# Patient Record
Sex: MALE | Race: WHITE
[De-identification: names, ages, dates, MRNs, and addresses within clinical notes are randomized per-mention and may not be internally consistent; named-entity substitution may affect disease eponyms.]

---

## 2017-03-11 ENCOUNTER — HOSPITAL ENCOUNTER (INPATIENT)
Dept: HOSPITAL 17 - PHEFT | Age: 27
LOS: 3 days | Discharge: LEFT BEFORE DISCHARGE | DRG: 433 | End: 2017-03-14
Attending: HOSPITALIST | Admitting: HOSPITALIST
Payer: COMMERCIAL

## 2017-03-11 VITALS
HEART RATE: 107 BPM | OXYGEN SATURATION: 99 % | SYSTOLIC BLOOD PRESSURE: 122 MMHG | TEMPERATURE: 97.8 F | DIASTOLIC BLOOD PRESSURE: 72 MMHG | RESPIRATION RATE: 20 BRPM

## 2017-03-11 VITALS
DIASTOLIC BLOOD PRESSURE: 83 MMHG | OXYGEN SATURATION: 94 % | RESPIRATION RATE: 20 BRPM | SYSTOLIC BLOOD PRESSURE: 133 MMHG | HEART RATE: 103 BPM | TEMPERATURE: 99.2 F

## 2017-03-11 VITALS
RESPIRATION RATE: 20 BRPM | SYSTOLIC BLOOD PRESSURE: 142 MMHG | OXYGEN SATURATION: 95 % | TEMPERATURE: 99 F | HEART RATE: 97 BPM | DIASTOLIC BLOOD PRESSURE: 79 MMHG

## 2017-03-11 VITALS
RESPIRATION RATE: 18 BRPM | OXYGEN SATURATION: 98 % | HEART RATE: 71 BPM | SYSTOLIC BLOOD PRESSURE: 123 MMHG | DIASTOLIC BLOOD PRESSURE: 62 MMHG

## 2017-03-11 VITALS
OXYGEN SATURATION: 98 % | DIASTOLIC BLOOD PRESSURE: 76 MMHG | HEART RATE: 100 BPM | RESPIRATION RATE: 18 BRPM | SYSTOLIC BLOOD PRESSURE: 140 MMHG

## 2017-03-11 VITALS
DIASTOLIC BLOOD PRESSURE: 65 MMHG | HEART RATE: 90 BPM | SYSTOLIC BLOOD PRESSURE: 135 MMHG | OXYGEN SATURATION: 97 % | RESPIRATION RATE: 16 BRPM

## 2017-03-11 VITALS — BODY MASS INDEX: 23.53 KG/M2 | WEIGHT: 173.72 LBS | HEIGHT: 72 IN

## 2017-03-11 DIAGNOSIS — F10.188: ICD-10-CM
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
## 2017-03-11 DIAGNOSIS — L29.9: ICD-10-CM

## 2017-03-11 DIAGNOSIS — K70.30: ICD-10-CM

## 2017-03-11 DIAGNOSIS — K72.90: ICD-10-CM

## 2017-03-11 DIAGNOSIS — Z87.820: ICD-10-CM

## 2017-03-11 DIAGNOSIS — Z72.0: ICD-10-CM

## 2017-03-11 DIAGNOSIS — Z78.1: ICD-10-CM

## 2017-03-11 DIAGNOSIS — E72.20: ICD-10-CM

## 2017-03-11 DIAGNOSIS — R74.0: ICD-10-CM

## 2017-03-11 DIAGNOSIS — K70.10: Primary | ICD-10-CM

## 2017-03-11 DIAGNOSIS — K21.9: ICD-10-CM

## 2017-03-11 LAB
ALP SERPL-CCNC: 223 U/L (ref 45–117)
ALT SERPL-CCNC: 66 U/L (ref 12–78)
AMPHETAMINE, URINE: (no result)
ANION GAP SERPL CALC-SCNC: 14 MEQ/L (ref 5–15)
APAP SERPL-MCNC: (no result) MCG/ML (ref 10–30)
APTT BLD: 35.1 SEC (ref 24.3–30.1)
AST SERPL-CCNC: 166 U/L (ref 15–37)
BARBITURATES, URINE: (no result)
BASOPHILS # BLD AUTO: 0.1 TH/MM3 (ref 0–0.2)
BASOPHILS NFR BLD: 0.8 % (ref 0–2)
BILIRUB SERPL-MCNC: 22.5 MG/DL (ref 0.2–1)
BUN SERPL-MCNC: 4 MG/DL (ref 7–18)
CHLORIDE SERPL-SCNC: 92 MEQ/L (ref 98–107)
COCAINE UR-MCNC: (no result) NG/ML
COLOR UR: (no result)
COMMENT (UR): (no result)
CULTURE IF INDICATED: (no result)
EOSINOPHIL # BLD: 0 TH/MM3 (ref 0–0.4)
EOSINOPHIL NFR BLD: 0.5 % (ref 0–4)
ERYTHROCYTE [DISTWIDTH] IN BLOOD BY AUTOMATED COUNT: 15.6 % (ref 11.6–17.2)
GFR SERPLBLD BASED ON 1.73 SQ M-ARVRAT: 163 ML/MIN (ref 89–?)
GLUCOSE UR STRIP-MCNC: 100 MG/DL
HCO3 BLD-SCNC: 27 MEQ/L (ref 21–32)
HCT VFR BLD CALC: 34.7 % (ref 39–51)
HEMO FLAGS: (no result)
HGB UR QL STRIP: (no result)
INR PPP: 1.5 RATIO
KETONES UR STRIP-MCNC: (no result) MG/DL
LEUKOCYTE ESTERASE UR QL STRIP: (no result) /HPF (ref 0–5)
LYMPHOCYTES # BLD AUTO: 0.9 TH/MM3 (ref 1–4.8)
LYMPHOCYTES NFR BLD AUTO: 12 % (ref 9–44)
MCH RBC QN AUTO: 36 PG (ref 27–34)
MCHC RBC AUTO-ENTMCNC: 34.6 % (ref 32–36)
MCV RBC AUTO: 104.1 FL (ref 80–100)
METHOD OF COLLECTION: (no result)
MONOCYTES NFR BLD: 10.2 % (ref 0–8)
NEUTROPHILS # BLD AUTO: 6 TH/MM3 (ref 1.8–7.7)
NEUTROPHILS NFR BLD AUTO: 76.5 % (ref 16–70)
NITRITE UR QL STRIP: (no result)
PLATELET # BLD: 123 TH/MM3 (ref 150–450)
POTASSIUM SERPL-SCNC: 3 MEQ/L (ref 3.5–5.1)
PROTHROMBIN TIME: 16.8 SEC (ref 9.8–11.6)
RBC # BLD AUTO: 3.33 MIL/MM3 (ref 4.5–5.9)
SODIUM SERPL-SCNC: 133 MEQ/L (ref 136–145)
SP GR UR STRIP: (no result) (ref 1–1.03)
SQUAMOUS #/AREA URNS HPF: (no result) /HPF (ref 0–5)
WBC # BLD AUTO: 7.8 TH/MM3 (ref 4–11)

## 2017-03-11 PROCEDURE — 80053 COMPREHEN METABOLIC PANEL: CPT

## 2017-03-11 PROCEDURE — 83690 ASSAY OF LIPASE: CPT

## 2017-03-11 PROCEDURE — 85007 BL SMEAR W/DIFF WBC COUNT: CPT

## 2017-03-11 PROCEDURE — 86308 HETEROPHILE ANTIBODY SCREEN: CPT

## 2017-03-11 PROCEDURE — 96360 HYDRATION IV INFUSION INIT: CPT

## 2017-03-11 PROCEDURE — 84443 ASSAY THYROID STIM HORMONE: CPT

## 2017-03-11 PROCEDURE — 85730 THROMBOPLASTIN TIME PARTIAL: CPT

## 2017-03-11 PROCEDURE — 74177 CT ABD & PELVIS W/CONTRAST: CPT

## 2017-03-11 PROCEDURE — 76705 ECHO EXAM OF ABDOMEN: CPT

## 2017-03-11 PROCEDURE — 81001 URINALYSIS AUTO W/SCOPE: CPT

## 2017-03-11 PROCEDURE — 82248 BILIRUBIN DIRECT: CPT

## 2017-03-11 PROCEDURE — 80074 ACUTE HEPATITIS PANEL: CPT

## 2017-03-11 PROCEDURE — 85610 PROTHROMBIN TIME: CPT

## 2017-03-11 PROCEDURE — 85027 COMPLETE CBC AUTOMATED: CPT

## 2017-03-11 PROCEDURE — 80307 DRUG TEST PRSMV CHEM ANLYZR: CPT

## 2017-03-11 PROCEDURE — 83735 ASSAY OF MAGNESIUM: CPT

## 2017-03-11 PROCEDURE — 82140 ASSAY OF AMMONIA: CPT

## 2017-03-11 PROCEDURE — 85025 COMPLETE CBC W/AUTO DIFF WBC: CPT

## 2017-03-11 RX ADMIN — ONDANSETRON PRN MG: 2 INJECTION, SOLUTION INTRAMUSCULAR; INTRAVENOUS at 20:39

## 2017-03-11 RX ADMIN — SODIUM CHLORIDE, PRESERVATIVE FREE SCH ML: 5 INJECTION INTRAVENOUS at 20:44

## 2017-03-11 RX ADMIN — WATER SCH ML: 1 IRRIGANT IRRIGATION at 16:31

## 2017-03-11 RX ADMIN — POTASSIUM CHLORIDE SCH MEQ: 750 TABLET, FILM COATED, EXTENDED RELEASE ORAL at 16:44

## 2017-03-11 RX ADMIN — PANTOPRAZOLE SCH MG: 40 TABLET, DELAYED RELEASE ORAL at 17:24

## 2017-03-11 RX ADMIN — SODIUM CHLORIDE, PRESERVATIVE FREE PRN ML: 5 INJECTION INTRAVENOUS at 16:31

## 2017-03-11 NOTE — RADHPO
EXAM DATE/TIME:  03/11/2017 13:56 

 

[facility] COMPARISON:     

No previous studies available for comparison.

        

 

 

INDICATIONS :     

Right upper quadrant pain.

                     

 

MEDICAL HISTORY :     

Gastroesophageal reflux disease.     Skull fracture. Right foot/ankle fracture. Jaundice. Abdominal p
ain. ETOH abuse.

 

SURGICAL HISTORY :          

Right foot/ankle fracture repair.

 

ENCOUNTER:     

Initial

 

ACUITY:     

4-6 days

 

PAIN SCORE:     

4/10

 

LOCATION:     

Right upper quadrant 

                     

MEASUREMENTS:     

 

LIVER:     

21.0 cm length 

 

COMMON DUCT:     

6 mm

 

RIGHT KIDNEY:     

11.8 x 5.8 x 5.4 cm

 

FINDINGS:     

 

LIVER:     

The liver is enlarged and echogenic. No focal hepatic masses seen.

 

COMMON DUCT:     

No intraluminal mass or stone visualized.  

 

GALLBLADDER:          

There is sludge within the gallbladder. The gallbladder wall is thickened. Gallstones are not seen.

 

PANCREAS:          

The visualized portions are within normal limits.  

 

RIGHT KIDNEY:          

No evidence of hydronephrosis, stone, or mass.  

 

CONCLUSION:     

1. Hepatomegaly and suspected fatty infiltration.

2. Thickened gallbladder wall which can be seen with hepatic disease. Gallstones are not seen. Cholec
ystitis cannot be ruled out in the correct clinical situation.

 

 

 

 Jose Arnett MD on March 11, 2017 at 14:44           

Board Certified Radiologist.

 This report was verified electronically.

## 2017-03-11 NOTE — PD
HPI


Chief Complaint:  Abdominal Pain


Time Seen by Provider:  13:08


Travel History


International Travel<30 days:  No


Contact w/Intl Traveler<30days:  No





History of Present Illness


HPI


Patient is a 26-year-old male who presents to emergency room for evaluation of 

right upper quadrant abdominal pain, fatigue, decreased appetite as well as 

jaundice.  Patient reports that for the past 4-5 days, he has not been feeling 

well.  Patient reports that he has been feeling nauseous, reports overall 

decreased oral intake.  Reports that he has been having pains to his right 

upper abdomen.  Patient also reports that he has noticed that his eyes as well 

as his skin appears yellow in color.  Reports that he has noticed mild pruritus 

to his skin.  Patient does admit to heavy alcohol drinking and does drink about 

6-7 alcoholic beverages per day.  Reports that he has been drinking very 

heavily for the past few years.  Patient reports that he has been also having 

some low-grade fevers with a MAXIMUM TEMPERATURE of 101.  Patient here for 

evaluation of the symptoms.


Patient denies any use of drugs, reports no past medical history or past 

surgical history.





PFSH


Past Medical History


Asthma:  No


Autoimmune Disease:  No


Blood Disorders:  No


Anxiety:  No


Depression:  No


Cardiovascular Problems:  No


Cystic Fibrosis:  No


Diminished Hearing:  No


Gastrointestinal Disorders:  Yes (REFLUX, IS TAKING PRILOSEC OTC FOR THIS SINCE 

2001)


GERD:  Yes


Genitourinary:  No


Musculoskeletal:  Yes (FX RT FT /ANKLE)


Neurologic:  No


Psychiatric:  No


Respiratory:  No


Sleep Apnea:  No





Past Surgical History


Abdominal Surgery:  No


Cardiac Surgery:  No


Ear Surgery:  No


Endocrine Surgery:  No


Eye Surgery:  No


Genitourinary Surgery:  No


Gynecologic Surgery:  No


Oral Surgery:  No


Thoracic Surgery:  No





Social History


Alcohol Use:  Yes (3 DRINKS PER MONTH)


Tobacco Use:  Yes (1 PK PER WEEK)


Substance Use:  No





Allergies-Medications


(Allergen,Severity, Reaction):  


Coded Allergies:  


     No Known Allergies (Verified , 3/11/17)


Reported Meds & Prescriptions





Reported Meds & Active Scripts


Active


No Active Prescriptions or Reported Medications    








Review of Systems


General / Constitutional:  Positive: Fever, Chills


Eyes:  No: Visual changes


HENT:  No: Headaches


Cardiovascular:  No: Chest Pain or Discomfort


Respiratory:  No: Shortness of Breath


Gastrointestinal:  Positive: Nausea, Abdominal Pain, Loss of Appetite,  No: 

Vomiting


Genitourinary:  No: Dysuria


Musculoskeletal:  No: Pain


Skin:  No Rash


Neurologic:  Positive: Weakness


Psychiatric:  No: Depression


Endocrine:  No: Polydipsia


Hematologic/Lymphatic:  No: Easy Bruising





Physical Exam


Narrative


GENERAL: no acute distress, nontoxic 


SKIN: Warm and dry.  Patient is jaundiced appearing


HEAD: Atraumatic. Normocephalic. 


EYES: Pupils equal and round.  Patient with jaundice to sclera.  Patient with 

left-sided conjunctival injected. 


ENT: No nasal bleeding or discharge.  Mucous membranes pink and moist.


NECK: Trachea midline. No JVD. 


CARDIOVASCULAR: Regular rate and rhythm.  No murmur appreciated.


RESPIRATORY: No accessory muscle use. Clear to auscultation. Breath sounds 

equal bilaterally. 


GASTROINTESTINAL: Abdomen soft, patient with mild tenderness to the right upper 

quadrant with no rebound or guarding on exam, nondistended. 


MUSCULOSKELETAL: No obvious deformities. No clubbing.  No cyanosis.  No edema. 


NEUROLOGICAL: Awake and alert. No obvious cranial nerve deficits.  Motor 

grossly within normal limits. Normal speech.


PSYCHIATRIC: Appropriate mood and affect; insight and judgment normal.





Data


Data


Last Documented VS








Vital Signs








  Date Time  Temp Pulse Resp B/P Pulse Ox O2 Delivery O2 Flow Rate FiO2


 


3/11/17 14:37  71 18 123/62 98 Room Air  


 


3/11/17 11:20 99.2       











Orders








 Us Abdomen Gallbladder (3/11/17 )


Lipase (3/11/17 13:42)


Urinalysis - C+S If Indicated (3/11/17 13:42)


Comprehensive Metabolic Panel (3/11/17 13:42)


Complete Blood Count With Diff (3/11/17 13:42)


Hepatitis Profile (3/11/17 13:42)


Act Partial Throm Time (Ptt) (3/11/17 13:42)


Prothrombin Time / Inr (Pt) (3/11/17 13:42)


Ammonia (3/11/17 13:42)


Tylenol (Acetaminophen) (3/11/17 13:42)


Iv Access Insert/Monitor (3/11/17 13:42)


Alcohol (Ethanol) (3/11/17 13:42)


Ct Abd/Pel W Iv Contrast(Rout) (3/11/17 14:06)


Sodium Chlor 0.9% 1000 Ml Inj (Ns 1000 M (3/11/17 14:15)








Labs








 Laboratory Tests








Test 3/11/17 3/11/17 3/11/17





 12:00 13:50 14:55


 


White Blood Count 7.8 TH/MM3  


 


Red Blood Count 3.33 MIL/MM3  


 


Hemoglobin 12.0 GM/DL  


 


Hematocrit 34.7 %  


 


Mean Corpuscular Volume 104.1 FL  


 


Mean Corpuscular Hemoglobin 36.0 PG  


 


Mean Corpuscular Hemoglobin 34.6 %  





Concent   


 


Red Cell Distribution Width 15.6 %  


 


Platelet Count 123 TH/MM3  


 


Mean Platelet Volume 9.5 FL  


 


Neutrophils (%) (Auto) 76.5 %  


 


Lymphocytes (%) (Auto) 12.0 %  


 


Monocytes (%) (Auto) 10.2 %  


 


Eosinophils (%) (Auto) 0.5 %  


 


Basophils (%) (Auto) 0.8 %  


 


Neutrophils # (Auto) 6.0 TH/MM3  


 


Lymphocytes # (Auto) 0.9 TH/MM3  


 


Monocytes # (Auto) 0.8 TH/MM3  


 


Eosinophils # (Auto) 0.0 TH/MM3  


 


Basophils # (Auto) 0.1 TH/MM3  


 


CBC Comment DIFF FINAL   


 


Differential Comment    


 


Prothrombin Time 16.8 SEC  


 


Prothromb Time International 1.5 RATIO  





Ratio   


 


Activated Partial 35.1 SEC  





Thromboplast Time   


 


Sodium Level 133 MEQ/L  


 


Potassium Level 3.0 MEQ/L  


 


Chloride Level 92 MEQ/L  


 


Carbon Dioxide Level 27.0 MEQ/L  


 


Anion Gap 14 MEQ/L  


 


Blood Urea Nitrogen 4 MG/DL  


 


Creatinine 0.60 MG/DL  


 


Estimat Glomerular Filtration 163 ML/MIN  





Rate   


 


Random Glucose 83 MG/DL  


 


Calcium Level 8.2 MG/DL  


 


Total Bilirubin 22.5 MG/DL  


 


Aspartate Amino Transf 166 U/L  





(AST/SGOT)   


 


Alanine Aminotransferase 66 U/L  





(ALT/SGPT)   


 


Alkaline Phosphatase 223 U/L  


 


Total Protein 6.2 GM/DL  


 


Albumin 2.9 GM/DL  


 


Lipase 572 U/L  


 


Acetaminophen Level LESS THAN 2.0  





 MCG/ML  


 


Ethyl Alcohol Level 21 MG/DL  


 


Ammonia  39 MCMOL/L 


 


Urine Collection Type   CLEAN CATCH 


 


Urine Color   AREN 


 


Urine Turbidity   CLEAR 


 


Urine pH   7.0 


 


Urine Specific Gravity   GREATER THAN





   1.035


 


Urine Protein   30 mg/dL


 


Urine Glucose (UA)   100 mg/dL


 


Urine Ketones   TRACE mg/dL


 


Urine Occult Blood   TRACE 


 


Urine Nitrite   NEG 


 


Urine Bilirubin   LARGE 


 


Urine Leukocyte Esterase   TRACE 


 


Urine WBC   0-2 /hpf


 


Urine Squamous Epithelial   0-5 /hpf





Cells   


 


Microscopic Urinalysis Comment   CULT NOT





   INDICATED














MDM


Medical Decision Making


Medical Screen Exam Complete:  Yes


Emergency Medical Condition:  Yes


Differential Diagnosis


Biliary duct obstruction, hepatitis, alcoholic hepatitis, pancreatitis, 

pancreatic cancer, cholangiocarcinoma, biliary atresia


Narrative Course


Patient is a 26-year-old male who presents to emergency room with complaints of 

jaundice, right upper quadrant abdominal pain, pruritus, fevers, fatigue and 

generalized weakness per the past 4-5 days.  Patient does have significant 

jaundice to his skin as well as to his sclera, patient admits heavy alcohol 

drinking.


Labs as well as hepatitis panel, lipase, liver function tests, right upper 

quadrant also been ordered.


Plan to monitor patient this time.





Physician Communication


Physician Communication


case reviewed with dr sequeira who accepts pt to service





Diagnosis





 Primary Impression:  


 Acute hepatitis


 Additional Impressions:  


 Alcohol abuse


 Transaminitis


 Jaundice


Scripts


No Active Prescriptions or Reported Meds








Cristal Phillips DO Mar 11, 2017 13:15

## 2017-03-11 NOTE — RADHPO
EXAM DATE/TIME:  03/11/2017 14:41 

 

[facility] COMPARISON:     

No previous studies available for comparison.

 

 

INDICATIONS :     

Abdomen pain, jaundice

                      

 

IV CONTRAST:     

70 cc Omnipaque 350 (iohexol) IV 

 

 

ORAL CONTRAST:      

No oral contrast ingested.

                      

 

RADIATION DOSE:     

10.62 CTDIvol (mGy) 

 

 

MEDICAL HISTORY :     

Gastroesophageal reflux disease.  

 

SURGICAL HISTORY :      

None. 

 

ENCOUNTER:      

Initial

 

ACUITY:      

1 day

 

PAIN SCALE:      

4/10

 

LOCATION:         

Abdomen 

 

TECHNIQUE:     

Volumetric scanning of the abdomen and pelvis was performed.  Using automated exposure control and ad
justment of the mA and/or kV according to patient size, radiation dose was kept as low as reasonably 
achievable to obtain optimal diagnostic quality images. 

 

FINDINGS:     

Liver is very enlarged and with heterogeneous, primarily fatty attenuation. No focal hepatic lesions 
seen. No ductal dilatation. There is sludge and/or small stones in the gallbladder.

 

Spleen enlarged at approximately 15.7 cm craniocaudal.

 

Pancreas, adrenal glands and kidneys within normal limits.

 

No obstruction or perceptible acute inflammatory changes of the gastrointestinal tract. There is smal
l ascites.

 

Visualized lung bases are clear. Visualized osseous structures are within normal limits.

 

CONCLUSION:     

1. Suspected hepatocellular disease and potentially with an acute component. Liver is very enlarged a
nd fatty infiltrated. There is small ascites and splenomegaly.

2. Sludge and/or small stones in the gallbladder without associated inflammatory changes. No duct sto
ne or ductal dilatation.

 

 

 

 Jose Irvin MD on March 11, 2017 at 15:07           

Board Certified Radiologist.

 This report was verified electronically.

## 2017-03-11 NOTE — HHI.HP
__________________________________________________





HPI


Service


[facility]


Primary Care Physician


No Primary Care Physician


Admission Diagnosis


Acute Liver Failure, Alcoholism


Diagnoses:  


Chief Complaint:  


yellow


Travel History


International Travel<30 Days:  No


Contact w/Intl Traveler <30 Da:  No


Traveled to Known Affected Are:  No


History of Present Illness


Patient is a 26-year-old gentleman with a known history of chronic alcoholism 

for at least the last 6 years.  He did come to the emergency room with 4 days 

of increasing "yellowness".  He arrives quite jaundiced with some left eye 

hemorrhage which is asymptomatic.  He has had increased nausea and decreased 

oral intake over the last several weeks.  He is accompanied by his mother.  He 

does have history of anxiety and had been taking alprazolam in the past but had 

stopped that about a year ago he is employed however has significant bruises 

and has reported increased weight loss of about 30 pounds in the last month 

with associated nausea and poor oral intake patient reports his abdomen is been 

aching.  He notes no history of a viral hepatitis or needle sticks or blood 

transfusions in the past.  Patient has been admitted Into the hospital due to 

increased jaundiced with evidence of liver failure and hyperbilirubinemia.  

Plan of care discussed with patient, IGOR MCGINNIS and mom at bedside





Review of Systems


Constitutional:  COMPLAINS OF: Fatigue, Weight loss, Change in appetite,  DENIES

: Diaphoretic episodes, Fever, Weight gain, Chills, Dizziness, Night Sweats


Endocrine:  DENIES: Heat/cold intolerance, Polydipsia, Polyuria, Polyphagia


Eyes:  DENIES: Blurred vision, Diplopia, Eye inflammation, Eye pain, Vision loss

, Photosensitivity, Double Vision


Ears, nose, mouth, throat:  DENIES: Tinnitus, Hearing loss, Vertigo, Nasal 

discharge, Oral lesions, Throat pain, Hoarseness, Ear Pain, Running Nose, 

Epistaxis, Sinus Pain, Toothache, Odynophagia


Respiratory:  DENIES: Apneas, Cough, Snoring, Wheezing, Hemoptysis, Sputum 

production, Shortness of breath


Cardiovascular:  DENIES: Chest pain, Palpitations, Syncope, Dyspnea on Exertion

, PND, Lower Extremity Edema, Orthopnea, Claudication


Gastrointestinal:  DENIES: Abdominal pain, Black stools, Bloody stools, 

Constipation, Diarrhea, Nausea, Vomiting, Difficulty Swallowing, Anorexia


Genitourinary:  DENIES: Sexual dysfunction, Urinary frequency, Urinary 

incontinence, Urgency, Hematuria, Dysuria, Nocturia, Penile Discharge, 

Testicular Pain, Testicular Swelling


Musculoskeletal:  DENIES: Joint pain, Muscle aches, Stiffness, Joint Swelling, 

Back pain, Neck pain


Integumentary:  DENIES: Abnormal pigmentation, Nail changes, Pruritus, Rash


Hematologic/lymphatic:  COMPLAINS OF: Bruising,  DENIES: Lymphadenopathy


Immunologic/allergic:  DENIES: Eczema, Urticaria


Neurologic:  DENIES: Abnormal gait, Headache, Localized weakness, Paresthesias, 

Seizures, Speech Problems, Tremor, Poor Balance


Psychiatric:  COMPLAINS OF: Anxiety,  DENIES: Confusion, Mood changes, 

Depression, Hallucinations, Agitation, Suicidal Ideation, Homicidal Ideation, 

Delusions





Past Family Social History


Past Medical History


Anxiety


Past Surgical History


None


Reported Medications


None


Allergies:  


Coded Allergies:  


     No Known Allergies (Verified , 3/11/17)


Active Ordered Medications


Reviewed and the medical record


Family History


No history of alcoholism


Social History


However take daily, drinks at least 612 ounce beers or hard liquor daily for 

the last 6 years, lives with her roommate, employed as a 





Physical Exam


Vital Signs





 Vital Signs








  Date Time  Temp Pulse Resp B/P Pulse Ox O2 Delivery O2 Flow Rate FiO2


 


3/11/17 14:37  71 18 123/62 98 Room Air  


 


3/11/17 13:16  90 16 135/65 97 Room Air  


 


3/11/17 11:20 99.2 103 20 133/83 94   








Physical Exam


GENERAL: This is a well-nourished, well-developed patient, tremorous.


SKIN: No rashes, scattered petechia,jaundice. Cool and dry.


HEAD: Atraumatic. Normocephalic. No temporal or scalp tenderness.


EYES: left eye asymptomatic hemorrhage, icteric


ENT: Nose without bleeding, purulent drainage or septal hematoma. Throat 

without erythema, tonsillar hypertrophy or exudate. Uvula midline. Airway 

patent.


NECK: Trachea midline. No JVD or lymphadenopathy. Supple, nontender, no 

meningeal signs.


CARDIOVASCULAR: Regular rate and rhythm without murmurs, gallops, or rubs. 


RESPIRATORY: Clear to auscultation. Breath sounds equal bilaterally. No wheezes

, rales, or rhonchi.  


GASTROINTESTINAL: Abdomen soft, non-tender, nondistended. there is hepato-

splenomegaly, no palpable masses. No guarding.


MUSCULOSKELETAL: Extremities without clubbing, cyanosis, or edema. No joint 

tenderness, effusion, or edema noted. No calf tenderness. Negative Homans sign 

bilaterally.


NEUROLOGICAL: Awake and alert. Cranial nerves II through XII intact.  Motor and 

sensory grossly within normal limits. Five out of 5 muscle strength in all 

muscle groups.  Normal speech.


Laboratory





Laboratory Tests








Test 3/11/17 3/11/17 3/11/17





 12:00 13:50 14:55


 


White Blood Count 7.8   


 


Red Blood Count 3.33   


 


Hemoglobin 12.0   


 


Hematocrit 34.7   


 


Mean Corpuscular Volume 104.1   


 


Mean Corpuscular Hemoglobin 36.0   


 


Mean Corpuscular Hemoglobin 34.6   





Concent   


 


Red Cell Distribution Width 15.6   


 


Platelet Count 123   


 


Mean Platelet Volume 9.5   


 


Neutrophils (%) (Auto) 76.5   


 


Lymphocytes (%) (Auto) 12.0   


 


Monocytes (%) (Auto) 10.2   


 


Eosinophils (%) (Auto) 0.5   


 


Basophils (%) (Auto) 0.8   


 


Neutrophils # (Auto) 6.0   


 


Lymphocytes # (Auto) 0.9   


 


Monocytes # (Auto) 0.8   


 


Eosinophils # (Auto) 0.0   


 


Basophils # (Auto) 0.1   


 


CBC Comment DIFF FINAL   


 


Differential Comment    


 


Prothrombin Time 16.8   


 


Prothromb Time International 1.5   





Ratio   


 


Activated Partial 35.1   





Thromboplast Time   


 


Sodium Level 133   


 


Potassium Level 3.0   


 


Chloride Level 92   


 


Carbon Dioxide Level 27.0   


 


Anion Gap 14   


 


Blood Urea Nitrogen 4   


 


Creatinine 0.60   


 


Estimat Glomerular Filtration 163   





Rate   


 


Random Glucose 83   


 


Calcium Level 8.2   


 


Total Bilirubin 22.5   


 


Aspartate Amino Transf 166   





(AST/SGOT)   


 


Alanine Aminotransferase 66   





(ALT/SGPT)   


 


Alkaline Phosphatase 223   


 


Total Protein 6.2   


 


Albumin 2.9   


 


Lipase 572   


 


Acetaminophen Level LESS THAN 2.0   


 


Ethyl Alcohol Level 21   


 


Ammonia  39  


 


Urine Collection Type   CLEAN CATCH 


 


Urine Color   AREN 


 


Urine Turbidity   CLEAR 


 


Urine pH   7.0 


 


Urine Specific Gravity   GREATER THAN





   1.035


 


Urine Protein   30 


 


Urine Glucose (UA)   100 


 


Urine Ketones   TRACE 


 


Urine Occult Blood   TRACE 


 


Urine Nitrite   NEG 


 


Urine Bilirubin   LARGE 


 


Urine Leukocyte Esterase   TRACE 


 


Urine WBC   0-2 


 


Urine Squamous Epithelial   0-5 





Cells   


 


Microscopic Urinalysis Comment   CULT NOT





   INDICATED








Result Diagram:  


3/11/17 1200                                                                   

             3/11/17 1200





Imaging


Gallbladder ultrasound, some enlargement without evidence of stones 


CT abdomen pelvis shows fatty liver, splenomegaly and minimal ascites





Assessment and Plan


Problem List:  


(1) Acute hepatitis


ICD Code:  B17.9


Status:  Acute


Plan:  likely  due to etoh (thrombocytopenia likley related)


r/o other causes? Viral, tylenol, GB disease


GI eval pending


aldactone, nadolol, lactulose


Reg diet, folic acid, thiamine, multivitamin, Protonix


Replace electrolytes





Code Status


full code


Discussed Condition With


patient, mom, ER MD





Physician Certification


2 Midnight Certification Type:  Admission for Inpatient Services


Order for Inpatient Services


The services are ordered in accordance with Medicare regulations or non-

Medicare payer requirements, as applicable.  In the case of services not 

specified as inpatient-only, they are appropriately provided as inpatient 

services in accordance with the 2-midnight benchmark.


Estimated LOS (days):  4


4 days is the estimated time the patient will need to remain in the hospital, 

assuming treatment plan goals are met and no additional complications.


Post-Hospital Plan:  Not yet determined








Brianna Jimenez MD Mar 11, 2017 16:32

## 2017-03-12 VITALS
DIASTOLIC BLOOD PRESSURE: 77 MMHG | HEART RATE: 103 BPM | SYSTOLIC BLOOD PRESSURE: 117 MMHG | OXYGEN SATURATION: 94 % | RESPIRATION RATE: 16 BRPM | TEMPERATURE: 100 F

## 2017-03-12 VITALS
DIASTOLIC BLOOD PRESSURE: 78 MMHG | OXYGEN SATURATION: 94 % | SYSTOLIC BLOOD PRESSURE: 118 MMHG | TEMPERATURE: 99.2 F | HEART RATE: 104 BPM | RESPIRATION RATE: 20 BRPM

## 2017-03-12 VITALS
HEART RATE: 109 BPM | RESPIRATION RATE: 20 BRPM | SYSTOLIC BLOOD PRESSURE: 109 MMHG | DIASTOLIC BLOOD PRESSURE: 73 MMHG | OXYGEN SATURATION: 95 % | TEMPERATURE: 100.6 F

## 2017-03-12 VITALS
TEMPERATURE: 97.1 F | RESPIRATION RATE: 20 BRPM | SYSTOLIC BLOOD PRESSURE: 119 MMHG | HEART RATE: 101 BPM | OXYGEN SATURATION: 97 % | DIASTOLIC BLOOD PRESSURE: 76 MMHG

## 2017-03-12 VITALS
HEART RATE: 113 BPM | TEMPERATURE: 98 F | RESPIRATION RATE: 20 BRPM | OXYGEN SATURATION: 100 % | SYSTOLIC BLOOD PRESSURE: 126 MMHG | DIASTOLIC BLOOD PRESSURE: 84 MMHG

## 2017-03-12 VITALS
TEMPERATURE: 98.9 F | OXYGEN SATURATION: 97 % | DIASTOLIC BLOOD PRESSURE: 88 MMHG | RESPIRATION RATE: 20 BRPM | SYSTOLIC BLOOD PRESSURE: 114 MMHG | HEART RATE: 107 BPM

## 2017-03-12 LAB
ALP SERPL-CCNC: 208 U/L (ref 45–117)
ALT SERPL-CCNC: 65 U/L (ref 12–78)
ANION GAP SERPL CALC-SCNC: 10 MEQ/L (ref 5–15)
AST SERPL-CCNC: 146 U/L (ref 15–37)
BASOPHILS # BLD AUTO: 0.3 TH/MM3 (ref 0–0.2)
BASOPHILS NFR BLD: 3.7 % (ref 0–2)
BILIRUB SERPL-MCNC: 24.6 MG/DL (ref 0.2–1)
BUN SERPL-MCNC: 6 MG/DL (ref 7–18)
CHLORIDE SERPL-SCNC: 95 MEQ/L (ref 98–107)
EOSINOPHIL # BLD: 0 TH/MM3 (ref 0–0.4)
EOSINOPHIL NFR BLD: 0.6 % (ref 0–4)
EOSINOPHIL NFR BLD: 2 % (ref 0–4)
ERYTHROCYTE [DISTWIDTH] IN BLOOD BY AUTOMATED COUNT: 16.2 % (ref 11.6–17.2)
GFR SERPLBLD BASED ON 1.73 SQ M-ARVRAT: 120 ML/MIN (ref 89–?)
HCO3 BLD-SCNC: 27.6 MEQ/L (ref 21–32)
HCT VFR BLD CALC: 33.2 % (ref 39–51)
HEMO FLAGS: (no result)
INR PPP: 1.6 RATIO
LYMPHOCYTES # BLD AUTO: 2 TH/MM3 (ref 1–4.8)
LYMPHOCYTES NFR BLD AUTO: 25.7 % (ref 9–44)
MCH RBC QN AUTO: 36.1 PG (ref 27–34)
MCHC RBC AUTO-ENTMCNC: 34.4 % (ref 32–36)
MCV RBC AUTO: 104.9 FL (ref 80–100)
MONOCYTES NFR BLD: 8.6 % (ref 0–8)
NEUTROPHILS # BLD AUTO: 4.6 TH/MM3 (ref 1.8–7.7)
NEUTROPHILS NFR BLD AUTO: 61.4 % (ref 16–70)
NEUTS BAND # BLD MANUAL: 6 TH/MM3 (ref 1.8–7.7)
NEUTS BAND NFR BLD: 2 % (ref 0–6)
NEUTS SEG NFR BLD MANUAL: 77 % (ref 16–70)
PLATELET # BLD: 128 TH/MM3 (ref 150–450)
POTASSIUM SERPL-SCNC: 3.2 MEQ/L (ref 3.5–5.1)
PROTHROMBIN TIME: 17.8 SEC (ref 9.8–11.6)
RBC # BLD AUTO: 3.17 MIL/MM3 (ref 4.5–5.9)
SCAN/DIFF: (no result)
SODIUM SERPL-SCNC: 133 MEQ/L (ref 136–145)
WBC # BLD AUTO: 7.6 TH/MM3 (ref 4–11)
WBC DIFF SAMPLE: 100

## 2017-03-12 RX ADMIN — PANTOPRAZOLE SCH MG: 40 TABLET, DELAYED RELEASE ORAL at 08:51

## 2017-03-12 RX ADMIN — SODIUM CHLORIDE, PRESERVATIVE FREE SCH ML: 5 INJECTION INTRAVENOUS at 08:50

## 2017-03-12 RX ADMIN — NADOLOL SCH MG: 20 TABLET ORAL at 08:51

## 2017-03-12 RX ADMIN — POTASSIUM CHLORIDE SCH MEQ: 750 TABLET, FILM COATED, EXTENDED RELEASE ORAL at 08:52

## 2017-03-12 RX ADMIN — SODIUM CHLORIDE, PRESERVATIVE FREE PRN ML: 5 INJECTION INTRAVENOUS at 22:21

## 2017-03-12 RX ADMIN — ACYCLOVIR SCH TAB: 800 TABLET ORAL at 08:51

## 2017-03-12 RX ADMIN — PENTOXIFYLLINE SCH MG: 400 TABLET, FILM COATED, EXTENDED RELEASE ORAL at 22:26

## 2017-03-12 RX ADMIN — SODIUM CHLORIDE, PRESERVATIVE FREE SCH ML: 5 INJECTION INTRAVENOUS at 20:06

## 2017-03-12 RX ADMIN — ONDANSETRON PRN MG: 2 INJECTION, SOLUTION INTRAMUSCULAR; INTRAVENOUS at 06:08

## 2017-03-12 RX ADMIN — WATER SCH ML: 1 IRRIGANT IRRIGATION at 08:53

## 2017-03-12 RX ADMIN — SPIRONOLACTONE SCH MG: 25 TABLET, FILM COATED ORAL at 08:50

## 2017-03-12 RX ADMIN — Medication SCH MG: at 08:51

## 2017-03-12 RX ADMIN — FOLIC ACID SCH MG: 1 TABLET ORAL at 08:52

## 2017-03-12 NOTE — HHI.PR
Subjective


Remarks


Patient seen on floor ambulating, would like to leave


Some concern for hallucinations


Mom at bedside and patient unable to clarify his mental status





Objective


Vitals





 Vital Signs








  Date Time  Temp Pulse Resp B/P Pulse Ox O2 Delivery O2 Flow Rate FiO2


 


3/12/17 12:00 98.0 113 20 126/84 100   


 


3/12/17 08:00 97.1 101 20 119/76 97   


 


3/12/17 04:29 100.0 103 16 117/77 94   


 


3/12/17 01:06 99.2 104 20 118/78 94   


 


3/11/17 20:28 97.8 107 20 122/72 99   


 


3/11/17 17:38 99.0 97 20 142/79 95   


 


3/11/17 16:43  100 18 140/76 98 Room Air  








 I/O








 3/11/17 3/11/17 3/11/17 3/12/17 3/12/17 3/12/17





 07:00 15:00 23:00 07:00 15:00 23:00


 


Intake Total   1000 ml  420 ml 


 


Balance   1000 ml  420 ml 


 


      


 


Intake Oral     420 ml 


 


IV Total   1000 ml   


 


# Voids  1 2 2 2 








Result Diagram:  


3/12/17 0822                                                                   

             3/12/17 0822





Imaging





Last Impressions








Abdomen/Pelvis CT 3/11/17 1406 Signed





Impressions: 





 Service Date/Time:  Saturday, March 11, 2017 14:41 - CONCLUSION:  1. Suspected 





 hepatocellular disease and potentially with an acute component. Liver is very 





 enlarged and fatty infiltrated. There is small ascites and splenomegaly. 2. 





 Sludge and/or small stones in the gallbladder without associated inflammatory 





 changes. No duct stone or ductal dilatation.     Jose Irvin MD 


 


Gall Bladder Ultrasound 3/11/17 0000 Signed





Impressions: 





 Service Date/Time:  Saturday, March 11, 2017 13:56 - CONCLUSION:  1. 





 Hepatomegaly and suspected fatty infiltration. 2. Thickened gallbladder wall 





 which can be seen with hepatic disease. Gallstones are not seen. Cholecystitis 





 cannot be ruled out in the correct clinical situation.     Jose Arnett MD 








Objective Remarks


GENERAL: This is a well-nourished, well-developed patient, in no apparent 

distress. Jaundice, petechia


CARDIOVASCULAR: sinus tachy without murmurs, gallops, or rubs. 


RESPIRATORY: Clear to auscultation. Breath sounds equal bilaterally. No wheezes

, rales, or rhonchi.  


GASTROINTESTINAL: Abdomen soft, non-tender, nondistended. Normal active bowel 

sounds


MUSCULOSKELETAL: Extremities without clubbing, cyanosis, or edema.


NEURO:  Alert & Oriented x4 to person, place, time, situation.  Moves all ext x4





A/P


Problem List:  


(1) Acute hepatitis


ICD Code:  B17.9


Status:  Acute


Plan:  likely  due to EtOH (Thrombocytopenia, coagulopathy likely related)


r/o other causes? Viral mono neg, hep viral studies pending , Tylenol neg, GB 

disease


GI eval pending


Aldactone, nadolol, lactulose


Reg diet, folic acid, thiamine, multivitamin, Protonix


Ammonia level still high


Replace electrolytes





Assessment and Plan


Santizo acted, Psych to see








Brianna Jimenez MD Mar 12, 2017 15:19

## 2017-03-13 VITALS
HEART RATE: 116 BPM | SYSTOLIC BLOOD PRESSURE: 132 MMHG | RESPIRATION RATE: 42 BRPM | DIASTOLIC BLOOD PRESSURE: 82 MMHG | TEMPERATURE: 101.6 F | OXYGEN SATURATION: 80 %

## 2017-03-13 VITALS
OXYGEN SATURATION: 92 % | DIASTOLIC BLOOD PRESSURE: 58 MMHG | SYSTOLIC BLOOD PRESSURE: 102 MMHG | RESPIRATION RATE: 18 BRPM | TEMPERATURE: 97.1 F | HEART RATE: 93 BPM

## 2017-03-13 VITALS
RESPIRATION RATE: 22 BRPM | OXYGEN SATURATION: 97 % | TEMPERATURE: 98.7 F | DIASTOLIC BLOOD PRESSURE: 78 MMHG | SYSTOLIC BLOOD PRESSURE: 114 MMHG | HEART RATE: 110 BPM

## 2017-03-13 VITALS
SYSTOLIC BLOOD PRESSURE: 106 MMHG | HEART RATE: 101 BPM | TEMPERATURE: 98.6 F | RESPIRATION RATE: 18 BRPM | OXYGEN SATURATION: 97 % | DIASTOLIC BLOOD PRESSURE: 68 MMHG

## 2017-03-13 VITALS
HEART RATE: 193 BPM | DIASTOLIC BLOOD PRESSURE: 46 MMHG | RESPIRATION RATE: 18 BRPM | OXYGEN SATURATION: 94 % | TEMPERATURE: 98 F | SYSTOLIC BLOOD PRESSURE: 101 MMHG

## 2017-03-13 VITALS
OXYGEN SATURATION: 95 % | TEMPERATURE: 99.5 F | HEART RATE: 115 BPM | DIASTOLIC BLOOD PRESSURE: 65 MMHG | SYSTOLIC BLOOD PRESSURE: 101 MMHG | RESPIRATION RATE: 22 BRPM

## 2017-03-13 VITALS
RESPIRATION RATE: 18 BRPM | TEMPERATURE: 98.2 F | OXYGEN SATURATION: 93 % | SYSTOLIC BLOOD PRESSURE: 95 MMHG | HEART RATE: 86 BPM | DIASTOLIC BLOOD PRESSURE: 60 MMHG

## 2017-03-13 VITALS
DIASTOLIC BLOOD PRESSURE: 61 MMHG | SYSTOLIC BLOOD PRESSURE: 100 MMHG | RESPIRATION RATE: 18 BRPM | OXYGEN SATURATION: 96 % | HEART RATE: 92 BPM | TEMPERATURE: 98.8 F

## 2017-03-13 VITALS — OXYGEN SATURATION: 90 %

## 2017-03-13 RX ADMIN — PANTOPRAZOLE SCH MG: 40 TABLET, DELAYED RELEASE ORAL at 07:27

## 2017-03-13 RX ADMIN — PENTOXIFYLLINE SCH MG: 400 TABLET, FILM COATED, EXTENDED RELEASE ORAL at 16:11

## 2017-03-13 RX ADMIN — PHENYTOIN SODIUM SCH MLS/HR: 50 INJECTION INTRAMUSCULAR; INTRAVENOUS at 16:58

## 2017-03-13 RX ADMIN — SODIUM CHLORIDE, PRESERVATIVE FREE PRN ML: 5 INJECTION INTRAVENOUS at 02:34

## 2017-03-13 RX ADMIN — FOLIC ACID SCH MG: 1 TABLET ORAL at 07:28

## 2017-03-13 RX ADMIN — PENTOXIFYLLINE SCH MG: 400 TABLET, FILM COATED, EXTENDED RELEASE ORAL at 05:19

## 2017-03-13 RX ADMIN — SODIUM CHLORIDE, PRESERVATIVE FREE PRN ML: 5 INJECTION INTRAVENOUS at 00:28

## 2017-03-13 RX ADMIN — SODIUM CHLORIDE, PRESERVATIVE FREE SCH ML: 5 INJECTION INTRAVENOUS at 21:30

## 2017-03-13 RX ADMIN — WATER SCH ML: 1 IRRIGANT IRRIGATION at 07:28

## 2017-03-13 RX ADMIN — PENTOXIFYLLINE SCH MG: 400 TABLET, FILM COATED, EXTENDED RELEASE ORAL at 21:30

## 2017-03-13 RX ADMIN — RIFAXIMIN SCH MG: 550 TABLET ORAL at 09:36

## 2017-03-13 RX ADMIN — SODIUM CHLORIDE, PRESERVATIVE FREE SCH ML: 5 INJECTION INTRAVENOUS at 07:29

## 2017-03-13 RX ADMIN — POTASSIUM CHLORIDE SCH MEQ: 750 TABLET, FILM COATED, EXTENDED RELEASE ORAL at 07:28

## 2017-03-13 RX ADMIN — NADOLOL SCH MG: 20 TABLET ORAL at 07:28

## 2017-03-13 RX ADMIN — Medication SCH MG: at 07:28

## 2017-03-13 RX ADMIN — SPIRONOLACTONE SCH MG: 25 TABLET, FILM COATED ORAL at 07:28

## 2017-03-13 RX ADMIN — ACYCLOVIR SCH TAB: 800 TABLET ORAL at 07:27

## 2017-03-13 RX ADMIN — RIFAXIMIN SCH MG: 550 TABLET ORAL at 21:29

## 2017-03-13 RX ADMIN — DEXTROSE MONOHYDRATE SCH MG: 5 INJECTION, SOLUTION INTRAVENOUS at 09:36

## 2017-03-13 NOTE — HHI.GIFU
GI Follow-up Note


Consult Follow-up


Subjective:  Patient laying in bed comfortably, sedated , restrained . No nausea

, vomiting, diarrhea . 





Objective:


PHYSICAL EXAMINATION:


Vitals signs stable


No fever





 Vital Signs








  Date Time  Temp Pulse Resp B/P Pulse Ox O2 Delivery O2 Flow Rate FiO2


 


3/13/17 04:00 98.7 110 22 114/78 97   








HEENT: Pupils round and reactive to light; normocephalic; atraumatic;  

jaundice.  Throat is clear.


NECK: Neck is supple, no JVD, no lymphadenopathy.


CHEST:  Chest is clear to auscultation and percussion.


CARDIAC:  Regular rate and rhythm with no murmur gallop or rubs.


ABDOMEN:  Soft, nondistended, nontender; no hepatosplenomegaly; bowel sounds 

are present in all four quadrants.


EXTREMITIES: No clubbing, cyanosis, or edema.


SKIN:  Normal; no rash; jaundice.


CNS:  lethargic, arousable 


Available Data (labs, X- Rays, Procedues) : 





 Laboratory Tests








Test 3/11/17 3/11/17 3/11/17 3/11/17





 12:00 13:50 14:55 16:30


 


White Blood Count 7.8 TH/MM3   


 


Red Blood Count 3.33 MIL/MM3   


 


Hemoglobin 12.0 GM/DL   


 


Hematocrit 34.7 %   


 


Mean Corpuscular Volume 104.1 FL   


 


Mean Corpuscular Hemoglobin 36.0 PG   


 


Mean Corpuscular Hemoglobin 34.6 %   





Concent    


 


Red Cell Distribution Width 15.6 %   


 


Platelet Count 123 TH/MM3   


 


Mean Platelet Volume 9.5 FL   


 


Neutrophils (%) (Auto) 76.5 %   


 


Lymphocytes (%) (Auto) 12.0 %   


 


Monocytes (%) (Auto) 10.2 %   


 


Eosinophils (%) (Auto) 0.5 %   


 


Basophils (%) (Auto) 0.8 %   


 


Neutrophils # (Auto) 6.0 TH/MM3   


 


Lymphocytes # (Auto) 0.9 TH/MM3   


 


Monocytes # (Auto) 0.8 TH/MM3   


 


Eosinophils # (Auto) 0.0 TH/MM3   


 


Basophils # (Auto) 0.1 TH/MM3   


 


CBC Comment DIFF FINAL    


 


Differential Comment     


 


Prothrombin Time 16.8 SEC   


 


Prothromb Time International 1.5 RATIO   





Ratio    


 


Activated Partial 35.1 SEC   





Thromboplast Time    


 


Sodium Level 133 MEQ/L   


 


Potassium Level 3.0 MEQ/L   


 


Chloride Level 92 MEQ/L   


 


Carbon Dioxide Level 27.0 MEQ/L   


 


Anion Gap 14 MEQ/L   


 


Blood Urea Nitrogen 4 MG/DL   


 


Creatinine 0.60 MG/DL   


 


Estimat Glomerular Filtration 163 ML/MIN   





Rate    


 


Random Glucose 83 MG/DL   


 


Calcium Level 8.2 MG/DL   


 


Total Bilirubin 22.5 MG/DL   


 


Direct Bilirubin 17.9 MG/DL   


 


Aspartate Amino Transf 166 U/L   





(AST/SGOT)    


 


Alanine Aminotransferase 66 U/L   





(ALT/SGPT)    


 


Alkaline Phosphatase 223 U/L   


 


Total Protein 6.2 GM/DL   


 


Albumin 2.9 GM/DL   


 


Lipase 572 U/L   


 


Acetaminophen Level LESS THAN 2.0   LESS THAN 2.0





 MCG/ML   MCG/ML


 


Ethyl Alcohol Level 21 MG/DL   


 


Ammonia  39 MCMOL/L  


 


Urine Collection Type   CLEAN CATCH  


 


Urine Color   AREN  


 


Urine Turbidity   CLEAR  


 


Urine pH   7.0  


 


Urine Specific Gravity   GREATER THAN 





   1.035 


 


Urine Protein   30 mg/dL 


 


Urine Glucose (UA)   100 mg/dL 


 


Urine Ketones   TRACE mg/dL 


 


Urine Occult Blood   TRACE  


 


Urine Nitrite   NEG  


 


Urine Bilirubin   LARGE  


 


Urine Leukocyte Esterase   TRACE  


 


Urine WBC   0-2 /hpf 


 


Urine Squamous Epithelial   0-5 /hpf 





Cells    


 


Microscopic Urinalysis Comment   CULT NOT 





   INDICATED 


 


Urine Opiates Screen   NEG  


 


Urine Barbiturates Screen   NEG  


 


Urine Amphetamines Screen   NEG  


 


Urine Benzodiazepines Screen   NEG  


 


Urine Cocaine Screen   NEG  


 


Urine Cannabinoids Screen   NEG  


 


Thyroid Stimulating Hormone    1.030 uIU/ML





3rd Gen    


 


Monoscreen    NEG 


 


    





Test 3/12/17 3/12/17 3/13/17 





 08:22 13:45 05:43 


 


White Blood Count 7.6 TH/MM3   


 


Red Blood Count 3.17 MIL/MM3   


 


Hemoglobin 11.4 GM/DL   


 


Hematocrit 33.2 %   


 


Mean Corpuscular Volume 104.9 FL   


 


Mean Corpuscular Hemoglobin 36.1 PG   


 


Mean Corpuscular Hemoglobin 34.4 %   





Concent    


 


Red Cell Distribution Width 16.2 %   


 


Platelet Count 128 TH/MM3   


 


Mean Platelet Volume 9.4 FL   


 


Neutrophils (%) (Auto) 61.4 %   


 


Lymphocytes (%) (Auto) 25.7 %   


 


Monocytes (%) (Auto) 8.6 %   


 


Eosinophils (%) (Auto) 0.6 %   


 


Basophils (%) (Auto) 3.7 %   


 


Neutrophils # (Auto) 4.6 TH/MM3   


 


Lymphocytes # (Auto) 2.0 TH/MM3   


 


Monocytes # (Auto) 0.7 TH/MM3   


 


Eosinophils # (Auto) 0.0 TH/MM3   


 


Basophils # (Auto) 0.3 TH/MM3   


 


CBC Comment AUTO DIFF    


 


Differential Total Cells 100    





Counted    


 


Neutrophils % (Manual) 77 %   


 


Band Neutrophils % 2 %   


 


Lymphocytes % 6 %   


 


Monocytes % 13 %   


 


Eosinophils % 2 %   


 


Neutrophils # (Manual) 6.0 TH/MM3   


 


Differential Comment FINAL DIFF   





 MANUAL   


 


Prothrombin Time 17.8 SEC   


 


Prothromb Time International 1.6 RATIO   





Ratio    


 


Sodium Level 133 MEQ/L   


 


Potassium Level 3.2 MEQ/L   


 


Chloride Level 95 MEQ/L   


 


Carbon Dioxide Level 27.6 MEQ/L   


 


Anion Gap 10 MEQ/L   


 


Blood Urea Nitrogen 6 MG/DL   


 


Creatinine 0.78 MG/DL   


 


Estimat Glomerular Filtration 120 ML/MIN   





Rate    


 


Random Glucose 97 MG/DL   


 


Calcium Level 8.3 MG/DL   


 


Magnesium Level 2.0 MG/DL   


 


Total Bilirubin 24.6 MG/DL   


 


Aspartate Amino Transf 146 U/L   





(AST/SGOT)    


 


Alanine Aminotransferase 65 U/L   





(ALT/SGPT)    


 


Alkaline Phosphatase 208 U/L   


 


Total Protein 6.3 GM/DL   


 


Albumin 2.8 GM/DL   


 


Lipase 748 U/L   


 


Ammonia  47 MCMOL/L 56 MCMOL/L 














ASSESSMENT/PLAN:


etoh hepatitis 


lethargy we will check ammonia





Recommendations 


supportive care


continue current management 


vit k


close monitoring of ammonia, pt/inr, lfts 


will need to stop etoh use upon dc 


fu hepatitis profile 


It was a pleasure seeing Scar Mathur. Thank you for this consult.


Entered by: Stephanie Morse MD Mar 13, 2017 08:05

## 2017-03-13 NOTE — HHI.PR
Subjective


Remarks


Follow-up EtOH hepatitis/hyperammonemia


03/13/17-patient seen and examined, alert and oriented 3, father by the 

bedside.  Case discussed with psychiatry.  MAXIMUM TEMPERATURE 100.6 at 8 PM 

however currently afebrile.  Denies any chest pain or shortness of breath.





Objective


Vitals





 Vital Signs








  Date Time  Temp Pulse Resp B/P Pulse Ox O2 Delivery O2 Flow Rate FiO2


 


3/13/17 08:00 98.6 101 18 106/68 97   


 


3/13/17 04:00 98.7 110 22 114/78 97   


 


3/13/17 00:00 99.5 115 22 101/65 95   


 


3/12/17 20:00 100.6 109 20 109/73 95   


 


3/12/17 16:00 98.9 107 20 114/88 97   








 I/O








 3/12/17 3/12/17 3/12/17 3/13/17 3/13/17 3/13/17





 07:00 15:00 23:00 07:00 15:00 23:00


 


Intake Total  420 ml 0 ml 0 ml  


 


Balance  420 ml 0 ml 0 ml  


 


      


 


Intake Oral  420 ml    


 


IV Total   0 ml 0 ml  


 


# Voids 2 2  3  


 


# Bowel Movements    0  








Result Diagram:  


3/12/17 0822                                                                   

             3/12/17 0822





Imaging





Last Impressions








Abdomen/Pelvis CT 3/11/17 1406 Signed





Impressions: 





 Service Date/Time:  Saturday, March 11, 2017 14:41 - CONCLUSION:  1. Suspected 





 hepatocellular disease and potentially with an acute component. Liver is very 





 enlarged and fatty infiltrated. There is small ascites and splenomegaly. 2. 





 Sludge and/or small stones in the gallbladder without associated inflammatory 





 changes. No duct stone or ductal dilatation.     Jose Irvin MD 


 


Gall Bladder Ultrasound 3/11/17 0000 Signed





Impressions: 





 Service Date/Time:  Saturday, March 11, 2017 13:56 - CONCLUSION:  1. 





 Hepatomegaly and suspected fatty infiltration. 2. Thickened gallbladder wall 





 which can be seen with hepatic disease. Gallstones are not seen. Cholecystitis 





 cannot be ruled out in the correct clinical situation.     Jose Arnett MD 








Objective Remarks


GENERAL: NAD in 4 point restraints


SKIN: Jaundice


HEAD: Normocephalic.


EYES: No scleral icterus. No injection or drainage. 


NECK: Supple, trachea midline. No JVD or lymphadenopathy.


CARDIOVASCULAR: Tachycardia, Regular rate and rhythm without murmurs, gallops, 

or rubs. 


RESPIRATORY: Breath sounds equal bilaterally. No accessory muscle use.


GASTROINTESTINAL: Abdomen soft, non-tender, nondistended. +HSM


MUSCULOSKELETAL: No cyanosis, or edema. 


BACK: Nontender without obvious deformity. No CVA tenderness.








A/P


Problem List:  


(1) Acute hepatitis


ICD Code:  B17.9


Status:  Acute


(2) Hepatic encephalopathy


ICD Code:  K72.90


Status:  Acute


(3) Hyperammonemia


ICD Code:  E72.20


Status:  Acute


(4) Transaminitis


ICD Code:  R74.0


Status:  Acute


(5) Alcohol abuse


ICD Code:  F10.10


Status:  Acute


(6) Alcoholic hepatitis


ICD Code:  K70.10


Status:  Acute


(7) Cirrhosis of liver


ICD Code:  K74.60


Status:  Acute


(8) Cirrhosis with alcoholism


ICD Code:  K70.30


Status:  Acute


Assessment and Plan


26-year-old man with





1-EtOH hepatitis: However Hepatitis profile pending.  Currently on vitamin K 2/

2 Liver disease


2-Alcohol abuse: Counseled to quit, continue with rally pack, CIWA protocol, 

Librium when necessary


3-Cirrhosis with alcoholism and cirrhosis of the liver: Continue with Aldactone

, nadolol, Xifaxan.  Appreciate input from GI


4-Hepatic encephalopathy: Currently on lactulose, continue Baker act as well as 

sitter one-to-one.  Monitor NH 3


5-Hyperammonemia: Continue with lactulose and monitor NH 3


6-Adjustment disorder with acute mood: Appreciate input from psychiatry








Manish Azevedo MD Mar 13, 2017 12:07

## 2017-03-13 NOTE — PD.CONS
Provisional Diagnosis


Admission Date


Mar 11, 2017 at 15:54


Axis I.


Alcohol use disorder


Axis II.


Deferred


Axis III.


Alcoholic hepatitis


Axis IV.


History of heavy alcohol use


Axis V.


35





History of Present Illness


Service


Psychiatry


Consult Requested By





Reason for Consult


The patient is a 26 year old  man, history of heavy alcohol use 

disorder, unknown for this service, with unknown psychiatric history, he 

reports psychiatric history of anxiety, but he is unreliable at this moment, 

who presented to the ER due to increased "yellowness" of skin over the past 4 

days, with increased nausea, and decreased oral intake. Parents presents at 

bedside. Patient reports unintentional weight loss of 30 pounds over the past 

month. Reports diffuse abdominal pain that is always present, no alleviating or 

aggravating factors. Having regular BMs, no obvious blood in stool. Patient has 

a history of chronic alcoholism for the past 6 years. Reports he drinks daily, 

but the amount each day varies, on average he is drinking 6 12 oz beers per day 

or hard liquor daily. He works as a . He has been to [facility] rehab twice. 

Apparently,per attendings note, patient has a history of DUI, and a second DUI 

with charges still pending. 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 

school with frontal lobe injury.  Patient was Santizo acted yesterday because he 

was refusing to come to the hospital for help while he was hallucinating and 

delirious at home.  At the moment of the psychiatric evaluation today patient 

is found restrained in 4 point, very confused, lethargic, disoriented in time 

and place, unable to provide any reliable or significant information for the 

psychiatric assessment most probably due to the alcohol withdrawal/hepatic 

encephalopathy.  I spoke with Dr. Lora, psychiatrist, who his former step 

father and related that he was the one that suggested yesterday the patient 

should be Baker act due to his reticence to look for medical help while he was 

hallucinating and psychotic and at that moment he did not have capacity to take 

any medical/rational decision.  Also spoke with Dr. Kinney, with the hospital is 

in charge of the patient today and he was informed of the condition of the 

mental status of the patient and my inability to perform a psychiatric 

assessment.  Patient should be stabilized medically first then re-consult to 

psychiatry once is able to participate in the interview.  I also called his 

sister for additional collateral information, Jeannette Garcia, [phone], she 

was unable to give me more information about the patient at this moment and 

refereed me to his mother, who unfortunately was not available at this time.


Primary Care Physician


No Primary Care Physician





Review of Systems


ROS Limitations:  Altered Mental Status, Uncooperative





Past Family Social History


Coded Allergies:  


     No Known Allergies (Verified , 3/11/17)


No Active Prescriptions or Reported Meds





 Current Medications








 Medications


  (Trade)  Dose


 Ordered  Sig/Brittany


 Route  Start Time


 Stop Time Status Last Admin


 


  (NS Flush)  2 ml  UNSCH  PRN


 FLUSH  3/11/17 16:15


    3/13/17 02:34


 


 


  (NS Flush)  2 ml  BID


 FLUSH  3/11/17 21:00


    3/13/17 07:29


 


 


  (Zofran Inj)  4 mg  Q6H  PRN


 IVP  3/11/17 16:15


    3/12/17 06:08


 


 


  (Romazicon Inj)  0.2 mg  Q1M  PRN


 IV PUSH  3/11/17 16:15


     


 


 


  (Ativan)  1 mg  Q4H  PRN


 PO  3/11/17 16:15


     


 


 


  (Ativan Inj)  1 mg  Q4H  PRN


 IV PUSH  3/11/17 16:15


    3/11/17 20:39


 


 


  (Ativan)  2 mg  Q2H  PRN


 PO  3/11/17 16:15


     


 


 


  (Ativan Inj)  2 mg  Q2H  PRN


 IV PUSH  3/11/17 16:15


    3/13/17 09:36


 


 


  (Ativan Inj)  2 mg  Q1H  PRN


 IV PUSH  3/11/17 16:15


     


 


 


  (Ativan Inj)  2 mg  Q15M  PRN


 IV PUSH  3/11/17 16:15


    3/12/17 22:21


 


 


  (Lactulose Liq)  30 ml  DAILY


 PO  3/11/17 16:30


    3/13/17 07:28


 


 


  (KCl)  30 meq  DAILY


 PO  3/11/17 16:15


    3/13/17 07:28


 


 


  (Librium)  25 mg  TID  PRN


 PO  3/11/17 16:15


    3/13/17 09:36


 


 


  (Vitamin B1)  100 mg  DAILY


 PO  3/12/17 09:00


    3/13/17 07:28


 


 


  (Folate)  1 mg  DAILY


 PO  3/12/17 09:00


    3/13/17 07:28


 


 


  (Theragran)  1 tab  DAILY


 PO  3/12/17 09:00


    3/13/17 07:27


 


 


  (Aldactone)  25 mg  DAILY


 PO  3/12/17 09:00


    3/13/17 07:28


 


 


  (Corgard)  20 mg  DAILY


 PO  3/12/17 09:00


    3/13/17 07:28


 


 


  (Protonix)  40 mg  DAILY


 PO  3/11/17 16:23


    3/13/17 07:27


 


 


  (Deltasone)  20 mg  DAILY


 PO  3/13/17 09:00


    3/13/17 07:28


 


 


  (TRENtal SR)  400 mg  Q8HR


 PO  3/12/17 22:00


    3/13/17 05:19


 


 


  (Vitamin K Inj)  10 mg  DAILY


 SQ  3/13/17 09:00


    3/13/17 09:36


 


 


  (Xifaxan)  550 mg  BID


 PO  3/13/17 09:00


    3/13/17 09:36


 








Social History


Patient lives with his mother, he works as a 





Physical Exam


Vital Signs





 Vital Signs








  Date Time  Temp Pulse Resp B/P Pulse Ox O2 Delivery O2 Flow Rate FiO2


 


3/13/17 08:00 98.6 101 18 106/68 97   


 


3/11/17 16:43      Room Air  








 I/O








 3/12/17 3/12/17 3/13/17





 08:00 16:00 00:00


 


Intake Total  420 ml 0 ml


 


Balance  420 ml 0 ml











Mental Status Examination


Speech:  Slow, Incoherent


Orientation:  Person


Memory:  Impaired (describe)


Thought Process:  Loose Association


Thought Content:  Bizarre thinking, Other (disorganized ideas)


Hallucination Type:  Visual


Attention and Concentration:  Abnormal


Judgement:  Impulsive


Motor Activity:  Normal gait





Assessment & Plan


Problem List:  


(1) Delirium due to another medical condition


Assessment & Plan:  26 year old  man, with psychiatric history of 

anxiety, history of TBI, hospitalized due to alcoholic hepatitis, who was Baker 

acted due to poor judgment, incapacity to take rational and medical decisions 

in the context of increased visual hallucination/psychotic behavior secondary 

to alcohol withdrawal and potential hepatic encephalitis.  On psychiatric 

evaluation today the patient is restrained in 4 points, confused, disorganized, 

unable to provide any meaningful information for the psychiatric assessment at 

this moment.  Patient should continue aggressive medical treatment and once is 

more stable he should be reconsult to psychiatry to evaluate the need to 

continue under Baker act.  Agree with CIWA protocol.  Could use Haldol 5 mg 

when necessary every 12 hours for aggressive behavior.


ICD Code:  F05


Assessment & Plan


Estimated LOS:  days








Chandu Turner MD Mar 13, 2017 10:03

## 2017-03-14 VITALS
OXYGEN SATURATION: 97 % | RESPIRATION RATE: 18 BRPM | HEART RATE: 86 BPM | SYSTOLIC BLOOD PRESSURE: 93 MMHG | DIASTOLIC BLOOD PRESSURE: 46 MMHG | TEMPERATURE: 98.5 F

## 2017-03-14 VITALS
OXYGEN SATURATION: 97 % | TEMPERATURE: 98.5 F | HEART RATE: 86 BPM | DIASTOLIC BLOOD PRESSURE: 46 MMHG | RESPIRATION RATE: 18 BRPM | SYSTOLIC BLOOD PRESSURE: 93 MMHG

## 2017-03-14 VITALS
OXYGEN SATURATION: 94 % | DIASTOLIC BLOOD PRESSURE: 58 MMHG | TEMPERATURE: 96.7 F | HEART RATE: 92 BPM | RESPIRATION RATE: 18 BRPM | SYSTOLIC BLOOD PRESSURE: 101 MMHG

## 2017-03-14 VITALS
DIASTOLIC BLOOD PRESSURE: 61 MMHG | OXYGEN SATURATION: 96 % | SYSTOLIC BLOOD PRESSURE: 95 MMHG | TEMPERATURE: 97 F | RESPIRATION RATE: 18 BRPM | HEART RATE: 83 BPM

## 2017-03-14 VITALS
OXYGEN SATURATION: 96 % | SYSTOLIC BLOOD PRESSURE: 102 MMHG | DIASTOLIC BLOOD PRESSURE: 54 MMHG | RESPIRATION RATE: 20 BRPM | TEMPERATURE: 97.9 F | HEART RATE: 86 BPM

## 2017-03-14 VITALS
HEART RATE: 59 BPM | SYSTOLIC BLOOD PRESSURE: 95 MMHG | TEMPERATURE: 97.9 F | RESPIRATION RATE: 18 BRPM | OXYGEN SATURATION: 95 % | DIASTOLIC BLOOD PRESSURE: 55 MMHG

## 2017-03-14 LAB
ALP SERPL-CCNC: 162 U/L (ref 45–117)
ALT SERPL-CCNC: 61 U/L (ref 12–78)
ANION GAP SERPL CALC-SCNC: 11 MEQ/L (ref 5–15)
AST SERPL-CCNC: 115 U/L (ref 15–37)
BILIRUB SERPL-MCNC: 23.7 MG/DL (ref 0.2–1)
BUN SERPL-MCNC: 12 MG/DL (ref 7–18)
CHLORIDE SERPL-SCNC: 104 MEQ/L (ref 98–107)
GFR SERPLBLD BASED ON 1.73 SQ M-ARVRAT: 109 ML/MIN (ref 89–?)
HCO3 BLD-SCNC: 25.3 MEQ/L (ref 21–32)
POTASSIUM SERPL-SCNC: 3.1 MEQ/L (ref 3.5–5.1)
SODIUM SERPL-SCNC: 140 MEQ/L (ref 136–145)

## 2017-03-14 RX ADMIN — FOLIC ACID SCH MG: 1 TABLET ORAL at 07:59

## 2017-03-14 RX ADMIN — POTASSIUM CHLORIDE SCH MEQ: 750 TABLET, FILM COATED, EXTENDED RELEASE ORAL at 08:00

## 2017-03-14 RX ADMIN — Medication SCH MG: at 08:00

## 2017-03-14 RX ADMIN — NADOLOL SCH MG: 20 TABLET ORAL at 08:00

## 2017-03-14 RX ADMIN — SPIRONOLACTONE SCH MG: 25 TABLET, FILM COATED ORAL at 08:02

## 2017-03-14 RX ADMIN — ACYCLOVIR SCH TAB: 800 TABLET ORAL at 08:00

## 2017-03-14 RX ADMIN — SODIUM CHLORIDE, PRESERVATIVE FREE SCH ML: 5 INJECTION INTRAVENOUS at 08:02

## 2017-03-14 RX ADMIN — PENTOXIFYLLINE SCH MG: 400 TABLET, FILM COATED, EXTENDED RELEASE ORAL at 12:49

## 2017-03-14 RX ADMIN — PHENYTOIN SODIUM SCH MLS/HR: 50 INJECTION INTRAMUSCULAR; INTRAVENOUS at 05:22

## 2017-03-14 RX ADMIN — WATER SCH ML: 1 IRRIGANT IRRIGATION at 08:01

## 2017-03-14 RX ADMIN — PANTOPRAZOLE SCH MG: 40 TABLET, DELAYED RELEASE ORAL at 08:00

## 2017-03-14 RX ADMIN — PENTOXIFYLLINE SCH MG: 400 TABLET, FILM COATED, EXTENDED RELEASE ORAL at 04:18

## 2017-03-14 RX ADMIN — DEXTROSE MONOHYDRATE SCH MG: 5 INJECTION, SOLUTION INTRAVENOUS at 08:02

## 2017-03-14 RX ADMIN — RIFAXIMIN SCH MG: 550 TABLET ORAL at 08:00

## 2017-03-14 NOTE — HHI.PR
Subjective


Remarks


Follow-up EtOH hepatitis/hyperammonemia


03/13/17-patient seen and examined, alert and oriented 3, father by the 

bedside.  Case discussed with psychiatry.  MAXIMUM TEMPERATURE 100.6 at 8 PM 

however currently afebrile.  Denies any chest pain or shortness of breath.


03/14/17-patient seen and examined by me no acute event overnight.  Alert and 

oriented 3.  Case discussed with patient's dad.  Currently afebrile.





Objective


Vitals





 Vital Signs








  Date Time  Temp Pulse Resp B/P Pulse Ox O2 Delivery O2 Flow Rate FiO2


 


3/14/17 08:15 97.0 83 18 95/61 96   


 


3/14/17 04:10 97.9 86 20 102/54 96   


 


3/14/17 00:00 97.9 59 18 95/55 95   


 


3/13/17 23:12     90   


 


3/13/17 23:07 101.6 116 42 132/82 80   


 


3/13/17 21:24 97.1 93 18 102/58 92   


 


3/13/17 20:00 98.0 193 18 101/46 94   


 


3/13/17 16:00 98.2 86 18 95/60 93   


 


3/13/17 12:00 98.8 92 18 100/61 96   








 I/O








 3/13/17 3/13/17 3/13/17 3/14/17 3/14/17 3/14/17





 07:00 15:00 23:00 07:00 15:00 23:00


 


Intake Total 0 ml 420 ml 640 ml 1040 ml  


 


Balance 0 ml 420 ml 640 ml 1040 ml  


 


      


 


Intake Oral  420 ml 640 ml 240 ml  


 


IV Total 0 ml   800 ml  


 


# Voids 3 2 2   


 


# Bowel Movements 0 1 1   








Result Diagram:  


3/12/17 0822                                                                   

             3/14/17 0515





Objective Remarks


GENERAL: NAD 


SKIN: Jaundice


HEAD: Normocephalic.


EYES: No scleral icterus. No injection or drainage. 


NECK: Supple, trachea midline. No JVD or lymphadenopathy.


CARDIOVASCULAR: Tachycardia, Regular rate and rhythm without murmurs, gallops, 

or rubs. 


RESPIRATORY: Breath sounds equal bilaterally. No accessory muscle use.


GASTROINTESTINAL: Abdomen soft, non-tender, nondistended. +HSM


MUSCULOSKELETAL: No cyanosis, or edema. 


BACK: Nontender without obvious deformity. No CVA tenderness.








A/P


Problem List:  


(1) Acute hepatitis


ICD Code:  B17.9


Status:  Acute


(2) Hepatic encephalopathy


ICD Code:  K72.90


Status:  Acute


(3) Hyperammonemia


ICD Code:  E72.20


Status:  Acute


(4) Transaminitis


ICD Code:  R74.0


Status:  Acute


(5) Alcohol abuse


ICD Code:  F10.10


Status:  Acute


(6) Alcoholic hepatitis


ICD Code:  K70.10


Status:  Acute


(7) Cirrhosis of liver


ICD Code:  K74.60


Status:  Acute


(8) Cirrhosis with alcoholism


ICD Code:  K70.30


Status:  Acute


Assessment and Plan


26-year-old man with





1-EtOH hepatitis: Hepatitis profile negative.  Currently on vitamin K 2/2 Liver 

disease


2-Alcohol abuse: Counseled to quit, continue with rally pack, CIWA protocol, 

Librium when necessary


3-Cirrhosis with alcoholism and cirrhosis of the liver: Continue with Aldactone

, nadolol, Xifaxan.  Appreciate input from GI


4-Hepatic encephalopathy: Resolved.  Currently on lactulose, continue Baker act 

as well as sitter one-to-one.  Monitor NH 3


5-Hyperammonemia: Continue with lactulose and monitor NH 3


6-Adjustment disorder with acute mood: Appreciate input from psychiatry


Discharge Planning


Likely discharge within the next 24 hours








Manish Azevedo MD Mar 14, 2017 09:15

## 2017-03-14 NOTE — HHI.PYPN
Subjective


Remarks


Patient seen for reevaluation today, he was found in his room calm, cooperative 

and pleasant, reports good mood, he says that he has learned his lesson with 

this hospitalization and says "my liver is warning me and calling my attention 

and I am planning to decrease my alcohol intake and also organize my life". He 

denies depressive symptoms, denies anxiety, anhedonia, hopelessness, 

helplessness, worthlessness, he denies SI, HI, VH, AH. No paranoia, delusions, 

agitation, aggressive behavior have been observed or reported. Patient is 

oriented x3, no confusion, fluctuation of consciousness is present at this time.





Review of Systems


Other


No somatic complains





Objective


Alert:  Yes


[place]:  Person, Place, Date, Situation


Mood:  Calm


Affect:  Euthymic


Memory Intact:  Immediate, Recent, Remote


Hallucinations:  Other (denies)


Delusions:  No


Delusion Type:  Other (no elicited )


Suicidal:  Ideation (he denies)


Homicidal:  Ideation (denies)


Insight/Judgement


Fair


Labs











Test 3/14/17 3/14/17





 05:15 08:53


 


Sodium Level 140 MEQ/L 


 


Potassium Level 3.1 MEQ/L 


 


Chloride Level 104 MEQ/L 


 


Carbon Dioxide Level 25.3 MEQ/L 


 


Anion Gap 11 MEQ/L 


 


Blood Urea Nitrogen 12 MG/DL 


 


Creatinine 0.85 MG/DL 


 


Estimat Glomerular Filtration 109 ML/MIN 





Rate  


 


Random Glucose 93 MG/DL 


 


Calcium Level 8.1 MG/DL 


 


Total Bilirubin 23.7 MG/DL 


 


Direct Bilirubin 19.9 MG/DL 


 


Aspartate Amino Transf 115 U/L 





(AST/SGOT)  


 


Alanine Aminotransferase 61 U/L 





(ALT/SGPT)  


 


Alkaline Phosphatase 162 U/L 


 


Total Protein 5.0 GM/DL 


 


Albumin 2.3 GM/DL 


 


Ammonia  44 MCMOL/L








Vitals/IOs





 Vital Signs








  Date Time  Temp Pulse Resp B/P Pulse Ox O2 Delivery O2 Flow Rate FiO2


 


3/14/17 16:32 96.7 92 18 101/58 94   


 


3/11/17 16:43      Room Air  








 Intake and Output








 3/13/17 3/13/17 3/14/17





 08:00 16:00 00:00


 


Intake Total 0 ml 420 ml 640 ml


 


Balance 0 ml 420 ml 640 ml











Assessment & Plan


Problem List:  


(1) Delirium due to another medical condition


ICD Code:  F05


(2) Alcohol dependence


Assessment & Plan:  At the moment of this evaluation patient does show any 

evidence of subjective or objective depression, anxiety, dulce or psychosis. He 

denies SI/HI/VH/AH. He is logical, coherent and relevant. OrientedX3. He does 

not meet criteria for psychiatric admission, baker act will be lifted. 

Extensive psychoeducation and motivation provided.


ICD Code:  F10.20


Assessment & Plan


Estimated LOS:  days


Justification for Cont. Inpt.


Patient does not meet criteria for psychiatric admission at this moment and can 

be lifted








Chandu uTrner MD Mar 14, 2017 16:58

## 2017-03-27 ENCOUNTER — HOSPITAL ENCOUNTER (INPATIENT)
Dept: HOSPITAL 17 - NEPC | Age: 27
LOS: 19 days | Discharge: HOME | DRG: 432 | End: 2017-04-15
Attending: HOSPITALIST | Admitting: HOSPITALIST
Payer: COMMERCIAL

## 2017-03-27 VITALS
HEART RATE: 86 BPM | OXYGEN SATURATION: 97 % | SYSTOLIC BLOOD PRESSURE: 121 MMHG | DIASTOLIC BLOOD PRESSURE: 65 MMHG | RESPIRATION RATE: 18 BRPM

## 2017-03-27 VITALS
DIASTOLIC BLOOD PRESSURE: 59 MMHG | HEART RATE: 90 BPM | OXYGEN SATURATION: 97 % | TEMPERATURE: 98.2 F | SYSTOLIC BLOOD PRESSURE: 119 MMHG | RESPIRATION RATE: 18 BRPM

## 2017-03-27 VITALS
OXYGEN SATURATION: 98 % | SYSTOLIC BLOOD PRESSURE: 120 MMHG | DIASTOLIC BLOOD PRESSURE: 56 MMHG | RESPIRATION RATE: 18 BRPM | TEMPERATURE: 97.8 F | HEART RATE: 92 BPM

## 2017-03-27 VITALS
DIASTOLIC BLOOD PRESSURE: 62 MMHG | TEMPERATURE: 98.2 F | OXYGEN SATURATION: 96 % | SYSTOLIC BLOOD PRESSURE: 133 MMHG | HEART RATE: 105 BPM | RESPIRATION RATE: 16 BRPM

## 2017-03-27 VITALS — WEIGHT: 193.57 LBS | HEIGHT: 72 IN | BODY MASS INDEX: 26.22 KG/M2

## 2017-03-27 VITALS
RESPIRATION RATE: 18 BRPM | OXYGEN SATURATION: 96 % | DIASTOLIC BLOOD PRESSURE: 57 MMHG | HEART RATE: 90 BPM | SYSTOLIC BLOOD PRESSURE: 115 MMHG

## 2017-03-27 DIAGNOSIS — E86.0: ICD-10-CM

## 2017-03-27 DIAGNOSIS — K76.7: ICD-10-CM

## 2017-03-27 DIAGNOSIS — D73.1: ICD-10-CM

## 2017-03-27 DIAGNOSIS — E87.2: ICD-10-CM

## 2017-03-27 DIAGNOSIS — Z87.891: ICD-10-CM

## 2017-03-27 DIAGNOSIS — R19.7: ICD-10-CM

## 2017-03-27 DIAGNOSIS — K70.31: ICD-10-CM

## 2017-03-27 DIAGNOSIS — L50.9: ICD-10-CM

## 2017-03-27 DIAGNOSIS — K72.90: ICD-10-CM

## 2017-03-27 DIAGNOSIS — J18.9: ICD-10-CM

## 2017-03-27 DIAGNOSIS — R04.0: ICD-10-CM

## 2017-03-27 DIAGNOSIS — D64.9: ICD-10-CM

## 2017-03-27 DIAGNOSIS — F41.9: ICD-10-CM

## 2017-03-27 DIAGNOSIS — D69.59: ICD-10-CM

## 2017-03-27 DIAGNOSIS — E88.09: ICD-10-CM

## 2017-03-27 DIAGNOSIS — K64.8: ICD-10-CM

## 2017-03-27 DIAGNOSIS — F10.21: ICD-10-CM

## 2017-03-27 DIAGNOSIS — N17.0: ICD-10-CM

## 2017-03-27 DIAGNOSIS — N39.0: ICD-10-CM

## 2017-03-27 DIAGNOSIS — K70.11: Primary | ICD-10-CM

## 2017-03-27 DIAGNOSIS — D68.4: ICD-10-CM

## 2017-03-27 DIAGNOSIS — E87.6: ICD-10-CM

## 2017-03-27 DIAGNOSIS — I86.8: ICD-10-CM

## 2017-03-27 DIAGNOSIS — D72.828: ICD-10-CM

## 2017-03-27 LAB
ALP SERPL-CCNC: 216 U/L (ref 45–117)
ALT SERPL-CCNC: 78 U/L (ref 12–78)
AMPHETAMINE, URINE: (no result)
ANION GAP SERPL CALC-SCNC: 14 MEQ/L (ref 5–15)
APTT BLD: 35.9 SEC (ref 24.3–30.1)
AST SERPL-CCNC: 143 U/L (ref 15–37)
BACTERIA #/AREA URNS HPF: (no result) /HPF
BARBITURATES, URINE: (no result)
BASOPHILS # BLD AUTO: 0.1 TH/MM3 (ref 0–0.2)
BASOPHILS NFR BLD: 0.5 % (ref 0–2)
BILIRUB SERPL-MCNC: 36.7 MG/DL (ref 0.2–1)
BUN SERPL-MCNC: 20 MG/DL (ref 7–18)
CHLORIDE SERPL-SCNC: 98 MEQ/L (ref 98–107)
COCAINE UR-MCNC: (no result) NG/ML
COLOR UR: (no result)
COMMENT (UR): (no result)
CULTURE IF INDICATED: (no result)
EOSINOPHIL # BLD: 0.1 TH/MM3 (ref 0–0.4)
EOSINOPHIL NFR BLD: 0.6 % (ref 0–4)
ERYTHROCYTE [DISTWIDTH] IN BLOOD BY AUTOMATED COUNT: 16.5 % (ref 11.6–17.2)
GFR SERPLBLD BASED ON 1.73 SQ M-ARVRAT: 20 ML/MIN (ref 89–?)
GGT SERPL-CCNC: 279 U/L (ref 15–85)
GLUCOSE UR STRIP-MCNC: (no result) MG/DL
HCO3 BLD-SCNC: 19.9 MEQ/L (ref 21–32)
HCT VFR BLD CALC: 35 % (ref 39–51)
HEMO FLAGS: (no result)
HGB UR QL STRIP: (no result)
INR PPP: 1.5 RATIO
KETONES UR STRIP-MCNC: (no result) MG/DL
LYMPHOCYTES # BLD AUTO: 0.6 TH/MM3 (ref 1–4.8)
LYMPHOCYTES NFR BLD AUTO: 3.8 % (ref 9–44)
MCH RBC QN AUTO: 36.6 PG (ref 27–34)
MCHC RBC AUTO-ENTMCNC: 33.8 % (ref 32–36)
MCV RBC AUTO: 108.4 FL (ref 80–100)
MONOCYTES NFR BLD: 10.6 % (ref 0–8)
MUCOUS THREADS #/AREA URNS LPF: (no result) /LPF
NEUTROPHILS # BLD AUTO: 12.8 TH/MM3 (ref 1.8–7.7)
NEUTROPHILS NFR BLD AUTO: 84.5 % (ref 16–70)
NITRITE UR QL STRIP: (no result)
PLATELET # BLD: 181 TH/MM3 (ref 150–450)
POTASSIUM SERPL-SCNC: 3.3 MEQ/L (ref 3.5–5.1)
PROTHROMBIN TIME: 16.9 SEC (ref 9.8–11.6)
RBC # BLD AUTO: 3.23 MIL/MM3 (ref 4.5–5.9)
SODIUM SERPL-SCNC: 132 MEQ/L (ref 136–145)
SP GR UR STRIP: 1.01 (ref 1–1.03)
WBC # BLD AUTO: 15.2 TH/MM3 (ref 4–11)

## 2017-03-27 PROCEDURE — 85027 COMPLETE CBC AUTOMATED: CPT

## 2017-03-27 PROCEDURE — 83540 ASSAY OF IRON: CPT

## 2017-03-27 PROCEDURE — 82105 ALPHA-FETOPROTEIN SERUM: CPT

## 2017-03-27 PROCEDURE — 82746 ASSAY OF FOLIC ACID SERUM: CPT

## 2017-03-27 PROCEDURE — 88305 TISSUE EXAM BY PATHOLOGIST: CPT

## 2017-03-27 PROCEDURE — 83520 IMMUNOASSAY QUANT NOS NONAB: CPT

## 2017-03-27 PROCEDURE — 82103 ALPHA-1-ANTITRYPSIN TOTAL: CPT

## 2017-03-27 PROCEDURE — 83605 ASSAY OF LACTIC ACID: CPT

## 2017-03-27 PROCEDURE — A9537 TC99M MEBROFENIN: HCPCS

## 2017-03-27 PROCEDURE — 85610 PROTHROMBIN TIME: CPT

## 2017-03-27 PROCEDURE — 84300 ASSAY OF URINE SODIUM: CPT

## 2017-03-27 PROCEDURE — 80069 RENAL FUNCTION PANEL: CPT

## 2017-03-27 PROCEDURE — 93970 EXTREMITY STUDY: CPT

## 2017-03-27 PROCEDURE — 82977 ASSAY OF GGT: CPT

## 2017-03-27 PROCEDURE — 94664 DEMO&/EVAL PT USE INHALER: CPT

## 2017-03-27 PROCEDURE — 81270 JAK2 GENE: CPT

## 2017-03-27 PROCEDURE — 74176 CT ABD & PELVIS W/O CONTRAST: CPT

## 2017-03-27 PROCEDURE — 87040 BLOOD CULTURE FOR BACTERIA: CPT

## 2017-03-27 PROCEDURE — 85652 RBC SED RATE AUTOMATED: CPT

## 2017-03-27 PROCEDURE — 80048 BASIC METABOLIC PNL TOTAL CA: CPT

## 2017-03-27 PROCEDURE — 83970 ASSAY OF PARATHORMONE: CPT

## 2017-03-27 PROCEDURE — 87086 URINE CULTURE/COLONY COUNT: CPT

## 2017-03-27 PROCEDURE — 96360 HYDRATION IV INFUSION INIT: CPT

## 2017-03-27 PROCEDURE — 83690 ASSAY OF LIPASE: CPT

## 2017-03-27 PROCEDURE — 82140 ASSAY OF AMMONIA: CPT

## 2017-03-27 PROCEDURE — 82306 VITAMIN D 25 HYDROXY: CPT

## 2017-03-27 PROCEDURE — 83735 ASSAY OF MAGNESIUM: CPT

## 2017-03-27 PROCEDURE — 76937 US GUIDE VASCULAR ACCESS: CPT

## 2017-03-27 PROCEDURE — 80076 HEPATIC FUNCTION PANEL: CPT

## 2017-03-27 PROCEDURE — 94003 VENT MGMT INPAT SUBQ DAY: CPT

## 2017-03-27 PROCEDURE — 87493 C DIFF AMPLIFIED PROBE: CPT

## 2017-03-27 PROCEDURE — 86140 C-REACTIVE PROTEIN: CPT

## 2017-03-27 PROCEDURE — 87070 CULTURE OTHR SPECIMN AEROBIC: CPT

## 2017-03-27 PROCEDURE — 94640 AIRWAY INHALATION TREATMENT: CPT

## 2017-03-27 PROCEDURE — 86256 FLUORESCENT ANTIBODY TITER: CPT

## 2017-03-27 PROCEDURE — 87449 NOS EACH ORGANISM AG IA: CPT

## 2017-03-27 PROCEDURE — 82607 VITAMIN B-12: CPT

## 2017-03-27 PROCEDURE — 85025 COMPLETE CBC W/AUTO DIFF WBC: CPT

## 2017-03-27 PROCEDURE — 86038 ANTINUCLEAR ANTIBODIES: CPT

## 2017-03-27 PROCEDURE — 80053 COMPREHEN METABOLIC PANEL: CPT

## 2017-03-27 PROCEDURE — 80074 ACUTE HEPATITIS PANEL: CPT

## 2017-03-27 PROCEDURE — 81256 HFE GENE: CPT

## 2017-03-27 PROCEDURE — 83550 IRON BINDING TEST: CPT

## 2017-03-27 PROCEDURE — 87205 SMEAR GRAM STAIN: CPT

## 2017-03-27 PROCEDURE — 85007 BL SMEAR W/DIFF WBC COUNT: CPT

## 2017-03-27 PROCEDURE — 76700 US EXAM ABDOM COMPLETE: CPT

## 2017-03-27 PROCEDURE — 82728 ASSAY OF FERRITIN: CPT

## 2017-03-27 PROCEDURE — 71010: CPT

## 2017-03-27 PROCEDURE — 84100 ASSAY OF PHOSPHORUS: CPT

## 2017-03-27 PROCEDURE — 85730 THROMBOPLASTIN TIME PARTIAL: CPT

## 2017-03-27 PROCEDURE — 76705 ECHO EXAM OF ABDOMEN: CPT

## 2017-03-27 PROCEDURE — 80307 DRUG TEST PRSMV CHEM ANLYZR: CPT

## 2017-03-27 PROCEDURE — P9047 ALBUMIN (HUMAN), 25%, 50ML: HCPCS

## 2017-03-27 PROCEDURE — 82390 ASSAY OF CERULOPLASMIN: CPT

## 2017-03-27 PROCEDURE — 78226 HEPATOBILIARY SYSTEM IMAGING: CPT

## 2017-03-27 PROCEDURE — 81001 URINALYSIS AUTO W/SCOPE: CPT

## 2017-03-27 RX ADMIN — PHENYTOIN SODIUM SCH MLS/HR: 50 INJECTION INTRAMUSCULAR; INTRAVENOUS at 18:46

## 2017-03-27 RX ADMIN — DOCUSATE SODIUM SCH MG: 100 CAPSULE, LIQUID FILLED ORAL at 21:45

## 2017-03-27 RX ADMIN — Medication SCH ML: at 21:46

## 2017-03-27 NOTE — RADRPT
EXAM DATE/TIME:  03/27/2017 18:08 

 

[facility] COMPARISON:     

No previous studies available for comparison.

 

                     

INDICATIONS :     

Short of breath. Severe jaundice.

                     

 

MEDICAL HISTORY :     

Cirrhosis.  Gastroesophageal reflux disease.        

 

SURGICAL HISTORY :     

None.   

 

ENCOUNTER:     

Initial                                        

 

ACUITY:     

1 week      

 

PAIN SCORE:     

0/10

 

LOCATION:     

Bilateral chest 

 

FINDINGS:     

Streakiness is noted within the left lung base consistent with probable atelectasis.  The right lung 
is clear.  The heart is normal. 

 

CONCLUSION:     

Left basilar streakiness consistent with probable atelectasis.       

 

 

 

 Lebron Marin MD on March 27, 2017 at 18:24                

Board Certified Radiologist.

 This report was verified electronically.

## 2017-03-27 NOTE — PD
HPI


Chief Complaint:  Abnormal Results


Time Seen by Provider:  15:10


Travel History


International Travel<30 days:  No


Contact w/Intl Traveler<30days:  No


Traveled to known affect area:  No





History of Present Illness


HPI


Patient is a 26-year-old male presenting to the emergency department with his 

sister for evaluation of jaundice.  Patient states he was in the [facility] 2 weeks ago, he left AMA secondary to withdrawing from alcohol likely 

and anxiety prior to workup being completed.  He states since that time he has 

felt fatigued, has increased abdominal bloating, muscle cramping, itching, 

irregular bowel movements, discolored stools.  He states that his stools are 

pale.  He denies any alcohol use since that time.  His sister is with him and 

states that she does believe he has been abstinent since his discharge.  His 

sister is a nurse in same day surgery.  Patient has been utilizing Benadryl, 4-

5 tablets a day for the itching.  He also has been utilizing over-the-counter 

Robitussin for dry cough, potassium supplementation that he attained over-the-

counter because he states they were giving it to him in the hospital so he 

decided to take it at home.  Additionally patient has been utilizing an over-the

-counter diuretic.  Patient states despite the over-the-counter therapies he 

continues to feel worse.





PFSH


Past Medical History


Asthma:  No


Autoimmune Disease:  No


Blood Disorders:  No


Anxiety:  Yes


Depression:  No


Cancer:  No


Cardiovascular Problems:  No


COPD:  No


Cystic Fibrosis:  No


Diabetes:  No


Diminished Hearing:  No


Endocrine:  No


Gastrointestinal Disorders:  Yes (liver failure)


GERD:  Yes


Genitourinary:  No


Hiatal Hernia:  No


Immune Disorder:  No


Medical other:  Yes (alcoholism)


Musculoskeletal:  Yes (FX RT FT /ANKLE)


Neurologic:  No


Psychiatric:  No


Reproductive:  No


Respiratory:  Yes


Sickle Cell Disease:  No


Sleep Apnea:  No


Thyroid Disease:  No


Ulcer:  No





Past Surgical History


Surgical History:  No Previous Surgery


Abdominal Surgery:  No


AICD:  No


Arteriovenous Shunt:  No


Cardiac Surgery:  No


Ear Surgery:  No


Endocrine Surgery:  No


Eye Surgery:  No


Genitourinary Surgery:  No


Gynecologic Surgery:  No


Insulin Pump:  No


Joint Replacement:  No


Oral Surgery:  No


Pacemaker:  No


Thoracic Surgery:  No





Social History


Alcohol Use:  Yes (6-7 alcoholic beverages daily, none since prior admission  3/

11)


Tobacco Use:  Yes (1 PK PER WEEK)


Substance Use:  No





Allergies-Medications


(Allergen,Severity, Reaction):  


Coded Allergies:  


     No Known Allergies (Verified , 3/11/17)


Reported Meds & Prescriptions





Reported Meds & Active Scripts


Active


No Active Prescriptions or Reported Medications    








Review of Systems


Except as stated in HPI:  all other systems reviewed are Neg


General / Constitutional:  No: Fever, Chills


Eyes:  No: Blurred Vision


HENT:  No: Headaches, Lightheadedness


Cardiovascular:  Positive: Dyspnea on exertion, Edema,  No: Chest Pain or 

Discomfort


Respiratory:  Positive: Cough, Shortness of Breath, Wheezing


Gastrointestinal:  Positive: Changes in Bowel Habits,  No: Nausea, Vomiting, 

Abdominal Pain


Genitourinary:  No: Dysuria


Musculoskeletal:  Positive: Myalgias


Skin:  Positive Itching


Neurologic:  Positive: Weakness


Psychiatric:  Positive: Anxiety





Physical Exam


Narrative


GENERAL: Thin, well-developed, alert  male.  Resting comfortably in no 

acute distress.


SKIN: Focused skin assessment warm/dry.  Visibly jaundiced


HEAD: Atraumatic. Normocephalic. 


EYES: Pupils equal and round.  Positive for scleral icterus. No injection or 

drainage. 


ENT: No nasal bleeding or discharge.  Mucous membranes pink and moist.


NECK: Trachea midline. No JVD. 


CARDIOVASCULAR: Regular rate and rhythm.  2/6 systolic murmur appreciated.


RESPIRATORY: No accessory muscle use.  Diminished breath sounds with scattered 

wheezing.


GASTROINTESTINAL: Abdomen firm, mildly tender diffusely, moderately distended. 

Hepatic and splenic margins not palpable.  Positive bowel sounds


MUSCULOSKELETAL: No obvious deformities. No clubbing.  No cyanosis.  No edema. 


NEUROLOGICAL: Awake and alert. No obvious cranial nerve deficits.  Motor 

grossly within normal limits. Normal speech.


PSYCHIATRIC: Appropriate mood and affect; insight and judgment normal.





Data


Data


Last Documented VS





Vital Signs








  Date Time  Temp Pulse Resp B/P Pulse Ox O2 Delivery O2 Flow Rate FiO2


 


3/27/17 16:00  90 18 115/57 96 Room Air  


 


3/27/17 15:30 98.2       








Orders





 Complete Blood Count With Diff (3/27/17 14:52)


Comprehensive Metabolic Panel (3/27/17 14:52)


Lipase (3/27/17 14:52)


Prothrombin Time / Inr (Pt) (3/27/17 14:52)


Act Partial Throm Time (Ptt) (3/27/17 14:52)


Urinalysis - C+S If Indicated (3/27/17 14:52)


Iv Access Insert/Monitor (3/27/17 14:52)


Ecg Monitoring (3/27/17 14:52)


Oximetry (3/27/17 14:52)


NPO (3/27/17 14:52)


Sodium Chloride 0.9% Flush (Ns Flush) (3/27/17 15:00)


Gamma Gt (Ggt) (3/27/17 15:25)


Sodium Chlor 0.9% 1000 Ml Inj (Ns 1000 M (3/27/17 16:15)


Ammonia (3/27/17 16:14)


Ct Abd/Pel W/O Iv Contrast (3/27/17 )


Admit Order (Ed Use Only) (3/27/17 17:44)





Labs








 Laboratory Tests








Test 3/27/17





 15:25


 


White Blood Count 15.2 TH/MM3


 


Red Blood Count 3.23 MIL/MM3


 


Hemoglobin 11.8 GM/DL


 


Hematocrit 35.0 %


 


Mean Corpuscular Volume 108.4 FL


 


Mean Corpuscular Hemoglobin 36.6 PG


 


Mean Corpuscular Hemoglobin 33.8 %





Concent 


 


Red Cell Distribution Width 16.5 %


 


Platelet Count 181 TH/MM3


 


Mean Platelet Volume 10.4 FL


 


Neutrophils (%) (Auto) 84.5 %


 


Lymphocytes (%) (Auto) 3.8 %


 


Monocytes (%) (Auto) 10.6 %


 


Eosinophils (%) (Auto) 0.6 %


 


Basophils (%) (Auto) 0.5 %


 


Neutrophils # (Auto) 12.8 TH/MM3


 


Lymphocytes # (Auto) 0.6 TH/MM3


 


Monocytes # (Auto) 1.6 TH/MM3


 


Eosinophils # (Auto) 0.1 TH/MM3


 


Basophils # (Auto) 0.1 TH/MM3


 


CBC Comment DIFF FINAL 


 


Differential Comment  


 


Prothrombin Time 16.9 SEC


 


Prothromb Time International 1.5 RATIO





Ratio 


 


Activated Partial 35.9 SEC





Thromboplast Time 


 


Sodium Level 132 MEQ/L


 


Potassium Level 3.3 MEQ/L


 


Chloride Level 98 MEQ/L


 


Carbon Dioxide Level 19.9 MEQ/L


 


Anion Gap 14 MEQ/L


 


Blood Urea Nitrogen 20 MG/DL


 


Creatinine 3.63 MG/DL


 


Estimat Glomerular Filtration 20 ML/MIN





Rate 


 


Random Glucose 87 MG/DL


 


Calcium Level 8.3 MG/DL


 


Total Bilirubin 36.7 MG/DL


 


Gamma Glutamyl Transpeptidase 279 U/L


 


Aspartate Amino Transf 143 U/L





(AST/SGOT) 


 


Alanine Aminotransferase 78 U/L





(ALT/SGPT) 


 


Alkaline Phosphatase 216 U/L


 


Total Protein 5.6 GM/DL


 


Albumin 2.1 GM/DL


 


Lipase 332 U/L














MDM


Medical Decision Making


Medical Screen Exam Complete:  Yes


Emergency Medical Condition:  Yes


Interpretation(s)





Last Impressions








Abdomen/Pelvis CT 3/27/17 0000 Signed





Impressions: 





 Service Date/Time:  Monday, March 27, 2017 16:50 - CONCLUSION: Findings 

suggest 





 advanced hepatocellular disease with varicosities, prominent spleen and 





 interestingly only a small amount of ascites.  Most of the ascites is in the 





 pelvis.     Jona Del Angel MD  FACR








 Laboratory Tests








Test 3/27/17





 15:25


 


White Blood Count 15.2 TH/MM3


 


Red Blood Count 3.23 MIL/MM3


 


Hemoglobin 11.8 GM/DL


 


Hematocrit 35.0 %


 


Mean Corpuscular Volume 108.4 FL


 


Mean Corpuscular Hemoglobin 36.6 PG


 


Mean Corpuscular Hemoglobin 33.8 %





Concent 


 


Red Cell Distribution Width 16.5 %


 


Platelet Count 181 TH/MM3


 


Mean Platelet Volume 10.4 FL


 


Neutrophils (%) (Auto) 84.5 %


 


Lymphocytes (%) (Auto) 3.8 %


 


Monocytes (%) (Auto) 10.6 %


 


Eosinophils (%) (Auto) 0.6 %


 


Basophils (%) (Auto) 0.5 %


 


Neutrophils # (Auto) 12.8 TH/MM3


 


Lymphocytes # (Auto) 0.6 TH/MM3


 


Monocytes # (Auto) 1.6 TH/MM3


 


Eosinophils # (Auto) 0.1 TH/MM3


 


Basophils # (Auto) 0.1 TH/MM3


 


CBC Comment DIFF FINAL 


 


Differential Comment  


 


Prothrombin Time 16.9 SEC


 


Prothromb Time International 1.5 RATIO





Ratio 


 


Activated Partial 35.9 SEC





Thromboplast Time 


 


Sodium Level 132 MEQ/L


 


Potassium Level 3.3 MEQ/L


 


Chloride Level 98 MEQ/L


 


Carbon Dioxide Level 19.9 MEQ/L


 


Anion Gap 14 MEQ/L


 


Blood Urea Nitrogen 20 MG/DL


 


Creatinine 3.63 MG/DL


 


Estimat Glomerular Filtration 20 ML/MIN





Rate 


 


Random Glucose 87 MG/DL


 


Calcium Level 8.3 MG/DL


 


Total Bilirubin 36.7 MG/DL


 


Gamma Glutamyl Transpeptidase 279 U/L


 


Aspartate Amino Transf 143 U/L





(AST/SGOT) 


 


Alanine Aminotransferase 78 U/L





(ALT/SGPT) 


 


Alkaline Phosphatase 216 U/L


 


Total Protein 5.6 GM/DL


 


Albumin 2.1 GM/DL


 


Lipase 332 U/L








Vital Signs








  Date Time  Temp Pulse Resp B/P Pulse Ox O2 Delivery O2 Flow Rate FiO2


 


3/27/17 14:29 98.2 105 16 133/62 96   








Differential Diagnosis


Liver failure versus congestive heart failure versus peritonitis versus ascites 

versus electrolyte abnormality versus cardiac arrhythmia versus other


Narrative Course


Patient is a 26-year-old male presenting to the emergency department for 

evaluation of worsening jaundice.  Patient has a 6 year history of alcoholism.  

He denies any alcohol use since his previous admission.  Patient left that 

admission AMA and has been utilizing over-the-counter therapies to help 

alleviate his symptoms with no relief.  Labs ordered and pending, patient 

placed on telemetry monitoring, continuous pulse oximetry.  IV access 

initiated.  Sister at bedside.


CBC with elevated WBCs at 15.2 with left shift, this is increased from prior  2 

weeks ago.


Chemistry shows elevated BUN and creatinine at 20/3.63, bilirubin at 36.7, GGT 

279, .  Potassium 3.3, sodium 132.


Elevated PT at 16.9, PTT at 35.9


Urinalysis and ammonia level pending.


CT of the abdomen and pelvis shows advanced hepatocellular disease with 

varicosities, prominent spleen and only a small amount of ascites, most of the 

ascites is in the pelvis.


Discussed with patient and his sister, patient stated we could freely discuss 

results in front of her.


Discussed with Dr. Mims who accepted admission.





Diagnosis





 Primary Impression:  


 Acute hepatitis


 Additional Impressions:  


 Jaundice


 Transaminitis


 Cirrhosis of liver


 Qualified Code:  K74.60 - Cirrhosis of liver with ascites, unspecified hepatic 

cirrhosis type


Scripts


No Active Prescriptions or Reported Meds


Condition:  Hortencia Masters Mar 27, 2017 15:19

## 2017-03-27 NOTE — HHI.HP
__________________________________________________





HPI


Service


[facility]


Primary Care Physician


No Primary Care Physician


Admission Diagnosis


cirrhosis


Diagnoses:  


Chief Complaint:  


abdominal pain, nausea, worsening of liver


Travel History


International Travel<30 Days:  No


Contact w/Intl Traveler <30 Da:  No


Traveled to Known Affected Are:  No


History of Present Illness


This is a 27 y/o M with hx of alcoholism and tobacco dependence who was 

admitted in [place] a few weeks ago for alcoholic hepatitis.  Patient left 

[place] due to the care that was given there and try to manage himself.  

Patient stated upon discharge he was taking his Lasix and potassium as 

directed.  Patient stated that abdominal pain did not worsen but he did felt 

nauseated.  Denies any vomiting.  Patient stated his urine was a [employer] 

color.  Patient has family history of autoimmune disease.  Denies any family 

history of any type of liver disease or cancer.  Patient states his main 

complaint is just feeling fatigued.





Patient stated that he has not drank any alcohol since he was admitted to [place].  Per patient's sister she stated that she can confirm this and that he 

was not drinking any alcohol.











Patient sisters at the bedside and she stated that patient tends to downplay 

his symptoms.  Per sister patient looks worse and he is more jaundiced.  He 

stated that he is still making good amount of urine.





Review of Systems


Constitutional:  COMPLAINS OF: Fatigue, Change in appetite,  DENIES: 

Diaphoretic episodes, Fever, Weight gain, Weight loss, Chills, Dizziness, Night 

Sweats


Endocrine:  DENIES: Heat/cold intolerance, Polydipsia, Polyuria, Polyphagia


Eyes:  DENIES: Blurred vision, Diplopia, Eye inflammation, Eye pain, Vision loss

, Photosensitivity, Double Vision


Ears, nose, mouth, throat:  DENIES: Tinnitus, Hearing loss, Vertigo, Nasal 

discharge, Oral lesions, Throat pain, Hoarseness, Ear Pain, Running Nose, 

Epistaxis, Sinus Pain, Toothache, Odynophagia


Respiratory:  DENIES: Apneas, Cough, Snoring, Wheezing, Hemoptysis, Sputum 

production, Shortness of breath


Cardiovascular:  DENIES: Chest pain, Palpitations, Syncope, Dyspnea on Exertion

, PND, Lower Extremity Edema, Orthopnea, Claudication


Gastrointestinal:  COMPLAINS OF: Abdominal pain, Nausea,  DENIES: Black stools, 

Bloody stools, Constipation, Diarrhea, Vomiting, Difficulty Swallowing, Anorexia


Genitourinary:  DENIES: Sexual dysfunction, Urinary frequency, Urinary 

incontinence, Urgency, Hematuria, Dysuria, Nocturia, Penile Discharge, 

Testicular Pain, Testicular Swelling


Musculoskeletal:  DENIES: Joint pain, Muscle aches, Stiffness, Joint Swelling, 

Back pain, Neck pain


Integumentary:  DENIES: Abnormal pigmentation, Nail changes, Pruritus, Rash


Hematologic/lymphatic:  DENIES: Bruising, Lymphadenopathy


Immunologic/allergic:  DENIES: Eczema, Urticaria


Neurologic:  DENIES: Abnormal gait, Headache, Localized weakness, Paresthesias, 

Seizures, Speech Problems, Tremor, Poor Balance


Psychiatric:  DENIES: Anxiety, Confusion, Mood changes, Depression, 

Hallucinations, Agitation, Suicidal Ideation, Homicidal Ideation, Delusions





Past Family Social History


Past Medical History


History of questionable alcohol hepatitis


Alcoholism


Tobacco dependence


Past Surgical History


Denied surgical history.


Reported Medications





Lasix and potassium.


Allergies:  


Coded Allergies:  


     No Known Allergies (Verified , 3/11/17)


Active Ordered Medications





 Current Medications


Sodium Chloride 2 ml 2 ml UNSCH  PRN IV FLUSH FLUSH AFTER USING IV ACCESS Last 

administered on 3/27/17at 16:08;  Start 3/27/17 at 15:00


Sodium Chloride (NS 1000 ml Inj) 1,000 ml @  999 mls/hr BOLUS  ONCE IV  Last 

administered on 3/27/17at 16:30;  Start 3/27/17 at 16:15;  Stop 3/27/17 at 17:15

;  Status DC


Family History


Multiple family members with autoimmune diseases.


Social History


Patient has a positive history for tobacco dependence but stop smoking when he 

was admitted to [facility].


Also had history of alcoholism in which patient stopped drinking since he was 

admitted at [facility].


Denied illicit drug use.





Physical Exam


Vital Signs





 Vital Signs








  Date Time  Temp Pulse Resp B/P Pulse Ox O2 Delivery O2 Flow Rate FiO2


 


3/27/17 16:00  90 18 115/57 96 Room Air  


 


3/27/17 15:30 98.2 90 18 119/59 97 Room Air  


 


3/27/17 15:15     96 Room Air  


 


3/27/17 14:29 98.2 105 16 133/62 96   








Physical Exam


GENERAL: This is a well-nourished, well-developed patient, in no apparent 

distress but looks weak.


SKIN: Positive for petechiae and jaundice.


HEAD: Atraumatic. Normocephalic. No temporal or scalp tenderness.


EYES: Pupils equal round and reactive. Extraocular motions intact. scleral 

icterus. No injection or drainage. 


ENT: Nose without bleeding, purulent drainage or septal hematoma. Throat 

without erythema, tonsillar hypertrophy or exudate. Uvula midline. Airway 

patent.


NECK: Trachea midline. No JVD or lymphadenopathy. Supple, nontender, no 

meningeal signs.


CARDIOVASCULAR: Regular rate and rhythm without murmurs, gallops, or rubs. 


RESPIRATORY: Clear to auscultation. Breath sounds equal bilaterally. No wheezes

, rales, or rhonchi.  


GASTROINTESTINAL: Abdomen soft, non-tender.  Positive for distention.  No hepato

-splenomegaly, or palpable masses. No guarding.


MUSCULOSKELETAL: Extremities without clubbing, cyanosis, or edema. No joint 

tenderness, effusion, or edema noted. No calf tenderness. Negative Homans sign 

bilaterally.


NEUROLOGICAL: Lethargic.  Cranial nerves II through XII intact.  Motor and 

sensory grossly within normal limits. Five out of 5 muscle strength in all 

muscle groups.  Normal speech.


Laboratory





Laboratory Tests








Test 3/27/17 3/27/17





 15:25 17:30


 


White Blood Count 15.2  


 


Red Blood Count 3.23  


 


Hemoglobin 11.8  


 


Hematocrit 35.0  


 


Mean Corpuscular Volume 108.4  


 


Mean Corpuscular Hemoglobin 36.6  


 


Mean Corpuscular Hemoglobin 33.8  





Concent  


 


Red Cell Distribution Width 16.5  


 


Platelet Count 181  


 


Mean Platelet Volume 10.4  


 


Neutrophils (%) (Auto) 84.5  


 


Lymphocytes (%) (Auto) 3.8  


 


Monocytes (%) (Auto) 10.6  


 


Eosinophils (%) (Auto) 0.6  


 


Basophils (%) (Auto) 0.5  


 


Neutrophils # (Auto) 12.8  


 


Lymphocytes # (Auto) 0.6  


 


Monocytes # (Auto) 1.6  


 


Eosinophils # (Auto) 0.1  


 


Basophils # (Auto) 0.1  


 


CBC Comment DIFF FINAL  


 


Differential Comment   


 


Prothrombin Time 16.9  


 


Prothromb Time International 1.5  





Ratio  


 


Activated Partial 35.9  





Thromboplast Time  


 


Sodium Level 132  


 


Potassium Level 3.3  


 


Chloride Level 98  


 


Carbon Dioxide Level 19.9  


 


Anion Gap 14  


 


Blood Urea Nitrogen 20  


 


Creatinine 3.63  


 


Estimat Glomerular Filtration 20  





Rate  


 


Random Glucose 87  


 


Calcium Level 8.3  


 


Total Bilirubin 36.7  


 


Gamma Glutamyl Transpeptidase 279  


 


Aspartate Amino Transf 143  





(AST/SGOT)  


 


Alanine Aminotransferase 78  





(ALT/SGPT)  


 


Alkaline Phosphatase 216  


 


Total Protein 5.6  


 


Albumin 2.1  


 


Lipase 332  


 


Urine Color  DARK-YELLOW 


 


Urine Turbidity  HAZY 


 


Urine pH  6.0 


 


Urine Specific Gravity  1.010 


 


Urine Protein  TRACE 


 


Urine Glucose (UA)  NEG 


 


Urine Ketones  NEG 


 


Urine Occult Blood  NEG 


 


Urine Nitrite  NEG 


 


Urine Bilirubin  LARGE 


 


Urine Urobilinogen  LESS THAN 2.0 


 


Urine Leukocyte Esterase  TRACE 


 


Urine RBC  LESS THAN 1 


 


Urine WBC  2 


 


Urine Bacteria  FEW 


 


Urine Mucus  FEW 


 


Microscopic Urinalysis Comment  CULT NOT





  INDICATED








Result Diagram:  


3/27/17 1525                                                                   

             3/27/17 1525





Imaging





Last Impressions








Abdomen/Pelvis CT 3/27/17 0000 Signed





Impressions: 





 Service Date/Time:  Monday, March 27, 2017 16:50 - CONCLUSION: Findings 

suggest 





 advanced hepatocellular disease with varicosities, prominent spleen and 





 interestingly only a small amount of ascites.  Most of the ascites is in the 





 pelvis.     Jona Del Angel MD  FACR











Assessment and Plan


Assessment and Plan


26-year-old male with history of hepatitis





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


-CT scan show hepatocellular disease with varices.


-Patient has not been drinking alcohol since last admission in [place] but 

symptoms are not improving.


-Hepatitis panel reviewed from last admission which was all negative.


-We will get an alcohol level and liver ultrasound.  continue to monitor LFTs.


-Consult GI.





Coagulopathy


-Secondary to liver disease.


-See treatment as above.





Acute renal failure


-Most likely secondary to dehydration since patient has not been eating for the 

past 2 weeks and was on Lasix.


-Will give patient IV fluids and monitor creatinine/GFR.  Will get a renal 

ultrasound.  Strict ins and outs.  Avoid nephrotoxins.


-Consult nephrologist.








DVT prophylaxis


-INR is elevated.  Chemoprophylaxis contraindicated.


Code Status


full


Discussed Condition With


Patient his sister.


DAVID Parsons.





Physician Certification


2 Midnight Certification Type:  Admission for Inpatient Services


Order for Inpatient Services


The services are ordered in accordance with Medicare regulations or non-

Medicare payer requirements, as applicable.  In the case of services not 

specified as inpatient-only, they are appropriately provided as inpatient 

services in accordance with the 2-midnight benchmark.


Estimated LOS (days):  5


5 days is the estimated time the patient will need to remain in the hospital, 

assuming treatment plan goals are met and no additional complications.


Post-Hospital Plan:  Home








Van,Catie Nola MD Mar 27, 2017 18:28

## 2017-03-27 NOTE — RADRPT
EXAM DATE/TIME:  03/27/2017 16:50 

 

[facility] COMPARISON:     CT ABDOMEN & PELVIS W CONTRAST, March 11, 2017, 14:41.

 

INDICATIONS :     Cirrhosis. Evaluate for ascites.

                      

ORAL CONTRAST:      No oral contrast ingested.

                      

RADIATION DOSE:     12.32 CTDIvol (mGy) 

 

MEDICAL HISTORY :     Cirrhosis. Gastroesophageal reflux disease. 

SURGICAL HISTORY :      None. 

ENCOUNTER:      Initial

ACUITY:      1 day

PAIN SCALE:      0/10

LOCATION:       Abdomen

 

TECHNIQUE:     Volumetric scanning of the abdomen and pelvis was performed.  Using automated exposure
 control and adjustment of the mA and/or kV according to patient size, radiation dose was kept as low
 as reasonably achievable to obtain optimal diagnostic quality images. 

 

FINDINGS:     Lung bases are clear.  The liver is free of focal defects.  Spleen is  unremarkable.  S
mall amount of ascites is present.  

 

There is generalized mesenteric edema noted. 

 

Minimal ascites is present in the pelvis.  

 

Kidneys are unremarkable.  

 

Mesenteric varicosities are noted.  

 

 

 

 

CONCLUSION:     Findings suggest advanced hepatocellular disease with varicosities, prominent spleen 
and interestingly only a small amount of ascites.  Most of the ascites is in the pelvis.  Jona winslow MD FACR on March 27, 2017 at 17:02                

Board Certified Radiologist.

 This report was verified electronically.

## 2017-03-27 NOTE — RADRPT
EXAM DATE/TIME:  03/27/2017 18:27 

 

[facility] COMPARISON:     

CT ABDOMEN & PELVIS W/O CONTRAST, March 27, 2017, 16:50.

        

 

 

INDICATIONS :     

Abdominal pain. Increased lab values. 

                     

 

MEDICAL HISTORY :     

Gastroesophageal reflux disease. Cirrhosis. Skull fracture from trauma. Jaundice. Abdominal pain.

 

SURGICAL HISTORY :     

Right foot/ankle fracture surgery. 

 

ENCOUNTER:     

Initial

 

ACUITY:     

3 weeks

 

PAIN SCORE:     

5/10

 

LOCATION:     

Abdomen. 

                     

MEASUREMENTS:     

 

LIVER:     

18.0 cm length 

 

COMMON DUCT:     

5 mm

 

RIGHT KIDNEY:     

13.4 x 6.2 x 5.6 cm

 

LEFT KIDNEY:     

12.3 x 5.4 x 5.7 cm

 

SPLEEN:     

17.9 cm length

 

AORTA:     

1.9 cm maximal      

 

FINDINGS:     

Hepatosplenomegaly is noted.  The wall of the gallbladder is thickened.  Some ascites is noted.  Slud
ge is noted within the gallbladder lumen.  Minimal pericholecystic fluid is also noted. If there is c
linical concern for acute cholecystitis a hepatobiliary scan may be helpful to confirm cystic duct ob
struction.  There is hepatopetal flow within the portal vein. No biliary ductal dilatation is noted. 
The common bile duct is normal in caliber for the patient's age.  The kidneys are prominent but demon
strate no hydronephrosis or solid mass. Evaluation of the pancreas is limited due to shadowing bowel 
gas.  

 

CONCLUSION:

1. Thick-walled gallbladder containing some sludge and demonstrating pericholecystic fluid.  If there
 is clinical concern for acute cholecystitis a hepatobiliary scan may be helpful to confirm cystic du
ct obstruction. 

2. Hepatosplenomegaly. 

3. Some ascites within the abdomen.       

 

 

 Lebron Marin MD on March 27, 2017 at 20:27                

Board Certified Radiologist.

 This report was verified electronically.

## 2017-03-28 VITALS
TEMPERATURE: 97.6 F | RESPIRATION RATE: 16 BRPM | SYSTOLIC BLOOD PRESSURE: 99 MMHG | DIASTOLIC BLOOD PRESSURE: 52 MMHG | HEART RATE: 81 BPM | OXYGEN SATURATION: 96 %

## 2017-03-28 VITALS
SYSTOLIC BLOOD PRESSURE: 94 MMHG | RESPIRATION RATE: 16 BRPM | OXYGEN SATURATION: 96 % | HEART RATE: 86 BPM | TEMPERATURE: 97.3 F | DIASTOLIC BLOOD PRESSURE: 54 MMHG

## 2017-03-28 VITALS
HEART RATE: 88 BPM | RESPIRATION RATE: 16 BRPM | TEMPERATURE: 97 F | DIASTOLIC BLOOD PRESSURE: 58 MMHG | SYSTOLIC BLOOD PRESSURE: 108 MMHG | OXYGEN SATURATION: 99 %

## 2017-03-28 VITALS
OXYGEN SATURATION: 97 % | TEMPERATURE: 97.1 F | SYSTOLIC BLOOD PRESSURE: 102 MMHG | RESPIRATION RATE: 20 BRPM | HEART RATE: 92 BPM | DIASTOLIC BLOOD PRESSURE: 53 MMHG

## 2017-03-28 VITALS
DIASTOLIC BLOOD PRESSURE: 59 MMHG | TEMPERATURE: 97 F | SYSTOLIC BLOOD PRESSURE: 107 MMHG | HEART RATE: 86 BPM | OXYGEN SATURATION: 95 % | RESPIRATION RATE: 18 BRPM

## 2017-03-28 VITALS — OXYGEN SATURATION: 96 %

## 2017-03-28 VITALS — OXYGEN SATURATION: 93 %

## 2017-03-28 VITALS — OXYGEN SATURATION: 95 %

## 2017-03-28 LAB
ALP SERPL-CCNC: 199 U/L (ref 45–117)
ALT SERPL-CCNC: 65 U/L (ref 12–78)
ANION GAP SERPL CALC-SCNC: 15 MEQ/L (ref 5–15)
AST SERPL-CCNC: 125 U/L (ref 15–37)
BASOPHILS # BLD AUTO: 0.2 TH/MM3 (ref 0–0.2)
BASOPHILS NFR BLD: 1.1 % (ref 0–2)
BILIRUB SERPL-MCNC: 30.2 MG/DL (ref 0.2–1)
BUN SERPL-MCNC: 26 MG/DL (ref 7–18)
CHLORIDE SERPL-SCNC: 106 MEQ/L (ref 98–107)
EOSINOPHIL # BLD: 0.2 TH/MM3 (ref 0–0.4)
EOSINOPHIL NFR BLD: 1.1 % (ref 0–4)
ERYTHROCYTE [DISTWIDTH] IN BLOOD BY AUTOMATED COUNT: 16.1 % (ref 11.6–17.2)
FERRITIN SERPL-MCNC: 1666 NG/ML (ref 26–388)
GFR SERPLBLD BASED ON 1.73 SQ M-ARVRAT: 17 ML/MIN (ref 89–?)
HCO3 BLD-SCNC: 16.2 MEQ/L (ref 21–32)
HCT VFR BLD CALC: 32 % (ref 39–51)
HEMO FLAGS: (no result)
LYMPHOCYTES # BLD AUTO: 1.3 TH/MM3 (ref 1–4.8)
LYMPHOCYTES NFR BLD AUTO: 9.3 % (ref 9–44)
MAGNESIUM SERPL-MCNC: 2.7 MG/DL (ref 1.5–2.5)
MCH RBC QN AUTO: 36.3 PG (ref 27–34)
MCHC RBC AUTO-ENTMCNC: 33.8 % (ref 32–36)
MCV RBC AUTO: 107.4 FL (ref 80–100)
MONOCYTES NFR BLD: 10.7 % (ref 0–8)
NEUTROPHILS # BLD AUTO: 11.2 TH/MM3 (ref 1.8–7.7)
NEUTROPHILS NFR BLD AUTO: 77.8 % (ref 16–70)
PLAT MORPH BLD: (no result)
PLATELET # BLD: 167 TH/MM3 (ref 150–450)
PLATELET BLD QL SMEAR: NORMAL
POTASSIUM SERPL-SCNC: 3.1 MEQ/L (ref 3.5–5.1)
RBC # BLD AUTO: 2.98 MIL/MM3 (ref 4.5–5.9)
SCAN/DIFF: (no result)
SODIUM SERPL-SCNC: 137 MEQ/L (ref 136–145)
TARGETS BLD QL SMEAR: (no result)
TRANSFERRIN IRON PROFILE: 99 MG/DL (ref 200–360)
WBC # BLD AUTO: 14.4 TH/MM3 (ref 4–11)

## 2017-03-28 RX ADMIN — DOCUSATE SODIUM SCH MG: 100 CAPSULE, LIQUID FILLED ORAL at 21:00

## 2017-03-28 RX ADMIN — PENTOXIFYLLINE SCH MG: 400 TABLET, FILM COATED, EXTENDED RELEASE ORAL at 17:08

## 2017-03-28 RX ADMIN — PHENYTOIN SODIUM SCH MLS/HR: 50 INJECTION INTRAMUSCULAR; INTRAVENOUS at 19:48

## 2017-03-28 RX ADMIN — SODIUM CHLORIDE SCH MLS/HR: 900 INJECTION INTRAVENOUS at 17:39

## 2017-03-28 RX ADMIN — WATER SCH ML: 1 IRRIGANT IRRIGATION at 08:48

## 2017-03-28 RX ADMIN — PENTOXIFYLLINE SCH MG: 400 TABLET, FILM COATED, EXTENDED RELEASE ORAL at 21:01

## 2017-03-28 RX ADMIN — DOCUSATE SODIUM SCH MG: 100 CAPSULE, LIQUID FILLED ORAL at 08:48

## 2017-03-28 RX ADMIN — POTASSIUM CHLORIDE SCH MLS/HR: 200 INJECTION, SOLUTION INTRAVENOUS at 19:48

## 2017-03-28 RX ADMIN — Medication SCH ML: at 21:00

## 2017-03-28 RX ADMIN — WATER SCH ML: 1 IRRIGANT IRRIGATION at 13:00

## 2017-03-28 RX ADMIN — Medication SCH ML: at 08:48

## 2017-03-28 RX ADMIN — ALBUMIN HUMAN SCH GM: 0.25 SOLUTION INTRAVENOUS at 21:05

## 2017-03-28 RX ADMIN — WATER SCH ML: 1 IRRIGANT IRRIGATION at 17:17

## 2017-03-28 RX ADMIN — PHENYTOIN SODIUM SCH MLS/HR: 50 INJECTION INTRAMUSCULAR; INTRAVENOUS at 01:54

## 2017-03-28 RX ADMIN — PHENYTOIN SODIUM SCH MLS/HR: 50 INJECTION INTRAMUSCULAR; INTRAVENOUS at 21:00

## 2017-03-28 RX ADMIN — POTASSIUM CHLORIDE SCH MLS/HR: 200 INJECTION, SOLUTION INTRAVENOUS at 13:18

## 2017-03-28 NOTE — RADRPT
EXAM DATE/TIME:  03/28/2017 14:36 

 

CORRECTION

Corrected on: April 03, 2017; Corrected surgical history

 

This report includes an Addendum and supersedes previous reports for this exam.

 

 

 

[facility] COMPARISON:     

No previous studies available for comparison.

 

 

INDICATIONS :     

*** Jaundice with abdomen pain and nausea.

                   

 

DOSE:      

3.9 mCi Tc99m Mebrofenin IV 

                   

                   

 

MEDICAL HISTORY :     

Cirrhosis.   Alcoholic hepatitis. ETOH abuse.

 

SURGICAL HISTORY :      

None    

 

ENCOUNTER:     

Initial

 

ACUITY:     

2 days

 

PAIN SCALE:     

2/10

 

LOCATION:       

upper quadrant 

 

TECHNIQUE:     

Following the intravenous administration of radiotracer, dynamic sequential images were performed wit
h continuous acquisition.

 

FINDINGS:     

 

HEPATIC KINETICS:     

There is diffusely decreased uptake in the liver. No activity in the biliary ducts is seen at 2 hours
. Gallbladder is not visualized.

 

CONCLUSION:     

Markedly delayed uptake in the liver. Findings suggest diffuse hepatocellular disease versus biliary 
obstruction. 24 hour delayed scan could be performed.

 

 

 

 Estevan Longoria MD on March 28, 2017 at 17:03           

Board Certified Radiologist.

 This report was verified electronically. 

 

ADDENDUM: 

24 hour scans still shows no activity in bile ducts or small bowel suggesting diffuse hepatocellular 
disease.

 

 

 Jona Del Angel MD FACR on March 29, 2017 at 9:38           

Board Certified Radiologist.

 This report was verified electronically.      on April 03, 2017 at 8:46   

Board Certified Radiologist.

 This report was verified electronically.

## 2017-03-28 NOTE — HHI.PR
Subjective


Remarks


Follow-up for hepatitis and acute renal failure


Patient complaining that the stroke did not help with his anxiety and pruritus.


Patient said the Ativan helped him.


Patient is making urine.


He has no other complaints.  He stated abdominal pain is stable.  Denies any 

nausea or vomiting.





Objective


Vitals





 Vital Signs








  Date Time  Temp Pulse Resp B/P Pulse Ox O2 Delivery O2 Flow Rate FiO2


 


3/28/17 12:00 97.0 86 18 107/59 95   


 


3/28/17 10:08     96   21


 


3/28/17 08:00 97.1 92 20 102/53 97   


 


3/28/17 04:00 97.6 81 16 99/52 96   


 


3/28/17 02:18   18     


 


3/28/17 01:01     93   


 


3/28/17 00:45 97.3 86 16 94/54 96   


 


3/27/17 20:40 97.8 92 18 120/56 98   


 


3/27/17 19:00  86 18 121/65 97 Room Air  


 


3/27/17 16:00  90 18 115/57 96 Room Air  








 I/O








 3/27/17 3/27/17 3/27/17 3/28/17 3/28/17 3/28/17





 07:00 15:00 23:00 07:00 15:00 23:00


 


Intake Total   240 ml 240 ml 1803 ml 


 


Output Total     400 ml 


 


Balance   240 ml 240 ml 1403 ml 


 


      


 


Intake Oral   240 ml 240 ml 720 ml 


 


IV Total     1083 ml 


 


Output Urine Total     400 ml 


 


# Voids   1 1 4 








Result Diagram:  


3/28/17 1031                                                                   

             3/28/17 1031





Imaging





Last Impressions








Chest X-Ray 3/27/17 0000 Signed





Impressions: 





 Service Date/Time:  Monday, March 27, 2017 18:08 - CONCLUSION:  Left basilar 





 streakiness consistent with probable atelectasis.            Lebron Marin MD 


 


Abdomen/Pelvis CT 3/27/17 0000 Signed





Impressions: 





 Service Date/Time:  Monday, March 27, 2017 16:50 - CONCLUSION: Findings 

suggest 





 advanced hepatocellular disease with varicosities, prominent spleen and 





 interestingly only a small amount of ascites.  Most of the ascites is in the 





 pelvis.     Jona Del Angel MD  FACR


 


Abdomen Ultrasound 3/27/17 0000 Signed





Impressions: 





 Service Date/Time:  Monday, March 27, 2017 18:27 - CONCLUSION:  1. Thick-

walled 





 gallbladder containing some sludge and demonstrating pericholecystic fluid.  

If 





 there is clinical concern for acute cholecystitis a hepatobiliary scan may be 





 helpful to confirm cystic duct obstruction.  2. Hepatosplenomegaly.  3. Some 





 ascites within the abdomen.           Lebron Marin MD 








Objective Remarks


GENERAL: Chronically ill male in no acute distress.


SKIN: Jaundiced


HEAD: Normocephalic.


EYES: scleral icterus. . 


RESPIRATORY: Breath sounds equal bilaterally. No accessory muscle use.


GASTROINTESTINAL: Abdomen soft, nontender positive for distention.


MUSCULOSKELETAL: No cyanosis, or edema. 


BACK: Nontender without obvious deformity. No CVA tenderness.





Medications and IVs





 Current Medications


Sodium Chloride 2 ml 2 ml UNSCH  PRN IV FLUSH FLUSH AFTER USING IV ACCESS Last 

administered on 3/27/17at 16:08;  Start 3/27/17 at 15:00;  Stop 3/27/17 at 18:25

;  Status DC


Sodium Chloride 1,000 ml @  999 mls/hr BOLUS  ONCE IV  Last administered on 3/27

/17at 16:30;  Start 3/27/17 at 16:15;  Stop 3/27/17 at 17:15;  Status DC


Sodium Chloride (NS 1000 ml Inj) 1,000 ml @  125 mls/hr Q8H IV  Last 

administered on 3/28/17at 01:54;  Start 3/27/17 at 18:14


Sodium Chloride (NS Flush) 2 ml UNSCH  PRN IV FLUSH FLUSH AFTER USING IV ACCESS

;  Start 3/27/17 at 18:15


Sodium Chloride (NS Flush) 2 ml BID IV FLUSH  Last administered on 3/27/17at 21:

46;  Start 3/27/17 at 21:00


Ondansetron HCl (Zofran Inj) 4 mg Q6H  PRN IVP NAUSEA OR VOMITING;  Start 3/27/

17 at 18:15


Docusate Sodium (Colace) 100 mg Q12H PO  Last administered on 3/27/17at 21:45;  

Start 3/27/17 at 21:00


Naloxone HCl (Narcan Inj) 0.4 mg UNSCH  PRN IV SEE LABEL COMMENTS;  Start 3/27/

17 at 18:15


Hydroxyzine Pamoate (Vistaril) 25 mg Q6H  PRN PO ITCHING Last administered on 3/

28/17at 08:48;  Start 3/27/17 at 18:15


Lactulose (Lactulose Liq) 30 ml TID PO  Last administered on 3/28/17at 08:48;  

Start 3/28/17 at 09:00


Alprazolam (Xanax) 0.25 mg ONCE  ONCE PO  Last administered on 3/27/17at 21:45;

  Start 3/27/17 at 21:30;  Stop 3/27/17 at 21:31;  Status DC


Morphine Sulfate 2 mg 2 mg ONCE  ONCE IV PUSH  Last administered on 3/28/17at 02

:10;  Start 3/28/17 at 02:00;  Stop 3/28/17 at 02:01;  Status DC


Potassium Chloride (KCl 20 Meq Premix Inj) 100 ml @  50 mls/hr Q2H IV  Last 

administered on 3/28/17at 13:18;  Start 3/28/17 at 13:00;  Stop 3/28/17 at 16:59


Pentoxifylline 400 mg 400 mg Q8HR PO ;  Start 3/28/17 at 14:00


Ceftriaxone Sodium/Sodium Chloride (Rocephin Inj/NS Inj) 100 ml @  200 mls/hr 

Q24H IV ;  Start 3/28/17 at 14:00





A/P


Assessment and Plan


26-year-old male with history of hepatitis





Hepatitis complicated by varices, ascites and coagulopathy.


-CT scan show hepatocellular disease with varices.


-Patient has not been drinking alcohol since last admission in [facility] but 

symptoms are not improving.


-Hepatitis panel reviewed from last admission which was all negative.


-Alcohol level during this admission is negative.


-Liver ultrasound shows possible acute cholecystitis so HIDA scan was ordered.


-GI consulted and recommended to add Ceftriaxone, add Pentoxifylline, Cont. PPI

, Cont. Lactulose, obtain DARRYL, AMA, ASM, Ceruloplasmin, Alpha 1 Antitrypsin, 

AFP level, US guided paracentesis, diagnostic (? SBP) if enough fluid to safely 

drain


-Continue with supportive care.  Patient is not a candidate for liver 

transplant due to recent abdomen which patient was drinking alcohol.





Coagulopathy


-Secondary to liver disease.


-See treatment as above.





Acute renal failure


-Most likely secondary to dehydration since patient has not been eating for the 

past 2 weeks and was on Lasix.


-Nephrologist consulted and following.  Recommend to continue with IV fluids.


-Abdominal images are reviewed.





Anxiety


-Give Ativan when necessary.





Urticaria


-Increase Vistaril.








DVT prophylaxis


-INR is elevated so chemoprophylaxis contraindicated.








Catie Mims MD Mar 28, 2017 15:48

## 2017-03-28 NOTE — PD.CONS
HPI


History of Present Illness


This is a 26 year old male patient with a long hx of ETOH abuse, who was 

hospitalized from 3/11-3/14 for acute alcoholic hepatitis.  During that 

admission, he was treated with Pentoxifylline, Prednisone, Nadolol, 

Spironolactone, Lactulose, and Protonix.  Unfortunately, he left AMA on 3/14.  

His LFTs on 3/14 were T. Bili 23.7, , ALT 61, Alkaline Phosph 162. He 

reports that he did take an OTC diuretic at home, but was not sent home with 

medications because he left AMA.  He reports that he has been drinking heavily 

for the past 6 years, drinking about 6-7 drinks per day.  He has not had any 

alcohol since his last hospitalization on 3/11.  He is not taking Tylenol 

products. He is not aware of any other liver disease, although several people 

on his mother's side have autoimmune disorders.  He did have a hepatitis panel 

and celiac disease panel, which were both negative, but did not have the full 

liver workup.  He reports that he has not felt well since his last 

hospitalization, having nausea without vomiting, increasing abdominal swelling 

with discomfort related to the swelling.  His jaundice has been worsening and 

reports that his urine has been darker in color.  He also reports intermittent 

fevers.  Abdomen Ultrasound (3/27/17)-----> 1. Thick-walled gallbladder 

containing some sludge and demonstrating pericholecystic fluid.  If there is 

clinical concern for acute cholecystitis a hepatobiliary scan may be helpful to 

confirm cystic duct obstruction. 2. Hepatosplenomegaly.  3. Some ascites within 

the abdomen.  Abdomen/Pelvis CT (3/27/17)---->  Findings suggest advanced 

hepatocellular disease with varicosities, prominent spleen and interestingly 

only a small amount of ascites.  Most of the ascites is in the pelvis.    (

Lizette Amin)





PFSH


Past Medical History


Alcoholic hepatitis


Alcoholism


Tobacco dependence


Liver cirrhosis.


Past Surgical History


Denied surgical history. (Lizette Amin)


Coded Allergies:  


     No Known Allergies (Verified , 3/11/17)


Medications





 Allergies








Coded Allergies Type Severity Reaction Last Updated Verified


 


  No Known Allergies    3/11/17 Yes








 Active Scripts








 Medications  Dose


 Route/Sig Days Date Category


 


 No Active


 Prescriptions or


 Reported


 Medications  


     Rx





He was taking a diuretic at home (OTC) unknown name.


Family History


Multiple family members on mother's side with autoimmune diseases.


Social History


Patient has a positive history for tobacco dependence but stop smoking when he 

was admitted to [facility].


Also had history of alcoholism in which patient stopped drinking since he was 

admitted at [facility]. (has been drinking 6 alcoholic drinks per day x 6 years

)


Denied illicit drug use. (EloisaLizette)





Review of Systems


Constitutional:  COMPLAINS OF: Fatigue, Fever, Chills


Cardiovascular:  DENIES: Chest pain


Gastrointestinal:  COMPLAINS OF: Abdominal pain, Nausea, Swelling of Abdomen,  

DENIES: Black stools, Bloody stools, Constipation, Diarrhea, Vomiting


Integumentary:  COMPLAINS OF: Jaundice


Psychiatric:  DENIES: Confusion (Lizette Amin)





GI Exam


Vitals I&O





 Vital Signs








  Date Time  Temp Pulse Resp B/P Pulse Ox O2 Delivery O2 Flow Rate FiO2


 


3/28/17 10:08     96   21


 


3/28/17 08:00 97.1 92 20 102/53 97   


 


3/28/17 04:00 97.6 81 16 99/52 96   


 


3/28/17 02:18   18     


 


3/28/17 01:01     93   


 


3/28/17 00:45 97.3 86 16 94/54 96   


 


3/27/17 20:40 97.8 92 18 120/56 98   


 


3/27/17 19:00  86 18 121/65 97 Room Air  


 


3/27/17 16:00  90 18 115/57 96 Room Air  


 


3/27/17 15:30 98.2 90 18 119/59 97 Room Air  


 


3/27/17 15:15     96 Room Air  


 


3/27/17 14:29 98.2 105 16 133/62 96   








 I/O








 3/27/17 3/27/17 3/27/17 3/28/17 3/28/17 3/28/17





 07:00 15:00 23:00 07:00 15:00 23:00


 


Intake Total   240 ml 240 ml  


 


Balance   240 ml 240 ml  


 


      


 


Intake Oral   240 ml 240 ml  


 


# Voids   1 1  








Imaging





Last Impressions








Chest X-Ray 3/27/17 0000 Signed





Impressions: 





 Service Date/Time:  Monday, March 27, 2017 18:08 - CONCLUSION:  Left basilar 





 streakiness consistent with probable atelectasis.            Lebron Marin MD 


 


Abdomen/Pelvis CT 3/27/17 0000 Signed





Impressions: 





 Service Date/Time:  Monday, March 27, 2017 16:50 - CONCLUSION: Findings 

suggest 





 advanced hepatocellular disease with varicosities, prominent spleen and 





 interestingly only a small amount of ascites.  Most of the ascites is in the 





 pelvis.     Jona Del Angel MD  FACR


 


Abdomen Ultrasound 3/27/17 0000 Signed





Impressions: 





 Service Date/Time:  Monday, March 27, 2017 18:27 - CONCLUSION:  1. Thick-

walled 





 gallbladder containing some sludge and demonstrating pericholecystic fluid.  

If 





 there is clinical concern for acute cholecystitis a hepatobiliary scan may be 





 helpful to confirm cystic duct obstruction.  2. Hepatosplenomegaly.  3. Some 





 ascites within the abdomen.           Lebron Marin MD 








Laboratory











Test 3/27/17 3/27/17 3/27/17 3/28/17





 15:25 17:30 17:42 10:31


 


White Blood Count 15.2 TH/MM3   14.4 TH/MM3


 


Red Blood Count 3.23 MIL/MM3   2.98 MIL/MM3


 


Hemoglobin 11.8 GM/DL   10.8 GM/DL


 


Hematocrit 35.0 %   32.0 %


 


Mean Corpuscular Volume 108.4 FL   107.4 FL


 


Mean Corpuscular Hemoglobin 36.6 PG   36.3 PG


 


Mean Corpuscular Hemoglobin 33.8 %   33.8 %





Concent    


 


Red Cell Distribution Width 16.5 %   16.1 %


 


Platelet Count 181 TH/MM3   167 TH/MM3


 


Mean Platelet Volume 10.4 FL   10.4 FL


 


Neutrophils (%) (Auto) 84.5 %   77.8 %


 


Lymphocytes (%) (Auto) 3.8 %   9.3 %


 


Monocytes (%) (Auto) 10.6 %   10.7 %


 


Eosinophils (%) (Auto) 0.6 %   1.1 %


 


Basophils (%) (Auto) 0.5 %   1.1 %


 


Neutrophils # (Auto) 12.8 TH/MM3   11.2 TH/MM3


 


Lymphocytes # (Auto) 0.6 TH/MM3   1.3 TH/MM3


 


Monocytes # (Auto) 1.6 TH/MM3   1.5 TH/MM3


 


Eosinophils # (Auto) 0.1 TH/MM3   0.2 TH/MM3


 


Basophils # (Auto) 0.1 TH/MM3   0.2 TH/MM3


 


CBC Comment DIFF FINAL    AUTO DIFF 


 


Differential Comment     AUTO DIFF





    CONFIRMED


 


Prothrombin Time 16.9 SEC   


 


Prothromb Time International 1.5 RATIO   





Ratio    


 


Activated Partial 35.9 SEC   





Thromboplast Time    


 


Sodium Level 132 MEQ/L   137 MEQ/L


 


Potassium Level 3.3 MEQ/L   3.1 MEQ/L


 


Chloride Level 98 MEQ/L   106 MEQ/L


 


Carbon Dioxide Level 19.9 MEQ/L   16.2 MEQ/L


 


Anion Gap 14 MEQ/L   15 MEQ/L


 


Blood Urea Nitrogen 20 MG/DL   26 MG/DL


 


Creatinine 3.63 MG/DL   4.21 MG/DL


 


Estimat Glomerular Filtration 20 ML/MIN   17 ML/MIN





Rate    


 


Random Glucose 87 MG/DL   108 MG/DL


 


Calcium Level 8.3 MG/DL   7.6 MG/DL


 


Total Bilirubin 36.7 MG/DL   30.2 MG/DL


 


Gamma Glutamyl Transpeptidase 279 U/L   


 


Aspartate Amino Transf 143 U/L   125 U/L





(AST/SGOT)    


 


Alanine Aminotransferase 78 U/L   65 U/L





(ALT/SGPT)    


 


Alkaline Phosphatase 216 U/L   199 U/L


 


Total Protein 5.6 GM/DL   4.9 GM/DL


 


Albumin 2.1 GM/DL   1.8 GM/DL


 


Lipase 332 U/L   


 


Urine Color  DARK-YELLOW   


 


Urine Turbidity  HAZY   


 


Urine pH  6.0   


 


Urine Specific Gravity  1.010   


 


Urine Protein  TRACE mg/dL  


 


Urine Glucose (UA)  NEG mg/dL  


 


Urine Ketones  NEG mg/dL  


 


Urine Occult Blood  NEG   


 


Urine Nitrite  NEG   


 


Urine Bilirubin  LARGE   


 


Urine Urobilinogen  LESS THAN 2.0  





  MG/DL  


 


Urine Leukocyte Esterase  TRACE   


 


Urine RBC  LESS THAN 1  





  /hpf  


 


Urine WBC  2 /hpf  


 


Urine Bacteria  FEW /hpf  


 


Urine Mucus  FEW /lpf  


 


Microscopic Urinalysis Comment  CULT NOT  





  INDICATED  


 


Urine Opiates Screen  NEG   


 


Urine Barbiturates Screen  NEG   


 


Urine Amphetamines Screen  NEG   


 


Urine Benzodiazepines Screen  POS   


 


Urine Cocaine Screen  NEG   


 


Urine Cannabinoids Screen  NEG   


 


Ammonia   44 MCMOL/L 


 


Ethyl Alcohol Level   LESS THAN 3 





   MG/DL 


 


Platelet Estimate    NORMAL 


 


Platelet Morphology Comment    ENLARGED 


 


Target Cells    1+ 








Physical Examination


HEENT: Normocephalic; atraumatic; + significant jaundice. 


CHEST:  CTA


CARDIAC:  RRR


ABDOMEN:  Soft, distended with ascites, nontender; hepatosplenomegaly; bowel 

sounds are present in all four quadrants.


EXTREMITIES: No clubbing, cyanosis, or edema.


SKIN:  + jaundice.


CNS:  No focal deficits; lethargic, but oriented times three. (Lizette Amin)





Assessment and Plan


Plan


ASSESSMENT:


- Severe Alcoholic hepatitis on what appears to be underlying liver cirrhosis (

based on imaging).  Has hx of ETOH abuse, reportedly drinking 6-7 drinks per


   day x 6 years.  He was hospitalized from 3/11-3/14 for acute alcoholic 

hepatitis.  During that admission, he was treated with Pentoxifylline, 

Prednisone,


   Nadolol, Spironolactone, Lactulose, and Protonix.  Unfortunately, he left 

[facility] on 3/14 with LFTs on 3/14 were T. Bili 23.7, , ALT 61, Alkaline 


   Phosph 162.  Hepatitis Panel and Celiac panel was negative at that time.  He 

was taking an OTC diuretic at home.  He is not taking Tylenol products. 


   He is not aware of any other liver disease, although several people on his 

mother's side have autoimmune disorders.  He returned to ER for worsening


   jaundice, nausea without vomiting, worsening abdominal distention, and 

intermittent fever and chills.  Abdomen Ultrasound (3/27/17)-----> 1. Thick-

walled 


   gallbladder containing some sludge and demonstrating pericholecystic fluid.  

If there is clinical concern for acute cholecystitis a hepatobiliary scan may 


   be helpful to confirm cystic duct obstruction. 2. Hepatosplenomegaly.  3. 

Some ascites within the abdomen.  Abdomen/Pelvis CT (3/27/17)---->  Findings


   suggest advanced hepatocellular disease with varicosities, prominent spleen 

and interestingly only a small amount of ascites.  Most of the ascites is in the


   pelvis.   T. Bili 30.2, , ALT 65, Alk Phosph 199.  PT 16.9, INR 1.5.  

The fact that his PT/INR is stable is actually reassuring.  Creat. 4.21. It is


   unclear at this time, if he has an CARLEE related to his self medicating with 

OTC diuretics or possible hepatorenal because of liver disease.  DF 52.70.


   Not a candidate for steroids with his intermittent fevers/chills.  Will 

start Pentoxifylline.  He does need complete liver workup to rule out other 

underlying


   liver disease such as autoimmune hepatitis.


- Ascites with intermittent fevers and chills.  Will add Ceftriaxone for SBP 

proph.  Will ask for diagnostic paracentesis if enough fluid to safely drain.


- Hepatic encephalopathy.  44 lactulose.


- Coagulopathy.  STABLE.  


- ARF.  Creat 4.21.  At this point, feel more related to OTC diuretics/

dehydration.  


- Leukocytosis/Fever.  Add Rocephin.





PLAN:


- Clear liquids


- Add Ceftriaxone 


- Add Pentoxifylline


- Cont. PPI


- Cont. Lactulose


- DARRYL, AMA, ASMA


- Ceruloplasmin, Alpha 1 Antitrypsin


- AFP level


- Defer diuretics to renal


- US guided paracentesis, diagnostic (? SBP) if enough fluid to safely drain


- Pt was drinking up until his last hospitalization on 3/11 and therefore is 

not a candidate for liver transplant if his condition worsens.


- Supportive care


- Further recommendations to follow based on results of above


- Pt seen and examined by Dr. Parker and myself and this note is written on 

his behalf (Lizette Amin)


Physician Comments


Patient seen and examined


Agree with above


Continue with current supportive care


Monitor labs


Patient needs to stop all alcohol intake


Prognosis at this point is poor (Zac Parker MD)








Lizette Amin Mar 28, 2017 13:14


Zac Parker MD Mar 28, 2017 22:28

## 2017-03-28 NOTE — PD.CONS
[facility]


Service


Nephrology


Consult Requested By


Dr. Mims


Reason for Consult


CARLEE


Primary Care Physician


No Primary Care Physician


History of Present Illness


The patient is a 27 yo [place] male who presented to this facility 3/27 with 

complaints of abd pain, nausea, and poor oral intake for the past 2 weeks.  He 

was recently admitted to [facility] (3/11-3/14) for similar complaints, but signed 

himself out AMA.  He has a hx of heavy EtOH intake for the past several years, 

but says he has been trying to cut back since he was admitted to [facility].  As per 

records, he was using Lasix since his discharge from the hospital despite 

little po intake, but he says he was only using "over the counter diuretics" 

and Benadryl.  Denies any NSAID use at home.  No prior hx of renal decline.  

Mentions multiple family member with RA.


Admitting SCr 3.63


Labs from beginning of month from [facility] show preserved renal function at 0.6-0.8 

baseline SCr. (Prerna Ball)





Review of Systems


Gastrointestinal:  COMPLAINS OF: Abdominal pain, Nausea, Vomiting


Integumentary:  COMPLAINS OF: Abnormal pigmentation, Pruritus (Prerna Ball)





Past Family Social History


Allergies:  


Coded Allergies:  


     No Known Allergies (Verified , 3/11/17)


Past Medical History


EtOH abuse


Alcoholic hepatitis?


TBI from high school


GERD


Past Surgical History


Denies


Reported Medications


Benadryl


"over the counter diuretics"





Reported Meds & Active Scripts


Active


No Active Prescriptions or Reported Medications


Active Ordered Medications





 Current Medications








 Medications


  (Trade)  Dose


 Ordered  Sig/Brittany


 Route  Start Time


 Stop Time Status Last Admin


 


  (NS 1000 ml Inj)  1,000 ml @ 


 125 mls/hr  Q8H


 IV  3/27/17 18:14


    3/28/17 01:54


 


 


  (NS Flush)  2 ml  UNSCH  PRN


 IV FLUSH  3/27/17 18:15


     


 


 


  (NS Flush)  2 ml  BID


 IV FLUSH  3/27/17 21:00


    3/27/17 21:46


 


 


  (Zofran Inj)  4 mg  Q6H  PRN


 IVP  3/27/17 18:15


     


 


 


  (Colace)  100 mg  Q12H


 PO  3/27/17 21:00


    3/27/17 21:45


 


 


  (Narcan Inj)  0.4 mg  UNSCH  PRN


 IV  3/27/17 18:15


     


 


 


  (Vistaril)  25 mg  Q6H  PRN


 PO  3/27/17 18:15


    3/28/17 08:48


 


 


  (Lactulose Liq)  30 ml  TID


 PO  3/28/17 09:00


    3/28/17 08:48


 








Family History


Reports multiple family members with RA.


Social History


Single


Works as a  as per previous records


Sister active in his life.  Works in hospital as per RN


Previous smoker of 1 pack per week.  Quit last hospitalization


EtOH abuse---6-8 drinks daily. (Prerna Ball)





Physical Exam


Vital Signs





 Vital Signs








  Date Time  Temp Pulse Resp B/P Pulse Ox O2 Delivery O2 Flow Rate FiO2


 


3/28/17 10:08     96   21


 


3/28/17 08:00 97.1 92 20 102/53 97   


 


3/28/17 04:00 97.6 81 16 99/52 96   


 


3/28/17 02:18   18     


 


3/28/17 01:01     93   


 


3/28/17 00:45 97.3 86 16 94/54 96   


 


3/27/17 20:40 97.8 92 18 120/56 98   


 


3/27/17 19:00  86 18 121/65 97 Room Air  


 


3/27/17 16:00  90 18 115/57 96 Room Air  


 


3/27/17 15:30 98.2 90 18 119/59 97 Room Air  


 


3/27/17 15:15     96 Room Air  


 


3/27/17 14:29 98.2 105 16 133/62 96   








Physical Exam


GENERAL: Pt drowsy, but talks when asked questions.  NAD


SKIN: Warm and dry. Jaundiced


HEAD: Atraumatic. Normocephalic. 


EYES: Pupils equal and round. Scleral icterus. No injection or drainage. 


ENT: No nasal bleeding or discharge.  Mucous membranes pink and moist.


NECK: Trachea midline. No JVD. 


CARDIOVASCULAR: Regular rate and rhythm.  


RESPIRATORY: No accessory muscle use. Clear to auscultation. Breath sounds 

equal bilaterally. 


GASTROINTESTINAL: Abdomen soft, non-tender, distended.


MUSCULOSKELETAL: Extremities without clubbing, cyanosis, or edema. No obvious 

deformities. 


NEUROLOGICAL: Awake and alert.  Normal speech.


PSYCHIATRIC: Appropriate mood and affect; insight and judgment normal (appears, 

but is quite drowsy)


Laboratory





Laboratory Tests








Test 3/27/17 3/27/17 3/27/17 3/28/17





 15:25 17:30 17:42 10:31


 


White Blood Count 15.2    14.4 


 


Red Blood Count 3.23    2.98 


 


Hemoglobin 11.8    10.8 


 


Hematocrit 35.0    32.0 


 


Mean Corpuscular Volume 108.4    107.4 


 


Mean Corpuscular Hemoglobin 36.6    36.3 


 


Mean Corpuscular Hemoglobin 33.8    33.8 





Concent    


 


Red Cell Distribution Width 16.5    16.1 


 


Platelet Count 181    167 


 


Mean Platelet Volume 10.4    10.4 


 


Neutrophils (%) (Auto) 84.5    77.8 


 


Lymphocytes (%) (Auto) 3.8    9.3 


 


Monocytes (%) (Auto) 10.6    10.7 


 


Eosinophils (%) (Auto) 0.6    1.1 


 


Basophils (%) (Auto) 0.5    1.1 


 


Neutrophils # (Auto) 12.8    11.2 


 


Lymphocytes # (Auto) 0.6    1.3 


 


Monocytes # (Auto) 1.6    1.5 


 


Eosinophils # (Auto) 0.1    0.2 


 


Basophils # (Auto) 0.1    0.2 


 


CBC Comment DIFF FINAL    AUTO DIFF 


 


Differential Comment     AUTO DIFF





    CONFIRMED


 


Prothrombin Time 16.9    


 


Prothromb Time International 1.5    





Ratio    


 


Activated Partial 35.9    





Thromboplast Time    


 


Sodium Level 132    137 


 


Potassium Level 3.3    3.1 


 


Chloride Level 98    106 


 


Carbon Dioxide Level 19.9    16.2 


 


Anion Gap 14    15 


 


Blood Urea Nitrogen 20    26 


 


Creatinine 3.63    4.21 


 


Estimat Glomerular Filtration 20    17 





Rate    


 


Random Glucose 87    108 


 


Calcium Level 8.3    7.6 


 


Total Bilirubin 36.7    30.2 


 


Gamma Glutamyl Transpeptidase 279    


 


Aspartate Amino Transf 143    125 





(AST/SGOT)    


 


Alanine Aminotransferase 78    65 





(ALT/SGPT)    


 


Alkaline Phosphatase 216    199 


 


Total Protein 5.6    4.9 


 


Albumin 2.1    1.8 


 


Lipase 332    


 


Urine Color  DARK-YELLOW   


 


Urine Turbidity  HAZY   


 


Urine pH  6.0   


 


Urine Specific Gravity  1.010   


 


Urine Protein  TRACE   


 


Urine Glucose (UA)  NEG   


 


Urine Ketones  NEG   


 


Urine Occult Blood  NEG   


 


Urine Nitrite  NEG   


 


Urine Bilirubin  LARGE   


 


Urine Urobilinogen  LESS THAN 2.0   


 


Urine Leukocyte Esterase  TRACE   


 


Urine RBC  LESS THAN 1   


 


Urine WBC  2   


 


Urine Bacteria  FEW   


 


Urine Mucus  FEW   


 


Microscopic Urinalysis Comment  CULT NOT  





  INDICATED  


 


Urine Opiates Screen  NEG   


 


Urine Barbiturates Screen  NEG   


 


Urine Amphetamines Screen  NEG   


 


Urine Benzodiazepines Screen  POS   


 


Urine Cocaine Screen  NEG   


 


Urine Cannabinoids Screen  NEG   


 


Ammonia   44  


 


Ethyl Alcohol Level   LESS THAN 3  


 


Platelet Estimate    NORMAL 


 


Platelet Morphology Comment    ENLARGED 


 


Target Cells    1+ 





 (Ball,Prerna Shefali PA)


Result Diagram:  


3/28/17 1031                                                                   

             3/28/17 1031





Imaging





Last Impressions








Chest X-Ray 3/27/17 0000 Signed





Impressions: 





 Service Date/Time:  Monday, March 27, 2017 18:08 - CONCLUSION:  Left basilar 





 streakiness consistent with probable atelectasis.            Lebron Marin MD 


 


Abdomen/Pelvis CT 3/27/17 0000 Signed





Impressions: 





 Service Date/Time:  Monday, March 27, 2017 16:50 - CONCLUSION: Findings 

suggest 





 advanced hepatocellular disease with varicosities, prominent spleen and 





 interestingly only a small amount of ascites.  Most of the ascites is in the 





 pelvis.     Jona Del Angel MD  FACR


 


Abdomen Ultrasound 3/27/17 0000 Signed





Impressions: 





 Service Date/Time:  Monday, March 27, 2017 18:27 - CONCLUSION:  1. Thick-

walled 





 gallbladder containing some sludge and demonstrating pericholecystic fluid.  

If 





 there is clinical concern for acute cholecystitis a hepatobiliary scan may be 





 helpful to confirm cystic duct obstruction.  2. Hepatosplenomegaly.  3. Some 





 ascites within the abdomen.           Lebron Marin MD 





 (Prerna Ball)





Assessment and Plan


Problem List:  


(1) Acute renal failure


Plan:  Likely prerenal from dehydration.  Reported hx of poor po intake with 

apparent diuretic usage.


Agree with IVF.  


Abd US reviewed and showed no signs of obstructive uropathy





Medications should be adjusted for the patient's renal decline.  Avoid 

nephrotoxic medications such as iodinated contrast dyes and NSAIDs.  Avoid 

gadolinium when eGFR <30





(2) Hypokalemia


Plan:  Repletion as ordered





(3) Acidosis


Plan:  Likely related to ARF.  Will replete K+ and monitor.  Consider adding 

bicarb to IVF if worsens.





(4) Cirrhosis with alcoholism


Plan:  Pending GI eval today.  


 (Prerna Ball)


Assessment and Plan


At this point in time the primary consideration is acute renal insufficiency 

secondary to dehydration.  Patient does not progress to an acute tubular 

necrosis.





Unfortunately given severity of his hepatocellular disease he is also at risk 

for development of hepatorenal syndrome which would be a poor prognostic 

indicator.





Continue IV fluids and add IV albumin to try and improve intravascular volume.





The exam, history, and the medical decision-making described in the above note 

were completed with the assistance of the KAIDEN. I reviewed and agree with the 

findings presented.  I attest that I had a face-to-face encounter with the 

patient on the same day, and personally performed and documented my assessment 

and findings in the medical record.   (RADHA Alan MD)








Prerna Ball Mar 28, 2017 12:06


RADHA Alan MD Mar 28, 2017 20:04

## 2017-03-29 VITALS
TEMPERATURE: 98.1 F | DIASTOLIC BLOOD PRESSURE: 56 MMHG | RESPIRATION RATE: 18 BRPM | HEART RATE: 96 BPM | OXYGEN SATURATION: 95 % | SYSTOLIC BLOOD PRESSURE: 113 MMHG

## 2017-03-29 VITALS
OXYGEN SATURATION: 95 % | TEMPERATURE: 98.3 F | DIASTOLIC BLOOD PRESSURE: 61 MMHG | HEART RATE: 114 BPM | SYSTOLIC BLOOD PRESSURE: 112 MMHG | RESPIRATION RATE: 16 BRPM

## 2017-03-29 VITALS
RESPIRATION RATE: 16 BRPM | TEMPERATURE: 97 F | HEART RATE: 92 BPM | OXYGEN SATURATION: 97 % | DIASTOLIC BLOOD PRESSURE: 66 MMHG | SYSTOLIC BLOOD PRESSURE: 118 MMHG

## 2017-03-29 VITALS — OXYGEN SATURATION: 94 %

## 2017-03-29 VITALS
SYSTOLIC BLOOD PRESSURE: 115 MMHG | DIASTOLIC BLOOD PRESSURE: 68 MMHG | TEMPERATURE: 96.7 F | OXYGEN SATURATION: 96 % | HEART RATE: 94 BPM | RESPIRATION RATE: 16 BRPM

## 2017-03-29 VITALS
TEMPERATURE: 98 F | HEART RATE: 92 BPM | SYSTOLIC BLOOD PRESSURE: 123 MMHG | DIASTOLIC BLOOD PRESSURE: 63 MMHG | OXYGEN SATURATION: 97 % | RESPIRATION RATE: 16 BRPM

## 2017-03-29 VITALS
DIASTOLIC BLOOD PRESSURE: 60 MMHG | RESPIRATION RATE: 16 BRPM | OXYGEN SATURATION: 98 % | SYSTOLIC BLOOD PRESSURE: 119 MMHG | TEMPERATURE: 98 F | HEART RATE: 90 BPM

## 2017-03-29 LAB
ALP SERPL-CCNC: 201 U/L (ref 45–117)
ALT SERPL-CCNC: 65 U/L (ref 12–78)
ANION GAP SERPL CALC-SCNC: 14 MEQ/L (ref 5–15)
AST SERPL-CCNC: 129 U/L (ref 15–37)
BASOPHILS # BLD AUTO: 0 TH/MM3 (ref 0–0.2)
BASOPHILS NFR BLD: 0.3 % (ref 0–2)
BILIRUB INDIRECT SERPL-MCNC: 5.7 MG/DL (ref 0–0.8)
BILIRUB SERPL-MCNC: 31.1 MG/DL (ref 0.2–1)
BUN SERPL-MCNC: 30 MG/DL (ref 7–18)
CHLORIDE SERPL-SCNC: 106 MEQ/L (ref 98–107)
EOSINOPHIL # BLD: 0.1 TH/MM3 (ref 0–0.4)
EOSINOPHIL NFR BLD: 0.6 % (ref 0–4)
EOSINOPHIL NFR BLD: 1 % (ref 0–4)
ERYTHROCYTE [DISTWIDTH] IN BLOOD BY AUTOMATED COUNT: 16.3 % (ref 11.6–17.2)
GFR SERPLBLD BASED ON 1.73 SQ M-ARVRAT: 15 ML/MIN (ref 89–?)
HCO3 BLD-SCNC: 15.5 MEQ/L (ref 21–32)
HCT VFR BLD CALC: 32.3 % (ref 39–51)
HEMO FLAGS: (no result)
INR PPP: 1.6 RATIO
LYMPHOCYTES # BLD AUTO: 0.8 TH/MM3 (ref 1–4.8)
LYMPHOCYTES NFR BLD AUTO: 5.2 % (ref 9–44)
MCH RBC QN AUTO: 37.5 PG (ref 27–34)
MCHC RBC AUTO-ENTMCNC: 34.9 % (ref 32–36)
MCV RBC AUTO: 107.5 FL (ref 80–100)
METAMYELOCYTES NFR BLD: 1 % (ref 0–1)
MONOCYTES NFR BLD: 8.6 % (ref 0–8)
NEUTROPHILS # BLD AUTO: 13 TH/MM3 (ref 1.8–7.7)
NEUTROPHILS NFR BLD AUTO: 85.3 % (ref 16–70)
NEUTS BAND # BLD MANUAL: 12.6 TH/MM3 (ref 1.8–7.7)
NEUTS BAND NFR BLD: 9 % (ref 0–6)
NEUTS SEG NFR BLD MANUAL: 73 % (ref 16–70)
PLAT MORPH BLD: (no result)
PLATELET # BLD: 169 TH/MM3 (ref 150–450)
PLATELET BLD QL SMEAR: NORMAL
POTASSIUM SERPL-SCNC: 3 MEQ/L (ref 3.5–5.1)
PROTHROMBIN TIME: 18.1 SEC (ref 9.8–11.6)
RBC # BLD AUTO: 3 MIL/MM3 (ref 4.5–5.9)
SCAN/DIFF: (no result)
SODIUM SERPL-SCNC: 135 MEQ/L (ref 136–145)
TARGETS BLD QL SMEAR: (no result)
WBC # BLD AUTO: 15.2 TH/MM3 (ref 4–11)
WBC DIFF SAMPLE: 100

## 2017-03-29 RX ADMIN — WATER SCH ML: 1 IRRIGANT IRRIGATION at 13:32

## 2017-03-29 RX ADMIN — DOCUSATE SODIUM SCH MG: 100 CAPSULE, LIQUID FILLED ORAL at 08:37

## 2017-03-29 RX ADMIN — Medication SCH ML: at 21:19

## 2017-03-29 RX ADMIN — SODIUM CHLORIDE SCH MLS/HR: 900 INJECTION INTRAVENOUS at 13:33

## 2017-03-29 RX ADMIN — ONDANSETRON PRN MG: 2 INJECTION, SOLUTION INTRAMUSCULAR; INTRAVENOUS at 02:57

## 2017-03-29 RX ADMIN — PHENYTOIN SODIUM SCH MLS/HR: 50 INJECTION INTRAMUSCULAR; INTRAVENOUS at 02:14

## 2017-03-29 RX ADMIN — Medication SCH ML: at 08:37

## 2017-03-29 RX ADMIN — PHENYTOIN SODIUM SCH MLS/HR: 50 INJECTION INTRAMUSCULAR; INTRAVENOUS at 08:38

## 2017-03-29 RX ADMIN — ONDANSETRON PRN MG: 2 INJECTION, SOLUTION INTRAMUSCULAR; INTRAVENOUS at 11:57

## 2017-03-29 RX ADMIN — ALBUMIN HUMAN SCH GM: 0.25 SOLUTION INTRAVENOUS at 02:15

## 2017-03-29 RX ADMIN — ALBUMIN HUMAN SCH GM: 0.25 SOLUTION INTRAVENOUS at 15:42

## 2017-03-29 RX ADMIN — ALBUMIN HUMAN SCH GM: 0.25 SOLUTION INTRAVENOUS at 08:36

## 2017-03-29 RX ADMIN — PENTOXIFYLLINE SCH MG: 400 TABLET, FILM COATED, EXTENDED RELEASE ORAL at 21:21

## 2017-03-29 RX ADMIN — STANDARDIZED SENNA CONCENTRATE AND DOCUSATE SODIUM SCH TAB: 8.6; 5 TABLET, FILM COATED ORAL at 21:00

## 2017-03-29 RX ADMIN — WATER SCH ML: 1 IRRIGANT IRRIGATION at 18:31

## 2017-03-29 RX ADMIN — PENTOXIFYLLINE SCH MG: 400 TABLET, FILM COATED, EXTENDED RELEASE ORAL at 13:32

## 2017-03-29 RX ADMIN — PENTOXIFYLLINE SCH MG: 400 TABLET, FILM COATED, EXTENDED RELEASE ORAL at 05:47

## 2017-03-29 RX ADMIN — THIAMINE HYDROCHLORIDE SCH MLS/HR: 100 INJECTION, SOLUTION INTRAMUSCULAR; INTRAVENOUS at 18:31

## 2017-03-29 RX ADMIN — WATER SCH ML: 1 IRRIGANT IRRIGATION at 08:35

## 2017-03-29 NOTE — HHI.GIFU
Subjective


Remarks


Resting in bed.  No change.  States he "aches all over."  Having mild epistaxis 

after blowing nose. (Lizette Amin)





Objective


Vitals I&O





 Vital Signs








  Date Time  Temp Pulse Resp B/P Pulse Ox O2 Delivery O2 Flow Rate FiO2


 


3/29/17 04:00 98.3 114 16 112/61 95   


 


3/29/17 00:00 98.0 90 16 119/60 98   


 


3/28/17 20:00 97.0 88 16 108/58 99   


 


3/28/17 17:59     95   21


 


3/28/17 12:00 97.0 86 18 107/59 95   


 


3/28/17 10:08     96   21








 I/O








 3/28/17 3/28/17 3/28/17 3/29/17 3/29/17 3/29/17





 07:00 15:00 23:00 07:00 15:00 23:00


 


Intake Total 240 ml 1803 ml 240 ml 300 ml  


 


Output Total  400 ml    


 


Balance 240 ml 1403 ml 240 ml 300 ml  


 


      


 


Intake Oral 240 ml 720 ml 240 ml 300 ml  


 


IV Total  1083 ml    


 


Output Urine Total  400 ml    


 


# Voids 1 4 3 4  


 


# Bowel Movements   1 3  








Laboratory





Laboratory Tests








Test 3/28/17 3/28/17 3/28/17 3/29/17





 10:31 13:25 14:10 08:37


 


White Blood Count 14.4    15.2 


 


Red Blood Count 2.98    3.00 


 


Hemoglobin 10.8    11.3 


 


Hematocrit 32.0    32.3 


 


Mean Corpuscular Volume 107.4    107.5 


 


Mean Corpuscular Hemoglobin 36.3    37.5 


 


Mean Corpuscular Hemoglobin 33.8    34.9 





Concent    


 


Red Cell Distribution Width 16.1    16.3 


 


Platelet Count 167    169 


 


Mean Platelet Volume 10.4    10.4 


 


Neutrophils (%) (Auto) 77.8    85.3 


 


Lymphocytes (%) (Auto) 9.3    5.2 


 


Monocytes (%) (Auto) 10.7    8.6 


 


Eosinophils (%) (Auto) 1.1    0.6 


 


Basophils (%) (Auto) 1.1    0.3 


 


Neutrophils # (Auto) 11.2    13.0 


 


Lymphocytes # (Auto) 1.3    0.8 


 


Monocytes # (Auto) 1.5    1.3 


 


Eosinophils # (Auto) 0.2    0.1 


 


Basophils # (Auto) 0.2    0.0 


 


CBC Comment AUTO DIFF    AUTO DIFF 


 


Differential Comment AUTO DIFF   





 CONFIRMED   


 


Platelet Estimate NORMAL    


 


Platelet Morphology Comment ENLARGED    


 


Target Cells 1+    


 


Sodium Level 137    


 


Potassium Level 3.1    


 


Chloride Level 106    


 


Carbon Dioxide Level 16.2    


 


Anion Gap 15    


 


Blood Urea Nitrogen 26    


 


Creatinine 4.21    


 


Estimat Glomerular Filtration 17    





Rate    


 


Random Glucose 108    


 


Calcium Level 7.6    


 


Total Bilirubin 30.2    


 


Aspartate Amino Transf 125    





(AST/SGOT)    


 


Alanine Aminotransferase 65    





(ALT/SGPT)    


 


Alkaline Phosphatase 199    


 


Total Protein 4.9    


 


Albumin 1.8    


 


Urine Random Sodium  29   


 


Lactic Acid Level   1.7  


 


Magnesium Level   2.7  


 


Iron Level   129  


 


Total Iron Binding Capacity   139  


 


Percent Iron Saturation   93.1  


 


Ferritin   1666  


 


Tumor Marker Alpha Fetoprotein   3.3  


 


25-Hydroxy Vitamin D Total   10.2  


 


Parathyroid Hormone (Intact)   59.9  


 


Prothrombin Time    18.1 


 


Prothromb Time International    1.6 





Ratio    








Imaging





Last Impressions








Hepatobiliary Scan Nuclear Medicine 3/28/17 0000 Signed





Impressions: 





 Service Date/Time:  Tuesday, March 28, 2017 14:36 - CONCLUSION:  Markedly 





 delayed uptake in the liver. Findings suggest diffuse hepatocellular disease 





 versus biliary obstruction. 24 hour delayed scan could be performed.     

Estevan Longoria MD 





 


 


Chest X-Ray 3/27/17 0000 Signed





Impressions: 





 Service Date/Time:  Monday, March 27, 2017 18:08 - CONCLUSION:  Left basilar 





 streakiness consistent with probable atelectasis.            Lebron Marin MD 


 


Abdomen/Pelvis CT 3/27/17 0000 Signed





Impressions: 





 Service Date/Time:  Monday, March 27, 2017 16:50 - CONCLUSION: Findings 

suggest 





 advanced hepatocellular disease with varicosities, prominent spleen and 





 interestingly only a small amount of ascites.  Most of the ascites is in the 





 pelvis.     Jona Del Angel MD  FACR


 


Abdomen Ultrasound 3/27/17 0000 Signed





Impressions: 





 Service Date/Time:  Monday, March 27, 2017 18:27 - CONCLUSION:  1. Thick-

walled 





 gallbladder containing some sludge and demonstrating pericholecystic fluid.  

If 





 there is clinical concern for acute cholecystitis a hepatobiliary scan may be 





 helpful to confirm cystic duct obstruction.  2. Hepatosplenomegaly.  3. Some 





 ascites within the abdomen.           Lebron Marin MD 








Physical Exam


HEENT: Normocephalic; atraumatic; + jaundice.


CHEST:  CTA


CARDIAC:  RRR


ABDOMEN:  Soft, nondistended, nontender;  hepatosplenomegaly; bowel sounds are 

present in all four quadrants. Ascites


EXTREMITIES: No clubbing, cyanosis, or edema.


SKIN:  Significant jaundice, Spider angiomas


CNS:  No focal deficits; alert and oriented times three. (Lizette Amin)





Assessment and Plan


Plan


ASSESSMENT:


- Severe Alcoholic hepatitis on what appears to be underlying liver cirrhosis (

based on imaging).  Has hx of ETOH abuse, reportedly drinking 6-7 drinks per


   day x 6 years.  He was hospitalized from 3/11-3/14 for acute alcoholic 

hepatitis.  During that admission, he was treated with Pentoxifylline, 

Prednisone,


   Nadolol, Spironolactone, Lactulose, and Protonix.  Unfortunately, he left 

[facility] on 3/14 with LFTs on 3/14 were T. Bili 23.7, , ALT 61, Alkaline 


   Phosph 162.  Hepatitis Panel and Celiac panel was negative at that time.  He 

was taking an OTC diuretic at home.  He is not taking Tylenol products. 


   He is not aware of any other liver disease, although several people on his 

mother's side have autoimmune disorders.  He returned to ER for worsening


   jaundice, nausea without vomiting, worsening abdominal distention, and 

intermittent fever and chills.  Abdomen Ultrasound (3/27/17)-----> 1. Thick-

walled 


   gallbladder containing some sludge and demonstrating pericholecystic fluid.  

If there is clinical concern for acute cholecystitis a hepatobiliary scan may 


   be helpful to confirm cystic duct obstruction. 2. Hepatosplenomegaly.  3. 

Some ascites within the abdomen.  Abdomen/Pelvis CT (3/27/17)---->  Findings


   suggest advanced hepatocellular disease with varicosities, prominent spleen 

and interestingly only a small amount of ascites.  Most of the ascites is in the


   pelvis.  DF 52.70. Pentoxifylline.  He does need complete liver workup to 

rule out other underlying liver disease such as autoimmune hepatitis-DARRYL pending

,


   AMA pending, ASMA pending, Iron Saturation 93.1, Ferritin 1666, AFP 3.3, 

Alpha 1 antitrypsin pending, Ceruloplasmin pending.  Today's LFTs are 


   pending. 


- Ascites with intermittent fevers and chills.  Ceftriaxone.  Afebrile.


- Hepatic encephalopathy.  44 lactulose.


- Coagulopathy.  STABLE.  


- ARF.  At this point, renal feels more related to OTC diuretics/dehydration.  


- Leukocytosis/Fever.  Rocephin.  Afebrile.





PLAN:


- 2 gram sodium diet


- Cont. Ceftriaxone 


- Cont. Pentoxifylline


- Cont. PPI


- Cont. Lactulose


- Await DARRYL, AMA, ASMA


- Await Ceruloplasmin, Alpha 1 Antitrypsin


- Hfe Level


- Defer diuretics to renal


- Pt was drinking up until his last hospitalization on 3/11 and therefore is 

not a candidate for liver transplant if his condition worsens.


- Supportive care


- Further recommendations to follow based on results of above


- Pt seen and examined by Dr. Parker and myself and this note is written on 

his behalf (Lizette Amin)


Physician Comments


Patient seen and examined


Agree with above


Continue with current supportive care


Monitor labs


Case discussed in brief with Dr. Alan we will defer to nephrology regarding 

diuretic use


If ascites becomes tense we will pursue paracentesis (Zac Parker MD)








Lizette Amin Mar 29, 2017 09:49


Zac Parker MD Mar 29, 2017 20:29

## 2017-03-29 NOTE — HHI.PR
Subjective


Remarks


f/u for liver and renal failure.


patient is very fatigue today.


he lost his appetite.


feels like he needs to urinate but unable to. 


sister at bedside.


+abdominal pain that is stable. 


+ nauseated.


he had an episode of epistaxis this morning.





Objective


Vitals





 Vital Signs








  Date Time  Temp Pulse Resp B/P Pulse Ox O2 Delivery O2 Flow Rate FiO2


 


3/29/17 12:00 97.0 92 16 118/66 97   


 


3/29/17 11:17     94   21


 


3/29/17 08:00 96.7 94 16 115/68 96   


 


3/29/17 04:00 98.3 114 16 112/61 95   


 


3/29/17 00:00 98.0 90 16 119/60 98   


 


3/28/17 20:00 97.0 88 16 108/58 99   


 


3/28/17 17:59     95   21








 I/O








 3/28/17 3/28/17 3/28/17 3/29/17 3/29/17 3/29/17





 07:00 15:00 23:00 07:00 15:00 23:00


 


Intake Total 240 ml 1803 ml 240 ml 300 ml  


 


Output Total  400 ml    


 


Balance 240 ml 1403 ml 240 ml 300 ml  


 


      


 


Intake Oral 240 ml 720 ml 240 ml 300 ml  


 


IV Total  1083 ml    


 


Output Urine Total  400 ml    


 


# Voids 1 4 3 4  


 


# Bowel Movements   1 3  








Result Diagram:  


3/29/17 0837                                                                   

             3/29/17 0837





Objective Remarks


GENERAL: Chronically ill male in no acute distress.


SKIN: Jaundiced


HEAD: Normocephalic.


EYES: scleral icterus. . 


RESPIRATORY: Breath sounds equal bilaterally. No accessory muscle use.


GASTROINTESTINAL: Abdomen soft, + diffused mild TT,  positive for distention.


MUSCULOSKELETAL: No cyanosis, or edema. 


BACK: Nontender without obvious deformity. No CVA tenderness.





Medications and IVs





 Current Medications


Sodium Chloride 2 ml 2 ml UNSCH  PRN IV FLUSH FLUSH AFTER USING IV ACCESS Last 

administered on 3/27/17at 16:08;  Start 3/27/17 at 15:00;  Stop 3/27/17 at 18:25

;  Status DC


Sodium Chloride 1,000 ml @  999 mls/hr BOLUS  ONCE IV  Last administered on 3/27

/17at 16:30;  Start 3/27/17 at 16:15;  Stop 3/27/17 at 17:15;  Status DC


Sodium Chloride (NS 1000 ml Inj) 1,000 ml @  125 mls/hr Q8H IV  Last 

administered on 3/29/17at 08:38;  Start 3/27/17 at 18:14


Sodium Chloride (NS Flush) 2 ml UNSCH  PRN IV FLUSH FLUSH AFTER USING IV ACCESS

;  Start 3/27/17 at 18:15


Sodium Chloride (NS Flush) 2 ml BID IV FLUSH  Last administered on 3/29/17at 08:

37;  Start 3/27/17 at 21:00


Ondansetron HCl (Zofran Inj) 4 mg Q6H  PRN IVP NAUSEA OR VOMITING Last 

administered on 3/29/17at 11:57;  Start 3/27/17 at 18:15


Docusate Sodium (Colace) 100 mg Q12H PO  Last administered on 3/27/17at 21:45;  

Start 3/27/17 at 21:00


Naloxone HCl (Narcan Inj) 0.4 mg UNSCH  PRN IV SEE LABEL COMMENTS;  Start 3/27/

17 at 18:15


Hydroxyzine Pamoate (Vistaril) 25 mg Q6H  PRN PO ITCHING Last administered on 3/

28/17at 08:48;  Start 3/27/17 at 18:15;  Stop 3/28/17 at 15:47;  Status DC


Lactulose (Lactulose Liq) 30 ml TID PO  Last administered on 3/29/17at 13:32;  

Start 3/28/17 at 09:00


Alprazolam (Xanax) 0.25 mg ONCE  ONCE PO  Last administered on 3/27/17at 21:45;

  Start 3/27/17 at 21:30;  Stop 3/27/17 at 21:31;  Status DC


Morphine Sulfate 2 mg 2 mg ONCE  ONCE IV PUSH  Last administered on 3/28/17at 02

:10;  Start 3/28/17 at 02:00;  Stop 3/28/17 at 02:01;  Status DC


Potassium Chloride (KCl 20 Meq Premix Inj) 100 ml @  50 mls/hr Q2H IV  Last 

administered on 3/28/17at 19:48;  Start 3/28/17 at 13:00;  Stop 3/28/17 at 16:59

;  Status DC


Pentoxifylline 400 mg 400 mg Q8HR PO  Last administered on 3/29/17at 13:32;  

Start 3/28/17 at 14:00


Ceftriaxone Sodium/Sodium Chloride (Rocephin Inj/NS Inj) 100 ml @  200 mls/hr 

Q24H IV  Last administered on 3/29/17at 13:33;  Start 3/28/17 at 14:00


Hydroxyzine Pamoate (Vistaril) 50 mg Q6H  PRN PO ITCHING Last administered on 3/

29/17at 11:57;  Start 3/28/17 at 18:00


Lorazepam (Ativan) 0.5 mg Q12H  PRN PO anxiety Last administered on 3/29/17at 03

:00;  Start 3/28/17 at 16:00


Albumin Human (Albumin 25% Inj) 12.5 gm Q6H IV  Last administered on 3/29/17at 

08:36;  Start 3/28/17 at 20:15;  Stop 3/29/17 at 15:00;  Status DC





A/P


Assessment and Plan


26-year-old male with history of hepatitis





Hepatitis complicated by varices, ascites and coagulopathy.


-CT scan show hepatocellular disease with varices.


-Patient has not been drinking alcohol since last admission in [place] but 

symptoms are not improving.


-Hepatitis panel reviewed from last admission which was all negative.


-Alcohol level during this admission is negative.


-Liver ultrasound shows possible acute cholecystitis. HIDA scan negative. 


-GI consulted and ff. continue with Ceftriaxone, add Pentoxifylline, Cont. PPI, 

Cont. Lactulose, obtain DARRYL, AMA, ASM, Ceruloplasmin, Alpha 1 Antitrypsin, AFP 

level, US guided paracentesis, diagnostic (? SBP) if enough fluid to safely 

drain


-Continue with supportive care.  Patient is not a candidate for liver 

transplant due to recent abdomen which patient was drinking alcohol.


-poor prognosis.





Coagulopathy


-Secondary to liver disease.


-See treatment as above.





Acute renal failure


-Most likely secondary to dehydration since patient has not been eating for the 

past 2 weeks and was on Lasix.


-Nephrologist consulted and following.  Recommend to continue with IV fluids.


-Abdominal images are reviewed.


-worsening. 


-will place [facility] for accurate I/Os. 





Anxiety


-Give Ativan when necessary.





Urticaria


-on Vistaril.








DVT prophylaxis


-INR is elevated so chemoprophylaxis contraindicated.


Discharge Planning


poor prognosis. continue with current management.








Catie Mims MD Mar 29, 2017 15:14

## 2017-03-29 NOTE — HHI.NPPN
Subjective


History of Present Illness


The patient is a 27 yo [place] male who presented to this facility 3/27 with 

complaints of abd pain, nausea, and poor oral intake for the past 2 weeks.  He 

was recently admitted to [facility] (3/11-3/14) for similar complaints, but signed 

himself out AMA.  He has a hx of heavy EtOH intake for the past several years, 

but says he has been trying to cut back since he was admitted to [facility].  As per 

records, he was using Lasix since his discharge from the hospital despite 

little po intake, but he says he was only using "over the counter diuretics" 

and Benadryl.  Denies any NSAID use at home.  No prior hx of renal decline.  

Mentions multiple family member with RA.


Admitting SCr 3.63


Labs from beginning of month from [facility] show preserved renal function at 0.6-0.8 

baseline SCr.


Interval History


Patient had no new plaints today.  His mother was by the bedside and patient 

gave permission for discussion of his medical situation with her.





Review of Systems


General


Constitutional:  Fatigue





Objective Data


Data











 3/28/17 3/29/17





 19:00 07:00


 


Intake Total 1803 ml 540 ml


 


Output Total 400 ml 


 


Balance 1403 ml 540 ml


 


  


 


Intake Oral 720 ml 540 ml


 


IV Total 1083 ml 


 


Output Urine Total 400 ml 


 


# Voids 4 7


 


# Bowel Movements  4











 Vital Signs








  Date Time  Temp Pulse Resp B/P Pulse Ox O2 Delivery O2 Flow Rate FiO2


 


3/29/17 12:00 97.0 92 16 118/66 97   


 


3/29/17 11:17     94   21


 


3/29/17 08:00 96.7 94 16 115/68 96   


 


3/29/17 04:00 98.3 114 16 112/61 95   


 


3/29/17 00:00 98.0 90 16 119/60 98   


 


3/28/17 20:00 97.0 88 16 108/58 99   


 


3/28/17 17:59     95   21








-:  


3/29/17 0837                                                                   

             3/29/17 0837





Medication Review





 Current Medications


Sodium Chloride 2 ml 2 ml UNSCH  PRN IV FLUSH FLUSH AFTER USING IV ACCESS Last 

administered on 3/27/17at 16:08;  Start 3/27/17 at 15:00;  Stop 3/27/17 at 18:25

;  Status DC


Sodium Chloride 1,000 ml @  999 mls/hr BOLUS  ONCE IV  Last administered on 3/27

/17at 16:30;  Start 3/27/17 at 16:15;  Stop 3/27/17 at 17:15;  Status DC


Sodium Chloride (NS 1000 ml Inj) 1,000 ml @  125 mls/hr Q8H IV  Last 

administered on 3/29/17at 08:38;  Start 3/27/17 at 18:14;  Stop 3/29/17 at 17:48

;  Status DC


Sodium Chloride (NS Flush) 2 ml UNSCH  PRN IV FLUSH FLUSH AFTER USING IV ACCESS

;  Start 3/27/17 at 18:15


Sodium Chloride (NS Flush) 2 ml BID IV FLUSH  Last administered on 3/29/17at 08:

37;  Start 3/27/17 at 21:00


Ondansetron HCl (Zofran Inj) 4 mg Q6H  PRN IVP NAUSEA OR VOMITING Last 

administered on 3/29/17at 11:57;  Start 3/27/17 at 18:15


Docusate Sodium (Colace) 100 mg Q12H PO  Last administered on 3/27/17at 21:45;  

Start 3/27/17 at 21:00;  Stop 3/29/17 at 15:16;  Status DC


Naloxone HCl (Narcan Inj) 0.4 mg UNSCH  PRN IV SEE LABEL COMMENTS;  Start 3/27/

17 at 18:15


Hydroxyzine Pamoate (Vistaril) 25 mg Q6H  PRN PO ITCHING Last administered on 3/

28/17at 08:48;  Start 3/27/17 at 18:15;  Stop 3/28/17 at 15:47;  Status DC


Lactulose (Lactulose Liq) 30 ml TID PO  Last administered on 3/29/17at 13:32;  

Start 3/28/17 at 09:00


Alprazolam (Xanax) 0.25 mg ONCE  ONCE PO  Last administered on 3/27/17at 21:45;

  Start 3/27/17 at 21:30;  Stop 3/27/17 at 21:31;  Status DC


Morphine Sulfate 2 mg 2 mg ONCE  ONCE IV PUSH  Last administered on 3/28/17at 02

:10;  Start 3/28/17 at 02:00;  Stop 3/28/17 at 02:01;  Status DC


Potassium Chloride (KCl 20 Meq Premix Inj) 100 ml @  50 mls/hr Q2H IV  Last 

administered on 3/28/17at 19:48;  Start 3/28/17 at 13:00;  Stop 3/28/17 at 16:59

;  Status DC


Pentoxifylline 400 mg 400 mg Q8HR PO  Last administered on 3/29/17at 13:32;  

Start 3/28/17 at 14:00


Ceftriaxone Sodium/Sodium Chloride (Rocephin Inj/NS Inj) 100 ml @  200 mls/hr 

Q24H IV  Last administered on 3/29/17at 13:33;  Start 3/28/17 at 14:00


Hydroxyzine Pamoate (Vistaril) 50 mg Q6H  PRN PO ITCHING Last administered on 3/

29/17at 11:57;  Start 3/28/17 at 18:00


Lorazepam (Ativan) 0.5 mg Q12H  PRN PO anxiety Last administered on 3/29/17at 15

:42;  Start 3/28/17 at 16:00


Albumin Human (Albumin 25% Inj) 12.5 gm Q6H IV  Last administered on 3/29/17at 

15:42;  Start 3/28/17 at 20:15;  Stop 3/29/17 at 15:00;  Status DC


Benzonatate (Tessalon) 200 mg TID  PRN PO cough;  Start 3/29/17 at 15:15


Senna/Docusate Sodium 2 tab 2 tab BID PO ;  Start 3/29/17 at 21:00


Sodium Bicarbonate/ Sodium Chloride (Sodium Bicarb 8.4% (Ped) Inj/1/ 2 NS 1000 

ml Inj) 1,075 ml @  100 mls/hr [id number] IV ;  Start 3/29/17 at 18:00;  Status UNV


Potassium Chloride (KCl) 20 meq ONCE  ONCE PO ;  Start 3/29/17 at 18:00;  Stop 3

/29/17 at 18:01;  Status UNV





Physical Exam


General


Appearance:  No Acute Distress, Comfortable





Eyes


Eye Exam:  Jaundice





Pulmonary


Resp Exam:  Clear Bilaterally, Breath Sounds Equal, No Distress





Cardiology


CV Exam:  Regular, Normal Sinus Rhythm





Gastrointestinal/Abdomen


GI Exam:  Soft, Non-Tender





Integumentary


Skin Exam:  Clear, Warm, Jaundice





Neurologic


Neuro Exam:  Alert, Awake





Psychiatric


Psych Exam:  Appropriate Responses





Assessment/Plan


Discussed Condition With:  Patient


Problem List:  


(1) Acute renal failure


Plan:  


Unfortunately the patient's renal indices are continuing to deteriorate despite 

IV hydration.


I suspect the patient did have some degree of hepatorenal syndrome prior to 

admission with subsequent development of dehydration and development of an ATN.


Unfortunately the patient's azotemia may continue to worsen.  I discussed the 

situation with the patient and his mother in detail.


If azotemia continues to worsen we could proceed with dialysis in the hope that 

the patient has a recoverable ATN.


His overall prognosis I believe is primarily dependent upon his hepatic disease 

however.  It is noted that the patient is not a liver transplant candidate at 

the present time given recent usage of alcohol per GI.


I discussed with the patient and his mother indications for, alternatives to 

and risks associated with dialysis and hemodialysis Vas-Cath placement 

including risk of hemorrhage, arrhythmia, hypotension and death.


It is noted a Todd catheter has been placed but there was no evidence of 

obstructive uropathy and ultrasound.








Medications should be adjusted for the patient's renal decline.  Avoid 

nephrotoxic medications such as iodinated contrast dyes and NSAIDs.  Avoid 

gadolinium when eGFR <30





(2) Hypokalemia


Plan:  Repletion as ordered





(3) Acidosis


Plan:  Sodium bicarbonate added to the IV fluids to try and improve acid-base 

status.





(4) Cirrhosis with alcoholism


Plan:  Pending GI eval today.  











RADHA Alan MD Mar 29, 2017 17:58

## 2017-03-30 VITALS
DIASTOLIC BLOOD PRESSURE: 58 MMHG | SYSTOLIC BLOOD PRESSURE: 110 MMHG | TEMPERATURE: 97.4 F | HEART RATE: 90 BPM | OXYGEN SATURATION: 95 % | RESPIRATION RATE: 16 BRPM

## 2017-03-30 VITALS
HEART RATE: 85 BPM | TEMPERATURE: 97.8 F | SYSTOLIC BLOOD PRESSURE: 108 MMHG | OXYGEN SATURATION: 97 % | RESPIRATION RATE: 16 BRPM | DIASTOLIC BLOOD PRESSURE: 55 MMHG

## 2017-03-30 VITALS
DIASTOLIC BLOOD PRESSURE: 67 MMHG | RESPIRATION RATE: 16 BRPM | TEMPERATURE: 98.6 F | OXYGEN SATURATION: 97 % | SYSTOLIC BLOOD PRESSURE: 109 MMHG | HEART RATE: 87 BPM

## 2017-03-30 VITALS
RESPIRATION RATE: 17 BRPM | TEMPERATURE: 98.2 F | OXYGEN SATURATION: 97 % | DIASTOLIC BLOOD PRESSURE: 59 MMHG | SYSTOLIC BLOOD PRESSURE: 110 MMHG | HEART RATE: 87 BPM

## 2017-03-30 VITALS
SYSTOLIC BLOOD PRESSURE: 108 MMHG | OXYGEN SATURATION: 97 % | HEART RATE: 86 BPM | RESPIRATION RATE: 18 BRPM | TEMPERATURE: 96.2 F | DIASTOLIC BLOOD PRESSURE: 58 MMHG

## 2017-03-30 VITALS
DIASTOLIC BLOOD PRESSURE: 55 MMHG | RESPIRATION RATE: 18 BRPM | OXYGEN SATURATION: 97 % | HEART RATE: 86 BPM | SYSTOLIC BLOOD PRESSURE: 105 MMHG | TEMPERATURE: 97.3 F

## 2017-03-30 LAB
ALP SERPL-CCNC: 195 U/L (ref 45–117)
ALT SERPL-CCNC: 60 U/L (ref 12–78)
ANION GAP SERPL CALC-SCNC: 17 MEQ/L (ref 5–15)
AST SERPL-CCNC: 105 U/L (ref 15–37)
BASOPHILS # BLD AUTO: 0.1 TH/MM3 (ref 0–0.2)
BASOPHILS NFR BLD: 0.9 % (ref 0–2)
BILIRUB SERPL-MCNC: 33.7 MG/DL (ref 0.2–1)
BUN SERPL-MCNC: 33 MG/DL (ref 7–18)
CHLORIDE SERPL-SCNC: 105 MEQ/L (ref 98–107)
EOSINOPHIL # BLD: 0.1 TH/MM3 (ref 0–0.4)
EOSINOPHIL NFR BLD: 0.5 % (ref 0–4)
ERYTHROCYTE [DISTWIDTH] IN BLOOD BY AUTOMATED COUNT: 15.9 % (ref 11.6–17.2)
GFR SERPLBLD BASED ON 1.73 SQ M-ARVRAT: 13 ML/MIN (ref 89–?)
HCO3 BLD-SCNC: 14.4 MEQ/L (ref 21–32)
HCT VFR BLD CALC: 31.6 % (ref 39–51)
HEMO FLAGS: (no result)
INR PPP: 1.6 RATIO
LYMPHOCYTES # BLD AUTO: 0.7 TH/MM3 (ref 1–4.8)
LYMPHOCYTES NFR BLD AUTO: 4.7 % (ref 9–44)
MCH RBC QN AUTO: 37.1 PG (ref 27–34)
MCHC RBC AUTO-ENTMCNC: 34.6 % (ref 32–36)
MCV RBC AUTO: 107.2 FL (ref 80–100)
MONOCYTES NFR BLD: 7.7 % (ref 0–8)
NEUTROPHILS # BLD AUTO: 13.6 TH/MM3 (ref 1.8–7.7)
NEUTROPHILS NFR BLD AUTO: 86.2 % (ref 16–70)
PLATELET # BLD: 165 TH/MM3 (ref 150–450)
POTASSIUM SERPL-SCNC: 3 MEQ/L (ref 3.5–5.1)
PROTHROMBIN TIME: 18.1 SEC (ref 9.8–11.6)
RBC # BLD AUTO: 2.95 MIL/MM3 (ref 4.5–5.9)
SODIUM SERPL-SCNC: 136 MEQ/L (ref 136–145)
WBC # BLD AUTO: 15.8 TH/MM3 (ref 4–11)

## 2017-03-30 RX ADMIN — PENTOXIFYLLINE SCH MG: 400 TABLET, FILM COATED, EXTENDED RELEASE ORAL at 13:46

## 2017-03-30 RX ADMIN — WATER SCH ML: 1 IRRIGANT IRRIGATION at 08:39

## 2017-03-30 RX ADMIN — STANDARDIZED SENNA CONCENTRATE AND DOCUSATE SODIUM SCH TAB: 8.6; 5 TABLET, FILM COATED ORAL at 20:55

## 2017-03-30 RX ADMIN — MIDODRINE HYDROCHLORIDE SCH MG: 5 TABLET ORAL at 17:15

## 2017-03-30 RX ADMIN — OCTREOTIDE ACETATE SCH MCG: 100 INJECTION, SOLUTION INTRAVENOUS; SUBCUTANEOUS at 17:13

## 2017-03-30 RX ADMIN — Medication SCH ML: at 08:39

## 2017-03-30 RX ADMIN — SODIUM CHLORIDE SCH MLS/HR: 900 INJECTION INTRAVENOUS at 13:46

## 2017-03-30 RX ADMIN — ONDANSETRON PRN MG: 2 INJECTION, SOLUTION INTRAMUSCULAR; INTRAVENOUS at 17:19

## 2017-03-30 RX ADMIN — PENTOXIFYLLINE SCH MG: 400 TABLET, FILM COATED, EXTENDED RELEASE ORAL at 20:55

## 2017-03-30 RX ADMIN — PENTOXIFYLLINE SCH MG: 400 TABLET, FILM COATED, EXTENDED RELEASE ORAL at 05:44

## 2017-03-30 RX ADMIN — Medication SCH ML: at 20:55

## 2017-03-30 RX ADMIN — OCTREOTIDE ACETATE SCH MCG: 100 INJECTION, SOLUTION INTRAVENOUS; SUBCUTANEOUS at 20:55

## 2017-03-30 RX ADMIN — THIAMINE HYDROCHLORIDE SCH MLS/HR: 100 INJECTION, SOLUTION INTRAMUSCULAR; INTRAVENOUS at 08:41

## 2017-03-30 RX ADMIN — WATER SCH ML: 1 IRRIGANT IRRIGATION at 15:00

## 2017-03-30 RX ADMIN — WATER SCH ML: 1 IRRIGANT IRRIGATION at 13:45

## 2017-03-30 RX ADMIN — STANDARDIZED SENNA CONCENTRATE AND DOCUSATE SODIUM SCH TAB: 8.6; 5 TABLET, FILM COATED ORAL at 08:39

## 2017-03-30 RX ADMIN — RIFAXIMIN SCH MG: 550 TABLET ORAL at 20:55

## 2017-03-30 NOTE — HHI.PR
Subjective


Remarks


Follow-up for liver failure and renal failure.


Patient continues to be fatigue.


Continues to complain of pruritus.


Has decreased urine output.


Urine output is 100 cc.  Urine is a concentrated yellow.





Objective


Vitals





 Vital Signs








  Date Time  Temp Pulse Resp B/P Pulse Ox O2 Delivery O2 Flow Rate FiO2


 


3/30/17 12:01 97.3 86 18 105/55 97   


 


3/30/17 08:00 96.2 86 18 108/58 97   


 


3/30/17 05:30 97.8 85 16 108/55 97   


 


3/30/17 00:00 97.4 90 16 110/58 95   


 


3/29/17 20:00 98.1 96 18 113/56 95   


 


3/29/17 16:00 98.0 92 16 123/63 97   








 I/O








 3/29/17 3/29/17 3/29/17 3/30/17 3/30/17 3/30/17





 07:00 15:00 23:00 07:00 15:00 23:00


 


Intake Total 300 ml 1150 ml 480 ml 240 ml  


 


Output Total  200 ml 350 ml 350 ml  


 


Balance 300 ml 950 ml 130 ml -110 ml  


 


      


 


Intake Oral 300 ml 480 ml 480 ml 240 ml  


 


IV Total  670 ml    


 


Output Urine Total  200 ml 350 ml 350 ml  


 


# Voids 4 1    


 


# Bowel Movements 3 1 2 3  








Result Diagram:  


3/30/17 0825                                                                   

             3/30/17 0825





Objective Remarks


GENERAL: Chronically ill male in no acute distress.


SKIN: Jaundiced


HEAD: Normocephalic.


EYES: scleral icterus. . 


RESPIRATORY: Breath sounds equal bilaterally. No accessory muscle use.


GASTROINTESTINAL: Abdomen soft, + diffused mild TT,  positive for distention.


MUSCULOSKELETAL: No cyanosis, or edema. 


BACK: Nontender without obvious deformity. No CVA tenderness.





Medications and IVs





 Current Medications


Sodium Chloride 2 ml 2 ml UNSCH  PRN IV FLUSH FLUSH AFTER USING IV ACCESS Last 

administered on 3/27/17at 16:08;  Start 3/27/17 at 15:00;  Stop 3/27/17 at 18:25

;  Status DC


Sodium Chloride 1,000 ml @  999 mls/hr BOLUS  ONCE IV  Last administered on 3/27

/17at 16:30;  Start 3/27/17 at 16:15;  Stop 3/27/17 at 17:15;  Status DC


Sodium Chloride (NS 1000 ml Inj) 1,000 ml @  125 mls/hr Q8H IV  Last 

administered on 3/29/17at 08:38;  Start 3/27/17 at 18:14;  Stop 3/29/17 at 17:48

;  Status DC


Sodium Chloride (NS Flush) 2 ml UNSCH  PRN IV FLUSH FLUSH AFTER USING IV ACCESS

;  Start 3/27/17 at 18:15


Sodium Chloride (NS Flush) 2 ml BID IV FLUSH  Last administered on 3/29/17at 21:

19;  Start 3/27/17 at 21:00


Ondansetron HCl (Zofran Inj) 4 mg Q6H  PRN IVP NAUSEA OR VOMITING Last 

administered on 3/29/17at 11:57;  Start 3/27/17 at 18:15


Docusate Sodium (Colace) 100 mg Q12H PO  Last administered on 3/27/17at 21:45;  

Start 3/27/17 at 21:00;  Stop 3/29/17 at 15:16;  Status DC


Naloxone HCl (Narcan Inj) 0.4 mg UNSCH  PRN IV SEE LABEL COMMENTS;  Start 3/27/

17 at 18:15


Hydroxyzine Pamoate (Vistaril) 25 mg Q6H  PRN PO ITCHING Last administered on 3/

28/17at 08:48;  Start 3/27/17 at 18:15;  Stop 3/28/17 at 15:47;  Status DC


Lactulose (Lactulose Liq) 30 ml TID PO  Last administered on 3/30/17at 08:39;  

Start 3/28/17 at 09:00


Alprazolam (Xanax) 0.25 mg ONCE  ONCE PO  Last administered on 3/27/17at 21:45;

  Start 3/27/17 at 21:30;  Stop 3/27/17 at 21:31;  Status DC


Morphine Sulfate 2 mg 2 mg ONCE  ONCE IV PUSH  Last administered on 3/28/17at 02

:10;  Start 3/28/17 at 02:00;  Stop 3/28/17 at 02:01;  Status DC


Potassium Chloride (KCl 20 Meq Premix Inj) 100 ml @  50 mls/hr Q2H IV  Last 

administered on 3/28/17at 19:48;  Start 3/28/17 at 13:00;  Stop 3/28/17 at 16:59

;  Status DC


Pentoxifylline 400 mg 400 mg Q8HR PO  Last administered on 3/30/17at 05:44;  

Start 3/28/17 at 14:00


Ceftriaxone Sodium/Sodium Chloride (Rocephin Inj/NS Inj) 100 ml @  200 mls/hr 

Q24H IV  Last administered on 3/29/17at 13:33;  Start 3/28/17 at 14:00


Hydroxyzine Pamoate (Vistaril) 50 mg Q6H  PRN PO ITCHING Last administered on 3/

30/17at 08:39;  Start 3/28/17 at 18:00;  Stop 3/30/17 at 09:15;  Status DC


Lorazepam (Ativan) 0.5 mg Q12H  PRN PO anxiety Last administered on 3/30/17at 01

:30;  Start 3/28/17 at 16:00


Albumin Human (Albumin 25% Inj) 12.5 gm Q6H IV  Last administered on 3/29/17at 

15:42;  Start 3/28/17 at 20:15;  Stop 3/29/17 at 15:00;  Status DC


Benzonatate (Tessalon) 200 mg TID  PRN PO cough;  Start 3/29/17 at 15:15


Senna/Docusate Sodium 2 tab 2 tab BID PO ;  Start 3/29/17 at 21:00


Sodium Bicarbonate/ Sodium Chloride (Sodium Bicarbonate 8.4% Inj/1/2 NS 1000 ml 

Inj) 1,075 ml @  100 mls/hr [id number] IV  Last administered on 3/30/17at 08:41;  

Start 3/29/17 at 18:00


Potassium Chloride (KCl) 20 meq ONCE  ONCE PO  Last administered on 3/29/17at 18

:31;  Start 3/29/17 at 18:00;  Stop 3/29/17 at 18:01;  Status DC


Hydroxyzine Pamoate (Vistaril) 50 mg Q6H  PRN PO ITCHING;  Start 3/30/17 at 09:

15





A/P


Assessment and Plan


26-year-old male with history of hepatitis





Hepatitis complicated by varices, ascites and coagulopathy.


-CT scan show hepatocellular disease with varices.


-Patient has not been drinking alcohol since last admission in [place] but 

symptoms are not improving.


-Hepatitis panel reviewed from last admission which was all negative.


-Alcohol level during this admission is negative.


-Liver ultrasound shows possible acute cholecystitis. HIDA scan negative. 


-GI consulted and ff. continue with Ceftriaxone, add Pentoxifylline, Cont. PPI, 

Cont. Lactulose, obtain DARRYL, AMA, ASM, Ceruloplasmin, Alpha 1 Antitrypsin, AFP 

level, US guided paracentesis, diagnostic (? SBP) if enough fluid to safely 

drain


-Continue with supportive care.  Patient is not a candidate for liver 

transplant due to recent alcohol use.


-poor prognosis.





Coagulopathy


-Secondary to liver disease.


-See treatment as above.





Acute renal failure


-Per nephrologist this may be partly due to hepatorenal syndrome versus 

dehydration with ATN.


-Nephrologist consulted and following.  Recommend to continue with IV fluids.


-Abdominal images are reviewed.


-worsening. 


-Todd placed due to for accurate I's and O's.


-Patient may require dialysis.  Renal function most likely will not improve 

unless liver improves.





Anxiety


-on Ativan when necessary.





Urticaria


-on Vistaril.








DVT prophylaxis


-INR is elevated so chemoprophylaxis contraindicated.


Discharge Planning


Very poor prognosis.


Continue with supportive care.








Catie Mims MD Mar 30, 2017 12:32

## 2017-03-30 NOTE — HHI.GIFU
Subjective


Remarks


Resting in bed.  More lethargic today.  No fevers.  No abdominal pain.  (Lizette Amin)





Objective


Vitals I&O





 Vital Signs








  Date Time  Temp Pulse Resp B/P Pulse Ox O2 Delivery O2 Flow Rate FiO2


 


3/30/17 12:01 97.3 86 18 105/55 97   


 


3/30/17 08:00 96.2 86 18 108/58 97   


 


3/30/17 05:30 97.8 85 16 108/55 97   


 


3/30/17 00:00 97.4 90 16 110/58 95   


 


3/29/17 20:00 98.1 96 18 113/56 95   


 


3/29/17 16:00 98.0 92 16 123/63 97   








 I/O








 3/29/17 3/29/17 3/29/17 3/30/17 3/30/17 3/30/17





 07:00 15:00 23:00 07:00 15:00 23:00


 


Intake Total 300 ml 1150 ml 480 ml 240 ml  


 


Output Total  200 ml 350 ml 350 ml  


 


Balance 300 ml 950 ml 130 ml -110 ml  


 


      


 


Intake Oral 300 ml 480 ml 480 ml 240 ml  


 


IV Total  670 ml    


 


Output Urine Total  200 ml 350 ml 350 ml  


 


# Voids 4 1    


 


# Bowel Movements 3 1 2 3  








Laboratory





Laboratory Tests








Test 3/30/17





 08:25


 


White Blood Count 15.8 


 


Red Blood Count 2.95 


 


Hemoglobin 10.9 


 


Hematocrit 31.6 


 


Mean Corpuscular Volume 107.2 


 


Mean Corpuscular Hemoglobin 37.1 


 


Mean Corpuscular Hemoglobin 34.6 





Concent 


 


Red Cell Distribution Width 15.9 


 


Platelet Count 165 


 


Mean Platelet Volume 10.6 


 


Neutrophils (%) (Auto) 86.2 


 


Lymphocytes (%) (Auto) 4.7 


 


Monocytes (%) (Auto) 7.7 


 


Eosinophils (%) (Auto) 0.5 


 


Basophils (%) (Auto) 0.9 


 


Neutrophils # (Auto) 13.6 


 


Lymphocytes # (Auto) 0.7 


 


Monocytes # (Auto) 1.2 


 


Eosinophils # (Auto) 0.1 


 


Basophils # (Auto) 0.1 


 


CBC Comment DIFF FINAL 


 


Differential Comment  


 


Prothrombin Time 18.1 


 


Prothromb Time International 1.6 





Ratio 


 


Sodium Level 136 


 


Potassium Level 3.0 


 


Chloride Level 105 


 


Carbon Dioxide Level 14.4 


 


Anion Gap 17 


 


Blood Urea Nitrogen 33 


 


Creatinine 5.33 


 


Estimat Glomerular Filtration 13 





Rate 


 


Random Glucose 108 


 


Calcium Level 7.8 


 


Phosphorus Level 3.9 


 


Total Bilirubin 33.7 


 


Aspartate Amino Transf 105 





(AST/SGOT) 


 


Alanine Aminotransferase 60 





(ALT/SGPT) 


 


Alkaline Phosphatase 195 


 


Total Protein 4.9 


 


Albumin 2.0 








Imaging





Last Impressions








Hepatobiliary Scan Nuclear Medicine 3/28/17 0000 Signed





Impressions: 





 Service Date/Time:  Tuesday, March 28, 2017 14:36 - CONCLUSION:  Markedly 





 delayed uptake in the liver. Findings suggest diffuse hepatocellular disease 





 versus biliary obstruction. 24 hour delayed scan could be performed.     

Estevan Longoria MD ADDENDUM:  24 hour scans still shows no activity in bile ducts or 





 small bowel suggesting diffuse hepatocellular disease.    Jona Del Angel MD  





 FACR


 


Chest X-Ray 3/27/17 0000 Signed





Impressions: 





 Service Date/Time:  Monday, March 27, 2017 18:08 - CONCLUSION:  Left basilar 





 streakiness consistent with probable atelectasis.            Lebron Marin MD 


 


Abdomen/Pelvis CT 3/27/17 0000 Signed





Impressions: 





 Service Date/Time:  Monday, March 27, 2017 16:50 - CONCLUSION: Findings 

suggest 





 advanced hepatocellular disease with varicosities, prominent spleen and 





 interestingly only a small amount of ascites.  Most of the ascites is in the 





 pelvis.     Jona Del Angel MD  FACR


 


Abdomen Ultrasound 3/27/17 0000 Signed





Impressions: 





 Service Date/Time:  Monday, March 27, 2017 18:27 - CONCLUSION:  1. Thick-

walled 





 gallbladder containing some sludge and demonstrating pericholecystic fluid.  

If 





 there is clinical concern for acute cholecystitis a hepatobiliary scan may be 





 helpful to confirm cystic duct obstruction.  2. Hepatosplenomegaly.  3. Some 





 ascites within the abdomen.           Lebron Marin MD 








Physical Exam


HEENT: Normocephalic; atraumatic; + jaundice.


CHEST:  CTA


CARDIAC:  RRR


ABDOMEN:  Soft, nondistended, nontender;  hepatosplenomegaly; bowel sounds are 

present in all four quadrants. Ascites


EXTREMITIES: No clubbing, cyanosis, or edema.


SKIN:  Significant jaundice, Spider angiomas


CNS:  No focal deficits; alert and oriented times three. (Lizette Amin)





Assessment and Plan


Plan


ASSESSMENT:


- Severe Alcoholic hepatitis on what appears to be underlying liver cirrhosis (

based on imaging).  Has hx of ETOH abuse, reportedly drinking 6-7 drinks per


   day x 6 years.  He was hospitalized from 3/11-3/14 for acute alcoholic 

hepatitis.  During that admission, he was treated with Pentoxifylline, 

Prednisone,


   Nadolol, Spironolactone, Lactulose, and Protonix.  Unfortunately, he left 

A on 3/14 with LFTs on 3/14 were T. Bili 23.7, , ALT 61, Alkaline 


   Phosph 162.  Hepatitis Panel and Celiac panel was negative at that time.  He 

was taking an OTC diuretic at home.  He is not taking Tylenol products. 


   He is not aware of any other liver disease, although several people on his 

mother's side have autoimmune disorders.  He returned to ER for worsening


   jaundice, nausea without vomiting, worsening abdominal distention, and 

intermittent fever and chills.  Abdomen Ultrasound (3/27/17)-----> 1. Thick-

walled 


   gallbladder containing some sludge and demonstrating pericholecystic fluid.  

If there is clinical concern for acute cholecystitis a hepatobiliary scan may 


   be helpful to confirm cystic duct obstruction. 2. Hepatosplenomegaly.  3. 

Some ascites within the abdomen.  Abdomen/Pelvis CT (3/27/17)---->  Findings


   suggest advanced hepatocellular disease with varicosities, prominent spleen 

and interestingly only a small amount of ascites.  Most of the ascites is in the


   pelvis.  DF 52.70. Pentoxifylline.  He does need complete liver workup to 

rule out other underlying liver disease such as autoimmune hepatitis-DARRYL neg,


   AMA pending, ASMA pending, Iron Saturation 93.1, Ferritin 1666, AFP 3.3, 

Alpha 1 antitrypsin 286, Ceruloplasmin pending.  LFTs T. Bili 33.7, , 


   ALT 60, Alk Phosph 195.  Pt with worsening lethargy today.  Will add xifaxan


- Ascites with intermittent fevers and chills.  Ceftriaxone.  Afebrile.


- Hepatic encephalopathy.  44 lactulose.


- Coagulopathy.  STABLE.  


- ARF.  Per renal.  


- Leukocytosis/Fever.  Rocephin.  Afebrile.





PLAN:


- 2 gram sodium diet


- Cont. Ceftriaxone 


- Cont. Pentoxifylline


- Cont. PPI


- Cont. Lactulose


- Add Xifaxan 


- Await Hfe Gene, AMA, Ceruloplasmin 


- Defer diuretics to renal


- Supportive care


- Pt was drinking up until his last hospitalization on 3/11 and therefore is 

not a candidate for liver transplant if his condition worsens.


- Further recommendations to follow based on results of above


- Pt seen and examined by Dr. Parker and myself and this note is written on 

his behalf (Lizette Amin)


Physician Comments


Patient seen and examined


Agree with above


Continue with current supportive care


Monitor labs


We will cut down on lactulose as it seems that diarrhea is one of the reasons 

the patient avoids to eat


Patient is encouraged to increase his intake and nutritional supplements have 

been added


Overall prognosis appears to be poor with worsening renal function (Zac Parker MD)








Lizette Amin Mar 30, 2017 14:00


Zac Parker MD Mar 30, 2017 18:13

## 2017-03-30 NOTE — HHI.NPPN
Subjective


History of Present Illness


The patient is a 27 yo [place] male who presented to this facility 3/27 with 

complaints of abd pain, nausea, and poor oral intake for the past 2 weeks.  He 

was recently admitted to [facility] (3/11-3/14) for similar complaints, but signed 

himself out AMA.  He has a hx of heavy EtOH intake for the past several years, 

but says he has been trying to cut back since he was admitted to [facility].  As per 

records, he was using Lasix since his discharge from the hospital despite 

little po intake, but he says he was only using "over the counter diuretics" 

and Benadryl.  Denies any NSAID use at home.  No prior hx of renal decline.  

Mentions multiple family member with RA.


Admitting SCr 3.63


Labs from beginning of month from [facility] show preserved renal function at 0.6-0.8 

baseline SCr.


Interval History


Patient had no verbal complaints.  Appeared to be fairly alert.





Review of Systems


General


Constitutional:  Fatigue





Objective Data


Data











 3/29/17 3/30/17





 19:00 07:00


 


Intake Total 1150 ml 720 ml


 


Output Total 400 ml 500 ml


 


Balance 750 ml 220 ml


 


  


 


Intake Oral 480 ml 720 ml


 


IV Total 670 ml 


 


Output Urine Total 400 ml 500 ml


 


# Voids 1 


 


# Bowel Movements 3 3











 Vital Signs








  Date Time  Temp Pulse Resp B/P Pulse Ox O2 Delivery O2 Flow Rate FiO2


 


3/30/17 12:01 97.3 86 18 105/55 97   


 


3/30/17 08:00 96.2 86 18 108/58 97   


 


3/30/17 05:30 97.8 85 16 108/55 97   


 


3/30/17 00:00 97.4 90 16 110/58 95   


 


3/29/17 20:00 98.1 96 18 113/56 95   








-:  


3/30/17 0825                                                                   

             3/30/17 0825





Medication Review





 Current Medications


Sodium Chloride 2 ml 2 ml UNSCH  PRN IV FLUSH FLUSH AFTER USING IV ACCESS Last 

administered on 3/27/17at 16:08;  Start 3/27/17 at 15:00;  Stop 3/27/17 at 18:25

;  Status DC


Sodium Chloride 1,000 ml @  999 mls/hr BOLUS  ONCE IV  Last administered on 3/27

/17at 16:30;  Start 3/27/17 at 16:15;  Stop 3/27/17 at 17:15;  Status DC


Sodium Chloride (NS 1000 ml Inj) 1,000 ml @  125 mls/hr Q8H IV  Last 

administered on 3/29/17at 08:38;  Start 3/27/17 at 18:14;  Stop 3/29/17 at 17:48

;  Status DC


Sodium Chloride (NS Flush) 2 ml UNSCH  PRN IV FLUSH FLUSH AFTER USING IV ACCESS

;  Start 3/27/17 at 18:15


Sodium Chloride (NS Flush) 2 ml BID IV FLUSH  Last administered on 3/29/17at 21:

19;  Start 3/27/17 at 21:00


Ondansetron HCl (Zofran Inj) 4 mg Q6H  PRN IVP NAUSEA OR VOMITING Last 

administered on 3/29/17at 11:57;  Start 3/27/17 at 18:15


Docusate Sodium (Colace) 100 mg Q12H PO  Last administered on 3/27/17at 21:45;  

Start 3/27/17 at 21:00;  Stop 3/29/17 at 15:16;  Status DC


Naloxone HCl (Narcan Inj) 0.4 mg UNSCH  PRN IV SEE LABEL COMMENTS;  Start 3/27/

17 at 18:15


Hydroxyzine Pamoate (Vistaril) 25 mg Q6H  PRN PO ITCHING Last administered on 3/

28/17at 08:48;  Start 3/27/17 at 18:15;  Stop 3/28/17 at 15:47;  Status DC


Lactulose (Lactulose Liq) 30 ml TID PO  Last administered on 3/30/17at 13:45;  

Start 3/28/17 at 09:00;  Stop 3/30/17 at 14:22;  Status DC


Alprazolam (Xanax) 0.25 mg ONCE  ONCE PO  Last administered on 3/27/17at 21:45;

  Start 3/27/17 at 21:30;  Stop 3/27/17 at 21:31;  Status DC


Morphine Sulfate 2 mg 2 mg ONCE  ONCE IV PUSH  Last administered on 3/28/17at 02

:10;  Start 3/28/17 at 02:00;  Stop 3/28/17 at 02:01;  Status DC


Potassium Chloride (KCl 20 Meq Premix Inj) 100 ml @  50 mls/hr Q2H IV  Last 

administered on 3/28/17at 19:48;  Start 3/28/17 at 13:00;  Stop 3/28/17 at 16:59

;  Status DC


Pentoxifylline 400 mg 400 mg Q8HR PO  Last administered on 3/30/17at 13:46;  

Start 3/28/17 at 14:00


Ceftriaxone Sodium/Sodium Chloride (Rocephin Inj/NS Inj) 100 ml @  200 mls/hr 

Q24H IV  Last administered on 3/30/17at 13:46;  Start 3/28/17 at 14:00


Hydroxyzine Pamoate (Vistaril) 50 mg Q6H  PRN PO ITCHING Last administered on 3/

30/17at 08:39;  Start 3/28/17 at 18:00;  Stop 3/30/17 at 09:15;  Status DC


Lorazepam (Ativan) 0.5 mg Q12H  PRN PO anxiety Last administered on 3/30/17at 13

:46;  Start 3/28/17 at 16:00


Albumin Human (Albumin 25% Inj) 12.5 gm Q6H IV  Last administered on 3/29/17at 

15:42;  Start 3/28/17 at 20:15;  Stop 3/29/17 at 15:00;  Status DC


Benzonatate (Tessalon) 200 mg TID  PRN PO cough;  Start 3/29/17 at 15:15


Senna/Docusate Sodium 2 tab 2 tab BID PO ;  Start 3/29/17 at 21:00


Sodium Bicarbonate/ Sodium Chloride (Sodium Bicarbonate 8.4% Inj/1/2 NS 1000 ml 

Inj) 1,075 ml @  100 mls/hr [id number] IV  Last administered on 3/30/17at 08:41;  

Start 3/29/17 at 18:00;  Stop 3/30/17 at 15:55;  Status DC


Potassium Chloride (KCl) 20 meq ONCE  ONCE PO  Last administered on 3/29/17at 18

:31;  Start 3/29/17 at 18:00;  Stop 3/29/17 at 18:01;  Status DC


Hydroxyzine Pamoate (Vistaril) 50 mg Q6H  PRN PO ITCHING Last administered on 3/

30/17at 13:46;  Start 3/30/17 at 09:15


Rifaximin (Xifaxan) 550 mg BID PO ;  Start 3/30/17 at 21:00


Lactulose 30 ml 30 ml DAILY PO ;  Start 3/30/17 at 15:00


Sodium Bicarbonate/ Dextrose (Sodium Bicarbonate 8.4% Inj/D5W 1000 ml Inj) 1,

150 ml @  50 mls/hr Q23H IV ;  Start 3/30/17 at 18:00


Midodrine (Proamatine) 5 mg TID@07,12,17 PO ;  Start 3/30/17 at 17:00


Octreotide Acetate (SandoSTATIN INJ) 100 mcg Q8HR SQ ;  Start 3/30/17 at 16:00





Physical Exam


General


Appearance:  No Acute Distress, Comfortable





Eyes


Eye Exam:  Jaundice





Pulmonary


Resp Exam:  Clear Bilaterally, Breath Sounds Equal, No Distress





Cardiology


CV Exam:  Regular, Normal Sinus Rhythm





Gastrointestinal/Abdomen


GI Exam:  Soft, Non-Tender, Distended (moderately but not tense.  Relatively 

soft)





Integumentary


Skin Exam:  Clear, Warm, Jaundice





Extremeties


Extremeties Remarks


One plus pitting edema of lower legs but no edema of upper legs or thighs.





Neurologic


Neuro Exam:  Alert, Awake





Psychiatric


Psych Exam:  Appropriate Responses





Assessment/Plan


Discussed Condition With:  Patient, Parent, Sibling


Problem List:  


(1) Acute renal failure


Plan:  


Unfortunately the patient's renal indices are continuing to deteriorate despite 

IV hydration.


I suspect the patient did have some degree of hepatorenal syndrome prior to 

admission with subsequent development of dehydration and development of an ATN.


Unfortunately the patient's azotemia may continue to worsen. 





In the event that there may be a component of hepatorenal syndrome will try 

midodrine by mouth as well as octreotide subcutaneously.  Family concerned the 

patient is developing edema but there does not appear to be an excessive amount 

of fluid retention present at this time peripherally.  Patient will have some 

third spacing secondary to hypoalbuminemia and liver disease.  Continue IV 

fluids with added bicarbonate.





We'll hold on dialysis at this time.  Supplement potassium as indicated.


If the patient continues with progressive azotemia may be related to 

hepatorenal syndrome and without significant liver recovery prognosis is grim 

as discussed with family.  If the patient has an ATN component and liver 

improvement doesn't occur there is potential for renal recovery given the 

patient's relatively young age.  This also discussed with the family.








Medications should be adjusted for the patient's renal decline.  Avoid 

nephrotoxic medications such as iodinated contrast dyes and NSAIDs.  Avoid 

gadolinium when eGFR <30





(2) Hypokalemia


Plan:  Repletion as ordered





(3) Acidosis


Plan:  Continue sodium bicarbonate in IV fluids.





(4) Cirrhosis with alcoholism


Plan:  Repeat LFTs tomorrow.





Plan


Prognosis guarded.


Time 30 minutes in direct patient care.








RADHA Alan MD Mar 30, 2017 16:13

## 2017-03-31 VITALS
SYSTOLIC BLOOD PRESSURE: 108 MMHG | TEMPERATURE: 98.3 F | OXYGEN SATURATION: 99 % | RESPIRATION RATE: 17 BRPM | DIASTOLIC BLOOD PRESSURE: 54 MMHG | HEART RATE: 83 BPM

## 2017-03-31 VITALS
OXYGEN SATURATION: 99 % | TEMPERATURE: 98.2 F | DIASTOLIC BLOOD PRESSURE: 60 MMHG | HEART RATE: 84 BPM | RESPIRATION RATE: 17 BRPM | SYSTOLIC BLOOD PRESSURE: 108 MMHG

## 2017-03-31 VITALS
HEART RATE: 83 BPM | SYSTOLIC BLOOD PRESSURE: 111 MMHG | DIASTOLIC BLOOD PRESSURE: 55 MMHG | TEMPERATURE: 99 F | RESPIRATION RATE: 17 BRPM | OXYGEN SATURATION: 97 %

## 2017-03-31 VITALS
SYSTOLIC BLOOD PRESSURE: 115 MMHG | RESPIRATION RATE: 20 BRPM | TEMPERATURE: 97.4 F | DIASTOLIC BLOOD PRESSURE: 57 MMHG | OXYGEN SATURATION: 97 % | HEART RATE: 85 BPM

## 2017-03-31 VITALS
SYSTOLIC BLOOD PRESSURE: 111 MMHG | OXYGEN SATURATION: 95 % | HEART RATE: 80 BPM | DIASTOLIC BLOOD PRESSURE: 60 MMHG | TEMPERATURE: 96.8 F | RESPIRATION RATE: 12 BRPM

## 2017-03-31 VITALS
DIASTOLIC BLOOD PRESSURE: 59 MMHG | TEMPERATURE: 97.7 F | OXYGEN SATURATION: 96 % | SYSTOLIC BLOOD PRESSURE: 108 MMHG | RESPIRATION RATE: 20 BRPM | HEART RATE: 95 BPM

## 2017-03-31 LAB
ALP SERPL-CCNC: 181 U/L (ref 45–117)
ALP SERPL-CCNC: 187 U/L (ref 45–117)
ALT SERPL-CCNC: 58 U/L (ref 12–78)
ALT SERPL-CCNC: 61 U/L (ref 12–78)
ANION GAP SERPL CALC-SCNC: 14 MEQ/L (ref 5–15)
ANION GAP SERPL CALC-SCNC: 15 MEQ/L (ref 5–15)
AST SERPL-CCNC: 100 U/L (ref 15–37)
AST SERPL-CCNC: 103 U/L (ref 15–37)
BASOPHILS # BLD AUTO: 0.1 TH/MM3 (ref 0–0.2)
BASOPHILS NFR BLD: 0.5 % (ref 0–2)
BILIRUB SERPL-MCNC: 32.5 MG/DL (ref 0.2–1)
BILIRUB SERPL-MCNC: 33.7 MG/DL (ref 0.2–1)
BUN SERPL-MCNC: 32 MG/DL (ref 7–18)
BUN SERPL-MCNC: 33 MG/DL (ref 7–18)
CHLORIDE SERPL-SCNC: 106 MEQ/L (ref 98–107)
CHLORIDE SERPL-SCNC: 106 MEQ/L (ref 98–107)
EOSINOPHIL # BLD: 0.2 TH/MM3 (ref 0–0.4)
EOSINOPHIL NFR BLD: 1.1 % (ref 0–4)
ERYTHROCYTE [DISTWIDTH] IN BLOOD BY AUTOMATED COUNT: 15.7 % (ref 11.6–17.2)
GFR SERPLBLD BASED ON 1.73 SQ M-ARVRAT: 12 ML/MIN (ref 89–?)
GFR SERPLBLD BASED ON 1.73 SQ M-ARVRAT: 12 ML/MIN (ref 89–?)
HCO3 BLD-SCNC: 17.4 MEQ/L (ref 21–32)
HCO3 BLD-SCNC: 18.3 MEQ/L (ref 21–32)
HCT VFR BLD CALC: 29.6 % (ref 39–51)
HEMO FLAGS: (no result)
INR PPP: 1.6 RATIO
LYMPHOCYTES # BLD AUTO: 0.8 TH/MM3 (ref 1–4.8)
LYMPHOCYTES NFR BLD AUTO: 5.5 % (ref 9–44)
MCH RBC QN AUTO: 37.3 PG (ref 27–34)
MCHC RBC AUTO-ENTMCNC: 35.2 % (ref 32–36)
MCV RBC AUTO: 105.8 FL (ref 80–100)
MITOCHONDRIA AB SER QL: (no result) U
MONOCYTES NFR BLD: 9.5 % (ref 0–8)
NEUTROPHILS # BLD AUTO: 12.4 TH/MM3 (ref 1.8–7.7)
NEUTROPHILS NFR BLD AUTO: 83.4 % (ref 16–70)
PLATELET # BLD: 165 TH/MM3 (ref 150–450)
POTASSIUM SERPL-SCNC: 3 MEQ/L (ref 3.5–5.1)
POTASSIUM SERPL-SCNC: 3.1 MEQ/L (ref 3.5–5.1)
PROTHROMBIN TIME: 17.8 SEC (ref 9.8–11.6)
RBC # BLD AUTO: 2.8 MIL/MM3 (ref 4.5–5.9)
SODIUM SERPL-SCNC: 138 MEQ/L (ref 136–145)
SODIUM SERPL-SCNC: 138 MEQ/L (ref 136–145)
WBC # BLD AUTO: 14.9 TH/MM3 (ref 4–11)

## 2017-03-31 RX ADMIN — RIFAXIMIN SCH MG: 550 TABLET ORAL at 08:02

## 2017-03-31 RX ADMIN — OCTREOTIDE ACETATE SCH MCG: 100 INJECTION, SOLUTION INTRAVENOUS; SUBCUTANEOUS at 22:40

## 2017-03-31 RX ADMIN — STANDARDIZED SENNA CONCENTRATE AND DOCUSATE SODIUM SCH TAB: 8.6; 5 TABLET, FILM COATED ORAL at 21:00

## 2017-03-31 RX ADMIN — MIDODRINE HYDROCHLORIDE SCH MG: 5 TABLET ORAL at 05:57

## 2017-03-31 RX ADMIN — Medication SCH ML: at 08:03

## 2017-03-31 RX ADMIN — Medication SCH ML: at 22:41

## 2017-03-31 RX ADMIN — OCTREOTIDE ACETATE SCH MCG: 100 INJECTION, SOLUTION INTRAVENOUS; SUBCUTANEOUS at 05:56

## 2017-03-31 RX ADMIN — Medication SCH MG: at 14:49

## 2017-03-31 RX ADMIN — ONDANSETRON PRN MG: 2 INJECTION, SOLUTION INTRAMUSCULAR; INTRAVENOUS at 08:03

## 2017-03-31 RX ADMIN — SODIUM CHLORIDE SCH MLS/HR: 900 INJECTION INTRAVENOUS at 14:49

## 2017-03-31 RX ADMIN — OCTREOTIDE ACETATE SCH MCG: 100 INJECTION, SOLUTION INTRAVENOUS; SUBCUTANEOUS at 14:49

## 2017-03-31 RX ADMIN — Medication SCH MG: at 22:40

## 2017-03-31 RX ADMIN — PENTOXIFYLLINE SCH MG: 400 TABLET, FILM COATED, EXTENDED RELEASE ORAL at 14:50

## 2017-03-31 RX ADMIN — STANDARDIZED SENNA CONCENTRATE AND DOCUSATE SODIUM SCH TAB: 8.6; 5 TABLET, FILM COATED ORAL at 08:03

## 2017-03-31 RX ADMIN — WATER SCH ML: 1 IRRIGANT IRRIGATION at 08:01

## 2017-03-31 RX ADMIN — PENTOXIFYLLINE SCH MG: 400 TABLET, FILM COATED, EXTENDED RELEASE ORAL at 05:56

## 2017-03-31 RX ADMIN — PENTOXIFYLLINE SCH MG: 400 TABLET, FILM COATED, EXTENDED RELEASE ORAL at 22:40

## 2017-03-31 RX ADMIN — RIFAXIMIN SCH MG: 550 TABLET ORAL at 22:46

## 2017-03-31 RX ADMIN — MIDODRINE HYDROCHLORIDE SCH MG: 5 TABLET ORAL at 11:37

## 2017-03-31 RX ADMIN — MIDODRINE HYDROCHLORIDE SCH MG: 5 TABLET ORAL at 17:10

## 2017-03-31 NOTE — HHI.GIFU
Subjective


Remarks


Resting in bed.  Not quite as lethargic today.  Still having significant 

diarrhea- states every 20-30 minutes.  Not much appetite (Lizette Amin)





Objective


Vitals I&O





 Vital Signs








  Date Time  Temp Pulse Resp B/P Pulse Ox O2 Delivery O2 Flow Rate FiO2


 


3/31/17 04:00 98.3 83 17 108/54 99   


 


3/31/17 00:00 98.2 84 17 108/60 99   


 


3/30/17 20:00 98.2 87 17 110/59 97   


 


3/30/17 16:00 98.6 87 16 109/67 97   


 


3/30/17 12:01 97.3 86 18 105/55 97   








 I/O








 3/30/17 3/30/17 3/30/17 3/31/17 3/31/17 3/31/17





 07:00 15:00 23:00 07:00 15:00 23:00


 


Intake Total 240 ml 2020 ml 701 ml 680 ml  


 


Output Total 350 ml 225 ml 600 ml 200 ml  


 


Balance -110 ml 1795 ml 101 ml 480 ml  


 


      


 


Intake Oral 240 ml 1200 ml 60 ml 120 ml  


 


IV Total  820 ml 641 ml 560 ml  


 


Output Urine Total 350 ml 225 ml 600 ml 200 ml  


 


# Voids   1   


 


# Bowel Movements 3 8 4 5  








Laboratory





Laboratory Tests








Test 3/30/17 3/31/17





 14:28 06:20


 


Ammonia LESS THAN 10  


 


White Blood Count  14.9 


 


Red Blood Count  2.80 


 


Hemoglobin  10.4 


 


Hematocrit  29.6 


 


Mean Corpuscular Volume  105.8 


 


Mean Corpuscular Hemoglobin  37.3 


 


Mean Corpuscular Hemoglobin  35.2 





Concent  


 


Red Cell Distribution Width  15.7 


 


Platelet Count  165 


 


Mean Platelet Volume  10.1 


 


Neutrophils (%) (Auto)  83.4 


 


Lymphocytes (%) (Auto)  5.5 


 


Monocytes (%) (Auto)  9.5 


 


Eosinophils (%) (Auto)  1.1 


 


Basophils (%) (Auto)  0.5 


 


Neutrophils # (Auto)  12.4 


 


Lymphocytes # (Auto)  0.8 


 


Monocytes # (Auto)  1.4 


 


Eosinophils # (Auto)  0.2 


 


Basophils # (Auto)  0.1 


 


CBC Comment  DIFF FINAL 


 


Differential Comment   


 


Prothrombin Time  17.8 


 


Prothromb Time International  1.6 





Ratio  


 


Sodium Level  138 


 


Potassium Level  3.0 


 


Chloride Level  106 


 


Carbon Dioxide Level  17.4 


 


Anion Gap  15 


 


Blood Urea Nitrogen  33 


 


Creatinine  5.83 


 


Estimat Glomerular Filtration  12 





Rate  


 


Random Glucose  105 


 


Calcium Level  7.9 


 


Phosphorus Level  4.0 


 


Total Bilirubin  33.7 


 


Aspartate Amino Transf  103 





(AST/SGOT)  


 


Alanine Aminotransferase  61 





(ALT/SGPT)  


 


Alkaline Phosphatase  187 


 


Total Protein  4.8 


 


Albumin  2.0 








Imaging





Last Impressions








Hepatobiliary Scan Nuclear Medicine 3/28/17 0000 Signed





Impressions: 





 Service Date/Time:  Tuesday, March 28, 2017 14:36 - CONCLUSION:  Markedly 





 delayed uptake in the liver. Findings suggest diffuse hepatocellular disease 





 versus biliary obstruction. 24 hour delayed scan could be performed.     

Estevan Longoria MD ADDENDUM:  24 hour scans still shows no activity in bile ducts or 





 small bowel suggesting diffuse hepatocellular disease.    Jona Del Angel MD  





 FACR


 


Chest X-Ray 3/27/17 0000 Signed





Impressions: 





 Service Date/Time:  Monday, March 27, 2017 18:08 - CONCLUSION:  Left basilar 





 streakiness consistent with probable atelectasis.            Lebron Marin MD 


 


Abdomen/Pelvis CT 3/27/17 0000 Signed





Impressions: 





 Service Date/Time:  Monday, March 27, 2017 16:50 - CONCLUSION: Findings 

suggest 





 advanced hepatocellular disease with varicosities, prominent spleen and 





 interestingly only a small amount of ascites.  Most of the ascites is in the 





 pelvis.     Jona Del Angel MD  FACR


 


Abdomen Ultrasound 3/27/17 0000 Signed





Impressions: 





 Service Date/Time:  Monday, March 27, 2017 18:27 - CONCLUSION:  1. Thick-

walled 





 gallbladder containing some sludge and demonstrating pericholecystic fluid.  

If 





 there is clinical concern for acute cholecystitis a hepatobiliary scan may be 





 helpful to confirm cystic duct obstruction.  2. Hepatosplenomegaly.  3. Some 





 ascites within the abdomen.           Lebron Marin MD 








Physical Exam


HEENT: Normocephalic; atraumatic; + jaundice.


CHEST:  CTA


CARDIAC:  RRR


ABDOMEN:  Soft, nondistended, nontender;  hepatosplenomegaly; bowel sounds are 

present in all four quadrants. Ascites


EXTREMITIES: No clubbing, cyanosis, or edema.


SKIN:  Significant jaundice, Spider angiomas


CNS:  No focal deficits; lethargic and oriented times three. (Lizette Amin)





Assessment and Plan


Plan


ASSESSMENT:


- Severe Alcoholic hepatitis on what appears to be underlying liver cirrhosis (

based on imaging).  Has hx of ETOH abuse, reportedly drinking 6-7 drinks per


   day x 6 years.  He was hospitalized from 3/11-3/14 for acute alcoholic 

hepatitis.  During that admission, he was treated with Pentoxifylline, 

Prednisone,


   Nadolol, Spironolactone, Lactulose, and Protonix.  Unfortunately, he left 

[facility] on 3/14 with LFTs on 3/14 were T. Bili 23.7, , ALT 61, Alkaline 


   Phosph 162.  Hepatitis Panel and Celiac panel was negative at that time.  He 

was taking an OTC diuretic at home.  He is not taking Tylenol products. 


   He is not aware of any other liver disease, although several people on his 

mother's side have autoimmune disorders.  He returned to ER for worsening


   jaundice, nausea without vomiting, worsening abdominal distention, and 

intermittent fever and chills.  Abdomen Ultrasound (3/27/17)-----> 1. Thick-

walled 


   gallbladder containing some sludge and demonstrating pericholecystic fluid.  

If there is clinical concern for acute cholecystitis a hepatobiliary scan may 


   be helpful to confirm cystic duct obstruction. 2. Hepatosplenomegaly.  3. 

Some ascites within the abdomen.  Abdomen/Pelvis CT (3/27/17)---->  Findings


   suggest advanced hepatocellular disease with varicosities, prominent spleen 

and interestingly only a small amount of ascites.  Most of the ascites is in the


   pelvis.  DF 52.70. Pentoxifylline.  He does need complete liver workup to 

rule out other underlying liver disease such as autoimmune hepatitis-DARRYL neg,


   AMA pending, ASMA neg, Iron Saturation 93.1, Ferritin 1666, AFP 3.3, Alpha 1 

antitrypsin 286, Ceruloplasmin pending.  LFTs persistently elevated today.


- Ascites with intermittent fevers and chills.  Ceftriaxone.  Afebrile.


- Hepatic encephalopathy.  Xifaxan.  Having significant diarrhea with 

lactulose.  Octreotide started by renal.


- Coagulopathy.  STABLE.  


- ARF.  Creat 5.83.  Per renal


- Leukocytosis/Fever.  WBC 14.9.  Afebrile.  Ceftrixaone





PLAN:


- 2 gram sodium diet


- Cont. Ceftriaxone 


- Cont. Pentoxifylline


- Cont. PPI


- Cont. Xifaxan 


- Await Hfe Gene, AMA, Ceruloplasmin 


- Defer diuretics to renal


- Monitor labs


- Supportive care


- Pt was drinking up until his last hospitalization on 3/11 and therefore is 

not a candidate for liver transplant if his condition worsens.


- Further recommendations to follow based on results of above


- Pt seen and examined by Dr. Parker and myself and this note is written on 

his behalf (Lizette Amin)


Physician Comments


Patient seen and examined


Agree with above


Continue with current supportive care


Monitor labs


I will hold lactulose at this point


We will obtain stool studies


And we will consider Lomotil


Prognosis still looking poor at this point (Zac Parker MD)








Lizette Amin Mar 31, 2017 09:06


Zac Parker MD Mar 31, 2017 18:58

## 2017-03-31 NOTE — HHI.PR
Subjective


Remarks


f/u for liver and renal failure.


patient more alert today.


patient stated he had no BM but nurse stated patient is having multiple BM.


he has no other complaints.





Objective


Vitals





 Vital Signs








  Date Time  Temp Pulse Resp B/P Pulse Ox O2 Delivery O2 Flow Rate FiO2


 


3/31/17 08:00 96.8 80 12 111/60 95   


 


3/31/17 04:00 98.3 83 17 108/54 99   


 


3/31/17 00:00 98.2 84 17 108/60 99   


 


3/30/17 20:00 98.2 87 17 110/59 97   


 


3/30/17 16:00 98.6 87 16 109/67 97   


 


3/30/17 12:01 97.3 86 18 105/55 97   








 I/O








 3/30/17 3/30/17 3/30/17 3/31/17 3/31/17 3/31/17





 07:00 15:00 23:00 07:00 15:00 23:00


 


Intake Total 240 ml 2020 ml 701 ml 680 ml  


 


Output Total 350 ml 225 ml 600 ml 200 ml  


 


Balance -110 ml 1795 ml 101 ml 480 ml  


 


      


 


Intake Oral 240 ml 1200 ml 60 ml 120 ml  


 


IV Total  820 ml 641 ml 560 ml  


 


Output Urine Total 350 ml 225 ml 600 ml 200 ml  


 


# Voids   1   


 


# Bowel Movements 3 8 4 5  








Result Diagram:  


3/31/17 0620                                                                   

             3/31/17 0620





Imaging





Last Impressions








Hepatobiliary Scan Nuclear Medicine 3/28/17 0000 Signed





Impressions: 





 Service Date/Time:  Tuesday, March 28, 2017 14:36 - CONCLUSION:  Markedly 





 delayed uptake in the liver. Findings suggest diffuse hepatocellular disease 





 versus biliary obstruction. 24 hour delayed scan could be performed.     

Estevan Longoria MD ADDENDUM:  24 hour scans still shows no activity in bile ducts or 





 small bowel suggesting diffuse hepatocellular disease.    Jona Del Angel MD  





 FACR


 


Chest X-Ray 3/27/17 0000 Signed





Impressions: 





 Service Date/Time:  Monday, March 27, 2017 18:08 - CONCLUSION:  Left basilar 





 streakiness consistent with probable atelectasis.            Lebron Marin MD 


 


Abdomen/Pelvis CT 3/27/17 0000 Signed





Impressions: 





 Service Date/Time:  Monday, March 27, 2017 16:50 - CONCLUSION: Findings 

suggest 





 advanced hepatocellular disease with varicosities, prominent spleen and 





 interestingly only a small amount of ascites.  Most of the ascites is in the 





 pelvis.     Jona Del Angel MD  FACR


 


Abdomen Ultrasound 3/27/17 0000 Signed





Impressions: 





 Service Date/Time:  Monday, March 27, 2017 18:27 - CONCLUSION:  1. Thick-

walled 





 gallbladder containing some sludge and demonstrating pericholecystic fluid.  

If 





 there is clinical concern for acute cholecystitis a hepatobiliary scan may be 





 helpful to confirm cystic duct obstruction.  2. Hepatosplenomegaly.  3. Some 





 ascites within the abdomen.           Lebron Marin MD 








Objective Remarks


GENERAL: Chronically ill male in no acute distress.


SKIN: Jaundiced


HEAD: Normocephalic.


EYES: scleral icterus. . 


RESPIRATORY: Breath sounds equal bilaterally. No accessory muscle use.


GASTROINTESTINAL: Abdomen soft, + diffused mild TT,  positive for distention.


MUSCULOSKELETAL: No cyanosis, or edema. 


BACK: Nontender without obvious deformity. No CVA tenderness.





Medications and IVs





 Current Medications


Sodium Chloride 2 ml 2 ml UNSCH  PRN IV FLUSH FLUSH AFTER USING IV ACCESS Last 

administered on 3/27/17at 16:08;  Start 3/27/17 at 15:00;  Stop 3/27/17 at 18:25

;  Status DC


Sodium Chloride 1,000 ml @  999 mls/hr BOLUS  ONCE IV  Last administered on 3/27

/17at 16:30;  Start 3/27/17 at 16:15;  Stop 3/27/17 at 17:15;  Status DC


Sodium Chloride (NS 1000 ml Inj) 1,000 ml @  125 mls/hr Q8H IV  Last 

administered on 3/29/17at 08:38;  Start 3/27/17 at 18:14;  Stop 3/29/17 at 17:48

;  Status DC


Sodium Chloride (NS Flush) 2 ml UNSCH  PRN IV FLUSH FLUSH AFTER USING IV ACCESS

;  Start 3/27/17 at 18:15


Sodium Chloride (NS Flush) 2 ml BID IV FLUSH  Last administered on 3/29/17at 21:

19;  Start 3/27/17 at 21:00


Ondansetron HCl (Zofran Inj) 4 mg Q6H  PRN IVP NAUSEA OR VOMITING Last 

administered on 3/31/17at 08:03;  Start 3/27/17 at 18:15


Docusate Sodium (Colace) 100 mg Q12H PO  Last administered on 3/27/17at 21:45;  

Start 3/27/17 at 21:00;  Stop 3/29/17 at 15:16;  Status DC


Naloxone HCl (Narcan Inj) 0.4 mg UNSCH  PRN IV SEE LABEL COMMENTS;  Start 3/27/

17 at 18:15


Hydroxyzine Pamoate (Vistaril) 25 mg Q6H  PRN PO ITCHING Last administered on 3/

28/17at 08:48;  Start 3/27/17 at 18:15;  Stop 3/28/17 at 15:47;  Status DC


Lactulose (Lactulose Liq) 30 ml TID PO  Last administered on 3/30/17at 13:45;  

Start 3/28/17 at 09:00;  Stop 3/30/17 at 14:22;  Status DC


Alprazolam (Xanax) 0.25 mg ONCE  ONCE PO  Last administered on 3/27/17at 21:45;

  Start 3/27/17 at 21:30;  Stop 3/27/17 at 21:31;  Status DC


Morphine Sulfate 2 mg 2 mg ONCE  ONCE IV PUSH  Last administered on 3/28/17at 02

:10;  Start 3/28/17 at 02:00;  Stop 3/28/17 at 02:01;  Status DC


Potassium Chloride (KCl 20 Meq Premix Inj) 100 ml @  50 mls/hr Q2H IV  Last 

administered on 3/28/17at 19:48;  Start 3/28/17 at 13:00;  Stop 3/28/17 at 16:59

;  Status DC


Pentoxifylline 400 mg 400 mg Q8HR PO  Last administered on 3/31/17at 05:56;  

Start 3/28/17 at 14:00


Ceftriaxone Sodium/Sodium Chloride (Rocephin Inj/NS Inj) 100 ml @  200 mls/hr 

Q24H IV  Last administered on 3/30/17at 13:46;  Start 3/28/17 at 14:00


Hydroxyzine Pamoate (Vistaril) 50 mg Q6H  PRN PO ITCHING Last administered on 3/

30/17at 08:39;  Start 3/28/17 at 18:00;  Stop 3/30/17 at 09:15;  Status DC


Lorazepam (Ativan) 0.5 mg Q12H  PRN PO anxiety Last administered on 3/31/17at 04

:04;  Start 3/28/17 at 16:00


Albumin Human (Albumin 25% Inj) 12.5 gm Q6H IV  Last administered on 3/29/17at 

15:42;  Start 3/28/17 at 20:15;  Stop 3/29/17 at 15:00;  Status DC


Benzonatate (Tessalon) 200 mg TID  PRN PO cough;  Start 3/29/17 at 15:15


Senna/Docusate Sodium 2 tab 2 tab BID PO ;  Start 3/29/17 at 21:00


Sodium Bicarbonate/ Sodium Chloride (Sodium Bicarbonate 8.4% Inj/1/2 NS 1000 ml 

Inj) 1,075 ml @  100 mls/hr [id number] IV  Last administered on 3/30/17at 08:41;  

Start 3/29/17 at 18:00;  Stop 3/30/17 at 15:55;  Status DC


Potassium Chloride (KCl) 20 meq ONCE  ONCE PO  Last administered on 3/29/17at 18

:31;  Start 3/29/17 at 18:00;  Stop 3/29/17 at 18:01;  Status DC


Hydroxyzine Pamoate (Vistaril) 50 mg Q6H  PRN PO ITCHING Last administered on 3/

31/17at 04:04;  Start 3/30/17 at 09:15


Rifaximin (Xifaxan) 550 mg BID PO  Last administered on 3/31/17at 08:02;  Start 

3/30/17 at 21:00


Lactulose 30 ml 30 ml DAILY PO ;  Start 3/30/17 at 15:00


Sodium Bicarbonate/ Dextrose (Sodium Bicarbonate 8.4% Inj/D5W 1000 ml Inj) 1,

150 ml @  70 mls/hr [id number] IV  Last administered on 3/30/17at 18:28;  Start 3/

30/17 at 18:00


Midodrine (Proamatine) 5 mg TID@07,12,17 PO  Last administered on 3/31/17at 05:

57;  Start 3/30/17 at 17:00


Octreotide Acetate (SandoSTATIN INJ) 100 mcg Q8HR SQ  Last administered on 3/31/

17at 05:56;  Start 3/30/17 at 16:00


Potassium Chloride (KCl) 30 meq ONCE  ONCE PO  Last administered on 3/30/17at 17

:14;  Start 3/30/17 at 17:00;  Stop 3/30/17 at 17:01;  Status DC





A/P


Assessment and Plan


26-year-old male with history of hepatitis





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


-CT scan show hepatocellular disease with varices.


-Patient has not been drinking alcohol since last admission in [place].


-Hepatitis panel reviewed from last admission which was all negative.


-Alcohol level during this admission is negative.


-Liver ultrasound shows possible acute cholecystitis. HIDA scan negative. 


-GI consulted and ff. continue with Ceftriaxone, Pentoxifylline, PPI, Lactulose.


-pending DARRYL, AMA, ASM, Ceruloplasmin, Alpha 1 Antitrypsin, AFP level, US 

guided paracentesis, diagnostic (? SBP) if enough fluid to safely drain


-Continue with supportive care.  Patient is not a candidate for liver 

transplant due to recent alcohol use.


-poor prognosis.





Coagulopathy


-Secondary to liver disease.


-See treatment as above.





Acute renal failure


-Per nephrologist this may be partly due to hepatorenal syndrome versus 

dehydration with ATN.


-Nephrologist consulted and following.  Recommend to continue with IV fluids 

with bicarb.


-Cr worsening but continues to make urine.


-Todd placed due to for accurate I's and O's.


-Patient may require dialysis.  Renal function most likely will not improve 

unless liver improves.


-per Nephro in the event that there may be a component of hepatorenal syndrome 

will try midodrine by mouth as well as octreotide subcutaneously.  





Anxiety


-on Ativan when necessary.





Urticaria


-on Vistaril.





DVT prophylaxis


-INR is elevated so chemoprophylaxis contraindicated.


Discharge Planning


Very poor prognosis.


Continue with supportive care.








Catei Mims MD Mar 31, 2017 10:17

## 2017-03-31 NOTE — HHI.NPPN
Subjective


History of Present Illness


The patient is a 25 yo [place] male who presented to this facility 3/27 with 

complaints of abd pain, nausea, and poor oral intake for the past 2 weeks.  He 

was recently admitted to [facility] (3/11-3/14) for similar complaints, but signed 

himself out AMA.  He has a hx of heavy EtOH intake for the past several years, 

but says he has been trying to cut back since he was admitted to [facility].  As per 

records, he was using Lasix since his discharge from the hospital despite 

little po intake, but he says he was only using "over the counter diuretics" 

and Benadryl.  Denies any NSAID use at home.  No prior hx of renal decline.  

Mentions multiple family member with RA.


Admitting SCr 3.63


Labs from beginning of month from [facility] show preserved renal function at 0.6-0.8 

baseline SCr.


Interval History


Patient's mother and sister by bedside.


Patient had no verbal complaints except for ongoing diarrhea.  Appears to be 

alert.


Urine output said to be improving.





Review of Systems


General


Constitutional:  Fatigue





Objective Data


Data











 3/30/17 3/31/17





 19:00 07:00


 


Intake Total 2020 ml 1381 ml


 


Output Total 375 ml 650 ml


 


Balance 1645 ml 731 ml


 


  


 


Intake Oral 1200 ml 180 ml


 


IV Total 820 ml 1201 ml


 


Output Urine Total 375 ml 650 ml


 


# Voids 1 


 


# Bowel Movements 10 7











 Vital Signs








  Date Time  Temp Pulse Resp B/P Pulse Ox O2 Delivery O2 Flow Rate FiO2


 


3/31/17 08:00 96.8 80 12 111/60 95   


 


3/31/17 04:00 98.3 83 17 108/54 99   


 


3/31/17 00:00 98.2 84 17 108/60 99   


 


3/30/17 20:00 98.2 87 17 110/59 97   


 


3/30/17 16:00 98.6 87 16 109/67 97   








-:  


3/31/17 0620                                                                   

             3/31/17 1040








Physical Exam


General


Appearance:  No Acute Distress, Comfortable





Eyes


Eye Exam:  Jaundice





Pulmonary


Resp Exam:  Clear Bilaterally, Breath Sounds Equal, No Distress





Cardiology


CV Exam:  Regular, Normal Sinus Rhythm





Gastrointestinal/Abdomen


GI Exam:  Soft, Non-Tender, Distended (moderately but not tense.  Relatively 

soft)





Integumentary


Skin Exam:  Clear, Warm, Jaundice





Extremeties


Extremeties Remarks


One plus pitting edema of lower legs but no edema of upper legs or thighs.





Neurologic


Neuro Exam:  Alert, Awake





Psychiatric


Psych Exam:  Appropriate Responses





Assessment/Plan


Discussed Condition With:  Patient, Parent, Sibling


Problem List:  


(1) Acute renal failure


Plan:  


Patient's urine output appears to be fair however the creatinine level has 

risen again.


Patient's mental status is stable.  Bicarbonate level improved.


I will increase midodrine to 7.5 mg 3 times daily and continue somatostatin.





We'll hold on dialysis at this time but will consider the same if the patient 

appears to be developing significant uremic symptoms or if his azotemia worsens 

significantly.  


If there is significant potential for recovery of renal function initiating 

dialysis would likely reduce chance of same but as indicated above I will 

proceed with dialysis if significant indications occur.


Discussed this with the family and patient.  They are in agreement.


Family is concerned regarding severity of patient's diarrhea.  Medications 

appear to be occurring in the stool whole per family.





Supplement potassium as indicated.


If the patient continues with progressive azotemia may be related to 

hepatorenal syndrome and without significant liver recovery prognosis is grim 

as discussed with family.  If the patient has an ATN component and liver 

improvement does occur there is potential for renal recovery given the patient'

s relatively young age.  This also discussed with the family.








Medications should be adjusted for the patient's renal decline.  Avoid 

nephrotoxic medications such as iodinated contrast dyes and NSAIDs.  Avoid 

gadolinium when eGFR <30





(2) Hypokalemia


Plan:  Repletion as ordered





(3) Acidosis


Plan:  Continue sodium bicarbonate in IV fluids.





(4) Cirrhosis with alcoholism


Plan:  Repeat LFTs tomorrow.











RADHA Alan MD Mar 31, 2017 12:40

## 2017-04-01 VITALS
SYSTOLIC BLOOD PRESSURE: 115 MMHG | HEART RATE: 82 BPM | DIASTOLIC BLOOD PRESSURE: 62 MMHG | RESPIRATION RATE: 18 BRPM | OXYGEN SATURATION: 97 % | TEMPERATURE: 97.6 F

## 2017-04-01 VITALS
OXYGEN SATURATION: 94 % | DIASTOLIC BLOOD PRESSURE: 59 MMHG | SYSTOLIC BLOOD PRESSURE: 106 MMHG | TEMPERATURE: 98.2 F | HEART RATE: 80 BPM | RESPIRATION RATE: 17 BRPM

## 2017-04-01 VITALS
TEMPERATURE: 100.1 F | HEART RATE: 78 BPM | RESPIRATION RATE: 16 BRPM | OXYGEN SATURATION: 97 % | DIASTOLIC BLOOD PRESSURE: 52 MMHG | SYSTOLIC BLOOD PRESSURE: 104 MMHG

## 2017-04-01 VITALS
RESPIRATION RATE: 18 BRPM | DIASTOLIC BLOOD PRESSURE: 68 MMHG | SYSTOLIC BLOOD PRESSURE: 124 MMHG | TEMPERATURE: 97.2 F | OXYGEN SATURATION: 96 % | HEART RATE: 93 BPM

## 2017-04-01 VITALS
OXYGEN SATURATION: 95 % | DIASTOLIC BLOOD PRESSURE: 56 MMHG | RESPIRATION RATE: 17 BRPM | TEMPERATURE: 98.9 F | HEART RATE: 80 BPM | SYSTOLIC BLOOD PRESSURE: 106 MMHG

## 2017-04-01 VITALS
OXYGEN SATURATION: 96 % | SYSTOLIC BLOOD PRESSURE: 110 MMHG | DIASTOLIC BLOOD PRESSURE: 65 MMHG | TEMPERATURE: 97.8 F | HEART RATE: 115 BPM | RESPIRATION RATE: 16 BRPM

## 2017-04-01 VITALS — HEART RATE: 80 BPM

## 2017-04-01 LAB
ALP SERPL-CCNC: 187 U/L (ref 45–117)
ALP SERPL-CCNC: 201 U/L (ref 45–117)
ALT SERPL-CCNC: 58 U/L (ref 12–78)
ALT SERPL-CCNC: 60 U/L (ref 12–78)
ANION GAP SERPL CALC-SCNC: 14 MEQ/L (ref 5–15)
ANION GAP SERPL CALC-SCNC: 14 MEQ/L (ref 5–15)
AST SERPL-CCNC: 105 U/L (ref 15–37)
AST SERPL-CCNC: 117 U/L (ref 15–37)
BILIRUB INDIRECT SERPL-MCNC: 6 MG/DL (ref 0–0.8)
BILIRUB SERPL-MCNC: 33.4 MG/DL (ref 0.2–1)
BILIRUB SERPL-MCNC: 35 MG/DL (ref 0.2–1)
BUN SERPL-MCNC: 33 MG/DL (ref 7–18)
BUN SERPL-MCNC: 34 MG/DL (ref 7–18)
C. DIFF EPI 027: (no result)
C. DIFF TOXIN PCR: NEGATIVE
CHLORIDE SERPL-SCNC: 103 MEQ/L (ref 98–107)
CHLORIDE SERPL-SCNC: 106 MEQ/L (ref 98–107)
GFR SERPLBLD BASED ON 1.73 SQ M-ARVRAT: 11 ML/MIN (ref 89–?)
GFR SERPLBLD BASED ON 1.73 SQ M-ARVRAT: 11 ML/MIN (ref 89–?)
HCO3 BLD-SCNC: 17.9 MEQ/L (ref 21–32)
HCO3 BLD-SCNC: 18.7 MEQ/L (ref 21–32)
INR PPP: 1.6 RATIO
POTASSIUM SERPL-SCNC: 3.3 MEQ/L (ref 3.5–5.1)
POTASSIUM SERPL-SCNC: 3.4 MEQ/L (ref 3.5–5.1)
PROTHROMBIN TIME: 17.6 SEC (ref 9.8–11.6)
SODIUM SERPL-SCNC: 136 MEQ/L (ref 136–145)
SODIUM SERPL-SCNC: 138 MEQ/L (ref 136–145)

## 2017-04-01 RX ADMIN — OCTREOTIDE ACETATE SCH MCG: 100 INJECTION, SOLUTION INTRAVENOUS; SUBCUTANEOUS at 20:50

## 2017-04-01 RX ADMIN — STANDARDIZED SENNA CONCENTRATE AND DOCUSATE SODIUM SCH TAB: 8.6; 5 TABLET, FILM COATED ORAL at 19:34

## 2017-04-01 RX ADMIN — OCTREOTIDE ACETATE SCH MCG: 100 INJECTION, SOLUTION INTRAVENOUS; SUBCUTANEOUS at 04:51

## 2017-04-01 RX ADMIN — CHOLESTYRAMINE SCH GM: 4 POWDER, FOR SUSPENSION ORAL at 17:30

## 2017-04-01 RX ADMIN — RIFAXIMIN SCH MG: 550 TABLET ORAL at 10:27

## 2017-04-01 RX ADMIN — OCTREOTIDE ACETATE SCH MCG: 100 INJECTION, SOLUTION INTRAVENOUS; SUBCUTANEOUS at 13:16

## 2017-04-01 RX ADMIN — MIDODRINE HYDROCHLORIDE SCH MG: 5 TABLET ORAL at 17:31

## 2017-04-01 RX ADMIN — PENTOXIFYLLINE SCH MG: 400 TABLET, FILM COATED, EXTENDED RELEASE ORAL at 20:51

## 2017-04-01 RX ADMIN — LOPERAMIDE HYDROCHLORIDE PRN MG: 2 CAPSULE ORAL at 22:20

## 2017-04-01 RX ADMIN — PHENYTOIN SODIUM SCH MLS/HR: 50 INJECTION INTRAMUSCULAR; INTRAVENOUS at 13:17

## 2017-04-01 RX ADMIN — LOPERAMIDE HYDROCHLORIDE PRN MG: 2 CAPSULE ORAL at 16:23

## 2017-04-01 RX ADMIN — Medication SCH ML: at 15:09

## 2017-04-01 RX ADMIN — PENTOXIFYLLINE SCH MG: 400 TABLET, FILM COATED, EXTENDED RELEASE ORAL at 04:51

## 2017-04-01 RX ADMIN — LOPERAMIDE HYDROCHLORIDE PRN MG: 2 CAPSULE ORAL at 19:27

## 2017-04-01 RX ADMIN — SODIUM CHLORIDE SCH MLS/HR: 900 INJECTION INTRAVENOUS at 13:13

## 2017-04-01 RX ADMIN — MIDODRINE HYDROCHLORIDE SCH MG: 5 TABLET ORAL at 07:04

## 2017-04-01 RX ADMIN — Medication SCH MG: at 20:51

## 2017-04-01 RX ADMIN — Medication SCH MG: at 13:16

## 2017-04-01 RX ADMIN — Medication SCH ML: at 19:35

## 2017-04-01 RX ADMIN — PENTOXIFYLLINE SCH MG: 400 TABLET, FILM COATED, EXTENDED RELEASE ORAL at 13:13

## 2017-04-01 RX ADMIN — STANDARDIZED SENNA CONCENTRATE AND DOCUSATE SODIUM SCH TAB: 8.6; 5 TABLET, FILM COATED ORAL at 09:00

## 2017-04-01 RX ADMIN — Medication SCH MG: at 04:51

## 2017-04-01 RX ADMIN — ALBUMIN HUMAN SCH GM: 0.25 SOLUTION INTRAVENOUS at 19:34

## 2017-04-01 RX ADMIN — RIFAXIMIN SCH MG: 550 TABLET ORAL at 19:35

## 2017-04-01 RX ADMIN — BENZONATATE PRN MG: 100 CAPSULE ORAL at 15:08

## 2017-04-01 NOTE — HHI.NPPN
Subjective


History of Present Illness


The patient is a 27 yo [place] male who presented to this facility 3/27 with 

complaints of abd pain, nausea, and poor oral intake for the past 2 weeks.  He 

was recently admitted to [facility] (3/11-3/14) for similar complaints, but signed 

himself out AMA.  He has a hx of heavy EtOH intake for the past several years, 

but says he has been trying to cut back since he was admitted to [facility].  As per 

records, he was using Lasix since his discharge from the hospital despite 

little po intake, but he says he was only using "over the counter diuretics" 

and Benadryl.  Denies any NSAID use at home.  No prior hx of renal decline.  

Mentions multiple family member with RA.


Admitting SCr 3.63


Labs from beginning of month from [facility] show preserved renal function at 0.6-0.8 

baseline SCr.


Interval History


The patient is resting. Just received Ativan.


Sister present in room


Reports he continues with excessive diarrhea at least 2-3x per hour.


Concerned as he is trying to keep up with po intake, but cannot.  


Lactulose and Colace stopped yesterday. (Prerna Ball)





Review of Systems


General


Constitutional:  Fatigue (Prerna Ball)





Gastrointestinal


Gastrointestinal:  Diarrhea (Prerna Ball)





Objective Data


Data











 3/31/17 4/1/17





 19:00 07:00


 


Intake Total 360 ml 1200 ml


 


Output Total 450 ml 


 


Balance -90 ml 1200 ml


 


  


 


Intake Oral  1200 ml


 


IV Total 360 ml 


 


Output Urine Total 450 ml 


 


# Bowel Movements 5 7











 Vital Signs








  Date Time  Temp Pulse Resp B/P Pulse Ox O2 Delivery O2 Flow Rate FiO2


 


4/1/17 08:00 97.8 115 16 110/65 96   


 


4/1/17 04:00 98.2 80 17 106/59 94   


 


4/1/17 00:00 98.9 80 17 106/56 95   


 


3/31/17 20:00 99.0 83 17 111/55 97   


 


3/31/17 16:00 97.7 95 20 108/59 96   





 (Prerna Ball)


-:  


3/31/17 0620                                                                   

             4/1/17 0645








 Microbiology


4/1/17 Stool Pus (MADELIN), Received


         Pending


Medication Review





 Current Medications








 Medications


  (Trade)  Dose


 Ordered  Sig/Brittany


 Route  Start Time


 Stop Time Status Last Admin


 


  (NS Flush)  2 ml  UNSCH  PRN


 IV FLUSH  3/27/17 18:15


     


 


 


  (NS Flush)  2 ml  BID


 IV FLUSH  3/27/17 21:00


    3/31/17 22:41


 


 


  (Zofran Inj)  4 mg  Q6H  PRN


 IVP  3/27/17 18:15


    3/31/17 08:03


 


 


  (Narcan Inj)  0.4 mg  UNSCH  PRN


 IV  3/27/17 18:15


     


 


 


 Pentoxifylline


 400 mg  400 mg  Q8HR


 PO  3/28/17 14:00


    4/1/17 04:51


 


 


  (Rocephin Inj/NS


 Inj)  100 ml @ 


 200 mls/hr  Q24H


 IV  3/28/17 14:00


    3/31/17 14:49


 


 


  (Tessalon)  200 mg  TID  PRN


 PO  3/29/17 15:15


     


 


 


  (Maty-Colace)  2 tab  BID


 PO  3/29/17 21:00


     


 


 


  (Vistaril)  50 mg  Q6H  PRN


 PO  3/30/17 09:15


    3/31/17 17:10


 


 


  (Xifaxan)  550 mg  BID


 PO  3/30/17 21:00


    4/1/17 10:27


 


 


  (Lactulose Liq)  30 ml  DAILY


 PO  3/30/17 15:00


   Hold  


 


 


  (SandoSTATIN INJ)  100 mcg  Q8HR


 SQ  3/30/17 16:00


    4/1/17 04:51


 


 


  (Proamatine)  7.5 mg  TID@07,12,17


 PO  3/31/17 17:00


    4/1/17 07:04


 


 


  (Sodium


 Bicarbonate)  650 mg  Q8HR


 PO  3/31/17 14:00


    4/1/17 04:51


 


 


  (Pill Splitter)  1 ea  UNSCH  PRN


 OTHER  3/31/17 12:45


     


 


 


  (Ativan Inj)  0.5 mg  BID  PRN


 IV PUSH  3/31/17 14:30


    4/1/17 10:28


 





 (Prerna Ball)





Physical Exam


General


Appearance:  No Acute Distress (Prerna Ball)





Eyes


Eye Exam:  Jaundice (Prerna Ball)





Pulmonary


Resp Exam:  Clear Bilaterally, Breath Sounds Equal, No Distress (Prerna Ball)





Cardiology


CV Exam:  Regular, Normal Sinus Rhythm (Prerna Ball)





Gastrointestinal/Abdomen


GI Exam:  Soft, Non-Tender, Distended (moderately) (Prerna Ball)





Integumentary


Skin Exam:  Clear, Warm, Jaundice (Prerna Ball)





Extremeties


Extremities Exam:  Moderate Edema


Extremeties Remarks


BLE up to thighs. (Prerna Ball)





Neurologic


Neuro Exam:  Sedated (Prerna Ball)





Assessment/Plan


Discussed Condition With:  Patient, Parent, Sibling


Problem List:  


(1) Acute renal failure


Plan:  The patient's renal functions have continued to deteriorate.


He clinically is volume depleted, but does have edema related to thrird spacing 

of fluids.


Will resume IVF with NS @70mL/hr along with Albumin 12.5mg q6h.


Increase Octreotide to 200mch q8h as well as Midodrine to 10mg TID.


Will follow with renal panel in the AM.  If azotemia should worsen, we feel 

that dialysis may be eminent. Appears he may have hepatorenla syndrome which 

the family has been counselled about previously; hepatorenal syndrome without 

significant liver recovery offers a poor prognosis





Medications should be adjusted for the patient's renal decline.  Avoid 

nephrotoxic medications such as iodinated contrast dyes and NSAIDs.  Avoid 

gadolinium when eGFR <30





(2) Hypokalemia


Plan:  Repletion as ordered





(3) Acidosis


Plan:  Continue po bicarb





(4) Cirrhosis with alcoholism


Plan:  LFTs not improving.





(5) Diarrhea


Plan:  Appreciate GI input.


Lactulose and Colace stopped yesterday.


 (Prerna Ball)


Plan


Patient was on IV fluids until yesterday and despite this his renal indices 

were deteriorating.  Fluids were held yesterday secondary to concerns regarding 

increasing ascites and peripheral edema.  Apparently now the patient is having 

copious diarrhea so IV fluids will be resumed but I am not optimistic that this 

will result in improvement in his azotemia but this remains to be determined.


Family wishes to hold dialysis pending repeat studies tomorrow.


Review a.m. labs.  Dialysis is reasonable depending upon the patient's 

prognosis as far as his liver disease is concerned.  Dialysis could be used as 

a bridging measure if there is significant hope of hepatic improvement.  Would 

like GIs input from this point of view.  It is uncertain as to prognosis for 

hepatic improvement dialysis could also be utilized also has a bridging measure 

in the hope of hepatic improvement.  I would not continue dialysis however if 

the patient's hepatic dysfunction becomes terminal.





We have increased octreotide and midodrine.  IV albumin has been resumed as 

ordered.  IV fluids to continue pending improvement in diarrhea and oral intake.





Patient's prognosis is guarded at best and possibly poor.





The exam, history, and the medical decision-making described in the above note 

were completed with the assistance of the KAIDEN. I reviewed and agree with the 

findings presented.  I attest that I had a face-to-face encounter with the 

patient on the same day, and personally performed and documented my assessment 

and findings in the medical record. 





Discussed with GI consultant covering this weekend. 


Total time spent in direct patient care 38 minutes. (RADHA Alan MD)








Prerna Ball Apr 1, 2017 12:47


RADHA Aaln MD Apr 1, 2017 15:50

## 2017-04-01 NOTE — HHI.PR
Subjective


Remarks


Follow-up jaundice and acute kidney injury.  Still having diarrhea.  Complains 

of intermittent abdominal pain when he coughs.  No nausea.  Seen with family.  

Discussed with RN and GI ARNP





Objective


Vitals





 Vital Signs








  Date Time  Temp Pulse Resp B/P Pulse Ox O2 Delivery O2 Flow Rate FiO2


 


4/1/17 12:00 100.1 78 16 104/52 97   


 


4/1/17 08:00 97.8 115 16 110/65 96   


 


4/1/17 04:00 98.2 80 17 106/59 94   


 


4/1/17 00:00 98.9 80 17 106/56 95   


 


3/31/17 20:00 99.0 83 17 111/55 97   


 


3/31/17 16:00 97.7 95 20 108/59 96   








 I/O








 3/31/17 3/31/17 3/31/17 4/1/17 4/1/17 4/1/17





 07:00 15:00 23:00 07:00 15:00 23:00


 


Intake Total 680 ml 360 ml 480 ml 720 ml  


 


Output Total 200 ml 450 ml    


 


Balance 480 ml -90 ml 480 ml 720 ml  


 


      


 


Intake Oral 120 ml  480 ml 720 ml  


 


IV Total 560 ml 360 ml    


 


Output Urine Total 200 ml 450 ml    


 


# Bowel Movements 5 5 4 3  








Result Diagram:  


3/31/17 0620                                                                   

             4/1/17 0645





Imaging





Last Impressions








Hepatobiliary Scan Nuclear Medicine 3/28/17 0000 Signed





Impressions: 





 Service Date/Time:  Tuesday, March 28, 2017 14:36 - CONCLUSION:  Markedly 





 delayed uptake in the liver. Findings suggest diffuse hepatocellular disease 





 versus biliary obstruction. 24 hour delayed scan could be performed.     

Estevan Longoria MD ADDENDUM:  24 hour scans still shows no activity in bile ducts or 





 small bowel suggesting diffuse hepatocellular disease.    Jona Del Angel MD  





 FACR


 


Chest X-Ray 3/27/17 0000 Signed





Impressions: 





 Service Date/Time:  Monday, March 27, 2017 18:08 - CONCLUSION:  Left basilar 





 streakiness consistent with probable atelectasis.            Lebron Marin MD 


 


Abdomen/Pelvis CT 3/27/17 0000 Signed





Impressions: 





 Service Date/Time:  Monday, March 27, 2017 16:50 - CONCLUSION: Findings 

suggest 





 advanced hepatocellular disease with varicosities, prominent spleen and 





 interestingly only a small amount of ascites.  Most of the ascites is in the 





 pelvis.     Jona Del Angel MD  FACR


 


Abdomen Ultrasound 3/27/17 0000 Signed





Impressions: 





 Service Date/Time:  Monday, March 27, 2017 18:27 - CONCLUSION:  1. Thick-

walled 





 gallbladder containing some sludge and demonstrating pericholecystic fluid.  

If 





 there is clinical concern for acute cholecystitis a hepatobiliary scan may be 





 helpful to confirm cystic duct obstruction.  2. Hepatosplenomegaly.  3. Some 





 ascites within the abdomen.           Lebron Marin MD 








Objective Remarks


GENERAL: Chronically ill male in no acute distress.


SKIN: Jaundiced


HEAD: Normocephalic.


EYES: scleral icterus. . 


RESPIRATORY: Breath sounds equal bilaterally. No accessory muscle use.


GASTROINTESTINAL: Abdomen soft, + diffused mild TT,  positive for distention.


MUSCULOSKELETAL: No cyanosis with pitting edema


BACK: Nontender without obvious deformity. No CVA tenderness.


Alert and oriented


Procedures


none





A/P


Problem List:  


(1) Acute hepatitis


ICD Code:  B17.9


Status:  Acute


(2) Acute renal failure


ICD Code:  N17.9


Status:  Acute


Assessment and Plan


26-year-old male with history of hepatitis





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


-CT scan show hepatocellular disease with varices.


-Patient has not been drinking alcohol since last admission in [place].


-Hepatitis panel reviewed from last admission which was all negative.


-Alcohol level during this admission is negative.


-Liver ultrasound shows possible acute cholecystitis. HIDA scan negative. 


-GI consulted and ff. continue with Ceftriaxone, Pentoxifylline, PPI.  Consider 

steroids


-Unremarkable DARRYL, AMA, ASM.  Follow-up hemachromatosis panel and US guided 

paracentesis, diagnostic (? SBP) if enough fluid to safely drain


-Continue with supportive care.  Patient is not a candidate for liver 

transplant due to recent alcohol use.


-poor prognosis.





Coagulopathy


-Secondary to liver disease.


-See treatment as above.





Acute renal failure


-Per nephrologist this may be partly due to hepatorenal syndrome versus 

dehydration with ATN.


-Nephrologist consulted and following.  Recommend to continue with IV fluids 

with bicarb, midodrine by mouth as well as octreotide subcutaneously and IV 

albumin.  


-Cr worsening but continues to make urine.


-Todd placed due to for accurate I's and O's.  Patient accepts risks of 

infection


-Patient may require dialysis.  Renal function most likely will not improve 

unless liver improves.


-per Nephro in the event that there may be a component of hepatorenal syndrome 

will try 





Diarrhea with C. difficile negative 


-Discussed with GI start Questran and Lomotil.  Continue IV hydration





Anxiety


-on Ativan when necessary.





Urticaria


-on Vistaril.





DVT prophylaxis


-INR is elevated so chemoprophylaxis contraindicated.








Wilder Todd MD Apr 1, 2017 14:30

## 2017-04-01 NOTE — HHI.GIFU
Subjective


Remarks


Resting in bed.  Continues to have frequent diarrhea- 17 bm's documented 

yesterday and patient and family report that he goes 5x in between the times 

that were documented by staff.  No abdominal pain  (Lizette Amin Taniaashlyn JOHNSON)





Objective


Vitals I&O





 Vital Signs








  Date Time  Temp Pulse Resp B/P Pulse Ox O2 Delivery O2 Flow Rate FiO2


 


4/1/17 12:00 100.1 78 16 104/52 97   


 


4/1/17 08:00 97.8 115 16 110/65 96   


 


4/1/17 04:00 98.2 80 17 106/59 94   


 


4/1/17 00:00 98.9 80 17 106/56 95   


 


3/31/17 20:00 99.0 83 17 111/55 97   


 


3/31/17 16:00 97.7 95 20 108/59 96   








 I/O








 3/31/17 3/31/17 3/31/17 4/1/17 4/1/17 4/1/17





 07:00 15:00 23:00 07:00 15:00 23:00


 


Intake Total 680 ml 360 ml 480 ml 720 ml  


 


Output Total 200 ml 450 ml    


 


Balance 480 ml -90 ml 480 ml 720 ml  


 


      


 


Intake Oral 120 ml  480 ml 720 ml  


 


IV Total 560 ml 360 ml    


 


Output Urine Total 200 ml 450 ml    


 


# Bowel Movements 5 5 4 3  








Laboratory





Laboratory Tests








Test 4/1/17 4/1/17





 05:00 06:45


 


Stool C. difficile Toxin (PCR) NEGATIVE  


 


Stl C. difficile Toxin PRESUMPTIVE 





Epiderm 027 NEGATIVE 


 


Prothrombin Time  17.6 


 


Prothromb Time International  1.6 





Ratio  


 


Sodium Level  138 


 


Potassium Level  3.4 


 


Chloride Level  106 


 


Carbon Dioxide Level  17.9 


 


Anion Gap  14 


 


Blood Urea Nitrogen  34 


 


Creatinine  6.37 


 


Estimat Glomerular Filtration  11 





Rate  


 


Random Glucose  120 


 


Calcium Level  8.1 


 


Total Bilirubin  35.0 


 


Aspartate Amino Transf  117 





(AST/SGOT)  


 


Alanine Aminotransferase  60 





(ALT/SGPT)  


 


Alkaline Phosphatase  201 


 


Total Protein  5.0 


 


Albumin  2.0 














 Date/Time Procedure Status





Source Growth 


 


 4/1/17 05:00 Stool Pus (MADELIN) Received





Stool Stool Pending 








Imaging





Last Impressions








Hepatobiliary Scan Nuclear Medicine 3/28/17 0000 Signed





Impressions: 





 Service Date/Time:  Tuesday, March 28, 2017 14:36 - CONCLUSION:  Markedly 





 delayed uptake in the liver. Findings suggest diffuse hepatocellular disease 





 versus biliary obstruction. 24 hour delayed scan could be performed.     

Estevan Longoria MD ADDENDUM:  24 hour scans still shows no activity in bile ducts or 





 small bowel suggesting diffuse hepatocellular disease.    Jona Del Angel MD  





 FACR


 


Chest X-Ray 3/27/17 0000 Signed





Impressions: 





 Service Date/Time:  Monday, March 27, 2017 18:08 - CONCLUSION:  Left basilar 





 streakiness consistent with probable atelectasis.            Leborn Marin MD 


 


Abdomen/Pelvis CT 3/27/17 0000 Signed





Impressions: 





 Service Date/Time:  Monday, March 27, 2017 16:50 - CONCLUSION: Findings 

suggest 





 advanced hepatocellular disease with varicosities, prominent spleen and 





 interestingly only a small amount of ascites.  Most of the ascites is in the 





 pelvis.     Jona Del Angel MD  FACR


 


Abdomen Ultrasound 3/27/17 0000 Signed





Impressions: 





 Service Date/Time:  Monday, March 27, 2017 18:27 - CONCLUSION:  1. Thick-

walled 





 gallbladder containing some sludge and demonstrating pericholecystic fluid.  

If 





 there is clinical concern for acute cholecystitis a hepatobiliary scan may be 





 helpful to confirm cystic duct obstruction.  2. Hepatosplenomegaly.  3. Some 





 ascites within the abdomen.           Lebron Marin MD 








Physical Exam


HEENT: Normocephalic; atraumatic; + jaundice.


CHEST:  CTA


CARDIAC:  RRR


ABDOMEN:  Soft, nondistended, nontender;  hepatosplenomegaly; bowel sounds are 

present in all four quadrants. Ascites


EXTREMITIES: No clubbing, cyanosis, or edema.


SKIN:  Significant jaundice, Spider angiomas


CNS:  No focal deficits; lethargic and oriented times three. (Lizette Amin [facility])





Assessment and Plan


Plan


ASSESSMENT:


- Severe Alcoholic hepatitis on what appears to be underlying liver cirrhosis (

based on imaging).  Has hx of ETOH abuse, reportedly drinking 6-7 drinks per


   day x 6 years.  He was hospitalized from 3/11-3/14 for acute alcoholic 

hepatitis.  During that admission, he was treated with Pentoxifylline, 

Prednisone,


   Nadolol, Spironolactone, Lactulose, and Protonix.  Unfortunately, he left 

[facility] on 3/14 with LFTs on 3/14 were T. Bili 23.7, , ALT 61, Alkaline 


   Phosph 162.  Hepatitis Panel and Celiac panel was negative at that time.  He 

was taking an OTC diuretic at home.  He is not taking Tylenol products. 


   He is not aware of any other liver disease, although several people on his 

mother's side have autoimmune disorders.  He returned to ER for worsening


   jaundice, nausea without vomiting, worsening abdominal distention, and 

intermittent fever and chills.  Abdomen Ultrasound (3/27/17)-----> 1. Thick-

walled 


   gallbladder containing some sludge and demonstrating pericholecystic fluid.  

If there is clinical concern for acute cholecystitis a hepatobiliary scan may 


   be helpful to confirm cystic duct obstruction. 2. Hepatosplenomegaly.  3. 

Some ascites within the abdomen.  Abdomen/Pelvis CT (3/27/17)---->  Findings


   suggest advanced hepatocellular disease with varicosities, prominent spleen 

and interestingly only a small amount of ascites.  Most of the ascites is in the


   pelvis.  DF 52.70. Pentoxifylline.  He does need complete liver workup to 

rule out other underlying liver disease such as autoimmune hepatitis-DARRYL neg,


   AMA neg, ASMA neg, Iron Saturation 93.1, Ferritin 1666, AFP 3.3, Alpha 1 

antitrypsin 286, Ceruloplasmin 30.  Ferritin and Iron Saturation elevated and 

therefore


   Hfe gene ordered to r/o hemochromatosis.  LFTs persistently elevated today.


- Ascites with intermittent fevers and chills.  Ceftriaxone.  Afebrile.


- Severe diarrhea.  CDiff negative.  On octreotide.  Will add cholestyramine 

and imodium.  If no improvement, consider lomotil and sigmoidoscopy.  


- Hepatic encephalopathy.  Xifaxan.  Lactulose d/c'd. Octreotide started by 

renal.


- Coagulopathy.  STABLE.  


- ARF.  Creat 6.37. Renal following, unclear at this time if his worsening 

renal function related to dehydration from diarrhea vs. his liver disease.  


- Leukocytosis/Fever.  WBC 14.9.  Low grade fever. Ceftrixaone





PLAN:


- 2 gram sodium diet


- Add Cholestyramine


- Add Imodium, 1st dose now and then prn


- Cont. Ceftriaxone 


- Cont. Pentoxifylline


- Cont. PPI


- Cont. Xifaxan 


- Await Hfe Gene


- Defer diuretics to renal


- Monitor labs


- Supportive care


- Consider adding steroids.  


- Consider Lomotil and sigmoidoscopy if no improvement in diarrhea


- Pt was drinking up until his last hospitalization on 3/11 and therefore is 

not a candidate for liver transplant if his condition worsens.


- Further recommendations to follow based on results of above


- Pt seen and examined by Dr. Evangelista and myself and this note is written on 

his behalf (Lizette Amin)


Physician Comments


Seen and examined with ALEX, still with diarrhea, unclear etiology. 

Sigmoidoscopy planned on monday. Discussed with Dr. Alan possibility of 

hepatorenal syndrome. Absolutely needs to quit etoh. Over all prognosis very 

poor. Discussed with mother and sister in the room. Steroids once infection 

ruled out. (Naveen Evangelista MD)








Lizette Amin Apr 1, 2017 14:24


Naveen Evangelista MD Apr 1, 2017 16:26

## 2017-04-02 VITALS
TEMPERATURE: 97.6 F | RESPIRATION RATE: 22 BRPM | HEART RATE: 140 BPM | DIASTOLIC BLOOD PRESSURE: 62 MMHG | SYSTOLIC BLOOD PRESSURE: 105 MMHG | OXYGEN SATURATION: 96 %

## 2017-04-02 VITALS
OXYGEN SATURATION: 98 % | SYSTOLIC BLOOD PRESSURE: 113 MMHG | TEMPERATURE: 98.4 F | HEART RATE: 75 BPM | DIASTOLIC BLOOD PRESSURE: 59 MMHG | RESPIRATION RATE: 14 BRPM

## 2017-04-02 VITALS
HEART RATE: 85 BPM | SYSTOLIC BLOOD PRESSURE: 106 MMHG | OXYGEN SATURATION: 98 % | TEMPERATURE: 96.9 F | RESPIRATION RATE: 18 BRPM | DIASTOLIC BLOOD PRESSURE: 59 MMHG

## 2017-04-02 VITALS
HEART RATE: 89 BPM | RESPIRATION RATE: 17 BRPM | OXYGEN SATURATION: 95 % | SYSTOLIC BLOOD PRESSURE: 115 MMHG | DIASTOLIC BLOOD PRESSURE: 58 MMHG | TEMPERATURE: 99.2 F

## 2017-04-02 VITALS — HEART RATE: 77 BPM

## 2017-04-02 VITALS
DIASTOLIC BLOOD PRESSURE: 62 MMHG | HEART RATE: 77 BPM | RESPIRATION RATE: 14 BRPM | SYSTOLIC BLOOD PRESSURE: 111 MMHG | OXYGEN SATURATION: 100 % | TEMPERATURE: 96.8 F

## 2017-04-02 VITALS
HEART RATE: 80 BPM | OXYGEN SATURATION: 96 % | RESPIRATION RATE: 16 BRPM | SYSTOLIC BLOOD PRESSURE: 109 MMHG | TEMPERATURE: 98.3 F | DIASTOLIC BLOOD PRESSURE: 59 MMHG

## 2017-04-02 VITALS — HEART RATE: 89 BPM

## 2017-04-02 LAB
ALP SERPL-CCNC: 161 U/L (ref 45–117)
ALT SERPL-CCNC: 48 U/L (ref 12–78)
ANION GAP SERPL CALC-SCNC: 14 MEQ/L (ref 5–15)
AST SERPL-CCNC: 88 U/L (ref 15–37)
BASOPHILS # BLD AUTO: 0.1 TH/MM3 (ref 0–0.2)
BASOPHILS NFR BLD: 0.6 % (ref 0–2)
BILIRUB INDIRECT SERPL-MCNC: 6 MG/DL (ref 0–0.8)
BILIRUB SERPL-MCNC: 32.7 MG/DL (ref 0.2–1)
BUN SERPL-MCNC: 33 MG/DL (ref 7–18)
CHLORIDE SERPL-SCNC: 104 MEQ/L (ref 98–107)
EOSINOPHIL # BLD: 0.2 TH/MM3 (ref 0–0.4)
EOSINOPHIL NFR BLD: 1.3 % (ref 0–4)
ERYTHROCYTE [DISTWIDTH] IN BLOOD BY AUTOMATED COUNT: 15.5 % (ref 11.6–17.2)
GFR SERPLBLD BASED ON 1.73 SQ M-ARVRAT: 10 ML/MIN (ref 89–?)
HCO3 BLD-SCNC: 18.4 MEQ/L (ref 21–32)
HCT VFR BLD CALC: 28.3 % (ref 39–51)
HEMO FLAGS: (no result)
LYMPHOCYTES # BLD AUTO: 0.9 TH/MM3 (ref 1–4.8)
LYMPHOCYTES NFR BLD AUTO: 4.7 % (ref 9–44)
MAGNESIUM SERPL-MCNC: 2.3 MG/DL (ref 1.5–2.5)
MCH RBC QN AUTO: 37 PG (ref 27–34)
MCHC RBC AUTO-ENTMCNC: 34.9 % (ref 32–36)
MCV RBC AUTO: 106.1 FL (ref 80–100)
MONOCYTES NFR BLD: 9.2 % (ref 0–8)
NEUTROPHILS # BLD AUTO: 15.5 TH/MM3 (ref 1.8–7.7)
NEUTROPHILS NFR BLD AUTO: 84.2 % (ref 16–70)
PLATELET # BLD: 128 TH/MM3 (ref 150–450)
POTASSIUM SERPL-SCNC: 3.1 MEQ/L (ref 3.5–5.1)
RBC # BLD AUTO: 2.66 MIL/MM3 (ref 4.5–5.9)
SODIUM SERPL-SCNC: 136 MEQ/L (ref 136–145)
WBC # BLD AUTO: 18.4 TH/MM3 (ref 4–11)

## 2017-04-02 RX ADMIN — STANDARDIZED SENNA CONCENTRATE AND DOCUSATE SODIUM SCH TAB: 8.6; 5 TABLET, FILM COATED ORAL at 08:52

## 2017-04-02 RX ADMIN — SODIUM CHLORIDE SCH MLS/HR: 900 INJECTION, SOLUTION INTRAVENOUS at 14:36

## 2017-04-02 RX ADMIN — Medication SCH MG: at 21:33

## 2017-04-02 RX ADMIN — SODIUM CHLORIDE SCH MLS/HR: 900 INJECTION, SOLUTION INTRAVENOUS at 22:37

## 2017-04-02 RX ADMIN — PENTOXIFYLLINE SCH MG: 400 TABLET, FILM COATED, EXTENDED RELEASE ORAL at 21:33

## 2017-04-02 RX ADMIN — Medication SCH ML: at 21:00

## 2017-04-02 RX ADMIN — SODIUM CHLORIDE SCH MLS/HR: 900 INJECTION INTRAVENOUS at 14:45

## 2017-04-02 RX ADMIN — Medication SCH MG: at 14:37

## 2017-04-02 RX ADMIN — IPRATROPIUM BROMIDE AND ALBUTEROL SULFATE SCH AMPULE: .5; 3 SOLUTION RESPIRATORY (INHALATION) at 21:44

## 2017-04-02 RX ADMIN — OCTREOTIDE ACETATE SCH MCG: 100 INJECTION, SOLUTION INTRAVENOUS; SUBCUTANEOUS at 14:37

## 2017-04-02 RX ADMIN — Medication SCH TAB: at 17:11

## 2017-04-02 RX ADMIN — PENTOXIFYLLINE SCH MG: 400 TABLET, FILM COATED, EXTENDED RELEASE ORAL at 14:37

## 2017-04-02 RX ADMIN — LOPERAMIDE HYDROCHLORIDE PRN MG: 2 CAPSULE ORAL at 02:51

## 2017-04-02 RX ADMIN — Medication SCH MG: at 06:15

## 2017-04-02 RX ADMIN — NYSTATIN SCH ML: 100000 SUSPENSION ORAL at 14:37

## 2017-04-02 RX ADMIN — BENZONATATE PRN MG: 100 CAPSULE ORAL at 15:19

## 2017-04-02 RX ADMIN — MIDODRINE HYDROCHLORIDE SCH MG: 5 TABLET ORAL at 14:37

## 2017-04-02 RX ADMIN — CHOLESTYRAMINE SCH GM: 4 POWDER, FOR SUSPENSION ORAL at 17:12

## 2017-04-02 RX ADMIN — NYSTATIN SCH ML: 100000 SUSPENSION ORAL at 21:33

## 2017-04-02 RX ADMIN — RIFAXIMIN SCH MG: 550 TABLET ORAL at 08:43

## 2017-04-02 RX ADMIN — CHOLESTYRAMINE SCH GM: 4 POWDER, FOR SUSPENSION ORAL at 06:15

## 2017-04-02 RX ADMIN — LOPERAMIDE HYDROCHLORIDE PRN MG: 2 CAPSULE ORAL at 08:44

## 2017-04-02 RX ADMIN — Medication SCH ML: at 08:52

## 2017-04-02 RX ADMIN — ALBUMIN HUMAN SCH GM: 0.25 SOLUTION INTRAVENOUS at 08:42

## 2017-04-02 RX ADMIN — PENTOXIFYLLINE SCH MG: 400 TABLET, FILM COATED, EXTENDED RELEASE ORAL at 06:15

## 2017-04-02 RX ADMIN — OCTREOTIDE ACETATE SCH MCG: 100 INJECTION, SOLUTION INTRAVENOUS; SUBCUTANEOUS at 22:37

## 2017-04-02 RX ADMIN — ALBUMIN HUMAN SCH GM: 0.25 SOLUTION INTRAVENOUS at 21:32

## 2017-04-02 RX ADMIN — MIDODRINE HYDROCHLORIDE SCH MG: 5 TABLET ORAL at 06:15

## 2017-04-02 RX ADMIN — OCTREOTIDE ACETATE SCH MCG: 100 INJECTION, SOLUTION INTRAVENOUS; SUBCUTANEOUS at 06:43

## 2017-04-02 RX ADMIN — PHENYTOIN SODIUM SCH MLS/HR: 50 INJECTION INTRAMUSCULAR; INTRAVENOUS at 22:50

## 2017-04-02 RX ADMIN — ALBUMIN HUMAN SCH GM: 0.25 SOLUTION INTRAVENOUS at 02:59

## 2017-04-02 RX ADMIN — PHENYTOIN SODIUM SCH MLS/HR: 50 INJECTION INTRAMUSCULAR; INTRAVENOUS at 14:44

## 2017-04-02 RX ADMIN — RIFAXIMIN SCH MG: 550 TABLET ORAL at 21:33

## 2017-04-02 RX ADMIN — PHENYTOIN SODIUM SCH MLS/HR: 50 INJECTION INTRAMUSCULAR; INTRAVENOUS at 02:55

## 2017-04-02 RX ADMIN — IPRATROPIUM BROMIDE AND ALBUTEROL SULFATE SCH AMPULE: .5; 3 SOLUTION RESPIRATORY (INHALATION) at 17:17

## 2017-04-02 RX ADMIN — NYSTATIN SCH ML: 100000 SUSPENSION ORAL at 17:14

## 2017-04-02 RX ADMIN — MIDODRINE HYDROCHLORIDE SCH MG: 5 TABLET ORAL at 17:00

## 2017-04-02 NOTE — RADRPT
EXAM DATE/TIME:  04/02/2017 15:31 

 

[place] COMPARISON:     

No previous studies available for comparison.

 

                     

INDICATIONS :     

Short of breath. 

                     

 

MEDICAL HISTORY :     

Cirrhosis.  Gastroesophageal reflux disease.        

 

SURGICAL HISTORY :     

None.   

 

ENCOUNTER:     

Subsequent                                        

 

ACUITY:     

4 - 6 days      

 

PAIN SCORE:     

6/10

 

LOCATION:     

Bilateral chest 

 

FINDINGS:     

A single view of the chest demonstrates a bilateral mostly basilar airspace disease that is increased
 slightly from March 27. No significant effusion. No pneumothorax.

 

CONCLUSION:     

1. Slight increase in basilar airspace consolidation since March 27.

 

 

 

 Ralph Barrett MD on April 02, 2017 at 16:42           

Board Certified Radiologist.

 This report was verified electronically.

## 2017-04-02 NOTE — HHI.PR
Subjective


Remarks


Follow-up hepatitis and diarrhea.  Patient claims 12 episodes of loose stools 

denies nausea and abdominal pain.  Discussed with RN





Objective


Vitals





 Vital Signs








  Date Time  Temp Pulse Resp B/P Pulse Ox O2 Delivery O2 Flow Rate FiO2


 


4/2/17 04:00 96.9 85 18 106/59 98   


 


4/2/17 00:00 97.6 140 22 105/62 96   


 


4/1/17 21:00  80      


 


4/1/17 20:00 97.6 82 18 115/62 97   


 


4/1/17 17:37 97.2 93 18 124/68 96   


 


4/1/17 12:00 100.1 78 16 104/52 97   








 I/O








 4/1/17 4/1/17 4/1/17 4/2/17 4/2/17 4/2/17





 07:00 15:00 23:00 07:00 15:00 23:00


 


Intake Total 720 ml 960 ml 1036 ml 1285 ml  


 


Output Total   800 ml   


 


Balance 720 ml 960 ml 236 ml 1285 ml  


 


      


 


Intake Oral 720 ml 960 ml 480 ml 720 ml  


 


IV Total   556 ml 565 ml  


 


Output Urine Total   800 ml   


 


# Voids  18 5   


 


# Bowel Movements 3  2 4  








Result Diagram:  


3/31/17 0620                                                                   

             4/1/17 1809





Imaging





Last Impressions








Hepatobiliary Scan Nuclear Medicine 3/28/17 0000 Signed





Impressions: 





 Service Date/Time:  Tuesday, March 28, 2017 14:36 - CONCLUSION:  Markedly 





 delayed uptake in the liver. Findings suggest diffuse hepatocellular disease 





 versus biliary obstruction. 24 hour delayed scan could be performed.     

Estevan Longoria MD ADDENDUM:  24 hour scans still shows no activity in bile ducts or 





 small bowel suggesting diffuse hepatocellular disease.    Jona Del Angel MD  





 FACR


 


Chest X-Ray 3/27/17 0000 Signed





Impressions: 





 Service Date/Time:  Monday, March 27, 2017 18:08 - CONCLUSION:  Left basilar 





 streakiness consistent with probable atelectasis.            Lebron Marin MD 


 


Abdomen/Pelvis CT 3/27/17 0000 Signed





Impressions: 





 Service Date/Time:  Monday, March 27, 2017 16:50 - CONCLUSION: Findings 

suggest 





 advanced hepatocellular disease with varicosities, prominent spleen and 





 interestingly only a small amount of ascites.  Most of the ascites is in the 





 pelvis.     Jona Del Angel MD  FACR


 


Abdomen Ultrasound 3/27/17 0000 Signed





Impressions: 





 Service Date/Time:  Monday, March 27, 2017 18:27 - CONCLUSION:  1. Thick-

walled 





 gallbladder containing some sludge and demonstrating pericholecystic fluid.  

If 





 there is clinical concern for acute cholecystitis a hepatobiliary scan may be 





 helpful to confirm cystic duct obstruction.  2. Hepatosplenomegaly.  3. Some 





 ascites within the abdomen.           Lebron Marin MD 








Objective Remarks


GENERAL: Chronically ill male in no acute distress.


SKIN: Jaundiced


HEAD: Normocephalic.


EYES: scleral icterus. . 


RESPIRATORY: Breath sounds equal bilaterally. No accessory muscle use.


GASTROINTESTINAL: Abdomen soft, + diffused mild TT,  positive for distention.


MUSCULOSKELETAL: No cyanosis with pitting edema


BACK: Nontender without obvious deformity. No CVA tenderness.


Alert and oriented nonfocal


Procedures


none





A/P


Problem List:  


(1) Acute hepatitis


ICD Code:  B17.9


Status:  Acute


(2) Acute renal failure


ICD Code:  N17.9


Status:  Acute


Assessment and Plan


26-year-old male with history of hepatitis





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


-CT scan show hepatocellular disease with varices.


-Patient has not been drinking alcohol since last admission in [place].


-Hepatitis panel reviewed from last admission which was all negative.


-Alcohol level during this admission is negative.


-Liver ultrasound shows possible acute cholecystitis. HIDA scan negative. 


-GI consulted and ff. continue with Ceftriaxone, Pentoxifylline, PPI.  Consider 

steroids if infection ruled out


-Unremarkable DARRYL, AMA, ASM.  Follow-up hemachromatosis panel and US guided 

paracentesis, diagnostic (? SBP) if enough fluid to safely drain


-Continue with supportive care.  Patient is not a candidate for liver 

transplant due to recent alcohol use.


-poor prognosis.





Coagulopathy


-Secondary to liver disease.


-See treatment as above.





Acute renal failure


-Per nephrologist this may be partly due to hepatorenal syndrome versus 

dehydration with ATN.


-Nephrologist consulted and following.  Recommend to continue with IV fluids 

with bicarb, midodrine by mouth as well as octreotide subcutaneously and IV 

albumin.  


-Cr worsening but continues to make urine.


-Todd placed due to for accurate I's and O's.  Patient accepts risks of 

infection


-Patient may require dialysis.  Renal function most likely will not improve 

unless liver improves.





Diarrhea with C. difficile negative 


-Continue Questran and Lomotil as well as IV hydration.  Sigmoidoscopy per GI





Anxiety


-on Ativan when necessary.





Urticaria


-on Vistaril.





DVT prophylaxis


-INR is elevated so chemoprophylaxis contraindicated.








Wilder Todd MD Apr 2, 2017 08:35

## 2017-04-02 NOTE — HHI.GIFU
Subjective


Remarks


27 yo male lying in bed in no apparent distress. Jaundiced. Reports multiple 

episodes of loose stools (reports 12 BM). States that as soon as he puts 

anything in his mouth, it comes right out. He has seen whole pills in his 

stool. Denies nausea and abdominal pain.  (Sirisha Maier)





Objective


Vitals I&O





 Vital Signs








  Date Time  Temp Pulse Resp B/P Pulse Ox O2 Delivery O2 Flow Rate FiO2


 


4/2/17 12:00 98.4 75 14 113/59 98   


 


4/2/17 08:00 96.8 77 14 111/62 100   


 


4/2/17 04:00 96.9 85 18 106/59 98   


 


4/2/17 00:00 97.6 140 22 105/62 96   


 


4/1/17 21:00  80      


 


4/1/17 20:00 97.6 82 18 115/62 97   


 


4/1/17 17:37 97.2 93 18 124/68 96   








 I/O








 4/1/17 4/1/17 4/1/17 4/2/17 4/2/17 4/2/17





 07:00 15:00 23:00 07:00 15:00 23:00


 


Intake Total 720 ml 960 ml 1036 ml 1285 ml  


 


Output Total   800 ml  400 ml 


 


Balance 720 ml 960 ml 236 ml 1285 ml -400 ml 


 


      


 


Intake Oral 720 ml 960 ml 480 ml 720 ml  


 


IV Total   556 ml 565 ml  


 


Output Urine Total   800 ml  400 ml 


 


# Voids  18 5   


 


# Bowel Movements 3  2 4 1 








Laboratory





Laboratory Tests








Test 4/1/17 4/2/17





 18:09 07:30


 


Sodium Level 136  136 


 


Potassium Level 3.3  3.1 


 


Chloride Level 103  104 


 


Carbon Dioxide Level 18.7  18.4 


 


Anion Gap 14  14 


 


Blood Urea Nitrogen 33  33 


 


Creatinine 6.41  6.65 


 


Estimat Glomerular Filtration 11  10 





Rate  


 


Random Glucose 111  111 


 


Calcium Level 7.8  7.9 


 


Total Bilirubin 33.4  32.7 


 


Direct Bilirubin 27.4  26.7 


 


Indirect Bilirubin 6.0  6.0 


 


Aspartate Amino Transf 105  88 





(AST/SGOT)  


 


Alanine Aminotransferase 58  48 





(ALT/SGPT)  


 


Alkaline Phosphatase 187  161 


 


Total Protein 4.9  4.7 


 


Albumin 2.2  2.4 


 


White Blood Count  18.4 


 


Red Blood Count  2.66 


 


Hemoglobin  9.9 


 


Hematocrit  28.3 


 


Mean Corpuscular Volume  106.1 


 


Mean Corpuscular Hemoglobin  37.0 


 


Mean Corpuscular Hemoglobin  34.9 





Concent  


 


Red Cell Distribution Width  15.5 


 


Platelet Count  128 


 


Mean Platelet Volume  10.7 


 


Neutrophils (%) (Auto)  84.2 


 


Lymphocytes (%) (Auto)  4.7 


 


Monocytes (%) (Auto)  9.2 


 


Eosinophils (%) (Auto)  1.3 


 


Basophils (%) (Auto)  0.6 


 


Neutrophils # (Auto)  15.5 


 


Lymphocytes # (Auto)  0.9 


 


Monocytes # (Auto)  1.7 


 


Eosinophils # (Auto)  0.2 


 


Basophils # (Auto)  0.1 


 


CBC Comment  DIFF FINAL 


 


Differential Comment   


 


Phosphorus Level  3.5 


 


Magnesium Level  2.3 














 Date/Time Procedure Status





Source Growth 


 


 4/1/17 05:00 Stool Pus (MADELIN) - Final Complete





Stool Stool FEW WBC'S 








Imaging





Last Impressions








Hepatobiliary Scan Nuclear Medicine 3/28/17 0000 Signed





Impressions: 





 Service Date/Time:  Tuesday, March 28, 2017 14:36 - CONCLUSION:  Markedly 





 delayed uptake in the liver. Findings suggest diffuse hepatocellular disease 





 versus biliary obstruction. 24 hour delayed scan could be performed.     

Estevan Longoria MD ADDENDUM:  24 hour scans still shows no activity in bile ducts or 





 small bowel suggesting diffuse hepatocellular disease.    Jona Del Angel MD  





 FACR


 


Chest X-Ray 3/27/17 0000 Signed





Impressions: 





 Service Date/Time:  Monday, March 27, 2017 18:08 - CONCLUSION:  Left basilar 





 streakiness consistent with probable atelectasis.            Lebron Marin MD 


 


Abdomen/Pelvis CT 3/27/17 0000 Signed





Impressions: 





 Service Date/Time:  Monday, March 27, 2017 16:50 - CONCLUSION: Findings 

suggest 





 advanced hepatocellular disease with varicosities, prominent spleen and 





 interestingly only a small amount of ascites.  Most of the ascites is in the 





 pelvis.     Jona Del Angel MD  FACR


 


Abdomen Ultrasound 3/27/17 0000 Signed





Impressions: 





 Service Date/Time:  Monday, March 27, 2017 18:27 - CONCLUSION:  1. Thick-

walled 





 gallbladder containing some sludge and demonstrating pericholecystic fluid.  

If 





 there is clinical concern for acute cholecystitis a hepatobiliary scan may be 





 helpful to confirm cystic duct obstruction.  2. Hepatosplenomegaly.  3. Some 





 ascites within the abdomen.           Lebron Marin MD 








Physical Exam


HEENT: Normocephalic; atraumatic; + jaundice.


CHEST:  CTA


CARDIAC:  RRR


ABDOMEN:  Soft, nondistended, nontender;  hepatosplenomegaly; bowel sounds are 

present x 4 quadrants. Ascites


EXTREMITIES: No clubbing, cyanosis, or edema.


SKIN:  Significant jaundice, Spider angiomas


CNS:  No focal deficits; lethargic and oriented x 3 (Sirisha Maier)





Assessment and Plan


Plan


ASSESSMENT:


- Severe Alcoholic hepatitis on what appears to be underlying liver cirrhosis (

based on imaging).  Has hx of ETOH abuse, reportedly drinking 6-7 drinks per


   day x 6 years.  He was hospitalized from 3/11-3/14 for acute alcoholic 

hepatitis.  During that admission, he was treated with Pentoxifylline, 

Prednisone,


   Nadolol, Spironolactone, Lactulose, and Protonix.  Unfortunately, he left 

[facility] on 3/14 with LFTs on 3/14 were T. Bili 23.7, , ALT 61, Alkaline 


   Phosph 162.  Hepatitis Panel and Celiac panel was negative at that time.  He 

was taking an OTC diuretic at home.  He is not taking Tylenol products. 


   He is not aware of any other liver disease, although several people on his 

mother's side have autoimmune disorders.  He returned to ER for worsening


   jaundice, nausea without vomiting, worsening abdominal distention, and 

intermittent fever and chills.  Abdomen Ultrasound (3/27/17)-----> 1. Thick-

walled 


   gallbladder containing some sludge and demonstrating pericholecystic fluid.  

If there is clinical concern for acute cholecystitis a hepatobiliary scan may 


   be helpful to confirm cystic duct obstruction. 2. Hepatosplenomegaly.  3. 

Some ascites within the abdomen.  Abdomen/Pelvis CT (3/27/17)---->  Findings


   suggest advanced hepatocellular disease with varicosities, prominent spleen 

and interestingly only a small amount of ascites.  Most of the ascites is in the


   pelvis.  DF 52.70. Pentoxifylline.  He does need complete liver workup to 

rule out other underlying liver disease such as autoimmune hepatitis-DARRYL neg,


   AMA neg, ASMA neg, Iron Saturation 93.1, Ferritin 1666, AFP 3.3, Alpha 1 

antitrypsin 286, Ceruloplasmin 30.  Ferritin and Iron Saturation elevated and 

therefore


   Hfe gene ordered to r/o hemochromatosis.  LFTs persistently elevated today.


- Ascites with intermittent fevers and chills.  Ceftriaxone.  Afebrile.


- Severe diarrhea.  CDiff negative.  On octreotide.  On cholestyramine and 

Imodium.  If no improvement, consider Lomotil. Sigmoidoscopy planned for Monday


- Hepatic encephalopathy.  Xifaxan.  Lactulose d/c'd. Octreotide started by 

renal.


- Coagulopathy.  STABLE.  


- ARF.  Creat 6.65. Renal following, unclear at this time if his worsening 

renal function related to dehydration from diarrhea vs. his liver disease.  


- Leukocytosis/Fever.  WBC 18.4.  Low grade fever. Ceftriaxone





PLAN:


- Sigmoidoscopy Monday


- Obtain consents


- NPO after MN tonight


- 2 gram sodium diet


- IV Flagyl 


- Continue Cholestyramine


- Continue Imodium prn


- Cont. Ceftriaxone 


- Cont. Pentoxifylline


- Cont. PPI


- Cont. Xifaxan 


- Await Hfe Gene


- Defer diuretics to renal


- Monitor labs


- Supportive care


- Consider adding steroids.  


- Consider Lomotil if no improvement in diarrhea


- Pt was drinking up until his last hospitalization on 3/11 and therefore is 

not a candidate for liver transplant if his condition worsens.


- Further recommendations to follow based on results of above





Patient seen and examined by Dr. Evangelista and myself and this note is written 

on his behalf (Sirisha Maier)


Physician Comments


Sigmoidoscopy and dialysis tomorrow. Plan discussed with family (Naveen Evangelista MD)








Sirisha Maier Apr 2, 2017 15:41


Naveen Evangelista MD Apr 3, 2017 09:21

## 2017-04-02 NOTE — HHI.NPPN
Subjective


History of Present Illness


The patient is a 27 yo [place] male who presented to this facility 3/27 with 

complaints of abd pain, nausea, and poor oral intake for the past 2 weeks.  He 

was recently admitted to [facility] (3/11-3/14) for similar complaints, but signed 

himself out AMA.  He has a hx of heavy EtOH intake for the past several years, 

but says he has been trying to cut back since he was admitted to [facility].  As per 

records, he was using Lasix since his discharge from the hospital despite 

little po intake, but he says he was only using "over the counter diuretics" 

and Benadryl.  Denies any NSAID use at home.  No prior hx of renal decline.  

Mentions multiple family member with RA.


Admitting SCr 3.63


Labs from beginning of month from [facility] show preserved renal function at 0.6-0.8 

baseline SCr.


Interval History


Patient resting comfortably with no verbal complaints.  Mother and sister 

however upset as there is some question as to whether or not patient was 

receiving his IV fluids consistently overnight and today.  Charge nurse by 

bedside discussing the situation with them.





Review of Systems


General


Constitutional:  Fatigue





Gastrointestinal


Gastrointestinal:  Diarrhea





Objective Data


Data











 4/1/17 4/2/17





 19:00 07:00


 


Intake Total 1320 ml 1961 ml


 


Output Total  800 ml


 


Balance 1320 ml 1161 ml


 


  


 


Intake Oral 1320 ml 840 ml


 


IV Total  1121 ml


 


Output Urine Total  800 ml


 


# Voids 23 


 


# Bowel Movements  6











 Vital Signs








  Date Time  Temp Pulse Resp B/P Pulse Ox O2 Delivery O2 Flow Rate FiO2


 


4/2/17 12:00 98.4 75 14 113/59 98   


 


4/2/17 08:00 96.8 77 14 111/62 100   


 


4/2/17 04:00 96.9 85 18 106/59 98   


 


4/2/17 00:00 97.6 140 22 105/62 96   


 


4/1/17 21:00  80      


 


4/1/17 20:00 97.6 82 18 115/62 97   


 


4/1/17 17:37 97.2 93 18 124/68 96   








-:  


4/2/17 0730                                                                    

            4/2/17 0730





Medication Review





 Current Medications


Sodium Chloride 2 ml 2 ml UNSCH  PRN IV FLUSH FLUSH AFTER USING IV ACCESS Last 

administered on 3/27/17at 16:08;  Start 3/27/17 at 15:00;  Stop 3/27/17 at 18:25

;  Status DC


Sodium Chloride 1,000 ml @  999 mls/hr BOLUS  ONCE IV  Last administered on 3/27

/17at 16:30;  Start 3/27/17 at 16:15;  Stop 3/27/17 at 17:15;  Status DC


Sodium Chloride (NS 1000 ml Inj) 1,000 ml @  125 mls/hr Q8H IV  Last 

administered on 3/29/17at 08:38;  Start 3/27/17 at 18:14;  Stop 3/29/17 at 17:48

;  Status DC


Sodium Chloride (NS Flush) 2 ml UNSCH  PRN IV FLUSH FLUSH AFTER USING IV ACCESS

;  Start 3/27/17 at 18:15


Sodium Chloride (NS Flush) 2 ml BID IV FLUSH  Last administered on 4/1/17at 19:

35;  Start 3/27/17 at 21:00


Ondansetron HCl (Zofran Inj) 4 mg Q6H  PRN IVP NAUSEA OR VOMITING Last 

administered on 3/31/17at 08:03;  Start 3/27/17 at 18:15


Docusate Sodium (Colace) 100 mg Q12H PO  Last administered on 3/27/17at 21:45;  

Start 3/27/17 at 21:00;  Stop 3/29/17 at 15:16;  Status DC


Naloxone HCl (Narcan Inj) 0.4 mg UNSCH  PRN IV SEE LABEL COMMENTS;  Start 3/27/

17 at 18:15


Hydroxyzine Pamoate (Vistaril) 25 mg Q6H  PRN PO ITCHING Last administered on 3/

28/17at 08:48;  Start 3/27/17 at 18:15;  Stop 3/28/17 at 15:47;  Status DC


Lactulose (Lactulose Liq) 30 ml TID PO  Last administered on 3/30/17at 13:45;  

Start 3/28/17 at 09:00;  Stop 3/30/17 at 14:22;  Status DC


Alprazolam (Xanax) 0.25 mg ONCE  ONCE PO  Last administered on 3/27/17at 21:45;

  Start 3/27/17 at 21:30;  Stop 3/27/17 at 21:31;  Status DC


Morphine Sulfate 2 mg 2 mg ONCE  ONCE IV PUSH  Last administered on 3/28/17at 02

:10;  Start 3/28/17 at 02:00;  Stop 3/28/17 at 02:01;  Status DC


Potassium Chloride (KCl 20 Meq Premix Inj) 100 ml @  50 mls/hr Q2H IV  Last 

administered on 3/28/17at 19:48;  Start 3/28/17 at 13:00;  Stop 3/28/17 at 16:59

;  Status DC


Pentoxifylline 400 mg 400 mg Q8HR PO  Last administered on 4/2/17at 06:15;  

Start 3/28/17 at 14:00


Ceftriaxone Sodium/Sodium Chloride (Rocephin Inj/NS Inj) 100 ml @  200 mls/hr 

Q24H IV  Last administered on 4/1/17at 13:13;  Start 3/28/17 at 14:00


Hydroxyzine Pamoate (Vistaril) 50 mg Q6H  PRN PO ITCHING Last administered on 3/

30/17at 08:39;  Start 3/28/17 at 18:00;  Stop 3/30/17 at 09:15;  Status DC


Lorazepam (Ativan) 0.5 mg Q12H  PRN PO anxiety Last administered on 3/31/17at 04

:04;  Start 3/28/17 at 16:00;  Stop 3/31/17 at 14:33;  Status DC


Albumin Human (Albumin 25% Inj) 12.5 gm Q6H IV  Last administered on 3/29/17at 

15:42;  Start 3/28/17 at 20:15;  Stop 3/29/17 at 15:00;  Status DC


Benzonatate (Tessalon) 200 mg TID  PRN PO cough Last administered on 4/1/17at 15

:08;  Start 3/29/17 at 15:15


Senna/Docusate Sodium 2 tab 2 tab BID PO ;  Start 3/29/17 at 21:00;  Stop 4/2/ 17 at 13:35;  Status DC


Sodium Bicarbonate/ Sodium Chloride (Sodium Bicarbonate 8.4% Inj/1/2 NS 1000 ml 

Inj) 1,075 ml @  100 mls/hr [id number] IV  Last administered on 3/30/17at 08:41;  

Start 3/29/17 at 18:00;  Stop 3/30/17 at 15:55;  Status DC


Potassium Chloride (KCl) 20 meq ONCE  ONCE PO  Last administered on 3/29/17at 18

:31;  Start 3/29/17 at 18:00;  Stop 3/29/17 at 18:01;  Status DC


Hydroxyzine Pamoate (Vistaril) 50 mg Q6H  PRN PO ITCHING Last administered on 4/ 2/17at 08:44;  Start 3/30/17 at 09:15


Rifaximin (Xifaxan) 550 mg BID PO  Last administered on 4/2/17at 08:43;  Start 3

/30/17 at 21:00


Lactulose 30 ml 30 ml DAILY PO ;  Start 3/30/17 at 15:00;  Status Hold


Sodium Bicarbonate/ Dextrose (Sodium Bicarbonate 8.4% Inj/D5W 1000 ml Inj) 1,

150 ml @  70 mls/hr [id number] IV  Last administered on 3/30/17at 18:28;  Start 3/

30/17 at 18:00;  Stop 3/31/17 at 12:33;  Status DC


Midodrine (Proamatine) 5 mg TID@07,12,17 PO  Last administered on 3/31/17at 11:

37;  Start 3/30/17 at 17:00;  Stop 3/31/17 at 12:33;  Status DC


Octreotide Acetate (SandoSTATIN INJ) 100 mcg Q8HR SQ  Last administered on 4/1/ 17at 04:51;  Start 3/30/17 at 16:00;  Stop 4/1/17 at 12:51;  Status DC


Potassium Chloride (KCl) 30 meq ONCE  ONCE PO  Last administered on 3/30/17at 17

:14;  Start 3/30/17 at 17:00;  Stop 3/30/17 at 17:01;  Status DC


Potassium Chloride (KCl) 40 meq ONCE  ONCE PO  Last administered on 3/31/17at 11

:37;  Start 3/31/17 at 10:30;  Stop 3/31/17 at 10:37;  Status DC


Midodrine (Proamatine) 7.5 mg TID@07,12,17 PO  Last administered on 4/1/17at 07:

04;  Start 3/31/17 at 17:00;  Stop 4/1/17 at 12:51;  Status DC


Sodium Bicarbonate (Sodium Bicarbonate) 650 mg Q8HR PO  Last administered on 4/2 /17at 06:15;  Start 3/31/17 at 14:00


Miscellaneous (Pill Splitter) 1 ea UNSCH  PRN OTHER SEE LABEL COMMENTS;  Start 3

/31/17 at 12:45


Lorazepam (Ativan Inj) 0.5 mg BID  PRN IV PUSH ANXIETY Last administered on 4/1/ 17at 22:22;  Start 3/31/17 at 14:30


Midodrine (Proamatine) 10 mg TID@07,12,17 PO  Last administered on 4/2/17at 06:

15;  Start 4/1/17 at 17:00


Octreotide Acetate 200 mcg 200 mcg Q8HR SQ  Last administered on 4/2/17at 06:43

;  Start 4/1/17 at 14:00


Sodium Chloride (NS 1000 ml Inj) 1,000 ml @  70 mls/hr [id number] IV  Last 

administered on 4/2/17at 02:55;  Start 4/1/17 at 13:00;  Stop 4/2/17 at 14:08;  

Status DC


Albumin Human (Albumin 25% Inj) 12.5 gm Q6H IV  Last administered on 4/1/17at 13

:17;  Start 4/1/17 at 14:00;  Stop 4/1/17 at 15:26;  Status DC


Potassium Chloride (KCl) 10 meq ONCE  ONCE PO  Last administered on 4/1/17at 15:

09;  Start 4/1/17 at 14:15;  Stop 4/1/17 at 14:16;  Status DC


Loperamide HCl (Imodium) 2 mg UNSCH  PRN PO DIARRHEA Last administered on 4/2/ 17at 08:44;  Start 4/1/17 at 14:15


Loperamide HCl (Imodium) 4 mg ONCE  ONCE PO  Last administered on 4/1/17at 15:08

;  Start 4/1/17 at 15:00;  Stop 4/1/17 at 15:01;  Status DC


Cholestyramine Resin (Questran Light Pkt) 4 gm Q12H PO  Last administered on 4/2 /17at 06:15;  Start 4/1/17 at 18:00


Albumin Human (Albumin 25% Inj) 25 gm Q6H IV  Last administered on 4/2/17at 08:

42;  Start 4/1/17 at 20:00;  Stop 4/2/17 at 14:11;  Status DC


Nystatin (Mycostatin  Liq) 5 ml QID SWISH-SWAL ;  Start 4/2/17 at 13:00;  Stop 4 /16/17 at 12:59


Multi-Ingredient Mouthwash/Gargle (Magic Mouthwash Adult Liq) 5 ml QID  PRN 

SWISH-SWAL mouth pain;  Start 4/2/17 at 11:00


Potassium Chloride (KCl) 30 meq ONCE  ONCE PO ;  Start 4/2/17 at 11:00;  Stop 4/ 2/17 at 11:01;  Status DC


Lactobacillus Acidophilus 1 tab 1 tab TID PO ;  Start 4/2/17 at 18:00


Sodium Chloride (NS 1000 ml Inj) 1,000 ml @  120 mls/hr Q8H20M IV ;  Start 4/2/ 17 at 14:15


Albumin Human 12.5 gm 12.5 gm Q6H IV ;  Start 4/2/17 at 20:00


Metronidazole (Flagyl 500 Mg Inj) 100 ml @  100 mls/hr Q6H IV ;  Start 4/2/17 

at 15:00


Potassium Bicarb/ Potassium Chloride (K-Lyte Cl  Eff) 25 meq ONCE  ONCE PO ;  

Start 4/2/17 at 20:00;  Stop 4/2/17 at 20:01;  Status UNV





Physical Exam


General


Appearance:  No Acute Distress





Eyes


Eye Exam:  Jaundice





Pulmonary


Resp Exam:  Clear Bilaterally, Breath Sounds Equal, No Distress





Cardiology


CV Exam:  Regular, Normal Sinus Rhythm





Gastrointestinal/Abdomen


GI Exam:  Soft, Non-Tender, Distended (moderately)





Integumentary


Skin Exam:  Clear, Warm, Jaundice





Extremeties


Extremities Exam:  Moderate Edema


Extremeties Remarks


One plus pitting edema of lower legs but no edema of upper legs or thighs.





Neurologic


Neuro Exam:  Sedated





Assessment/Plan


Discussed Condition With:  Patient, Parent, Sibling


Problem List:  


(1) Acute renal failure


Plan:  





Patient's creatinine level has deteriorated again today.


Discussed option of proceeding with dialysis however the family feels that the 

fluid intake may have been below what was ordered.  I cannot confirm same at 

the present time for the patient's edema relatively mild in the lower 

extremities and no worse than it was yesterday.  Since it appears to be some 

ambiguity about whether or not IV fluid was administered as ordered at the 

present time and there is no evidence of worsening edema will increase IV rate 

to try and make up for any deficit may have occurred with reevaluation 

tomorrow.  I believe it is more likely however at the patient has significant 

intrinsic renal pathology related to possible hepatorenal and or ATN at this 

point in time.  Family is not enthusiastic regarding initiation of dialysis I 

believe as it would indicate a poorer prognostic outlook.





Continue Octreotide to 200mch q8h as well as Midodrine to 10mg TID.  Continue 

IV albumin reduce the dosage to 12.5 g every 6.





Will follow with renal panel in the AM. 


I still believe that hepatorenal syndrome is very likely present and if so 

prognosis is very poor.


It is noted that the white count is going up.  I will defer evaluation to GI 

and primary care physician.





Medications should be adjusted for the patient's renal decline.  Avoid 

nephrotoxic medications such as iodinated contrast dyes and NSAIDs.  Avoid 

gadolinium when eGFR <30





(2) Hypokalemia


Plan:  Repletion as ordered





(3) Acidosis


Plan:  Continue po bicarb





(4) Cirrhosis with alcoholism


Plan:  LFTs not improving.





(5) Diarrhea


Plan:  





Plan


Patient was on IV fluids until yesterday and despite this his renal indices 

were deteriorating.  Fluids were held yesterday secondary to concerns regarding 

increasing ascites and peripheral edema.  Apparently now the patient is having 

copious diarrhea so IV fluids will be resumed but I am not optimistic that this 

will result in improvement in his azotemia but this remains to be determined.


Family wishes to hold dialysis pending repeat studies tomorrow.


Review a.m. labs.  Dialysis is reasonable depending upon the patient's 

prognosis as far as his liver disease is concerned.  Dialysis could be used as 

a bridging measure if there is significant hope of hepatic improvement.  Would 

like GIs input from this point of view.  It is uncertain as to prognosis for 

hepatic improvement dialysis could also be utilized also has a bridging measure 

in the hope of hepatic improvement.  I would not continue dialysis however if 

the patient's hepatic dysfunction becomes terminal.





We have increased octreotide and midodrine.  IV albumin has been resumed as 

ordered.  IV fluids to continue pending improvement in diarrhea and oral intake.





Patient's prognosis is guarded at best and possibly poor.





The exam, history, and the medical decision-making described in the above note 

were completed with the assistance of the KAIDEN. I reviewed and agree with the 

findings presented.  I attest that I had a face-to-face encounter with the 

patient on the same day, and personally performed and documented my assessment 

and findings in the medical record. 





Discussed with GI consultant covering this weekend. 


Total time spent in direct patient care 38 minutes.








RADHA Alan MD Apr 2, 2017 14:24

## 2017-04-03 VITALS — OXYGEN SATURATION: 100 %

## 2017-04-03 VITALS
OXYGEN SATURATION: 92 % | SYSTOLIC BLOOD PRESSURE: 102 MMHG | HEART RATE: 89 BPM | RESPIRATION RATE: 19 BRPM | DIASTOLIC BLOOD PRESSURE: 53 MMHG | TEMPERATURE: 100 F

## 2017-04-03 VITALS — HEART RATE: 100 BPM

## 2017-04-03 VITALS
HEART RATE: 92 BPM | SYSTOLIC BLOOD PRESSURE: 96 MMHG | TEMPERATURE: 100.7 F | OXYGEN SATURATION: 94 % | DIASTOLIC BLOOD PRESSURE: 54 MMHG | RESPIRATION RATE: 17 BRPM

## 2017-04-03 VITALS
DIASTOLIC BLOOD PRESSURE: 74 MMHG | RESPIRATION RATE: 18 BRPM | HEART RATE: 96 BPM | TEMPERATURE: 100.5 F | SYSTOLIC BLOOD PRESSURE: 134 MMHG | OXYGEN SATURATION: 95 %

## 2017-04-03 VITALS
OXYGEN SATURATION: 94 % | RESPIRATION RATE: 20 BRPM | HEART RATE: 91 BPM | TEMPERATURE: 98.1 F | DIASTOLIC BLOOD PRESSURE: 58 MMHG | SYSTOLIC BLOOD PRESSURE: 113 MMHG

## 2017-04-03 VITALS — TEMPERATURE: 98.5 F | HEART RATE: 104 BPM

## 2017-04-03 VITALS — OXYGEN SATURATION: 99 %

## 2017-04-03 VITALS — HEART RATE: 108 BPM

## 2017-04-03 VITALS
RESPIRATION RATE: 20 BRPM | TEMPERATURE: 98.1 F | OXYGEN SATURATION: 94 % | SYSTOLIC BLOOD PRESSURE: 113 MMHG | HEART RATE: 91 BPM | DIASTOLIC BLOOD PRESSURE: 58 MMHG

## 2017-04-03 VITALS — HEART RATE: 106 BPM

## 2017-04-03 LAB
ANION GAP SERPL CALC-SCNC: 13 MEQ/L (ref 5–15)
BASOPHILS # BLD AUTO: 0.1 TH/MM3 (ref 0–0.2)
BASOPHILS NFR BLD: 0.3 % (ref 0–2)
BUN SERPL-MCNC: 34 MG/DL (ref 7–18)
CHLORIDE SERPL-SCNC: 106 MEQ/L (ref 98–107)
EOSINOPHIL # BLD: 0.2 TH/MM3 (ref 0–0.4)
EOSINOPHIL NFR BLD: 1.1 % (ref 0–4)
ERYTHROCYTE [DISTWIDTH] IN BLOOD BY AUTOMATED COUNT: 15.7 % (ref 11.6–17.2)
GFR SERPLBLD BASED ON 1.73 SQ M-ARVRAT: 10 ML/MIN (ref 89–?)
HCO3 BLD-SCNC: 16.7 MEQ/L (ref 21–32)
HCT VFR BLD CALC: 28.3 % (ref 39–51)
HEMO FLAGS: (no result)
LYMPHOCYTES # BLD AUTO: 0.7 TH/MM3 (ref 1–4.8)
LYMPHOCYTES NFR BLD AUTO: 4 % (ref 9–44)
MAGNESIUM SERPL-MCNC: 2.2 MG/DL (ref 1.5–2.5)
MCH RBC QN AUTO: 36.5 PG (ref 27–34)
MCHC RBC AUTO-ENTMCNC: 33.8 % (ref 32–36)
MCHC RBC AUTO-ENTMCNC: 36.5 % (ref 32–36)
MCV RBC AUTO: 108.2 FL (ref 80–100)
MONOCYTES NFR BLD: 9.6 % (ref 0–8)
NEUTROPHILS # BLD AUTO: 15.3 TH/MM3 (ref 1.8–7.7)
NEUTROPHILS NFR BLD AUTO: 85 % (ref 16–70)
PLATELET # BLD: 116 TH/MM3 (ref 150–450)
POTASSIUM SERPL-SCNC: 3.3 MEQ/L (ref 3.5–5.1)
RBC # BLD AUTO: 2.62 MIL/MM3 (ref 4.5–5.9)
SODIUM SERPL-SCNC: 136 MEQ/L (ref 136–145)
WBC # BLD AUTO: 18 TH/MM3 (ref 4–11)

## 2017-04-03 PROCEDURE — 0DBN8ZX EXCISION OF SIGMOID COLON, VIA NATURAL OR ARTIFICIAL OPENING ENDOSCOPIC, DIAGNOSTIC: ICD-10-PCS | Performed by: INTERNAL MEDICINE

## 2017-04-03 RX ADMIN — IPRATROPIUM BROMIDE AND ALBUTEROL SULFATE SCH AMPULE: .5; 3 SOLUTION RESPIRATORY (INHALATION) at 13:20

## 2017-04-03 RX ADMIN — IPRATROPIUM BROMIDE AND ALBUTEROL SULFATE SCH AMPULE: .5; 3 SOLUTION RESPIRATORY (INHALATION) at 19:33

## 2017-04-03 RX ADMIN — NYSTATIN SCH ML: 100000 SUSPENSION ORAL at 08:45

## 2017-04-03 RX ADMIN — MIDODRINE HYDROCHLORIDE SCH MG: 5 TABLET ORAL at 05:53

## 2017-04-03 RX ADMIN — NYSTATIN SCH ML: 100000 SUSPENSION ORAL at 21:00

## 2017-04-03 RX ADMIN — Medication SCH ML: at 08:45

## 2017-04-03 RX ADMIN — Medication SCH TAB: at 08:45

## 2017-04-03 RX ADMIN — CHOLESTYRAMINE SCH GM: 4 POWDER, FOR SUSPENSION ORAL at 06:00

## 2017-04-03 RX ADMIN — NYSTATIN SCH ML: 100000 SUSPENSION ORAL at 13:31

## 2017-04-03 RX ADMIN — ALBUMIN HUMAN SCH GM: 0.25 SOLUTION INTRAVENOUS at 03:41

## 2017-04-03 RX ADMIN — OCTREOTIDE ACETATE SCH MCG: 100 INJECTION, SOLUTION INTRAVENOUS; SUBCUTANEOUS at 21:16

## 2017-04-03 RX ADMIN — Medication SCH MG: at 06:00

## 2017-04-03 RX ADMIN — BENZONATATE PRN MG: 100 CAPSULE ORAL at 13:37

## 2017-04-03 RX ADMIN — PENTOXIFYLLINE SCH MG: 400 TABLET, FILM COATED, EXTENDED RELEASE ORAL at 06:00

## 2017-04-03 RX ADMIN — RIFAXIMIN SCH MG: 550 TABLET ORAL at 09:00

## 2017-04-03 RX ADMIN — IPRATROPIUM BROMIDE AND ALBUTEROL SULFATE SCH AMPULE: .5; 3 SOLUTION RESPIRATORY (INHALATION) at 14:34

## 2017-04-03 RX ADMIN — PHENYTOIN SODIUM SCH MLS/HR: 50 INJECTION INTRAMUSCULAR; INTRAVENOUS at 06:55

## 2017-04-03 RX ADMIN — NYSTATIN SCH ML: 100000 SUSPENSION ORAL at 16:52

## 2017-04-03 RX ADMIN — SODIUM CHLORIDE SCH MLS/HR: 900 INJECTION, SOLUTION INTRAVENOUS at 08:45

## 2017-04-03 RX ADMIN — SODIUM CHLORIDE SCH MLS/HR: 900 INJECTION, SOLUTION INTRAVENOUS at 13:33

## 2017-04-03 RX ADMIN — PENTOXIFYLLINE SCH MG: 400 TABLET, FILM COATED, EXTENDED RELEASE ORAL at 21:16

## 2017-04-03 RX ADMIN — Medication SCH TAB: at 16:52

## 2017-04-03 RX ADMIN — Medication PRN ML: at 20:18

## 2017-04-03 RX ADMIN — IPRATROPIUM BROMIDE AND ALBUTEROL SULFATE SCH AMPULE: .5; 3 SOLUTION RESPIRATORY (INHALATION) at 11:52

## 2017-04-03 RX ADMIN — PENTOXIFYLLINE SCH MG: 400 TABLET, FILM COATED, EXTENDED RELEASE ORAL at 14:00

## 2017-04-03 RX ADMIN — SODIUM CHLORIDE SCH MLS/HR: 900 INJECTION, SOLUTION INTRAVENOUS at 03:41

## 2017-04-03 RX ADMIN — Medication SCH TAB: at 13:31

## 2017-04-03 RX ADMIN — MIDODRINE HYDROCHLORIDE SCH MG: 5 TABLET ORAL at 13:32

## 2017-04-03 RX ADMIN — ALBUMIN HUMAN SCH GM: 0.25 SOLUTION INTRAVENOUS at 08:46

## 2017-04-03 RX ADMIN — HEPARIN SODIUM SCH MLS/HR: 10000 INJECTION, SOLUTION INTRAVENOUS; SUBCUTANEOUS at 12:00

## 2017-04-03 RX ADMIN — ALBUTEROL SULFATE PRN MG: 2.5 SOLUTION RESPIRATORY (INHALATION) at 22:58

## 2017-04-03 RX ADMIN — ALBUMIN HUMAN SCH GM: 0.25 SOLUTION INTRAVENOUS at 16:52

## 2017-04-03 RX ADMIN — ALBUTEROL SULFATE PRN MG: 2.5 SOLUTION RESPIRATORY (INHALATION) at 14:27

## 2017-04-03 RX ADMIN — LOPERAMIDE HYDROCHLORIDE PRN MG: 2 CAPSULE ORAL at 21:44

## 2017-04-03 RX ADMIN — OCTREOTIDE ACETATE SCH MCG: 100 INJECTION, SOLUTION INTRAVENOUS; SUBCUTANEOUS at 05:53

## 2017-04-03 RX ADMIN — SODIUM CHLORIDE SCH MLS/HR: 900 INJECTION INTRAVENOUS at 13:33

## 2017-04-03 RX ADMIN — MIDODRINE HYDROCHLORIDE SCH MG: 5 TABLET ORAL at 16:53

## 2017-04-03 RX ADMIN — RIFAXIMIN SCH MG: 550 TABLET ORAL at 20:17

## 2017-04-03 RX ADMIN — BENZONATATE PRN MG: 100 CAPSULE ORAL at 05:53

## 2017-04-03 RX ADMIN — OCTREOTIDE ACETATE SCH MCG: 100 INJECTION, SOLUTION INTRAVENOUS; SUBCUTANEOUS at 15:04

## 2017-04-03 RX ADMIN — SODIUM CHLORIDE SCH MLS/HR: 900 INJECTION, SOLUTION INTRAVENOUS at 20:17

## 2017-04-03 RX ADMIN — Medication SCH ML: at 20:17

## 2017-04-03 RX ADMIN — CHOLESTYRAMINE SCH GM: 4 POWDER, FOR SUSPENSION ORAL at 16:52

## 2017-04-03 RX ADMIN — IPRATROPIUM BROMIDE AND ALBUTEROL SULFATE SCH AMPULE: .5; 3 SOLUTION RESPIRATORY (INHALATION) at 08:11

## 2017-04-03 RX ADMIN — ALBUMIN HUMAN SCH GM: 0.25 SOLUTION INTRAVENOUS at 20:17

## 2017-04-03 RX ADMIN — BENZONATATE PRN MG: 100 CAPSULE ORAL at 20:16

## 2017-04-03 NOTE — GIPROC
[facility]

[address] [facility]. [facility], [address]

 

 

FLEXIBLE SIGMOIDOSCOPY PROCEDURE REPORT     EXAM DATE: 04/03/2017

 

 

PATIENT NAME:      Scar Mathur           MR #:      [id number]

YOB: 1990      ORDER #:     [id number]

ATTENDING:     Naveen vEangelista MD     VISIT #:     [id number]

ASSISTANT:      Gibran Blue and Timothy Mazariegos     STATUS:     inpatient

 

INDICATIONS:  The patient is a 26 yr old male here for a flexible sigmoidoscopy

due to chronic diarrhea

PROCEDURE PERFORMED:     Flexible Sigmoidoscopy with biopsy

MEDICATIONS:     None and Per Anesthesia.

ESTIMATED BLOOD LOSS:     None

 

CONSENT: The patient understands the risks and benefits of the procedure and

understands that these risks include, but are not limited to: sedation,

allergic reaction, infection, perforation and/or bleeding. Alternative means of

evaluation and treatment include, among others: physical exam, x-rays, and/or

surgical intervention. The patient elects to proceed with this endoscopic

procedure.

 



medical equipment was checked for proper function. Hand hygiene and appropriate

measures for infection prevention was taken. After the risks, benefits and

alternatives of the procedure were thoroughly explained, Informed consent was

verified, confirmed and timeout was successfully executed by the treatment

team. A digital rectal exam  revealed hemorrhoids   The Pentax [id number]

endoscope was introduced through the anus and advanced to the sigmoid colon.

The prep was good. The instrument was then slowly withdrawn as the colon was

fully examined.

 

 

COLON FINDINGS: Non specific colitis.  Biopsied. Retroflexed views revealed

internal hemorrhoid  The scope was then completely withdrawn from the patient

and the procedure terminated.

 

 

 

ADVERSE EVENTS:      There were no complications.

IMPRESSIONS:     1.  Non specific colitis.  Biopsied

2.  Retroflexed views revealed internal hemorrhoid

3.  Revealed hemorrhoids

 

RECOMMENDATIONS:     Await biopsy results

RECALL:     NONE

 

 

 

_____________________________

Naveen Evangelista MD

eSigned:  Naveen Evangelista MD 04/03/2017 12:01 PM

 

 

cc:

## 2017-04-03 NOTE — HHI.PR
Subjective


Remarks


Follow-up diarrhea.  Continues to have loose stools 12 episodes yesterday.  

Denies nausea and abdominal pain.  For sigmoidoscopy today.  MAXIMUM 

TEMPERATURE 100.7.  Flagyl added yesterday.  Discussed with RN and family, will 

transfer to ICU for closer monitoring





Objective


Vitals





 Vital Signs








  Date Time  Temp Pulse Resp B/P Pulse Ox O2 Delivery O2 Flow Rate FiO2


 


4/3/17 07:50 98.1 91 20 113/58 94   


 


4/3/17 04:00 100.7 92 17 96/54 94   


 


4/3/17 00:00 100.0 89 19 102/53 92   


 


4/2/17 20:11  89      


 


4/2/17 20:00 99.2 89 17 115/58 95   


 


4/2/17 16:00 98.3 80 16 109/59 96   


 


4/2/17 12:00 98.4 75 14 113/59 98   








 I/O








 4/2/17 4/2/17 4/2/17 4/3/17 4/3/17 4/3/17





 07:00 15:00 23:00 07:00 15:00 23:00


 


Intake Total 1285 ml  1780 ml 0 ml 886 ml 


 


Output Total  400 ml 750 ml 300 ml 375 ml 


 


Balance 1285 ml -400 ml 1030 ml -300 ml 511 ml 


 


      


 


Intake Oral 720 ml  720 ml 0 ml  


 


IV Total 565 ml  1060 ml  886 ml 


 


Output Urine Total  400 ml 750 ml 300 ml 375 ml 


 


# Bowel Movements 4 1 18 2  








Result Diagram:  


4/3/17 0700                                                                    

            4/3/17 0700





Imaging





Last Impressions








Chest X-Ray 4/2/17 0000 Signed





Impressions: 





 Service Date/Time:  Sunday, April 2, 2017 15:31 - CONCLUSION:  1. Slight 





 increase in basilar airspace consolidation since March 27.     Ralph Barrett MD 


 


Hepatobiliary Scan Nuclear Medicine 3/28/17 0000 Signed





Impressions: 





 Service Date/Time:  Tuesday, March 28, 2017 14:36 - CONCLUSION:  Markedly 





 delayed uptake in the liver. Findings suggest diffuse hepatocellular disease 





 versus biliary obstruction. 24 hour delayed scan could be performed.     

Estevan Longoria MD ADDENDUM:  24 hour scans still shows no activity in bile ducts or 





 small bowel suggesting diffuse hepatocellular disease.    Jona Del Angel MD  





 FACR


 


Abdomen/Pelvis CT 3/27/17 0000 Signed





Impressions: 





 Service Date/Time:  Monday, March 27, 2017 16:50 - CONCLUSION: Findings 

suggest 





 advanced hepatocellular disease with varicosities, prominent spleen and 





 interestingly only a small amount of ascites.  Most of the ascites is in the 





 pelvis.     Jona Del Angel MD  FACR


 


Abdomen Ultrasound 3/27/17 0000 Signed





Impressions: 





 Service Date/Time:  Monday, March 27, 2017 18:27 - CONCLUSION:  1. Thick-

walled 





 gallbladder containing some sludge and demonstrating pericholecystic fluid.  

If 





 there is clinical concern for acute cholecystitis a hepatobiliary scan may be 





 helpful to confirm cystic duct obstruction.  2. Hepatosplenomegaly.  3. Some 





 ascites within the abdomen.           Lebron Marin MD 








Objective Remarks


GENERAL: Chronically ill male in no acute distress.


SKIN: Jaundiced


HEAD: Normocephalic.


EYES: scleral icterus. . 


RESPIRATORY: Breath sounds equal bilaterally. No accessory muscle use.


GASTROINTESTINAL: Abdomen soft, + diffused mild TT,  positive for distention.


MUSCULOSKELETAL: No cyanosis with pitting edema


BACK: Nontender without obvious deformity. No CVA tenderness.


Alert and oriented nonfocal


Procedures


Sigmoidoscopy





A/P


Problem List:  


(1) Acute hepatitis


ICD Code:  B17.9


Status:  Acute


(2) Acute renal failure


ICD Code:  N17.9


Status:  Acute


Assessment and Plan


26-year-old male with history of hepatitis





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


-CT scan show hepatocellular disease with varices.


-Patient has not been drinking alcohol since last admission in [place].


-Hepatitis panel reviewed from last admission which was all negative.


-Alcohol level during this admission is negative.


-Liver ultrasound shows possible acute cholecystitis. HIDA scan negative. 


-GI consulted and ff. continue with Ceftriaxone, Pentoxifylline, PPI.  Consider 

steroids if infection ruled out


-Unremarkable DARRYL, AMA, ASM.  Follow-up hemachromatosis panel and US guided 

paracentesis, diagnostic (? SBP) if enough fluid to safely drain


-Continue with supportive care.  Patient is not a candidate for liver 

transplant due to recent alcohol use.


-poor prognosis.





Coagulopathy


-Secondary to liver disease.


-See treatment as above.





Acute renal failure


-Per nephrologist this may be partly due to hepatorenal syndrome versus 

dehydration with ATN.


-Nephrologist consulted and following.  Recommend to continue with IV fluids 

with bicarb, midodrine by mouth as well as octreotide subcutaneously and IV 

albumin.  


-Cr worsening despite aggressive measures 


-Todd placed due to for accurate I's and O's.  Patient accepts risks of 

infection


-Patient may require dialysis.  Renal function most likely will not improve 

unless liver improves.





Diarrhea with C. difficile negative 


-Continue Questran and Lomotil as well as IV hydration.  Sigmoidoscopy per GI





Fever with leukocytosis.  Obtain blood culture.  Flagyl added.  Monitor cultures





Anxiety


-on Ativan when necessary.





Urticaria


-on Vistaril.





DVT prophylaxis


-INR is elevated so chemoprophylaxis contraindicated.








Wilder Todd MD Apr 3, 2017 10:58

## 2017-04-03 NOTE — HHI.NPPN
Subjective


History of Present Illness


The patient is a 25 yo [place] male who presented to this facility 3/27 with 

complaints of abd pain, nausea, and poor oral intake for the past 2 weeks.  He 

was recently admitted to [facility] (3/11-3/14) for similar complaints, but signed 

himself out AMA.  He has a hx of heavy EtOH intake for the past several years, 

but says he has been trying to cut back since he was admitted to [facility].  As per 

records, he was using Lasix since his discharge from the hospital despite 

little po intake, but he says he was only using "over the counter diuretics" 

and Benadryl.  Denies any NSAID use at home.  No prior hx of renal decline.  

Mentions multiple family member with RA.


Admitting SCr 3.63


Labs from beginning of month from [facility] show preserved renal function at 0.6-0.8 

baseline SCr.


Interval History


The patient was seen in the GI suite prior to his procedure.  No verbal 

complaints.  At time of examination the patient appeared to be fairly alert.





Review of Systems


General


Constitutional:  Fatigue





Gastrointestinal


Gastrointestinal:  Diarrhea





Objective Data


Data











 4/2/17 4/3/17





 19:00 07:00


 


Intake Total 240 ml 1540 ml


 


Output Total 750 ml 700 ml


 


Balance -510 ml 840 ml


 


  


 


Intake Oral 240 ml 480 ml


 


IV Total  1060 ml


 


Output Urine Total 750 ml 700 ml


 


# Bowel Movements 2 19











 Vital Signs








  Date Time  Temp Pulse Resp B/P Pulse Ox O2 Delivery O2 Flow Rate FiO2


 


4/3/17 11:54 98.3 101 16 116/67 95   


 


4/3/17 11:11 98.1 91 20 113/58 94   


 


4/3/17 08:15     100   21


 


4/3/17 07:50 98.1 91 20 113/58 94   


 


4/3/17 04:00 100.7 92 17 96/54 94   


 


4/3/17 00:00 100.0 89 19 102/53 92   


 


4/2/17 20:11  89      


 


4/2/17 20:00 99.2 89 17 115/58 95   


 


4/2/17 16:00 98.3 80 16 109/59 96   








-:  


4/3/17 0700                                                                    

            4/3/17 0700





Medication Review





 Current Medications


Sodium Chloride 2 ml 2 ml UNSCH  PRN IV FLUSH FLUSH AFTER USING IV ACCESS Last 

administered on 3/27/17at 16:08;  Start 3/27/17 at 15:00;  Stop 3/27/17 at 18:25

;  Status DC


Sodium Chloride 1,000 ml @  999 mls/hr BOLUS  ONCE IV  Last administered on 3/27

/17at 16:30;  Start 3/27/17 at 16:15;  Stop 3/27/17 at 17:15;  Status DC


Sodium Chloride (NS 1000 ml Inj) 1,000 ml @  125 mls/hr Q8H IV  Last 

administered on 3/29/17at 08:38;  Start 3/27/17 at 18:14;  Stop 3/29/17 at 17:48

;  Status DC


Sodium Chloride (NS Flush) 2 ml UNSCH  PRN IV FLUSH FLUSH AFTER USING IV ACCESS

;  Start 3/27/17 at 18:15


Sodium Chloride (NS Flush) 2 ml BID IV FLUSH  Last administered on 4/1/17at 19:

35;  Start 3/27/17 at 21:00


Ondansetron HCl (Zofran Inj) 4 mg Q6H  PRN IVP NAUSEA OR VOMITING Last 

administered on 3/31/17at 08:03;  Start 3/27/17 at 18:15


Docusate Sodium (Colace) 100 mg Q12H PO  Last administered on 3/27/17at 21:45;  

Start 3/27/17 at 21:00;  Stop 3/29/17 at 15:16;  Status DC


Naloxone HCl (Narcan Inj) 0.4 mg UNSCH  PRN IV SEE LABEL COMMENTS;  Start 3/27/

17 at 18:15


Hydroxyzine Pamoate (Vistaril) 25 mg Q6H  PRN PO ITCHING Last administered on 3/

28/17at 08:48;  Start 3/27/17 at 18:15;  Stop 3/28/17 at 15:47;  Status DC


Lactulose (Lactulose Liq) 30 ml TID PO  Last administered on 3/30/17at 13:45;  

Start 3/28/17 at 09:00;  Stop 3/30/17 at 14:22;  Status DC


Alprazolam (Xanax) 0.25 mg ONCE  ONCE PO  Last administered on 3/27/17at 21:45;

  Start 3/27/17 at 21:30;  Stop 3/27/17 at 21:31;  Status DC


Morphine Sulfate 2 mg 2 mg ONCE  ONCE IV PUSH  Last administered on 3/28/17at 02

:10;  Start 3/28/17 at 02:00;  Stop 3/28/17 at 02:01;  Status DC


Potassium Chloride (KCl 20 Meq Premix Inj) 100 ml @  50 mls/hr Q2H IV  Last 

administered on 3/28/17at 19:48;  Start 3/28/17 at 13:00;  Stop 3/28/17 at 16:59

;  Status DC


Pentoxifylline 400 mg 400 mg Q8HR PO  Last administered on 4/2/17at 21:33;  

Start 3/28/17 at 14:00


Ceftriaxone Sodium/Sodium Chloride (Rocephin Inj/NS Inj) 100 ml @  200 mls/hr 

Q24H IV  Last administered on 4/2/17at 14:45;  Start 3/28/17 at 14:00


Hydroxyzine Pamoate (Vistaril) 50 mg Q6H  PRN PO ITCHING Last administered on 3/

30/17at 08:39;  Start 3/28/17 at 18:00;  Stop 3/30/17 at 09:15;  Status DC


Lorazepam (Ativan) 0.5 mg Q12H  PRN PO anxiety Last administered on 3/31/17at 04

:04;  Start 3/28/17 at 16:00;  Stop 3/31/17 at 14:33;  Status DC


Albumin Human (Albumin 25% Inj) 12.5 gm Q6H IV  Last administered on 3/29/17at 

15:42;  Start 3/28/17 at 20:15;  Stop 3/29/17 at 15:00;  Status DC


Benzonatate (Tessalon) 200 mg TID  PRN PO cough Last administered on 4/3/17at 05

:53;  Start 3/29/17 at 15:15


Senna/Docusate Sodium 2 tab 2 tab BID PO ;  Start 3/29/17 at 21:00;  Stop 4/2/ 17 at 13:35;  Status DC


Sodium Bicarbonate/ Sodium Chloride (Sodium Bicarbonate 8.4% Inj/1/2 NS 1000 ml 

Inj) 1,075 ml @  100 mls/hr [id number] IV  Last administered on 3/30/17at 08:41;  

Start 3/29/17 at 18:00;  Stop 3/30/17 at 15:55;  Status DC


Potassium Chloride (KCl) 20 meq ONCE  ONCE PO  Last administered on 3/29/17at 18

:31;  Start 3/29/17 at 18:00;  Stop 3/29/17 at 18:01;  Status DC


Hydroxyzine Pamoate (Vistaril) 50 mg Q6H  PRN PO ITCHING Last administered on 4/

3/17at 05:53;  Start 3/30/17 at 09:15


Rifaximin (Xifaxan) 550 mg BID PO  Last administered on 4/2/17at 21:33;  Start 3

/30/17 at 21:00


Lactulose 30 ml 30 ml DAILY PO ;  Start 3/30/17 at 15:00;  Status Hold


Sodium Bicarbonate/ Dextrose (Sodium Bicarbonate 8.4% Inj/D5W 1000 ml Inj) 1,

150 ml @  70 mls/hr [id number] IV  Last administered on 3/30/17at 18:28;  Start 3/

30/17 at 18:00;  Stop 3/31/17 at 12:33;  Status DC


Midodrine (Proamatine) 5 mg TID@07,12,17 PO  Last administered on 3/31/17at 11:

37;  Start 3/30/17 at 17:00;  Stop 3/31/17 at 12:33;  Status DC


Octreotide Acetate (SandoSTATIN INJ) 100 mcg Q8HR SQ  Last administered on 4/1/ 17at 04:51;  Start 3/30/17 at 16:00;  Stop 4/1/17 at 12:51;  Status DC


Potassium Chloride (KCl) 30 meq ONCE  ONCE PO  Last administered on 3/30/17at 17

:14;  Start 3/30/17 at 17:00;  Stop 3/30/17 at 17:01;  Status DC


Potassium Chloride (KCl) 40 meq ONCE  ONCE PO  Last administered on 3/31/17at 11

:37;  Start 3/31/17 at 10:30;  Stop 3/31/17 at 10:37;  Status DC


Midodrine (Proamatine) 7.5 mg TID@07,12,17 PO  Last administered on 4/1/17at 07:

04;  Start 3/31/17 at 17:00;  Stop 4/1/17 at 12:51;  Status DC


Sodium Bicarbonate (Sodium Bicarbonate) 650 mg Q8HR PO  Last administered on 4/2 /17at 21:33;  Start 3/31/17 at 14:00


Miscellaneous (Pill Splitter) 1 ea UNSCH  PRN OTHER SEE LABEL COMMENTS;  Start 3

/31/17 at 12:45


Lorazepam (Ativan Inj) 0.5 mg BID  PRN IV PUSH ANXIETY Last administered on 4/3/

17at 06:02;  Start 3/31/17 at 14:30


Midodrine (Proamatine) 10 mg TID@07,12,17 PO  Last administered on 4/3/17at 05:

53;  Start 4/1/17 at 17:00


Octreotide Acetate 200 mcg 200 mcg Q8HR SQ  Last administered on 4/3/17at 05:53

;  Start 4/1/17 at 14:00


Sodium Chloride (NS 1000 ml Inj) 1,000 ml @  70 mls/hr [id number] IV  Last 

administered on 4/2/17at 02:55;  Start 4/1/17 at 13:00;  Stop 4/2/17 at 14:08;  

Status DC


Albumin Human (Albumin 25% Inj) 12.5 gm Q6H IV  Last administered on 4/1/17at 13

:17;  Start 4/1/17 at 14:00;  Stop 4/1/17 at 15:26;  Status DC


Potassium Chloride (KCl) 10 meq ONCE  ONCE PO  Last administered on 4/1/17at 15:

09;  Start 4/1/17 at 14:15;  Stop 4/1/17 at 14:16;  Status DC


Loperamide HCl (Imodium) 2 mg UNSCH  PRN PO DIARRHEA Last administered on 4/2/ 17at 08:44;  Start 4/1/17 at 14:15


Loperamide HCl (Imodium) 4 mg ONCE  ONCE PO  Last administered on 4/1/17at 15:08

;  Start 4/1/17 at 15:00;  Stop 4/1/17 at 15:01;  Status DC


Cholestyramine Resin (Questran Light Pkt) 4 gm Q12H PO  Last administered on 4/2 /17at 17:12;  Start 4/1/17 at 18:00


Albumin Human (Albumin 25% Inj) 25 gm Q6H IV  Last administered on 4/2/17at 08:

42;  Start 4/1/17 at 20:00;  Stop 4/2/17 at 14:11;  Status DC


Nystatin (Mycostatin  Liq) 5 ml QID SWISH-SWAL  Last administered on 4/2/17at 21

:33;  Start 4/2/17 at 13:00;  Stop 4/16/17 at 12:59


Multi-Ingredient Mouthwash/Gargle (Magic Mouthwash Adult Liq) 5 ml QID  PRN 

SWISH-SWAL mouth pain;  Start 4/2/17 at 11:00


Potassium Chloride (KCl) 30 meq ONCE  ONCE PO  Last administered on 4/2/17at 14:

38;  Start 4/2/17 at 11:00;  Stop 4/2/17 at 11:01;  Status DC


Lactobacillus Acidophilus 1 tab 1 tab TID PO  Last administered on 4/2/17at 17:

11;  Start 4/2/17 at 18:00


Sodium Chloride (NS 1000 ml Inj) 1,000 ml @  120 mls/hr Q8H20M IV  Last 

administered on 4/2/17at 22:50;  Start 4/2/17 at 14:15


Albumin Human 12.5 gm 12.5 gm Q6H IV  Last administered on 4/3/17at 08:46;  

Start 4/2/17 at 20:00


Metronidazole (Flagyl 500 Mg Inj) 100 ml @  100 mls/hr Q6H IV  Last 

administered on 4/3/17at 08:45;  Start 4/2/17 at 15:00


Potassium Bicarb/ Potassium Chloride (K-Lyte Cl  Eff) 25 meq ONCE  ONCE PO  

Last administered on 4/2/17at 21:33;  Start 4/2/17 at 20:00;  Stop 4/2/17 at 20:

01;  Status DC


Albuterol/ Ipratropium (Duoneb Neb) 1 ampule QID  NEB INH  Last administered on 

4/3/17at 08:11;  Start 4/2/17 at 16:00


Albuterol Sulfate (Albuterol Neb) 2.5 mg Q2HR NEB  PRN INH SHORTNESS OF BREATH;

  Start 4/2/17 at 15:30


Potassium Chloride (KCl) 30 meq ONCE  ONCE PO ;  Start 4/3/17 at 11:00;  Stop 4/

3/17 at 11:01;  Status DC


Ketamine HCl (Ketalar Inj) 500 mg STK-MED ONCE .ROUTE ;  Start 4/3/17 at 11:18;

  Stop 4/3/17 at 11:19;  Status DC





Physical Exam


General


Appearance:  No Acute Distress, Comfortable





Eyes


Eye Exam:  Jaundice





Pulmonary


Resp Exam:  Clear Bilaterally, Breath Sounds Equal, No Distress





Cardiology


CV Exam:  Regular, Normal Sinus Rhythm





Gastrointestinal/Abdomen


GI Exam:  Soft, Non-Tender, Distended (moderately)





Integumentary


Skin Exam:  Clear, Warm, Jaundice





Extremeties


Extremities Exam:  Moderate Edema


Extremeties Remarks


One plus pitting edema of lower legs but no edema of upper legs or thighs.





Neurologic


Neuro Exam:  Sedated





Assessment/Plan


Discussed Condition With:  Patient, Parent, Sibling


Problem List:  


(1) Acute renal failure


Plan:  





Patient's creatinine level has deteriorated again today.


Discussed option of proceeding with dialysis however the family feels that the 

fluid intake may have been below what was ordered.  I cannot confirm same at 

the present time for the patient's edema relatively mild in the lower 

extremities and no worse than it was yesterday.  Since it appears to be some 

ambiguity about whether or not IV fluid was administered as ordered at the 

present time and there is no evidence of worsening edema will increase IV rate 

to try and make up for any deficit may have occurred with reevaluation 

tomorrow.  I believe it is more likely however at the patient has significant 

intrinsic renal pathology related to possible hepatorenal and or ATN at this 

point in time.  Family is not enthusiastic regarding initiation of dialysis I 

believe as it would indicate a poorer prognostic outlook.





Continue Octreotide to 200mch q8h as well as Midodrine to 10mg TID.  Continue 

IV albumin reduce the dosage to 12.5 g every 6.





Will follow with renal panel in the AM. 


I still believe that hepatorenal syndrome is very likely present and if so 

prognosis is very poor.


It is noted that the white count is going up.  I will defer evaluation to GI 

and primary care physician.





Medications should be adjusted for the patient's renal decline.  Avoid 

nephrotoxic medications such as iodinated contrast dyes and NSAIDs.  Avoid 

gadolinium when eGFR <30





(2) Hypokalemia


Plan:  Repletion as ordered





(3) Acidosis


Plan:  Continue po bicarb





(4) Cirrhosis with alcoholism


Plan:  LFTs not improving.





(5) Diarrhea


Plan:  





Plan











Patient's creatinine level has risen but slightly since yesterday and his urine 

output appears to be improved.


Will hold on dialysis for the present.


Noted patient is being transferred to the ICU.


Some question as to absorption of by mouth bicarbonate at this time.  I will 

discontinue by mouth bicarbonate and add to IV fluids.  Also add sodium 

bicarbonate to IV.  By mouth dose 1.  I discussed the situation as far as 

renal status is concerned with his sister and his mother.  Dialysis is still a 

possibility.  But uncertain if this will improve the patient's overall 

prognosis if it is initiated.





Concerning patient has increasing leukocytosis and has spiked a temperature.  I 

will defer to primary care physician in regard to possible ID opinion.  Also 

await results of colonoscopy.








Review a.m. labs.  Dialysis is reasonable depending upon the patient's 

prognosis as far as his liver disease is concerned.  Dialysis could be used as 

a bridging measure if there is significant hope of hepatic improvement.  Would 

like GIs input from this point of view.  It is uncertain as to prognosis for 

hepatic improvement dialysis could also be utilized also has a bridging measure 

in the hope of hepatic improvement.  I would not continue dialysis however if 

the patient's hepatic dysfunction becomes terminal.





Continue octreotide and midodrine as well as IV albumin for the present.


Patient's prognosis is guarded at best and possibly poor.








RADHA Alan MD Apr 3, 2017 12:07

## 2017-04-04 VITALS
HEART RATE: 98 BPM | OXYGEN SATURATION: 96 % | DIASTOLIC BLOOD PRESSURE: 67 MMHG | TEMPERATURE: 99 F | RESPIRATION RATE: 24 BRPM | SYSTOLIC BLOOD PRESSURE: 126 MMHG

## 2017-04-04 VITALS
TEMPERATURE: 98.9 F | DIASTOLIC BLOOD PRESSURE: 60 MMHG | SYSTOLIC BLOOD PRESSURE: 121 MMHG | RESPIRATION RATE: 20 BRPM | HEART RATE: 95 BPM | OXYGEN SATURATION: 94 %

## 2017-04-04 VITALS
SYSTOLIC BLOOD PRESSURE: 128 MMHG | HEART RATE: 100 BPM | DIASTOLIC BLOOD PRESSURE: 68 MMHG | OXYGEN SATURATION: 99 % | RESPIRATION RATE: 29 BRPM | TEMPERATURE: 98.8 F

## 2017-04-04 VITALS
HEART RATE: 91 BPM | RESPIRATION RATE: 22 BRPM | OXYGEN SATURATION: 96 % | TEMPERATURE: 98.7 F | SYSTOLIC BLOOD PRESSURE: 121 MMHG | DIASTOLIC BLOOD PRESSURE: 66 MMHG

## 2017-04-04 VITALS — HEART RATE: 92 BPM

## 2017-04-04 VITALS
DIASTOLIC BLOOD PRESSURE: 57 MMHG | OXYGEN SATURATION: 96 % | TEMPERATURE: 100.1 F | RESPIRATION RATE: 23 BRPM | SYSTOLIC BLOOD PRESSURE: 123 MMHG | HEART RATE: 98 BPM

## 2017-04-04 VITALS
HEART RATE: 105 BPM | OXYGEN SATURATION: 96 % | SYSTOLIC BLOOD PRESSURE: 113 MMHG | DIASTOLIC BLOOD PRESSURE: 64 MMHG | TEMPERATURE: 98.7 F | RESPIRATION RATE: 23 BRPM

## 2017-04-04 VITALS — OXYGEN SATURATION: 97 %

## 2017-04-04 VITALS — OXYGEN SATURATION: 95 %

## 2017-04-04 VITALS — HEART RATE: 107 BPM

## 2017-04-04 VITALS — HEART RATE: 97 BPM

## 2017-04-04 VITALS — HEART RATE: 96 BPM

## 2017-04-04 VITALS — HEART RATE: 93 BPM

## 2017-04-04 VITALS — HEART RATE: 91 BPM

## 2017-04-04 LAB
ALP SERPL-CCNC: 135 U/L (ref 45–117)
ALT SERPL-CCNC: 39 U/L (ref 12–78)
ANION GAP SERPL CALC-SCNC: 13 MEQ/L (ref 5–15)
ANION GAP SERPL CALC-SCNC: 13 MEQ/L (ref 5–15)
AST SERPL-CCNC: 84 U/L (ref 15–37)
BASOPHILS # BLD AUTO: 0.1 TH/MM3 (ref 0–0.2)
BASOPHILS NFR BLD: 0.4 % (ref 0–2)
BILIRUB SERPL-MCNC: 34.3 MG/DL (ref 0.2–1)
BUN SERPL-MCNC: 33 MG/DL (ref 7–18)
BUN SERPL-MCNC: 33 MG/DL (ref 7–18)
CHLORIDE SERPL-SCNC: 106 MEQ/L (ref 98–107)
CHLORIDE SERPL-SCNC: 106 MEQ/L (ref 98–107)
EOSINOPHIL # BLD: 0.2 TH/MM3 (ref 0–0.4)
EOSINOPHIL NFR BLD: 1 % (ref 0–4)
EOSINOPHIL NFR BLD: 1.2 % (ref 0–4)
ERYTHROCYTE [DISTWIDTH] IN BLOOD BY AUTOMATED COUNT: 15.3 % (ref 11.6–17.2)
GFR SERPLBLD BASED ON 1.73 SQ M-ARVRAT: 10 ML/MIN (ref 89–?)
GFR SERPLBLD BASED ON 1.73 SQ M-ARVRAT: 10 ML/MIN (ref 89–?)
HCO3 BLD-SCNC: 18.2 MEQ/L (ref 21–32)
HCO3 BLD-SCNC: 18.2 MEQ/L (ref 21–32)
HCT VFR BLD CALC: 25.4 % (ref 39–51)
HEMO FLAGS: (no result)
INR PPP: 1.8 RATIO
LYMPHOCYTES # BLD AUTO: 1 TH/MM3 (ref 1–4.8)
LYMPHOCYTES NFR BLD AUTO: 5.6 % (ref 9–44)
MAGNESIUM SERPL-MCNC: 2.1 MG/DL (ref 1.5–2.5)
MCH RBC QN AUTO: 38.3 PG (ref 27–34)
MCV RBC AUTO: 104.8 FL (ref 80–100)
MONOCYTES NFR BLD: 9.7 % (ref 0–8)
NEUTROPHILS # BLD AUTO: 15.4 TH/MM3 (ref 1.8–7.7)
NEUTROPHILS NFR BLD AUTO: 83.1 % (ref 16–70)
NEUTS BAND # BLD MANUAL: 16.7 TH/MM3 (ref 1.8–7.7)
NEUTS BAND NFR BLD: 26 % (ref 0–6)
NEUTS SEG NFR BLD MANUAL: 64 % (ref 16–70)
PLAT MORPH BLD: (no result)
PLATELET # BLD: 121 TH/MM3 (ref 150–450)
PLATELET BLD QL SMEAR: (no result)
POTASSIUM SERPL-SCNC: 3.2 MEQ/L (ref 3.5–5.1)
POTASSIUM SERPL-SCNC: 3.2 MEQ/L (ref 3.5–5.1)
PROTHROMBIN TIME: 20.2 SEC (ref 9.8–11.6)
RBC # BLD AUTO: 2.42 MIL/MM3 (ref 4.5–5.9)
SCAN/DIFF: (no result)
SODIUM SERPL-SCNC: 137 MEQ/L (ref 136–145)
SODIUM SERPL-SCNC: 137 MEQ/L (ref 136–145)
TARGETS BLD QL SMEAR: (no result)
WBC # BLD AUTO: 18.6 TH/MM3 (ref 4–11)
WBC DIFF SAMPLE: 100

## 2017-04-04 RX ADMIN — POTASSIUM PHOSPHATE, MONOBASIC SCH MG: 500 TABLET, SOLUBLE ORAL at 21:13

## 2017-04-04 RX ADMIN — ALBUMIN HUMAN SCH GM: 0.25 SOLUTION INTRAVENOUS at 01:47

## 2017-04-04 RX ADMIN — Medication SCH TAB: at 17:20

## 2017-04-04 RX ADMIN — ALBUMIN HUMAN SCH GM: 0.25 SOLUTION INTRAVENOUS at 08:41

## 2017-04-04 RX ADMIN — OCTREOTIDE ACETATE SCH MCG: 100 INJECTION, SOLUTION INTRAVENOUS; SUBCUTANEOUS at 21:13

## 2017-04-04 RX ADMIN — IPRATROPIUM BROMIDE AND ALBUTEROL SULFATE SCH AMPULE: .5; 3 SOLUTION RESPIRATORY (INHALATION) at 07:31

## 2017-04-04 RX ADMIN — PENTOXIFYLLINE SCH MG: 400 TABLET, FILM COATED, EXTENDED RELEASE ORAL at 06:21

## 2017-04-04 RX ADMIN — RIFAXIMIN SCH MG: 550 TABLET ORAL at 21:13

## 2017-04-04 RX ADMIN — MIDODRINE HYDROCHLORIDE SCH MG: 5 TABLET ORAL at 17:20

## 2017-04-04 RX ADMIN — MIDODRINE HYDROCHLORIDE SCH MG: 5 TABLET ORAL at 06:21

## 2017-04-04 RX ADMIN — CHOLESTYRAMINE SCH GM: 4 POWDER, FOR SUSPENSION ORAL at 06:21

## 2017-04-04 RX ADMIN — Medication SCH ML: at 21:12

## 2017-04-04 RX ADMIN — IPRATROPIUM BROMIDE AND ALBUTEROL SULFATE SCH AMPULE: .5; 3 SOLUTION RESPIRATORY (INHALATION) at 15:43

## 2017-04-04 RX ADMIN — SODIUM CHLORIDE SCH MLS/HR: 900 INJECTION, SOLUTION INTRAVENOUS at 01:47

## 2017-04-04 RX ADMIN — MIDODRINE HYDROCHLORIDE SCH MG: 5 TABLET ORAL at 13:21

## 2017-04-04 RX ADMIN — POTASSIUM PHOSPHATE, MONOBASIC SCH MG: 500 TABLET, SOLUBLE ORAL at 11:38

## 2017-04-04 RX ADMIN — TAZOBACTAM SODIUM AND PIPERACILLIN SODIUM SCH MLS/HR: 250; 2 INJECTION, SOLUTION INTRAVENOUS at 17:20

## 2017-04-04 RX ADMIN — NYSTATIN SCH ML: 100000 SUSPENSION ORAL at 13:15

## 2017-04-04 RX ADMIN — OCTREOTIDE ACETATE SCH MCG: 100 INJECTION, SOLUTION INTRAVENOUS; SUBCUTANEOUS at 06:22

## 2017-04-04 RX ADMIN — NYSTATIN SCH ML: 100000 SUSPENSION ORAL at 17:20

## 2017-04-04 RX ADMIN — Medication SCH TAB: at 08:42

## 2017-04-04 RX ADMIN — IPRATROPIUM BROMIDE AND ALBUTEROL SULFATE SCH AMPULE: .5; 3 SOLUTION RESPIRATORY (INHALATION) at 19:20

## 2017-04-04 RX ADMIN — OCTREOTIDE ACETATE SCH MCG: 100 INJECTION, SOLUTION INTRAVENOUS; SUBCUTANEOUS at 15:03

## 2017-04-04 RX ADMIN — Medication SCH ML: at 08:43

## 2017-04-04 RX ADMIN — TAZOBACTAM SODIUM AND PIPERACILLIN SODIUM SCH MLS/HR: 250; 2 INJECTION, SOLUTION INTRAVENOUS at 12:07

## 2017-04-04 RX ADMIN — ALBUMIN HUMAN SCH GM: 0.25 SOLUTION INTRAVENOUS at 14:55

## 2017-04-04 RX ADMIN — TAZOBACTAM SODIUM AND PIPERACILLIN SODIUM SCH MLS/HR: 250; 2 INJECTION, SOLUTION INTRAVENOUS at 23:26

## 2017-04-04 RX ADMIN — HEPARIN SODIUM SCH MLS/HR: 10000 INJECTION, SOLUTION INTRAVENOUS; SUBCUTANEOUS at 01:47

## 2017-04-04 RX ADMIN — PENTOXIFYLLINE SCH MG: 400 TABLET, FILM COATED, EXTENDED RELEASE ORAL at 21:13

## 2017-04-04 RX ADMIN — SODIUM CHLORIDE SCH MLS/HR: 900 INJECTION, SOLUTION INTRAVENOUS at 08:42

## 2017-04-04 RX ADMIN — CHOLESTYRAMINE SCH GM: 4 POWDER, FOR SUSPENSION ORAL at 17:20

## 2017-04-04 RX ADMIN — RIFAXIMIN SCH MG: 550 TABLET ORAL at 08:42

## 2017-04-04 RX ADMIN — PENTOXIFYLLINE SCH MG: 400 TABLET, FILM COATED, EXTENDED RELEASE ORAL at 14:55

## 2017-04-04 RX ADMIN — NYSTATIN SCH ML: 100000 SUSPENSION ORAL at 08:42

## 2017-04-04 RX ADMIN — Medication PRN ML: at 06:33

## 2017-04-04 RX ADMIN — HEPARIN SODIUM SCH MLS/HR: 10000 INJECTION, SOLUTION INTRAVENOUS; SUBCUTANEOUS at 12:09

## 2017-04-04 RX ADMIN — NYSTATIN SCH ML: 100000 SUSPENSION ORAL at 21:13

## 2017-04-04 RX ADMIN — ALBUMIN HUMAN SCH GM: 0.25 SOLUTION INTRAVENOUS at 21:13

## 2017-04-04 RX ADMIN — IPRATROPIUM BROMIDE AND ALBUTEROL SULFATE SCH AMPULE: .5; 3 SOLUTION RESPIRATORY (INHALATION) at 11:10

## 2017-04-04 RX ADMIN — Medication SCH TAB: at 13:15

## 2017-04-04 NOTE — HHI.GIFU
Subjective


Remarks


Resting in bed.  Diarrhea has improved.  The nurse reports that he is now 

passing mostly flatus- small amount of stool.





Objective


Vitals I&O





 Vital Signs








  Date Time  Temp Pulse Resp B/P Pulse Ox O2 Delivery O2 Flow Rate FiO2


 


4/4/17 09:00      Room Air  


 


4/4/17 07:32     95   21


 


4/4/17 04:00     92 Nasal Cannula 2.00 


 


4/4/17 04:00  95      


 


4/4/17 04:00 98.9 93 20 121/60 94   


 


4/4/17 02:00  107      


 


4/4/17 00:00  108      


 


4/4/17 00:00     96 Nasal Cannula 2.00 


 


4/4/17 00:00 98.7 105 23 113/64 96   


 


4/3/17 22:00 98.5       


 


4/3/17 22:00  104      


 


4/3/17 20:00  96      


 


4/3/17 20:00 100.5 98 18 134/74 95   


 


4/3/17 20:00     95 Nasal Cannula 2.00 


 


4/3/17 19:36     99   21


 


4/3/17 15:00  108      


 


4/3/17 14:00  108      


 


4/3/17 12:50  106      


 


4/3/17 12:04  97 16 106/65 95   


 


4/3/17 11:59  99 16 113/69 95   


 


4/3/17 11:54 98.3 101 16 116/67 95   








 I/O








 4/3/17 4/3/17 4/3/17 4/4/17 4/4/17 4/4/17





 07:00 15:00 23:00 07:00 15:00 23:00


 


Intake Total 0 ml 1826 ml 1743 ml 1253 ml  


 


Output Total 300 ml 975 ml 650 ml 350 ml  


 


Balance -300 ml 851 ml 1093 ml 903 ml  


 


      


 


Intake Oral 0 ml 640 ml 720 ml 500 ml  


 


IV Total  1086 ml 923 ml 703 ml  


 


Albumin   100 ml 50 ml  


 


Other  100 ml    


 


Output Urine Total 300 ml 975 ml 650 ml 350 ml  


 


# Bowel Movements 2 1 1 2  








Laboratory





Laboratory Tests








Test 4/4/17





 03:55


 


White Blood Count 18.6 


 


Red Blood Count 2.42 


 


Hemoglobin 9.3 


 


Hematocrit 25.4 


 


Mean Corpuscular Volume 104.8 


 


Mean Corpuscular Hemoglobin 38.3 


 


Mean Corpuscular Hemoglobin 36.5 





Concent 


 


Red Cell Distribution Width 15.3 


 


Platelet Count 121 


 


Mean Platelet Volume 10.7 


 


Neutrophils (%) (Auto) 83.1 


 


Lymphocytes (%) (Auto) 5.6 


 


Monocytes (%) (Auto) 9.7 


 


Eosinophils (%) (Auto) 1.2 


 


Basophils (%) (Auto) 0.4 


 


Neutrophils # (Auto) 15.4 


 


Lymphocytes # (Auto) 1.0 


 


Monocytes # (Auto) 1.8 


 


Eosinophils # (Auto) 0.2 


 


Basophils # (Auto) 0.1 


 


CBC Comment AUTO DIFF 


 


Differential Total Cells 100 





Counted 


 


Neutrophils % (Manual) 64 


 


Band Neutrophils % 26 


 


Lymphocytes % 3 


 


Monocytes % 6 


 


Eosinophils % 1 


 


Neutrophils # (Manual) 16.7 


 


Differential Comment FINAL DIFF





 MANUAL


 


Platelet Estimate LOW 


 


Platelet Morphology Comment ENLARGED 


 


Target Cells 1+ 


 


Prothrombin Time 20.2 


 


Prothromb Time International 1.8 





Ratio 


 


Sodium Level 137 


 


Potassium Level 3.2 


 


Chloride Level 106 


 


Carbon Dioxide Level 18.2 


 


Anion Gap 13 


 


Blood Urea Nitrogen 33 


 


Creatinine 6.69 


 


Estimat Glomerular Filtration 10 





Rate 


 


Random Glucose 106 


 


Calcium Level 7.7 


 


Phosphorus Level 2.2 


 


Magnesium Level 2.1 


 


Total Bilirubin 34.3 


 


Aspartate Amino Transf 84 





(AST/SGOT) 


 


Alanine Aminotransferase 39 





(ALT/SGPT) 


 


Alkaline Phosphatase 135 


 


Total Protein 4.6 


 


Albumin 2.6 














 Date/Time Procedure Status





Source Growth 


 


 4/1/17 05:00 Stool Pus (MADELIN) - Final Complete





Stool Stool FEW WBC'S 








Imaging





Last Impressions








Chest X-Ray 4/2/17 0000 Signed





Impressions: 





 Service Date/Time:  Sunday, April 2, 2017 15:31 - CONCLUSION:  1. Slight 





 increase in basilar airspace consolidation since March 27.     Ralph Barrett MD 


 


Hepatobiliary Scan Nuclear Medicine 3/28/17 0000 Signed





Impressions: 





 Service Date/Time:  Tuesday, March 28, 2017 14:36 - CONCLUSION:  Markedly 





 delayed uptake in the liver. Findings suggest diffuse hepatocellular disease 





 versus biliary obstruction. 24 hour delayed scan could be performed.     

Estevan Longoria MD ADDENDUM:  24 hour scans still shows no activity in bile ducts or 





 small bowel suggesting diffuse hepatocellular disease.    Jona Del Angel MD  





 FACR


 


Abdomen/Pelvis CT 3/27/17 0000 Signed





Impressions: 





 Service Date/Time:  Monday, March 27, 2017 16:50 - CONCLUSION: Findings 

suggest 





 advanced hepatocellular disease with varicosities, prominent spleen and 





 interestingly only a small amount of ascites.  Most of the ascites is in the 





 pelvis.     Jona Del Angel MD  FACR


 


Abdomen Ultrasound 3/27/17 0000 Signed





Impressions: 





 Service Date/Time:  Monday, March 27, 2017 18:27 - CONCLUSION:  1. Thick-

walled 





 gallbladder containing some sludge and demonstrating pericholecystic fluid.  

If 





 there is clinical concern for acute cholecystitis a hepatobiliary scan may be 





 helpful to confirm cystic duct obstruction.  2. Hepatosplenomegaly.  3. Some 





 ascites within the abdomen.           Lebron Marin MD 








Physical Exam


HEENT: Normocephalic; atraumatic; + jaundice.


CHEST:  CTA


CARDIAC:  RRR


ABDOMEN:  Soft, nondistended, nontender;  hepatosplenomegaly; bowel sounds are 

present x 4 quadrants. Ascites


EXTREMITIES: No clubbing, cyanosis, or edema.


SKIN:  Significant jaundice, Spider angiomas


CNS:  No focal deficits; lethargic and oriented x 3





Assessment and Plan


Plan


ASSESSMENT:


- Severe Alcoholic hepatitis on what appears to be underlying liver cirrhosis (

based on imaging).  Has hx of ETOH abuse, reportedly drinking 6-7 drinks per


   day x 6 years.  He was hospitalized from 3/11-3/14 for acute alcoholic 

hepatitis.  During that admission, he was treated with Pentoxifylline, 

Prednisone,


   Nadolol, Spironolactone, Lactulose, and Protonix.  Unfortunately, he left 

[facility] on 3/14 with LFTs on 3/14 were T. Bili 23.7, , ALT 61, Alkaline 


   Phosph 162.  Hepatitis Panel and Celiac panel was negative at that time.  He 

was taking an OTC diuretic at home.  He is not taking Tylenol products. 


   He is not aware of any other liver disease, although several people on his 

mother's side have autoimmune disorders.  He returned to ER for worsening


   jaundice, nausea without vomiting, worsening abdominal distention, and 

intermittent fever and chills.  Abdomen Ultrasound (3/27/17)-----> 1. Thick-

walled 


   gallbladder containing some sludge and demonstrating pericholecystic fluid.  

If there is clinical concern for acute cholecystitis a hepatobiliary scan may 


   be helpful to confirm cystic duct obstruction. 2. Hepatosplenomegaly.  3. 

Some ascites within the abdomen.  Abdomen/Pelvis CT (3/27/17)---->  Findings


   suggest advanced hepatocellular disease with varicosities, prominent spleen 

and interestingly only a small amount of ascites.  Most of the ascites is in the


   pelvis.  DF 52.70. Pentoxifylline.  He does need complete liver workup to 

rule out other underlying liver disease such as autoimmune hepatitis-DARRYL neg,


   AMA neg, ASMA neg, Iron Saturation 93.1, Ferritin 1666, AFP 3.3, Alpha 1 

antitrypsin 286, Ceruloplasmin 30.  Ferritin and Iron Saturation elevated and 

therefore


   Hfe gene ordered to r/o hemochromatosis.  LFTs persistently- T. Bili 34.3, 

AST 84, ALT 39, ALk Phosph 135.  


- Ascites with intermittent fevers and chills.  Zosyn  Afebrile.


- Severe diarrhea.  CDiff negative.  S/P Sigmoidoscopy (4/3/17)----> 

nonspecific colitis, internal hemorrhoids.  Pathology pending.  On octreotide, 

probiotics,


   flagyl, xifaxan, cholestyramine and Imodium.  Does seem to be improving today

- passing more flatus than stool. 


- Hepatic encephalopathy.  Xifaxan.  Lactulose d/c'd because of diarrhea. 


- Coagulopathy.  STABLE.  


- ARF.  Creat 6.69. Renal following, unclear at this time if his worsening 

renal function related to dehydration from diarrhea vs. his liver disease.  


- Leukocytosis/Fever.  WBC 18.6.  Low grade fever. Zosyn





PLAN:


- 2 gram sodium diet


- Await pathology from Sigmoidoscopy


- Cont. Flagyl


- Cont. Xifaxan


- Cont. Probiotics


- Continue Cholestyramine


- Continue Imodium prn


- Cont. Pentoxifylline


- Cont. PPI


- Await Hfe Gene


- Renal following


- Monitor labs


- Supportive care


- Consider adding steroids.  


- Consider Lomotil if no improvement in diarrhea


- Pt was drinking up until his last hospitalization on 3/11 and therefore is 

not a candidate for liver transplant if his condition worsens.


- Further recommendations to follow based on results of above


- Patient seen and examined by Dr. Can and myself and this note is written 

on his behalf








Lizette Amin Apr 4, 2017 11:18

## 2017-04-04 NOTE — RADRPT
EXAM DATE/TIME:  04/04/2017 14:00 

 

[facility] COMPARISON:     

US ABDOMEN - COMPLETE, March 27, 2017, 18:27.

        

 

 

INDICATIONS :     

Ascites.

                     

 

MEDICAL HISTORY :     

Cirrhosis. Gastroesophageal reflux disease.   Skull fracture from trauma. Jaundice. Abdominal pain.

 

SURGICAL HISTORY :          

Right foot/ankle fracture surgery.

 

ENCOUNTER:     

Initial

 

ACUITY:     

1 day

 

PAIN SCORE:     

0/10

 

LOCATION:     

Bilateral   

AREA EVALUATED:       

Abdominal Quadrants.

 

FINDINGS:     

Imaging of the abdomen and pelvis was performed to evaluate for ascites for possible paracentesis.  

 

CONCLUSION:     

The exam demonstrates small pockets of ascites within the midline of the pelvis. There is a small umu
unt of ascites around the liver and spleen. The liver appears cirrhotic.

 

 

 

 Valdemar Del Angel MD on April 04, 2017 at 15:59           

Board Certified Radiologist.

 This report was verified electronically.

## 2017-04-04 NOTE — PD.CONS
History of Present Illness


Service


Infectious disease


Consult Requested By


Dr FLORENTIN Todd


Reason for Consult


Evaluate patient with fever and leukocytosis


Primary Care Physician


No Primary Care Physician


Diagnoses:  


History of Present Illness


Patient seen and examined.  Records reviewed.





Patient is a 26-year-old male, admitted to the hospital for further evaluation 

of worsening abdominal pain.  There was some nausea but no vomiting.  Is also 

feeling tired, and it was also noted that he has dark urine.  Patient was 

recently admitted March 11 to March 14 and not that time he was diagnosed to 

have possible alcohol related acute hepatitis.  Patient was admitted and was 

seen by GI.  There was some question of cholecystitis, but further studies, 

revealed that he has severe hepatocellular disease.  Patient also was found to 

be in acute renal failure, and nephrology has been evaluating the patient.  It 

was felt that it's possibly due to hepatorenal syndrome.  It is not oliguric.  

Since around April 1 patient has had intermittent low-grade fevers.  He's had 

some diarrhea, and stool for C. difficile came back negative.  Patient 

currently complains of diffuse, and generalized aches.  He has cough and his 

bringing up some yellowish phlegm.  He also has some chest pain, and abdominal 

pain.  He has no central line.  He has a Todd catheter in place.  Patient was 

started on Zosyn today.  He had been on Flagyl since April 2.





Infectious disease consultation has been requested to evaluate the patient. 











Patient sisters at the bedside and she stated that patient tends to downplay 

his symptoms.  Per sister patient looks worse and he is more jaundiced.  He 

stated that he is still making good amount of urine.





Review of Systems


Constitutional:  COMPLAINS OF: Fatigue, Fever,  DENIES: Chills


Eyes:  DENIES: Eye pain


Ears, nose, mouth, throat:  COMPLAINS OF: Throat pain,  DENIES: Nasal discharge

, Sinus Pain, Toothache


Respiratory:  COMPLAINS OF: Cough, Sputum production, Shortness of breath


Cardiovascular:  COMPLAINS OF: Chest pain,  DENIES: Palpitations


Gastrointestinal:  COMPLAINS OF: Abdominal pain, Diarrhea,  DENIES: Nausea, 

Vomiting


Musculoskeletal:  COMPLAINS OF: Muscle aches


Integumentary:  COMPLAINS OF: Abnormal pigmentation





Past Family Social History


Allergies:  


Coded Allergies:  


     No Known Allergies (Verified , 3/11/17)


Past Medical History


Recent hospitalization March 11 to March 14 with acute hepatitis, felt to be 

possibly alcohol related


Significant ETOH use last 6 years


Tobacco dependence


Past Surgical History


None


Active Ordered Medications


Albumin


Albuterol


Tessalon


Questran


Vistaril


Lactinex


Imodium


Ativan


Flagyl


ProAmatine


Nystatin 


Sandostatin 


Zofran


Trental


Zosyn


Potassium


Rifaximin


Sodium bicarbonate in IVF


Social History


Patient has a positive history for tobacco dependence but stop smoking when he 

was admitted to [facility].


Also had history of alcoholism in which patient stopped drinking since he was 

admitted at [facility].


Denied illicit drug use.





Physical Exam


Vital Signs





 Vital Signs








  Date Time  Temp Pulse Resp B/P Pulse Ox O2 Delivery O2 Flow Rate FiO2


 


4/4/17 11:00  91      


 


4/4/17 09:00      Room Air  


 


4/4/17 08:00 100.1 98 23 123/57 96   


 


4/4/17 08:00  98      


 


4/4/17 07:32     95   21


 


4/4/17 07:00  92      


 


4/4/17 04:00     92 Nasal Cannula 2.00 


 


4/4/17 04:00  95      


 


4/4/17 04:00 98.9 93 20 121/60 94   


 


4/4/17 02:00  107      


 


4/4/17 00:00  108      


 


4/4/17 00:00     96 Nasal Cannula 2.00 


 


4/4/17 00:00 98.7 105 23 113/64 96   


 


4/3/17 22:00 98.5       


 


4/3/17 22:00  104      


 


4/3/17 20:00  96      


 


4/3/17 20:00 100.5 98 18 134/74 95   


 


4/3/17 20:00     95 Nasal Cannula 2.00 


 


4/3/17 19:36     99   21


 


4/3/17 15:00  108      


 


4/3/17 14:00  108      


 


4/3/17 12:50  106      








Physical Exam


GENERAL: This is a well-nourished, well-developed male, has deep jaundice, 

lethargic, but does awaken and answered my questions.  He looks slightly 

tachypneic at rest.  


SKIN:   Cool and dry.  Has deep jaundice.  No generalized rash or ecchymosis.  

No embolic lesions noted.


HEAD: Atraumatic. Normocephalic. No temporal or scalp tenderness.


EYES:  Pink conjunctivae, no petechia or hemorrhage.  Pupils equal round and 

reactive. Extraocular motions intact. Has deep scleral icterus.   No injection 

or drainage. 


ENT: Nose without bleeding, or purulent drainage.  Moist oral mucosa, with some 

mild thrush.  Throat without erythema, or exudate. Uvula midline. Airway patent.


NECK: Trachea midline. No JVD or lymphadenopathy. Supple, nontender, no 

meningeal signs.


CARDIOVASCULAR: Regular rate and rhythm without murmurs, gallops, or rubs. 


RESPIRATORY: Clear to auscultation anteriorly.. Breath sounds equal 

bilaterally. No wheezes,  or rhonchi.  Has some rales at the bases.


GASTROINTESTINAL: Abdomen is distended, with diffuse abdominal tenderness.  No 

guarding.  Bowel sounds are present and hypoactive.  No rebound.


MUSCULOSKELETAL: Extremities without clubbing, or cyanosis.  Has bilateral 

pitting pedal edema.  His RLE is slightly bigger compared to his LLE. No calf 

tenderness.  No joint effusion.  


NEUROLOGICAL: Lethargic, but does open eyes and follows simple commands.  No 

Babinski. 


PSYCH:  Unable to fully assess


LINE:  PIV with no evidence of infection.  NO evidence of phlebitis in previous 

IV sites or venipuncture sites


:  Todd cath in place with some dark drainage at meatus,  urine looks clear 

in tubing


Laboratory





Laboratory Tests








Test 4/4/17





 03:55


 


White Blood Count 18.6 


 


Red Blood Count 2.42 


 


Hemoglobin 9.3 


 


Hematocrit 25.4 


 


Mean Corpuscular Volume 104.8 


 


Mean Corpuscular Hemoglobin 38.3 


 


Mean Corpuscular Hemoglobin 36.5 





Concent 


 


Red Cell Distribution Width 15.3 


 


Platelet Count 121 


 


Mean Platelet Volume 10.7 


 


Neutrophils (%) (Auto) 83.1 


 


Lymphocytes (%) (Auto) 5.6 


 


Monocytes (%) (Auto) 9.7 


 


Eosinophils (%) (Auto) 1.2 


 


Basophils (%) (Auto) 0.4 


 


Neutrophils # (Auto) 15.4 


 


Lymphocytes # (Auto) 1.0 


 


Monocytes # (Auto) 1.8 


 


Eosinophils # (Auto) 0.2 


 


Basophils # (Auto) 0.1 


 


CBC Comment AUTO DIFF 


 


Differential Total Cells 100 





Counted 


 


Neutrophils % (Manual) 64 


 


Band Neutrophils % 26 


 


Lymphocytes % 3 


 


Monocytes % 6 


 


Eosinophils % 1 


 


Neutrophils # (Manual) 16.7 


 


Differential Comment FINAL DIFF





 MANUAL


 


Platelet Estimate LOW 


 


Platelet Morphology Comment ENLARGED 


 


Target Cells 1+ 


 


Prothrombin Time 20.2 


 


Prothromb Time International 1.8 





Ratio 


 


Sodium Level 137 


 


Potassium Level 3.2 


 


Chloride Level 106 


 


Carbon Dioxide Level 18.2 


 


Anion Gap 13 


 


Blood Urea Nitrogen 33 


 


Creatinine 6.69 


 


Estimat Glomerular Filtration 10 





Rate 


 


Random Glucose 106 


 


Calcium Level 7.7 


 


Phosphorus Level 2.2 


 


Magnesium Level 2.1 


 


Total Bilirubin 34.3 


 


Aspartate Amino Transf 84 





(AST/SGOT) 


 


Alanine Aminotransferase 39 





(ALT/SGPT) 


 


Alkaline Phosphatase 135 


 


Total Protein 4.6 


 


Albumin 2.6 














 Date/Time Procedure Status





Source Growth 


 


 4/1/17 05:00 Stool Pus (MADELIN) - Final Complete





Stool Stool FEW WBC'S 








Result Diagram:  


4/4/17 0355                                                                    

            4/4/17 0355





Imaging


RADIOLOGY STUDIES/FILMS REVIEWED














Chest X-Ray 4/2/17 0000 Signed





Impressions: 





 Service Date/Time:  Sunday, April 2, 2017 15:31 - CONCLUSION:  1. Slight 





 increase in basilar airspace consolidation since March 27.     Ralph Barrett MD 


 


Hepatobiliary Scan Nuclear Medicine 3/28/17 0000 Signed





Impressions: 





 Service Date/Time:  Tuesday, March 28, 2017 14:36 - CONCLUSION:  Markedly 





 delayed uptake in the liver. Findings suggest diffuse hepatocellular disease 





 versus biliary obstruction. 24 hour delayed scan could be performed.     

Estevan Longoria MD ADDENDUM:  24 hour scans still shows no activity in bile ducts or 





 small bowel suggesting diffuse hepatocellular disease.    Jona Del Angel MD  





 FACR


 


Abdomen/Pelvis CT 3/27/17 0000 Signed





Impressions: 





 Service Date/Time:  Monday, March 27, 2017 16:50 - CONCLUSION: Findings 

suggest 





 advanced hepatocellular disease with varicosities, prominent spleen and 





 interestingly only a small amount of ascites.  Most of the ascites is in the 





 pelvis.     Jona Del Angel MD  FACR


 


Abdomen Ultrasound 3/27/17 0000 Signed





Impressions: 





 Service Date/Time:  Monday, March 27, 2017 18:27 - CONCLUSION:  1. Thick-

walled 





 gallbladder containing some sludge and demonstrating pericholecystic fluid.  

If 





 there is clinical concern for acute cholecystitis a hepatobiliary scan may be 





 helpful to confirm cystic duct obstruction.  2. Hepatosplenomegaly.  3. Some 





 ascites within the abdomen.           Lebron Marin MD 











Assessment and Plan


Assessment and Plan


IMPRESSION


Intermittent low grade fevers,  etiology?


   -  ?infection:  ,  pulmonary,  ?ascites/SBP


   -  ?non-infectious


Hepatic failure


Renal failure


   -  possible hepatorenal syndrome


   -  non-oliguric


Known ETOH abuse











RECOMMENDATION


Repeat UA and C/S


BC x 2


Sputum G/S C/S


Repeat US to check for ascites,  possible tap


US BLE eval DVT


Continue Zosyn


Monitor temps


Follow C/S


Monitor progress


I will follow along with you





Thank you for this consultation


Discussed Condition With


Spoke with Radha (sister),  and mother








Edna Reyes MD Apr 4, 2017 12:54

## 2017-04-04 NOTE — HHI.NPPN
Subjective


History of Present Illness


The patient is a 27 yo [place] male who presented to this facility 3/27 with 

complaints of abd pain, nausea, and poor oral intake for the past 2 weeks.  He 

was recently admitted to [facility] (3/11-3/14) for similar complaints, but signed 

himself out AMA.  He has a hx of heavy EtOH intake for the past several years, 

but says he has been trying to cut back since he was admitted to [facility].  As per 

records, he was using Lasix since his discharge from the hospital despite 

little po intake, but he says he was only using "over the counter diuretics" 

and Benadryl.  Denies any NSAID use at home.  No prior hx of renal decline.  

Mentions multiple family member with RA.


Admitting SCr 3.63


Labs from beginning of month from [facility] show preserved renal function at 0.6-0.8 

baseline SCr.


Interval History


Patient's sister by bedside.  Diarrhea is said to have diminished.





Review of Systems


General


Constitutional:  Fatigue





Gastrointestinal


Gastrointestinal:  Diarrhea





Objective Data


Data











 4/3/17 4/4/17





 19:00 07:00


 


Intake Total 2716 ml 2106 ml


 


Output Total 1225 ml 750 ml


 


Balance 1491 ml 1356 ml


 


  


 


Intake Oral 880 ml 980 ml


 


IV Total 1686 ml 1026 ml


 


Albumin 50 ml 100 ml


 


Other 100 ml 


 


Output Urine Total 1225 ml 750 ml


 


# Bowel Movements 1 3











 Vital Signs








  Date Time  Temp Pulse Resp B/P Pulse Ox O2 Delivery O2 Flow Rate FiO2


 


4/4/17 09:00      Room Air  


 


4/4/17 07:32     95   21


 


4/4/17 04:00     92 Nasal Cannula 2.00 


 


4/4/17 04:00  95      


 


4/4/17 04:00 98.9 93 20 121/60 94   


 


4/4/17 02:00  107      


 


4/4/17 00:00  108      


 


4/4/17 00:00     96 Nasal Cannula 2.00 


 


4/4/17 00:00 98.7 105 23 113/64 96   


 


4/3/17 22:00 98.5       


 


4/3/17 22:00  104      


 


4/3/17 20:00  96      


 


4/3/17 20:00 100.5 98 18 134/74 95   


 


4/3/17 20:00     95 Nasal Cannula 2.00 


 


4/3/17 19:36     99   21


 


4/3/17 15:00  108      


 


4/3/17 14:00  108      


 


4/3/17 12:50  106      


 


4/3/17 12:04  97 16 106/65 95   


 


4/3/17 11:59  99 16 113/69 95   


 


4/3/17 11:54 98.3 101 16 116/67 95   


 


4/3/17 11:11 98.1 91 20 113/58 94   








-:  


4/4/17 0355                                                                    

            4/4/17 0355





Medication Review





 Inpatient Medications


Albumin Human (Albumin 25% Inj) 25 gm Q6H IV  Last administered on 4/2/17at 08:

42;  Start 4/1/17 at 20:00;  Stop 4/2/17 at 14:11;  Status DC


Albumin Human 12.5 gm 12.5 gm Q6H IV  Last administered on 4/4/17at 08:41;  

Start 4/2/17 at 20:00


Albuterol Sulfate (Albuterol Neb) 2.5 mg Q2HR NEB  PRN INH SHORTNESS OF BREATH 

Last administered on 4/3/17at 22:58;  Start 4/2/17 at 15:30


Albuterol/ Ipratropium (Duoneb Neb) 1 ampule QID  NEB INH  Last administered on 

4/4/17at 07:31;  Start 4/2/17 at 16:00


Alprazolam (Xanax) 0.25 mg ONCE  ONCE PO  Last administered on 3/27/17at 21:45;

  Start 3/27/17 at 21:30;  Stop 3/27/17 at 21:31;  Status DC


Benzonatate (Tessalon) 200 mg TID  PRN PO cough Last administered on 4/3/17at 20

:16;  Start 3/29/17 at 15:15


Ceftriaxone Sodium/Sodium Chloride (Rocephin Inj/NS Inj) 100 ml @  200 mls/hr 

Q24H IV  Last administered on 4/3/17at 13:33;  Start 3/28/17 at 14:00;  Stop 4/4 /17 at 11:00;  Status DC


Cholestyramine Resin (Questran Light Pkt) 4 gm Q12H PO  Last administered on 4/4 /17at 06:21;  Start 4/1/17 at 18:00


Docusate Sodium (Colace) 100 mg Q12H PO  Last administered on 3/27/17at 21:45;  

Start 3/27/17 at 21:00;  Stop 3/29/17 at 15:16;  Status DC


Hydroxyzine Pamoate (Vistaril) 50 mg Q6H  PRN PO ITCHING Last administered on 4/

3/17at 05:53;  Start 3/30/17 at 09:15


Lactobacillus Acidophilus 1 tab 1 tab TID PO  Last administered on 4/4/17at 08:

42;  Start 4/2/17 at 18:00


Lactulose (Lactulose Liq) 30 ml TID PO  Last administered on 3/30/17at 13:45;  

Start 3/28/17 at 09:00;  Stop 3/30/17 at 14:22;  Status DC


Lactulose 30 ml 30 ml DAILY PO ;  Start 3/30/17 at 15:00;  Status Hold


Loperamide HCl (Imodium) 4 mg ONCE  ONCE PO  Last administered on 4/1/17at 15:08

;  Start 4/1/17 at 15:00;  Stop 4/1/17 at 15:01;  Status DC


Lorazepam (Ativan Inj) 0.5 mg BID  PRN IV PUSH ANXIETY Last administered on 4/4/ 17at 06:33;  Start 3/31/17 at 14:30


Lorazepam (Ativan) 0.5 mg Q12H  PRN PO anxiety Last administered on 3/31/17at 04

:04;  Start 3/28/17 at 16:00;  Stop 3/31/17 at 14:33;  Status DC


Metronidazole (Flagyl 500 Mg Inj) 100 ml @  100 mls/hr Q6H IV  Last 

administered on 4/4/17at 08:42;  Start 4/2/17 at 15:00


Midodrine (Proamatine) 10 mg TID@07,12,17 PO  Last administered on 4/4/17at 06:

21;  Start 4/1/17 at 17:00


Miscellaneous (Pill Splitter) 1 ea UNSCH  PRN OTHER SEE LABEL COMMENTS;  Start 3

/31/17 at 12:45


Morphine Sulfate 2 mg 2 mg ONCE  ONCE IV PUSH  Last administered on 3/28/17at 02

:10;  Start 3/28/17 at 02:00;  Stop 3/28/17 at 02:01;  Status DC


Multi-Ingredient Mouthwash/Gargle (Magic Mouthwash Adult Liq) 5 ml QID  PRN 

SWISH-SWAL mouth pain;  Start 4/2/17 at 11:00


Naloxone HCl (Narcan Inj) 0.4 mg UNSCH  PRN IV SEE LABEL COMMENTS;  Start 3/27/

17 at 18:15


Nystatin (Mycostatin  Liq) 5 ml QID SWISH-SWAL  Last administered on 4/4/17at 08

:42;  Start 4/2/17 at 13:00;  Stop 4/16/17 at 12:59


Octreotide Acetate (SandoSTATIN INJ) 200 mcg Q8HR SQ  Last administered on 4/4/ 17at 06:22;  Start 4/1/17 at 14:00


Ondansetron HCl (Zofran Inj) 4 mg Q6H  PRN IVP NAUSEA OR VOMITING Last 

administered on 3/31/17at 08:03;  Start 3/27/17 at 18:15


Pentoxifylline 400 mg 400 mg Q8HR PO  Last administered on 4/4/17at 06:21;  

Start 3/28/17 at 14:00


Piperacillin Sod/ Tazobactam Sod (Zosyn 3.375 Gm Premix) 50 ml @  100 mls/hr 

Q6H IV ;  Start 4/4/17 at 11:00;  Status UNV


Potassium Chloride 30 meq 30 meq ONCE  ONCE PO  Last administered on 4/3/17at 13

:32;  Start 4/3/17 at 11:00;  Stop 4/3/17 at 11:01;  Status DC


Potassium Chloride 40 meq 40 meq ONCE  ONCE PO ;  Start 4/4/17 at 11:00;  Stop 4 /4/17 at 11:01;  Status UNV


Potassium Phosphate (K-Phos) 1,000 mg Q12HR PO ;  Start 4/4/17 at 11:00;  

Status UNV


Potassium Bicarb/ Potassium Chloride (K-Lyte Cl  Eff) 25 meq ONCE  ONCE NG  

Last administered on 4/3/17at 13:31;  Start 4/3/17 at 13:00;  Stop 4/3/17 at 13:

01;  Status DC


Potassium Chloride (KCl 20 Meq Premix Inj) 100 ml @  50 mls/hr Q2H IV  Last 

administered on 3/28/17at 19:48;  Start 3/28/17 at 13:00;  Stop 3/28/17 at 16:59

;  Status DC


Potassium Chloride (KCl) 30 meq ONCE  ONCE PO  Last administered on 4/2/17at 14:

38;  Start 4/2/17 at 11:00;  Stop 4/2/17 at 11:01;  Status DC


Rifaximin (Xifaxan) 550 mg BID PO  Last administered on 4/4/17at 08:42;  Start 3

/30/17 at 21:00


Senna/Docusate Sodium 2 tab 2 tab BID PO ;  Start 3/29/17 at 21:00;  Stop 4/2/ 17 at 13:35;  Status DC


Sodium Bicarbonate/ Dextrose (Sodium Bicarbonate 8.4% Inj/D5W 1000 ml Inj) 1,

150 ml @  70 mls/hr [id number] IV  Last administered on 3/30/17at 18:28;  Start 3/

30/17 at 18:00;  Stop 3/31/17 at 12:33;  Status DC


Sodium Bicarbonate/ Potassium Chloride/Sodium Chloride (Sodium Bicarbonate 8.4% 

Inj/KCl Inj/1/2 NS 1000 ml Inj) 1,080 ml @  100 mls/hr [id number] IV  Last 

administered on 4/4/17at 01:47;  Start 4/3/17 at 12:00


Sodium Bicarbonate/ Sodium Chloride (Sodium Bicarbonate 8.4% Inj/1/2 NS 1000 ml 

Inj) 1,075 ml @  100 mls/hr [id number] IV  Last administered on 3/30/17at 08:41;  

Start 3/29/17 at 18:00;  Stop 3/30/17 at 15:55;  Status DC


Sodium Bicarbonate (Sodium Bicarbonate) 650 mg Q8HR PO  Last administered on 4/2 /17at 21:33;  Start 3/31/17 at 14:00;  Stop 4/3/17 at 12:02;  Status DC


Sodium Chloride (NS 1000 ml Inj) 1,000 ml @  120 mls/hr Q8H20M IV  Last 

administered on 4/2/17at 22:50;  Start 4/2/17 at 14:15;  Stop 4/3/17 at 12:02;  

Status DC


Sodium Chloride (NS Flush) 2 ml BID IV FLUSH  Last administered on 4/4/17at 08:

43;  Start 3/27/17 at 21:00





Physical Exam


General


Appearance:  No Acute Distress, Comfortable





Eyes


Eye Exam:  Jaundice





Pulmonary


Resp Exam:  Clear Bilaterally, Breath Sounds Equal, No Distress





Cardiology


CV Exam:  Regular, Normal Sinus Rhythm





Gastrointestinal/Abdomen


GI Exam:  Soft, Non-Tender, Distended (moderately)





Integumentary


Skin Exam:  Clear, Warm, Jaundice





Extremeties


Extremities Exam:  Moderate Edema


Extremeties Remarks


One plus pitting edema of lower legs trace edema thighs.





Neurologic


Neuro Exam:  Sedated





Assessment/Plan


Discussed Condition With:  Patient, Parent, Sibling


Problem List:  


(1) Acute renal failure


Plan:  








Patient's urine output has improved and creatinine level is slightly improved 

today.


Defer dialysis in view of this but as the patient and sister were advised we 

are not completely "out of the woods".  Hopefully this improvement will 

continue but remains be determined.


Continue Octreotide to 200mch q8h as well as Midodrine to 10mg TID.  Continue 

IV albumin  12.5 g every 6.


Appears to be somewhat more edema today.  In the diarrhea has diminished.  I 

will decrease IV fluids.


Will follow with renal panel in the AM. 


I still believe that hepatorenal syndrome is very likely present and if so 

prognosis is very poor.


It is noted that the white count is going up.  I will defer evaluation to GI 

and primary care physician.





Medications should be adjusted for the patient's renal decline.  Avoid 

nephrotoxic medications such as iodinated contrast dyes and NSAIDs.  Avoid 

gadolinium when eGFR <30





(2) Hypokalemia


Plan:  Repletion as ordered





(3) Acidosis


Plan:  Continue po bicarb





(4) Cirrhosis with alcoholism


Plan:  LFTs not improving.





(5) Diarrhea


Plan:  Improved.  Management per GI.  Apparently does have mild colitis.





Plan


Case discussed with primary care physician.  Agree with ID opinion in view of 

leukocytosis and low-grade temperature.








RADHA Alan MD Apr 4, 2017 11:08

## 2017-04-04 NOTE — HHI.PR
Subjective


Remarks


Follow-up hepatitis and colitis.  Improving diarrhea.  Tmax 100.5.  Complaining 

of cough Patient sleepy after receiving Ativan for anxiety.  Seen with sister.  

Discussed with RN, nephrology and ID





Objective


Vitals





 Vital Signs








  Date Time  Temp Pulse Resp B/P Pulse Ox O2 Delivery O2 Flow Rate FiO2


 


4/4/17 09:00      Room Air  


 


4/4/17 07:32     95   21


 


4/4/17 04:00     92 Nasal Cannula 2.00 


 


4/4/17 04:00  95      


 


4/4/17 04:00 98.9 93 20 121/60 94   


 


4/4/17 02:00  107      


 


4/4/17 00:00  108      


 


4/4/17 00:00     96 Nasal Cannula 2.00 


 


4/4/17 00:00 98.7 105 23 113/64 96   


 


4/3/17 22:00 98.5       


 


4/3/17 22:00  104      


 


4/3/17 20:00  96      


 


4/3/17 20:00 100.5 98 18 134/74 95   


 


4/3/17 20:00     95 Nasal Cannula 2.00 


 


4/3/17 19:36     99   21


 


4/3/17 15:00  108      


 


4/3/17 14:00  108      


 


4/3/17 12:50  106      


 


4/3/17 12:04  97 16 106/65 95   


 


4/3/17 11:59  99 16 113/69 95   


 


4/3/17 11:54 98.3 101 16 116/67 95   








 I/O








 4/3/17 4/3/17 4/3/17 4/4/17 4/4/17 4/4/17





 07:00 15:00 23:00 07:00 15:00 23:00


 


Intake Total 0 ml 1826 ml 1743 ml 1253 ml  


 


Output Total 300 ml 975 ml 650 ml 350 ml  


 


Balance -300 ml 851 ml 1093 ml 903 ml  


 


      


 


Intake Oral 0 ml 640 ml 720 ml 500 ml  


 


IV Total  1086 ml 923 ml 703 ml  


 


Albumin   100 ml 50 ml  


 


Other  100 ml    


 


Output Urine Total 300 ml 975 ml 650 ml 350 ml  


 


# Bowel Movements 2 1 1 2  








Result Diagram:  


4/4/17 0355                                                                    

            4/4/17 0355





Imaging





Last Impressions








Lower Extremity Ultrasound 4/4/17 0000 Signed





Impressions: 





 Service Date/Time:  Tuesday, April 4, 2017 14:04 - CONCLUSION:  Normal 





 examination.       Jose Khan MD 


 


Chest X-Ray 4/2/17 0000 Signed





Impressions: 





 Service Date/Time:  Sunday, April 2, 2017 15:31 - CONCLUSION:  1. Slight 





 increase in basilar airspace consolidation since March 27.     Ralph Barrett MD 


 


Hepatobiliary Scan Nuclear Medicine 3/28/17 0000 Signed





Impressions: 





 Service Date/Time:  Tuesday, March 28, 2017 14:36 - CONCLUSION:  Markedly 





 delayed uptake in the liver. Findings suggest diffuse hepatocellular disease 





 versus biliary obstruction. 24 hour delayed scan could be performed.     

Estevan Longoria MD ADDENDUM:  24 hour scans still shows no activity in bile ducts or 





 small bowel suggesting diffuse hepatocellular disease.    Jona Del Angel MD  





 FACR


 


Abdomen/Pelvis CT 3/27/17 0000 Signed





Impressions: 





 Service Date/Time:  Monday, March 27, 2017 16:50 - CONCLUSION: Findings 

suggest 





 advanced hepatocellular disease with varicosities, prominent spleen and 





 interestingly only a small amount of ascites.  Most of the ascites is in the 





 pelvis.     Jona Del Angel MD  FACR


 


Abdomen Ultrasound 3/27/17 0000 Signed





Impressions: 





 Service Date/Time:  Monday, March 27, 2017 18:27 - CONCLUSION:  1. Thick-

walled 





 gallbladder containing some sludge and demonstrating pericholecystic fluid.  

If 





 there is clinical concern for acute cholecystitis a hepatobiliary scan may be 





 helpful to confirm cystic duct obstruction.  2. Hepatosplenomegaly.  3. Some 





 ascites within the abdomen.           Lebron Marin MD 








Objective Remarks


GENERAL: Chronically ill male in no acute distress.


SKIN: Jaundiced


HEAD: Normocephalic.


EYES: scleral icterus. . 


RESPIRATORY: Breath sounds equal bilaterally. No accessory muscle use.


GASTROINTESTINAL: Abdomen soft, + diffused mild TT,  positive for distention.


MUSCULOSKELETAL: No cyanosis with pitting edema


BACK: Nontender without obvious deformity. No CVA tenderness.


Alert and oriented nonfocal


Procedures


Sigmoidoscopy





A/P


Problem List:  


(1) Acute hepatitis


ICD Code:  B17.9


Status:  Acute


(2) Acute renal failure


ICD Code:  N17.9


Status:  Acute


Assessment and Plan


26-year-old male with history of hepatitis





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


-CT scan show hepatocellular disease with varices.


-Patient has not been drinking alcohol since last admission in [place].


-Hepatitis panel reviewed from last admission which was all negative.


-Alcohol level during this admission is negative.


-Liver ultrasound shows possible acute cholecystitis. HIDA scan negative. 


-GI consulted and ff. continue with Ceftriaxone, Pentoxifylline, PPI.  Consider 

steroids if infection ruled out


-Unremarkable DARRYL, AMA, ASM.  Follow-up hemachromatosis panel and US guided 

paracentesis, diagnostic (? SBP) if enough fluid to safely drain


-Continue with supportive care.  Patient is not a candidate for liver 

transplant due to recent alcohol use.


-poor prognosis this was discussed with patient and family.





Coagulopathy


-Secondary to liver disease.


-See treatment as above.





Acute renal failure


-Per nephrologist this may be partly due to hepatorenal syndrome versus 

dehydration with ATN.


-Nephrologist consulted and following.  Recommend to continue with IV fluids 

with bicarb, midodrine by mouth as well as octreotide subcutaneously and IV 

albumin.  


-Cr slightly better.


-Todd placed due to for accurate I's and O's.  Patient accepts risks of 

infection


-Patient may require dialysis.  Renal function most likely will not improve 

unless liver improves.





Diarrhea with C. difficile negative 


-Improving.  Continue Questran and Lomotil as well as IV hydration.  Status 

post endoscopy follow-up pathology 





Fever with leukocytosis and diarrhea.  Patient complaining cough with abnormal 

chest x-ray.  Switch Rocephin to Zosyn and continue Flagyl.  Monitor cultures.  

Blood cultures if febrile.  Consult ID





Anxiety


-on Ativan when necessary.





Urticaria


-on Vistaril.





DVT prophylaxis


-INR is elevated so chemoprophylaxis contraindicated.








Wilder Todd MD Apr 4, 2017 11:18

## 2017-04-04 NOTE — RADRPT
EXAM DATE/TIME:  04/04/2017 14:04 

 

[facility] COMPARISON:     

No previous studies available for comparison.

        

 

 

INDICATIONS :                

Swelling in bilateral legs.

            

 

MEDICAL HISTORY :        

Gastroesophageal reflux disease. Cirrhosis. Skull fracture from trauma. Jaundice. Abdominal pain.

 

SURGICAL HISTORY :      

Right foot/ankle fracture surgery.

 

ENCOUNTER:     

Initial

 

ACUITY:     

1 day

 

PAIN SCORE:      

0/10

 

LOCATION:      

Bilateral  leg.

                       

 

TECHNIQUE:     

Venous ultrasound of the left and right leg was performed from the inguinal ligament to the proximal 
calf.  Real-time, color Doppler and spectral tracing, compression and augmentation techniques were us
ed.  

 

FINDINGS:     

 

RIGHT LEG:     

There is normal compressibility of the deep venous system from the inguinal region to the proximal ca
lf.  No echogenic clot is seen in the lumen of the common femoral, femoral, popliteal, and posterior 
tibial veins.  There is a normal response of the venous system to proximal and distal augmentation an
d respiration.  

 

LEFT LEG:     

There is normal compressibility of the deep venous system from the inguinal region to the proximal ca
lf.  No echogenic clot is seen in the lumen of the common femoral, femoral, popliteal, and posterior 
tibial veins.  There is a normal response of the venous system to proximal and distal augmentation an
d respiration.  

 

CONCLUSION:     

Normal examination.  

 

 

 

 Jose Khan MD on April 04, 2017 at 14:42           

Board Certified Radiologist.

 This report was verified electronically.

## 2017-04-05 VITALS
RESPIRATION RATE: 20 BRPM | SYSTOLIC BLOOD PRESSURE: 121 MMHG | HEART RATE: 99 BPM | OXYGEN SATURATION: 96 % | TEMPERATURE: 99 F | DIASTOLIC BLOOD PRESSURE: 64 MMHG

## 2017-04-05 VITALS
TEMPERATURE: 98.9 F | HEART RATE: 95 BPM | OXYGEN SATURATION: 95 % | DIASTOLIC BLOOD PRESSURE: 66 MMHG | SYSTOLIC BLOOD PRESSURE: 111 MMHG | RESPIRATION RATE: 23 BRPM

## 2017-04-05 VITALS — HEART RATE: 94 BPM

## 2017-04-05 VITALS — OXYGEN SATURATION: 100 %

## 2017-04-05 VITALS
DIASTOLIC BLOOD PRESSURE: 80 MMHG | HEART RATE: 97 BPM | OXYGEN SATURATION: 96 % | SYSTOLIC BLOOD PRESSURE: 119 MMHG | RESPIRATION RATE: 34 BRPM | TEMPERATURE: 98.2 F

## 2017-04-05 VITALS
TEMPERATURE: 98.4 F | DIASTOLIC BLOOD PRESSURE: 70 MMHG | HEART RATE: 91 BPM | RESPIRATION RATE: 14 BRPM | OXYGEN SATURATION: 100 % | SYSTOLIC BLOOD PRESSURE: 119 MMHG

## 2017-04-05 VITALS
OXYGEN SATURATION: 96 % | HEART RATE: 96 BPM | DIASTOLIC BLOOD PRESSURE: 71 MMHG | RESPIRATION RATE: 23 BRPM | SYSTOLIC BLOOD PRESSURE: 117 MMHG | TEMPERATURE: 98.9 F

## 2017-04-05 VITALS — HEART RATE: 95 BPM

## 2017-04-05 VITALS
DIASTOLIC BLOOD PRESSURE: 78 MMHG | HEART RATE: 108 BPM | OXYGEN SATURATION: 99 % | SYSTOLIC BLOOD PRESSURE: 120 MMHG | TEMPERATURE: 98.6 F | RESPIRATION RATE: 24 BRPM

## 2017-04-05 VITALS — HEART RATE: 92 BPM

## 2017-04-05 VITALS — HEART RATE: 96 BPM

## 2017-04-05 LAB
ALP SERPL-CCNC: 133 U/L (ref 45–117)
ALT SERPL-CCNC: 30 U/L (ref 12–78)
ANION GAP SERPL CALC-SCNC: 14 MEQ/L (ref 5–15)
AST SERPL-CCNC: 56 U/L (ref 15–37)
BACTERIA #/AREA URNS HPF: (no result) /HPF
BASOPHILS # BLD AUTO: 0.1 TH/MM3 (ref 0–0.2)
BASOPHILS NFR BLD: 0.3 % (ref 0–2)
BILIRUB SERPL-MCNC: 33 MG/DL (ref 0.2–1)
BUN SERPL-MCNC: 31 MG/DL (ref 7–18)
CHLORIDE SERPL-SCNC: 106 MEQ/L (ref 98–107)
COLOR UR: (no result)
COMMENT (UR): (no result)
CULTURE IF INDICATED: (no result)
EOSINOPHIL # BLD: 0.3 TH/MM3 (ref 0–0.4)
EOSINOPHIL NFR BLD: 1.4 % (ref 0–4)
ERYTHROCYTE [DISTWIDTH] IN BLOOD BY AUTOMATED COUNT: 15.8 % (ref 11.6–17.2)
GFR SERPLBLD BASED ON 1.73 SQ M-ARVRAT: 10 ML/MIN (ref 89–?)
GLUCOSE UR STRIP-MCNC: (no result) MG/DL
HCO3 BLD-SCNC: 17.6 MEQ/L (ref 21–32)
HCT VFR BLD CALC: 26.3 % (ref 39–51)
HEMO FLAGS: (no result)
HGB UR QL STRIP: (no result)
KETONES UR STRIP-MCNC: (no result) MG/DL
LYMPHOCYTES # BLD AUTO: 0.8 TH/MM3 (ref 1–4.8)
LYMPHOCYTES NFR BLD AUTO: 4 % (ref 9–44)
MAGNESIUM SERPL-MCNC: 2 MG/DL (ref 1.5–2.5)
MCH RBC QN AUTO: 37.1 PG (ref 27–34)
MCHC RBC AUTO-ENTMCNC: 34.9 % (ref 32–36)
MCV RBC AUTO: 106.2 FL (ref 80–100)
MONOCYTES NFR BLD: 8.5 % (ref 0–8)
NEUTROPHILS # BLD AUTO: 17.4 TH/MM3 (ref 1.8–7.7)
NEUTROPHILS NFR BLD AUTO: 85.8 % (ref 16–70)
NITRITE UR QL STRIP: (no result)
PLATELET # BLD: 122 TH/MM3 (ref 150–450)
POTASSIUM SERPL-SCNC: 3.6 MEQ/L (ref 3.5–5.1)
RBC # BLD AUTO: 2.47 MIL/MM3 (ref 4.5–5.9)
SODIUM SERPL-SCNC: 138 MEQ/L (ref 136–145)
SP GR UR STRIP: 1.01 (ref 1–1.03)
SQUAMOUS #/AREA URNS HPF: 2 /HPF (ref 0–5)
WBC # BLD AUTO: 20.3 TH/MM3 (ref 4–11)

## 2017-04-05 RX ADMIN — LOPERAMIDE HYDROCHLORIDE PRN MG: 2 CAPSULE ORAL at 16:42

## 2017-04-05 RX ADMIN — BENZONATATE PRN MG: 100 CAPSULE ORAL at 20:08

## 2017-04-05 RX ADMIN — PENTOXIFYLLINE SCH MG: 400 TABLET, FILM COATED, EXTENDED RELEASE ORAL at 12:51

## 2017-04-05 RX ADMIN — POTASSIUM PHOSPHATE, MONOBASIC SCH MG: 500 TABLET, SOLUBLE ORAL at 17:22

## 2017-04-05 RX ADMIN — Medication SCH TAB: at 17:22

## 2017-04-05 RX ADMIN — NYSTATIN SCH ML: 100000 SUSPENSION ORAL at 19:55

## 2017-04-05 RX ADMIN — MIDODRINE HYDROCHLORIDE SCH MG: 5 TABLET ORAL at 15:59

## 2017-04-05 RX ADMIN — HEPARIN SODIUM SCH MLS/HR: 10000 INJECTION, SOLUTION INTRAVENOUS; SUBCUTANEOUS at 08:25

## 2017-04-05 RX ADMIN — CHOLESTYRAMINE SCH GM: 4 POWDER, FOR SUSPENSION ORAL at 06:13

## 2017-04-05 RX ADMIN — OCTREOTIDE ACETATE SCH MCG: 100 INJECTION, SOLUTION INTRAVENOUS; SUBCUTANEOUS at 06:14

## 2017-04-05 RX ADMIN — BENZONATATE PRN MG: 100 CAPSULE ORAL at 08:41

## 2017-04-05 RX ADMIN — OCTREOTIDE ACETATE SCH MCG: 100 INJECTION, SOLUTION INTRAVENOUS; SUBCUTANEOUS at 19:55

## 2017-04-05 RX ADMIN — TAZOBACTAM SODIUM AND PIPERACILLIN SODIUM SCH MLS/HR: 250; 2 INJECTION, SOLUTION INTRAVENOUS at 12:51

## 2017-04-05 RX ADMIN — CHOLESTYRAMINE SCH GM: 4 POWDER, FOR SUSPENSION ORAL at 16:31

## 2017-04-05 RX ADMIN — COLLAGENASE SANTYL SCH APPLIC: 250 OINTMENT TOPICAL at 14:35

## 2017-04-05 RX ADMIN — ALBUMIN HUMAN SCH GM: 0.25 SOLUTION INTRAVENOUS at 14:34

## 2017-04-05 RX ADMIN — IPRATROPIUM BROMIDE AND ALBUTEROL SULFATE SCH AMPULE: .5; 3 SOLUTION RESPIRATORY (INHALATION) at 08:43

## 2017-04-05 RX ADMIN — IPRATROPIUM BROMIDE AND ALBUTEROL SULFATE SCH AMPULE: .5; 3 SOLUTION RESPIRATORY (INHALATION) at 15:02

## 2017-04-05 RX ADMIN — NYSTATIN SCH ML: 100000 SUSPENSION ORAL at 14:35

## 2017-04-05 RX ADMIN — Medication SCH TAB: at 12:50

## 2017-04-05 RX ADMIN — RIFAXIMIN SCH MG: 550 TABLET ORAL at 19:55

## 2017-04-05 RX ADMIN — ALBUMIN HUMAN SCH GM: 0.25 SOLUTION INTRAVENOUS at 01:04

## 2017-04-05 RX ADMIN — POTASSIUM PHOSPHATE, MONOBASIC SCH MG: 500 TABLET, SOLUBLE ORAL at 08:24

## 2017-04-05 RX ADMIN — PENTOXIFYLLINE SCH MG: 400 TABLET, FILM COATED, EXTENDED RELEASE ORAL at 19:55

## 2017-04-05 RX ADMIN — ALBUMIN HUMAN SCH GM: 0.25 SOLUTION INTRAVENOUS at 19:56

## 2017-04-05 RX ADMIN — NYSTATIN SCH ML: 100000 SUSPENSION ORAL at 17:22

## 2017-04-05 RX ADMIN — ONDANSETRON PRN MG: 2 INJECTION, SOLUTION INTRAMUSCULAR; INTRAVENOUS at 15:58

## 2017-04-05 RX ADMIN — Medication SCH ML: at 09:00

## 2017-04-05 RX ADMIN — Medication SCH ML: at 19:56

## 2017-04-05 RX ADMIN — RIFAXIMIN SCH MG: 550 TABLET ORAL at 08:24

## 2017-04-05 RX ADMIN — TAZOBACTAM SODIUM AND PIPERACILLIN SODIUM SCH MLS/HR: 250; 2 INJECTION, SOLUTION INTRAVENOUS at 06:14

## 2017-04-05 RX ADMIN — IPRATROPIUM BROMIDE AND ALBUTEROL SULFATE SCH AMPULE: .5; 3 SOLUTION RESPIRATORY (INHALATION) at 20:31

## 2017-04-05 RX ADMIN — ONDANSETRON PRN MG: 2 INJECTION, SOLUTION INTRAMUSCULAR; INTRAVENOUS at 01:04

## 2017-04-05 RX ADMIN — OCTREOTIDE ACETATE SCH MCG: 100 INJECTION, SOLUTION INTRAVENOUS; SUBCUTANEOUS at 12:52

## 2017-04-05 RX ADMIN — MIDODRINE HYDROCHLORIDE SCH MG: 5 TABLET ORAL at 06:14

## 2017-04-05 RX ADMIN — PENTOXIFYLLINE SCH MG: 400 TABLET, FILM COATED, EXTENDED RELEASE ORAL at 06:13

## 2017-04-05 RX ADMIN — MIDODRINE HYDROCHLORIDE SCH MG: 5 TABLET ORAL at 12:51

## 2017-04-05 RX ADMIN — TAZOBACTAM SODIUM AND PIPERACILLIN SODIUM SCH MLS/HR: 250; 2 INJECTION, SOLUTION INTRAVENOUS at 17:20

## 2017-04-05 RX ADMIN — Medication PRN ML: at 01:04

## 2017-04-05 RX ADMIN — IPRATROPIUM BROMIDE AND ALBUTEROL SULFATE SCH AMPULE: .5; 3 SOLUTION RESPIRATORY (INHALATION) at 11:05

## 2017-04-05 RX ADMIN — Medication SCH TAB: at 08:24

## 2017-04-05 RX ADMIN — POTASSIUM PHOSPHATE, MONOBASIC SCH MG: 500 TABLET, SOLUBLE ORAL at 12:50

## 2017-04-05 RX ADMIN — NYSTATIN SCH ML: 100000 SUSPENSION ORAL at 08:24

## 2017-04-05 RX ADMIN — BENZONATATE PRN MG: 100 CAPSULE ORAL at 15:58

## 2017-04-05 RX ADMIN — ALBUMIN HUMAN SCH GM: 0.25 SOLUTION INTRAVENOUS at 08:41

## 2017-04-05 RX ADMIN — LOPERAMIDE HYDROCHLORIDE PRN MG: 2 CAPSULE ORAL at 08:24

## 2017-04-05 NOTE — HHI.IDPN
Subjective


Subjective


Remarks


Notes reviewed


Temps ok


WBC up to 20


Todd removed, has not urinated yet


Still with diarrhea


Good UO


US not a lot of ascites 


Doppler  no DVT


Antibiotics


Zosyn


Past Medical History


Reviewed


Allergies:  


Coded Allergies:  


     No Known Allergies (Verified , 3/11/17)





Objective


.





 Vital Signs








  Date Time  Temp Pulse Resp B/P Pulse Ox O2 Delivery O2 Flow Rate FiO2


 


4/5/17 08:21     100   21


 


4/5/17 06:00  92      


 


4/5/17 04:00 98.9 95 23 111/66 95   


 


4/5/17 04:00  95      


 


4/5/17 02:00  96      


 


4/5/17 00:00 98.9 96 23 117/71 96   


 


4/5/17 00:00  96      


 


4/4/17 22:00  97      


 


4/4/17 20:00 98.7 91 22 121/66 96   


 


4/4/17 20:00  91      


 


4/4/17 20:00      Room Air  


 


4/4/17 19:21     97   


 


4/4/17 18:00  96      


 


4/4/17 16:00      Room Air  


 


4/4/17 16:00 98.8 100 29 128/68 99   


 


4/4/17 16:00  100      


 


4/4/17 14:00  93      














 4/4/17 4/4/17 4/5/17





 15:00 23:00 07:00


 


Intake Total 1779 ml 946 ml 1229 ml


 


Output Total 800 ml 600 ml 1000 ml


 


Balance 979 ml 346 ml 229 ml


 


   


 


Intake Oral 720 ml 400 ml 600 ml


 


IV Total 1009 ml 496 ml 579 ml


 


Albumin 50 ml 50 ml 50 ml


 


Output Urine Total 800 ml 600 ml 1000 ml


 


# Bowel Movements  2 2








.





Laboratory Tests








Test 4/4/17 4/5/17





 03:55 03:28


 


White Blood Count 18.6 TH/MM3 20.3 TH/MM3


 


Red Blood Count 2.42 MIL/MM3 2.47 MIL/MM3


 


Hemoglobin 9.3 GM/DL 9.2 GM/DL


 


Hematocrit 25.4 % 26.3 %


 


Mean Corpuscular Volume 104.8 .2 FL


 


Mean Corpuscular Hemoglobin 38.3 PG 37.1 PG


 


Mean Corpuscular Hemoglobin 36.5 % 34.9 %





Concent  


 


Red Cell Distribution Width 15.3 % 15.8 %


 


Platelet Count 121 TH/MM3 122 TH/MM3


 


Mean Platelet Volume 10.7 FL 10.5 FL


 


Neutrophils (%) (Auto) 83.1 % 85.8 %


 


Lymphocytes (%) (Auto) 5.6 % 4.0 %


 


Monocytes (%) (Auto) 9.7 % 8.5 %


 


Eosinophils (%) (Auto) 1.2 % 1.4 %


 


Basophils (%) (Auto) 0.4 % 0.3 %


 


Neutrophils # (Auto) 15.4 TH/MM3 17.4 TH/MM3


 


Lymphocytes # (Auto) 1.0 TH/MM3 0.8 TH/MM3


 


Monocytes # (Auto) 1.8 TH/MM3 1.7 TH/MM3


 


Eosinophils # (Auto) 0.2 TH/MM3 0.3 TH/MM3


 


Basophils # (Auto) 0.1 TH/MM3 0.1 TH/MM3


 


CBC Comment AUTO DIFF  DIFF FINAL 


 


Differential Total Cells 100  





Counted  


 


Neutrophils % (Manual) 64 % 


 


Band Neutrophils % 26 % 


 


Lymphocytes % 3 % 


 


Monocytes % 6 % 


 


Eosinophils % 1 % 


 


Neutrophils # (Manual) 16.7 TH/MM3 


 


Differential Comment FINAL DIFF  





 MANUAL 


 


Platelet Estimate LOW  


 


Platelet Morphology Comment ENLARGED  


 


Target Cells 1+  








Laboratory Tests








Test 4/4/17 4/5/17





 03:55 03:28


 


Sodium Level 137 MEQ/L 138 MEQ/L


 


Potassium Level 3.2 MEQ/L 3.6 MEQ/L


 


Chloride Level 106 MEQ/L 106 MEQ/L


 


Carbon Dioxide Level 18.2 MEQ/L 17.6 MEQ/L


 


Anion Gap 13 MEQ/L 14 MEQ/L


 


Blood Urea Nitrogen 33 MG/DL 31 MG/DL


 


Creatinine 6.69 MG/DL 6.49 MG/DL


 


Estimat Glomerular Filtration 10 ML/MIN 10 ML/MIN





Rate  


 


Random Glucose 106 MG/ MG/DL


 


Calcium Level 7.7 MG/DL 8.1 MG/DL


 


Phosphorus Level 2.2 MG/DL 2.2 MG/DL


 


Magnesium Level 2.1 MG/DL 2.0 MG/DL


 


Total Bilirubin 34.3 MG/DL 33.0 MG/DL


 


Aspartate Amino Transf 84 U/L 56 U/L





(AST/SGOT)  


 


Alanine Aminotransferase 39 U/L 30 U/L





(ALT/SGPT)  


 


Alkaline Phosphatase 135 U/L 133 U/L


 


Total Protein 4.6 GM/DL 4.5 GM/DL


 


Albumin 2.6 GM/DL 2.6 GM/DL








Microbiology








 Date/Time Procedure Status





Source Growth 


 


 4/4/17 13:00 Gram Stain - Final Resulted





Sputum Expectorated Sputum  





 4/4/17 13:00 Sputum Culture - Preliminary Resulted





Sputum Expectorated Sputum MODERATE GROWTH NORMAL RESPIRATORY FL... 


 


 4/4/17 14:30 Aerobic Blood Culture - Preliminary Resulted





Blood Peripheral NO GROWTH IN 1 DAY 





 4/4/17 14:30 Anaerobic Blood Culture - Preliminary Resulted





Blood Peripheral NO GROWTH IN 1 DAY 


 


 4/4/17 14:38 Aerobic Blood Culture - Preliminary Resulted





Blood Peripheral NO GROWTH IN 1 DAY 





 4/4/17 14:38 Anaerobic Blood Culture - Preliminary Resulted





Blood Peripheral NO GROWTH IN 1 DAY 








Imaging














Lower Extremity Ultrasound 4/4/17 0000 Signed





Impressions: 





 Service Date/Time:  Tuesday, April 4, 2017 14:04 - CONCLUSION:  Normal 





 examination.       Jose Khan MD 


 


Abdomen Ultrasound 4/4/17 0000 Signed





Impressions: 





 Service Date/Time:  Tuesday, April 4, 2017 14:00 - CONCLUSION:  The exam 





 demonstrates small pockets of ascites within the midline of the pelvis. There 

is 





 a small amount of ascites around the liver and spleen. The liver appears 





 cirrhotic.     Valdemar Del Angel MD 


 


Chest X-Ray 4/2/17 0000 Signed





Impressions: 





 Service Date/Time:  Sunday, April 2, 2017 15:31 - CONCLUSION:  1. Slight 





 increase in basilar airspace consolidation since March 27.     Ralph Barrett MD 


 


Hepatobiliary Scan Nuclear Medicine 3/28/17 0000 Signed





Impressions: 





 Service Date/Time:  Tuesday, March 28, 2017 14:36 - CONCLUSION:  Markedly 





 delayed uptake in the liver. Findings suggest diffuse hepatocellular disease 





 versus biliary obstruction. 24 hour delayed scan could be performed.     

Estevan Longoria MD ADDENDUM:  24 hour scans still shows no activity in bile ducts or 





 small bowel suggesting diffuse hepatocellular disease.    Jona Del Angel MD  





 FACR


 


Abdomen/Pelvis CT 3/27/17 0000 Signed





Impressions: 





 Service Date/Time:  Monday, March 27, 2017 16:50 - CONCLUSION: Findings 

suggest 





 advanced hepatocellular disease with varicosities, prominent spleen and 





 interestingly only a small amount of ascites.  Most of the ascites is in the 





 pelvis.     Jona Del Angel MD  FACR








Physical Exam


GENERAL:Awake and alert, up in chair,  has deep jaundice,  NAD   


SKIN:   Cool and dry.  Has deep jaundice.  No generalized rash or ecchymosis.  

No embolic lesions noted.


HEENT:   Pink conjunctivae, no petechia or hemorrhage.  Has deep scleral 

icterus.   Moist oral mucosa, with some mild thrush.  


NECK: Trachea midline. No JVD or lymphadenopathy. Supple, nontender, no 

meningeal signs.


CARDIOVASCULAR: Regular rate and rhythm without murmurs, gallops, or rubs. 


RESPIRATORY: Clear to auscultation anteriorly.. Breath sounds equal 

bilaterally. No wheezes,  or rhonchi.  Has some rales at the bases.


GASTROINTESTINAL: Abdomen is distended, with mild diffuse abdominal tenderness.

  No guarding.  Bowel sounds are present and hypoactive.  No rebound.


MUSCULOSKELETAL: Extremities without clubbing, or cyanosis.  Has bilateral 

pitting pedal edema. 


NEUROLOGICAL:  Grossly non-focal. 


PSYCH:  Calm and cooperative


LINE:  PIV with no evidence of infection.  No evidence of phlebitis in previous 

IV sites or venipuncture sites





Assessment & Plan


Remarks


IMPRESSION


Intermittent low grade fevers,  etiology?


   -  ?infection:  ,  pulmonary,  ?ascites/SBP


   -  ?non-infectious


Hepatic failure


Renal failure


   -  possible hepatorenal syndrome


   -  non-oliguric


Known ETOH abuse











RECOMMENDATION


Follow C/S


Monitor temps


Monitor progress


Follow CBC


Continue Curtis





Spoke with mother and sister


D/W Edna Marie MD Apr 5, 2017 13:31

## 2017-04-05 NOTE — HHI.PR
Subjective


Remarks


F/u liver and kidney disease. Still with loose stools though less 10 episodes 

in the past 24 hours. Also with abd pain. Wants young removed.  Discussed with 

RN





Objective


Vitals





 Vital Signs








  Date Time  Temp Pulse Resp B/P Pulse Ox O2 Delivery O2 Flow Rate FiO2


 


4/5/17 08:21     100   21


 


4/5/17 06:00  92      


 


4/5/17 04:00 98.9 95 23 111/66 95   


 


4/5/17 04:00  95      


 


4/5/17 02:00  96      


 


4/5/17 00:00 98.9 96 23 117/71 96   


 


4/5/17 00:00  96      


 


4/4/17 22:00  97      


 


4/4/17 20:00 98.7 91 22 121/66 96   


 


4/4/17 20:00  91      


 


4/4/17 20:00      Room Air  


 


4/4/17 19:21     97   


 


4/4/17 18:00  96      


 


4/4/17 16:00      Room Air  


 


4/4/17 16:00 98.8 100 29 128/68 99   


 


4/4/17 16:00  100      


 


4/4/17 14:00  93      


 


4/4/17 12:00  98      


 


4/4/17 12:00 99.0 98 24 126/67 96   


 


4/4/17 12:00      Room Air  








 I/O








 4/4/17 4/4/17 4/4/17 4/5/17 4/5/17 4/5/17





 07:00 15:00 23:00 07:00 15:00 23:00


 


Intake Total 1253 ml 1779 ml 946 ml 1229 ml  


 


Output Total 350 ml 800 ml 600 ml 1000 ml  


 


Balance 903 ml 979 ml 346 ml 229 ml  


 


      


 


Intake Oral 500 ml 720 ml 400 ml 600 ml  


 


IV Total 703 ml 1009 ml 496 ml 579 ml  


 


Albumin 50 ml 50 ml 50 ml 50 ml  


 


Output Urine Total 350 ml 800 ml 600 ml 1000 ml  


 


# Bowel Movements 2  2 2  








Result Diagram:  


4/5/17 0328                                                                    

            4/5/17 0328





Imaging





Last Impressions








Lower Extremity Ultrasound 4/4/17 0000 Signed





Impressions: 





 Service Date/Time:  Tuesday, April 4, 2017 14:04 - CONCLUSION:  Normal 





 examination.       Jose Khan MD 


 


Abdomen Ultrasound 4/4/17 0000 Signed





Impressions: 





 Service Date/Time:  Tuesday, April 4, 2017 14:00 - CONCLUSION:  The exam 





 demonstrates small pockets of ascites within the midline of the pelvis. There 

is 





 a small amount of ascites around the liver and spleen. The liver appears 





 cirrhotic.     Valdemar Del Angel MD 


 


Chest X-Ray 4/2/17 0000 Signed





Impressions: 





 Service Date/Time:  Sunday, April 2, 2017 15:31 - CONCLUSION:  1. Slight 





 increase in basilar airspace consolidation since March 27.     Ralph Barrett MD 


 


Hepatobiliary Scan Nuclear Medicine 3/28/17 0000 Signed





Impressions: 





 Service Date/Time:  Tuesday, March 28, 2017 14:36 - CONCLUSION:  Markedly 





 delayed uptake in the liver. Findings suggest diffuse hepatocellular disease 





 versus biliary obstruction. 24 hour delayed scan could be performed.     

Estevan Longoria MD ADDENDUM:  24 hour scans still shows no activity in bile ducts or 





 small bowel suggesting diffuse hepatocellular disease.    Jona Del Angel MD  





 FACR


 


Abdomen/Pelvis CT 3/27/17 0000 Signed





Impressions: 





 Service Date/Time:  Monday, March 27, 2017 16:50 - CONCLUSION: Findings 

suggest 





 advanced hepatocellular disease with varicosities, prominent spleen and 





 interestingly only a small amount of ascites.  Most of the ascites is in the 





 pelvis.     Jona Del Angel MD  FACR








Objective Remarks


GENERAL: Chronically ill male in no acute distress.


SKIN: Jaundiced


HEAD: Normocephalic.


EYES: scleral icterus. . 


RESPIRATORY: Breath sounds equal bilaterally. No accessory muscle use.


GASTROINTESTINAL: Abdomen soft, + epigastric area mild TT,  positive for 

distention.


MUSCULOSKELETAL: No cyanosis with pitting edema


BACK: Nontender without obvious deformity. No CVA tenderness.


Alert and oriented nonfocal


Procedures


Sigmoidoscopy





A/P


Problem List:  


(1) Acute hepatitis


ICD Code:  B17.9


Status:  Acute


(2) Acute renal failure


ICD Code:  N17.9


Status:  Acute


Assessment and Plan


26-year-old male with history of hepatitis





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


-CT scan show hepatocellular disease with varices.


-Patient has not been drinking alcohol since last admission in [place].


-Hepatitis panel reviewed from last admission which was all negative.


-Alcohol level during this admission is negative.


-Liver ultrasound shows possible acute cholecystitis. HIDA scan negative. 


-GI consulted and ff. continue with Zosyn, Pentoxifylline, PPI.  Consider 

steroids if infection ruled out


-Unremarkable DARRYL, AMA, ASM.  Follow-up hemachromatosis panel and US guided 

paracentesis, diagnostic (? SBP) if enough fluid to safely drain


-Continue with supportive care.  Patient is not a candidate for liver 

transplant due to recent alcohol use.


-poor prognosis this was discussed with patient and family.





Coagulopathy


-Secondary to liver disease.


-See treatment as above.





Acute renal failure


-Per nephrologist this may be partly due to hepatorenal syndrome versus 

dehydration with ATN.


-Nephrologist consulted and following.  Recommend to continue with IV fluids 

with bicarb, midodrine by mouth as well as octreotide subcutaneously and IV 

albumin.  


-Cr slightly better.


-Young discontinued


-Patient may require dialysis.  Renal function most likely will not improve 

unless liver improves.





Diarrhea with C. difficile negative 


-Improving.  Continue Questran and Lomotil as well as IV hydration.  Status 

post sigmoidoscopy pathology negative 





Fever with leukocytosis and diarrhea.  Patient complaining cough with abnormal 

chest x-ray.  Improving continue Zosyn.  Flagyl discontinued per ID.  Monitor 

cultures.  Blood cultures if febrile.





Anxiety


-on Ativan when necessary.





Urticaria


-on Vistaril.





Wound care





Physical therapy evaluation for deconditioning





DVT prophylaxis


-INR is elevated so chemoprophylaxis contraindicated.


Discharge Planning


Consider transfer to floor if he remains stable








Wilder Todd MD Apr 5, 2017 11:08

## 2017-04-05 NOTE — HHI.NPPN
Subjective


History of Present Illness


The patient is a 27 yo [place] male who presented to this facility 3/27 with 

complaints of abd pain, nausea, and poor oral intake for the past 2 weeks.  He 

was recently admitted to [facility] (3/11-3/14) for similar complaints, but signed 

himself out AMA.  He has a hx of heavy EtOH intake for the past several years, 

but says he has been trying to cut back since he was admitted to [facility].  As per 

records, he was using Lasix since his discharge from the hospital despite 

little po intake, but he says he was only using "over the counter diuretics" 

and Benadryl.  Denies any NSAID use at home.  No prior hx of renal decline.  

Mentions multiple family member with RA.


Admitting SCr 3.63


Labs from beginning of month from [facility] show preserved renal function at 0.6-0.8 

baseline SCr.


Interval History


The patient is laying quietly in bed.  Does speak when spoken to, but overall 

lets his family do the talking.


Sister and mother present in room.


Reports still with multiple stools, but decrease in quantity.


UOP good.  Todd out.


Intake still poor.





Review of Systems


General


Constitutional:  Fatigue





Gastrointestinal


Gastrointestinal:  Abdominal Pain, Diarrhea





Objective Data


Data











 4/4/17 4/5/17





 19:00 07:00


 


Intake Total 1779 ml 2175 ml


 


Output Total 800 ml 1600 ml


 


Balance 979 ml 575 ml


 


  


 


Intake Oral 720 ml 1000 ml


 


IV Total 1009 ml 1075 ml


 


Albumin 50 ml 100 ml


 


Output Urine Total 800 ml 1600 ml


 


# Bowel Movements  4











 Vital Signs








  Date Time  Temp Pulse Resp B/P Pulse Ox O2 Delivery O2 Flow Rate FiO2


 


4/5/17 08:21     100   21


 


4/5/17 06:00  92      


 


4/5/17 04:00 98.9 95 23 111/66 95   


 


4/5/17 04:00  95      


 


4/5/17 02:00  96      


 


4/5/17 00:00 98.9 96 23 117/71 96   


 


4/5/17 00:00  96      


 


4/4/17 22:00  97      


 


4/4/17 20:00 98.7 91 22 121/66 96   


 


4/4/17 20:00  91      


 


4/4/17 20:00      Room Air  


 


4/4/17 19:21     97   


 


4/4/17 18:00  96      








-:  


4/5/17 0328                                                                    

            4/5/17 0328








 Microbiology


4/5/17 Legionella Antigen, Received


         Pending


4/5/17 Streptococcus pneumoniae Antigen (M, Received


         Pending


Medication Review





 Current Medications








 Medications


  (Trade)  Dose


 Ordered  Sig/Brittany


 Route  Start Time


 Stop Time Status Last Admin


 


  (NS Flush)  2 ml  UNSCH  PRN


 IV FLUSH  3/27/17 18:15


    4/5/17 01:04


 


 


  (NS Flush)  2 ml  BID


 IV FLUSH  3/27/17 21:00


    4/4/17 21:12


 


 


  (Zofran Inj)  4 mg  Q6H  PRN


 IVP  3/27/17 18:15


    4/5/17 15:58


 


 


  (Narcan Inj)  0.4 mg  UNSCH  PRN


 IV  3/27/17 18:15


     


 


 


  (TRENtal SR)  400 mg  Q8HR


 PO  3/28/17 14:00


    4/5/17 12:51


 


 


  (Tessalon)  200 mg  TID  PRN


 PO  3/29/17 15:15


    4/5/17 15:58


 


 


  (Vistaril)  50 mg  Q6H  PRN


 PO  3/30/17 09:15


    4/3/17 05:53


 


 


  (Xifaxan)  550 mg  BID


 PO  3/30/17 21:00


    4/5/17 08:24


 


 


  (Lactulose Liq)  30 ml  DAILY


 PO  3/30/17 15:00


   Hold  


 


 


  (Pill Splitter)  1 ea  UNSCH  PRN


 OTHER  3/31/17 12:45


     


 


 


  (Ativan Inj)  0.5 mg  BID  PRN


 IV PUSH  3/31/17 14:30


    4/5/17 01:04


 


 


  (Proamatine)  10 mg  TID@07,12,17


 PO  4/1/17 17:00


    4/5/17 15:59


 


 


  (SandoSTATIN INJ)  200 mcg  Q8HR


 SQ  4/1/17 14:00


    4/5/17 12:52


 


 


  (Imodium)  2 mg  UNSCH  PRN


 PO  4/1/17 14:15


    4/5/17 16:42


 


 


  (Questran Light


 Pkt)  4 gm  Q12H


 PO  4/1/17 18:00


    4/5/17 16:31


 


 


  (Mycostatin  Liq)  5 ml  QID


 SWISH-SWAL  4/2/17 13:00


 4/16/17 12:59  4/5/17 14:35


 


 


  (Magic Mouthwash


 Adult Liq)  5 ml  QID  PRN


 SWISH-SWAL  4/2/17 11:00


     


 


 


  (Lactinex)  1 tab  TID


 PO  4/2/17 18:00


    4/5/17 12:50


 


 


 Albumin Human


 12.5 gm  12.5 gm  Q6H


 IV  4/2/17 20:00


    4/5/17 14:34


 


 


 Piperacillin Sod/


 Tazobactam Sod  50 ml @ 


 100 mls/hr  Q6H


 IV  4/4/17 12:00


    4/5/17 12:51


 


 


  (Sodium


 Bicarbonate 8.4%


 Inj/KCl Inj/1/2


 NS 1000 ml Inj)  1,085 ml @ 


 60 mls/hr  Q18H5M


 IV  4/4/17 13:00


    4/5/17 08:25


 


 


  (K-Phos)  1,000 mg  TID


 PO  4/5/17 13:00


    4/5/17 12:50


 


 


  (Santyl Oint)  1 applic  DAILY


 TOPICAL  4/5/17 11:15


    4/5/17 14:35


 











Physical Exam


General


Appearance:  No Acute Distress, Comfortable





Eyes


Eye Exam:  Jaundice





Pulmonary


Resp Exam:  Clear Bilaterally, Breath Sounds Equal, No Distress


Resp Remarks


scattered wheezing throughout





Cardiology


CV Exam:  Regular, Normal Sinus Rhythm





Gastrointestinal/Abdomen


GI Exam:  Soft, Non-Tender, Distended (moderately)





Integumentary


Skin Exam:  Clear, Warm, Jaundice





Extremeties


Extremities Exam:  Moderate Edema


Extremeties Remarks


BLE up to thighs.





Neurologic


Neuro Exam:  Sedated





Assessment/Plan


Discussed Condition With:  Patient, Parent, Sibling


Problem List:  


(1) Acute renal failure


Plan:  Patient's urine output has improved and creatinine level is slightly 

improved today.





Defer dialysis in view of this but as the patient, mother,  and sister were 

advised we are not completely "out of the woods".  Hopefully this improvement 

will continue but remains be determined.





Continue Octreotide to 200mch q8h as well as Midodrine to 10mg TID to enhance 

renal perfusion.  Will leave on Octreotide x2 weeks along with Midodrine.  May 

continue Midodrine indefinitely if required to keep BP adequate.  Continue IV 

albumin  12.5 g every 6.





Continue on IVF at 60mL/hr for the present.  Re-eval tomorrow and consider 

decreasing rate or stopping if edema worsening.





Will follow with renal panel in the AM. 


I still believe that hepatorenal syndrome is very likely present and if so 

prognosis is very poor.





Medications should be adjusted for the patient's renal decline.  Avoid 

nephrotoxic medications such as iodinated contrast dyes and NSAIDs.  Avoid 

gadolinium when eGFR <30





(2) Hypokalemia


Plan:  Repletion when needed





(3) Acidosis


Plan:  Continue bicarb via IV.  If worsens tomorrow, may need to increase per 

drip or add back po





(4) Cirrhosis with alcoholism


Plan:  LFTs a little better today





(5) Diarrhea


Plan:  Improved.  Management per GI.  Apparently does have mild colitis.











Prerna Ball Apr 5, 2017 17:03

## 2017-04-06 VITALS
TEMPERATURE: 98.6 F | RESPIRATION RATE: 19 BRPM | DIASTOLIC BLOOD PRESSURE: 71 MMHG | HEART RATE: 94 BPM | OXYGEN SATURATION: 96 % | SYSTOLIC BLOOD PRESSURE: 99 MMHG

## 2017-04-06 VITALS — HEART RATE: 95 BPM

## 2017-04-06 VITALS
HEART RATE: 86 BPM | OXYGEN SATURATION: 96 % | TEMPERATURE: 97.9 F | RESPIRATION RATE: 20 BRPM | DIASTOLIC BLOOD PRESSURE: 72 MMHG | SYSTOLIC BLOOD PRESSURE: 128 MMHG

## 2017-04-06 VITALS
TEMPERATURE: 98.2 F | OXYGEN SATURATION: 95 % | RESPIRATION RATE: 20 BRPM | HEART RATE: 87 BPM | DIASTOLIC BLOOD PRESSURE: 69 MMHG | SYSTOLIC BLOOD PRESSURE: 112 MMHG

## 2017-04-06 VITALS
HEART RATE: 92 BPM | DIASTOLIC BLOOD PRESSURE: 67 MMHG | SYSTOLIC BLOOD PRESSURE: 107 MMHG | RESPIRATION RATE: 12 BRPM | TEMPERATURE: 98.4 F | OXYGEN SATURATION: 93 %

## 2017-04-06 VITALS
SYSTOLIC BLOOD PRESSURE: 127 MMHG | HEART RATE: 93 BPM | TEMPERATURE: 98.3 F | DIASTOLIC BLOOD PRESSURE: 65 MMHG | RESPIRATION RATE: 20 BRPM | OXYGEN SATURATION: 96 %

## 2017-04-06 VITALS — HEART RATE: 92 BPM

## 2017-04-06 VITALS
SYSTOLIC BLOOD PRESSURE: 115 MMHG | OXYGEN SATURATION: 94 % | DIASTOLIC BLOOD PRESSURE: 69 MMHG | RESPIRATION RATE: 21 BRPM | TEMPERATURE: 98.2 F | HEART RATE: 107 BPM

## 2017-04-06 VITALS — HEART RATE: 93 BPM

## 2017-04-06 VITALS — HEART RATE: 86 BPM

## 2017-04-06 VITALS — HEART RATE: 89 BPM

## 2017-04-06 VITALS — OXYGEN SATURATION: 97 %

## 2017-04-06 LAB
ANION GAP SERPL CALC-SCNC: 11 MEQ/L (ref 5–15)
BUN SERPL-MCNC: 28 MG/DL (ref 7–18)
CHLORIDE SERPL-SCNC: 108 MEQ/L (ref 98–107)
GFR SERPLBLD BASED ON 1.73 SQ M-ARVRAT: 11 ML/MIN (ref 89–?)
HCO3 BLD-SCNC: 21.1 MEQ/L (ref 21–32)
HEREDITARY HEMOCHROM METHOD: (no result)
HFE GENE MUT ANL BLD/T: (no result)
HFE GENE MUT ANL BLD/T: (no result)
POTASSIUM SERPL-SCNC: 3.8 MEQ/L (ref 3.5–5.1)
SODIUM SERPL-SCNC: 140 MEQ/L (ref 136–145)
SPECIMEN SOURCE: (no result)

## 2017-04-06 RX ADMIN — TAZOBACTAM SODIUM AND PIPERACILLIN SODIUM SCH MLS/HR: 250; 2 INJECTION, SOLUTION INTRAVENOUS at 04:57

## 2017-04-06 RX ADMIN — HEPARIN SODIUM SCH MLS/HR: 10000 INJECTION, SOLUTION INTRAVENOUS; SUBCUTANEOUS at 00:37

## 2017-04-06 RX ADMIN — NYSTATIN SCH ML: 100000 SUSPENSION ORAL at 18:34

## 2017-04-06 RX ADMIN — ALBUMIN HUMAN SCH GM: 0.25 SOLUTION INTRAVENOUS at 14:34

## 2017-04-06 RX ADMIN — PENTOXIFYLLINE SCH MG: 400 TABLET, FILM COATED, EXTENDED RELEASE ORAL at 22:16

## 2017-04-06 RX ADMIN — ALBUMIN HUMAN SCH GM: 0.25 SOLUTION INTRAVENOUS at 09:47

## 2017-04-06 RX ADMIN — Medication SCH TAB: at 09:47

## 2017-04-06 RX ADMIN — Medication SCH ML: at 22:06

## 2017-04-06 RX ADMIN — ALBUMIN HUMAN SCH GM: 0.25 SOLUTION INTRAVENOUS at 00:33

## 2017-04-06 RX ADMIN — NYSTATIN SCH ML: 100000 SUSPENSION ORAL at 09:47

## 2017-04-06 RX ADMIN — MIDODRINE HYDROCHLORIDE SCH MG: 5 TABLET ORAL at 17:05

## 2017-04-06 RX ADMIN — MIDODRINE HYDROCHLORIDE SCH MG: 5 TABLET ORAL at 04:58

## 2017-04-06 RX ADMIN — POTASSIUM PHOSPHATE, MONOBASIC SCH MG: 500 TABLET, SOLUBLE ORAL at 09:48

## 2017-04-06 RX ADMIN — LOPERAMIDE HYDROCHLORIDE PRN MG: 2 CAPSULE ORAL at 12:21

## 2017-04-06 RX ADMIN — IPRATROPIUM BROMIDE AND ALBUTEROL SULFATE SCH AMPULE: .5; 3 SOLUTION RESPIRATORY (INHALATION) at 10:53

## 2017-04-06 RX ADMIN — COLLAGENASE SANTYL SCH APPLIC: 250 OINTMENT TOPICAL at 09:49

## 2017-04-06 RX ADMIN — IPRATROPIUM BROMIDE AND ALBUTEROL SULFATE SCH AMPULE: .5; 3 SOLUTION RESPIRATORY (INHALATION) at 15:17

## 2017-04-06 RX ADMIN — CHOLESTYRAMINE SCH GM: 4 POWDER, FOR SUSPENSION ORAL at 18:34

## 2017-04-06 RX ADMIN — RIFAXIMIN SCH MG: 550 TABLET ORAL at 22:06

## 2017-04-06 RX ADMIN — TAZOBACTAM SODIUM AND PIPERACILLIN SODIUM SCH MLS/HR: 250; 2 INJECTION, SOLUTION INTRAVENOUS at 18:34

## 2017-04-06 RX ADMIN — Medication SCH TAB: at 12:16

## 2017-04-06 RX ADMIN — POTASSIUM PHOSPHATE, MONOBASIC SCH MG: 500 TABLET, SOLUBLE ORAL at 18:34

## 2017-04-06 RX ADMIN — MIDODRINE HYDROCHLORIDE SCH MG: 5 TABLET ORAL at 12:21

## 2017-04-06 RX ADMIN — POTASSIUM PHOSPHATE, MONOBASIC SCH MG: 500 TABLET, SOLUBLE ORAL at 12:15

## 2017-04-06 RX ADMIN — PENTOXIFYLLINE SCH MG: 400 TABLET, FILM COATED, EXTENDED RELEASE ORAL at 04:57

## 2017-04-06 RX ADMIN — Medication SCH TAB: at 18:34

## 2017-04-06 RX ADMIN — IPRATROPIUM BROMIDE AND ALBUTEROL SULFATE SCH AMPULE: .5; 3 SOLUTION RESPIRATORY (INHALATION) at 07:41

## 2017-04-06 RX ADMIN — OCTREOTIDE ACETATE SCH MCG: 100 INJECTION, SOLUTION INTRAVENOUS; SUBCUTANEOUS at 14:34

## 2017-04-06 RX ADMIN — OCTREOTIDE ACETATE SCH MCG: 100 INJECTION, SOLUTION INTRAVENOUS; SUBCUTANEOUS at 04:57

## 2017-04-06 RX ADMIN — ALBUMIN HUMAN SCH GM: 0.25 SOLUTION INTRAVENOUS at 22:04

## 2017-04-06 RX ADMIN — CHOLESTYRAMINE SCH GM: 4 POWDER, FOR SUSPENSION ORAL at 04:57

## 2017-04-06 RX ADMIN — PENTOXIFYLLINE SCH MG: 400 TABLET, FILM COATED, EXTENDED RELEASE ORAL at 14:34

## 2017-04-06 RX ADMIN — OCTREOTIDE ACETATE SCH MCG: 100 INJECTION, SOLUTION INTRAVENOUS; SUBCUTANEOUS at 22:06

## 2017-04-06 RX ADMIN — TAZOBACTAM SODIUM AND PIPERACILLIN SODIUM SCH MLS/HR: 250; 2 INJECTION, SOLUTION INTRAVENOUS at 12:15

## 2017-04-06 RX ADMIN — NYSTATIN SCH ML: 100000 SUSPENSION ORAL at 12:15

## 2017-04-06 RX ADMIN — RIFAXIMIN SCH MG: 550 TABLET ORAL at 09:48

## 2017-04-06 RX ADMIN — Medication SCH ML: at 09:49

## 2017-04-06 RX ADMIN — TAZOBACTAM SODIUM AND PIPERACILLIN SODIUM SCH MLS/HR: 250; 2 INJECTION, SOLUTION INTRAVENOUS at 00:33

## 2017-04-06 RX ADMIN — NYSTATIN SCH ML: 100000 SUSPENSION ORAL at 22:05

## 2017-04-06 NOTE — HHI.NPPN
Subjective


History of Present Illness


The patient is a 27 yo [place] male who presented to this facility 3/27 with 

complaints of abd pain, nausea, and poor oral intake for the past 2 weeks.  He 

was recently admitted to [facility] (3/11-3/14) for similar complaints, but signed 

himself out AMA.  He has a hx of heavy EtOH intake for the past several years, 

but says he has been trying to cut back since he was admitted to [facility].  As per 

records, he was using Lasix since his discharge from the hospital despite 

little po intake, but he says he was only using "over the counter diuretics" 

and Benadryl.  Denies any NSAID use at home.  No prior hx of renal decline.  

Mentions multiple family member with RA.


Admitting SCr 3.63


Labs from beginning of month from [facility] show preserved renal function at 0.6-0.8 

baseline SCr.


Interval History


Patient not eating apparently because he is afraid having a bowel movement.  He 

was encouraged to eat.  Indicating that his fluid intake is adequate.





Review of Systems


General


Constitutional:  Fatigue





Gastrointestinal


Gastrointestinal:  Abdominal Pain, Diarrhea





Objective Data


Data











 4/5/17 4/6/17





 19:00 07:00


 


Intake Total 1107 ml 1477 ml


 


Output Total 750 ml 1104 ml


 


Balance 357 ml 373 ml


 


  


 


Intake Oral 520 ml 550 ml


 


IV Total 587 ml 927 ml


 


Output Urine Total 750 ml 1104 ml


 


# Bowel Movements  4











 Vital Signs








  Date Time  Temp Pulse Resp B/P Pulse Ox O2 Delivery O2 Flow Rate FiO2


 


4/6/17 12:00  107      


 


4/6/17 10:00  95      


 


4/6/17 08:00 98.3 93 20 127/65 96   


 


4/6/17 08:00  93      


 


4/6/17 06:00  86      


 


4/6/17 04:00  87      


 


4/6/17 04:00 98.2 87 20 112/69 95   


 


4/6/17 02:00  92      


 


4/6/17 00:00  94      


 


4/6/17 00:00 98.6 94 19 99/71 96   


 


4/5/17 22:00  95      


 


4/5/17 20:31     100   21


 


4/5/17 20:00 98.2 97 34 119/80 96   


 


4/5/17 20:00  97      


 


4/5/17 18:00  94      








-:  


4/5/17 0328                                                                    

            4/6/17 0510








Physical Exam


General


Appearance:  No Acute Distress, Comfortable





Eyes


Eye Exam:  Jaundice





Pulmonary


Resp Exam:  Clear Bilaterally, Breath Sounds Equal, No Distress





Cardiology


CV Exam:  Regular, Normal Sinus Rhythm





Gastrointestinal/Abdomen


GI Exam:  Soft, Non-Tender, Distended (moderately)





Integumentary


Skin Exam:  Clear, Warm, Jaundice





Extremeties


Extremities Exam:  Moderate Edema


Extremeties Remarks


One plus pitting edema of lower legs trace edema thighs.





Neurologic


Neuro Exam:  Sedated





Assessment/Plan


Discussed Condition With:  Patient, Parent, Sibling


Problem List:  


(1) Acute renal failure


Plan:  Patient's urine output has improved and creatinine level is slightly 

improved today.


Family and patient advised that although the renal indices are improving he 

still has severe azotemia but I'm hopeful current trend will continue.





Continue Octreotide to 200mch q8h as well as Midodrine to 10mg TID to enhance 

renal perfusion.  Will leave on Octreotide x2 weeks total.


If the patient's renal function normalizes otherwise midodrine may be required 

long-term.


Discontinue IV fluids as patient appears to be developing significant edema.


Continue IV albumin for the present however.


If renal indices continue to improve with fairly good urine output and the 

patient still has significant edema/ascites we could consider Aldactone.


Despite signs of improvement patient's prognosis is guarded and his long term 

prognosis remains be determined.








Medications should be adjusted for the patient's renal decline.  Avoid 

nephrotoxic medications such as iodinated contrast dyes and NSAIDs.  Avoid 

gadolinium when eGFR <30





(2) Acidosis


Plan:  Resolved.  If the bicarbonate level falls again will start by mouth 

bicarbonate.





(3) Cirrhosis with alcoholism


Plan:  LFTs a little better today





(4) Diarrhea


Plan:  Improved.  Management per GI.  Apparently does have mild colitis.











RADHA Alan MD Apr 6, 2017 17:24

## 2017-04-06 NOTE — HHI.PR
Subjective


Remarks


Follow-up hepatitis and renal failure.  Patient feels slightly better decrease 

stooling.  Ambulating in the hallway with physical therapy.  Discussed with RN





Objective


Vitals





 Vital Signs








  Date Time  Temp Pulse Resp B/P Pulse Ox O2 Delivery O2 Flow Rate FiO2


 


4/6/17 06:00  86      


 


4/6/17 04:00  87      


 


4/6/17 04:00 98.2 87 20 112/69 95   


 


4/6/17 02:00  92      


 


4/6/17 00:00  94      


 


4/6/17 00:00 98.6 94 19 99/71 96   


 


4/5/17 22:00  95      


 


4/5/17 20:31     100   21


 


4/5/17 20:00 98.2 97 34 119/80 96   


 


4/5/17 20:00  97      


 


4/5/17 18:00  94      


 


4/5/17 16:00  108      


 


4/5/17 16:00 98.6 98 24 120/78 99   


 


4/5/17 12:00 98.4 91 14 119/70 100   


 


4/5/17 12:00  91      








 I/O








 4/5/17 4/5/17 4/5/17 4/6/17 4/6/17 4/6/17





 07:00 15:00 23:00 07:00 15:00 23:00


 


Intake Total 1229 ml 1107 ml 681 ml 796 ml  


 


Output Total 1000 ml 750 ml 1100 ml 4 ml  


 


Balance 229 ml 357 ml -419 ml 792 ml  


 


      


 


Intake Oral 600 ml 520 ml 200 ml 350 ml  


 


IV Total 579 ml 587 ml 481 ml 446 ml  


 


Albumin 50 ml     


 


Output Urine Total 1000 ml 750 ml 1100 ml 4 ml  


 


# Bowel Movements 2   4  








Result Diagram:  


4/5/17 0328                                                                    

            4/6/17 0510





Imaging





Last Impressions








Lower Extremity Ultrasound 4/4/17 0000 Signed





Impressions: 





 Service Date/Time:  Tuesday, April 4, 2017 14:04 - CONCLUSION:  Normal 





 examination.       Jose Khan MD 


 


Abdomen Ultrasound 4/4/17 0000 Signed





Impressions: 





 Service Date/Time:  Tuesday, April 4, 2017 14:00 - CONCLUSION:  The exam 





 demonstrates small pockets of ascites within the midline of the pelvis. There 

is 





 a small amount of ascites around the liver and spleen. The liver appears 





 cirrhotic.     Valdemar Del Angel MD 


 


Chest X-Ray 4/2/17 0000 Signed





Impressions: 





 Service Date/Time:  Sunday, April 2, 2017 15:31 - CONCLUSION:  1. Slight 





 increase in basilar airspace consolidation since March 27.     Ralph Barrett MD 


 


Hepatobiliary Scan Nuclear Medicine 3/28/17 0000 Signed





Impressions: 





 Service Date/Time:  Tuesday, March 28, 2017 14:36 - CONCLUSION:  Markedly 





 delayed uptake in the liver. Findings suggest diffuse hepatocellular disease 





 versus biliary obstruction. 24 hour delayed scan could be performed.     

Estevan Longoria MD ADDENDUM:  24 hour scans still shows no activity in bile ducts or 





 small bowel suggesting diffuse hepatocellular disease.    Jona Del Angel MD  





 FACR


 


Abdomen/Pelvis CT 3/27/17 0000 Signed





Impressions: 





 Service Date/Time:  Monday, March 27, 2017 16:50 - CONCLUSION: Findings 

suggest 





 advanced hepatocellular disease with varicosities, prominent spleen and 





 interestingly only a small amount of ascites.  Most of the ascites is in the 





 pelvis.     Jona Del Angel MD  FACR








Objective Remarks


GENERAL: Chronically ill male in no acute distress.


SKIN: Jaundiced


HEAD: Normocephalic.


EYES: scleral icterus. 


RESPIRATORY: Breath sounds equal bilaterally. No accessory muscle use.


GASTROINTESTINAL: Abdomen soft, + epigastric area mild TT,  positive for 

distention.


MUSCULOSKELETAL: No cyanosis with pitting edema


BACK: Nontender without obvious deformity. No CVA tenderness.


Alert and oriented nonfocal


Procedures


Sigmoidoscopy





A/P


Problem List:  


(1) Acute hepatitis


ICD Code:  B17.9


Status:  Acute


(2) Acute renal failure


ICD Code:  N17.9


Status:  Acute


Assessment and Plan


26-year-old male with history of hepatitis





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


-CT scan show hepatocellular disease with varices.


-Patient has not been drinking alcohol since last admission in [place].


-Hepatitis panel reviewed from last admission which was all negative.


-Liver ultrasound shows possible acute cholecystitis. HIDA scan negative. 


-GI consulted. Consider steroids if infection ruled out


-Unremarkable DARRYL, AMA, ASM.  Follow-up hemochromatosis panel and US guided 

paracentesis, diagnostic (? SBP) if enough fluid to safely drain


-Continue with supportive care.  Patient is not a candidate for liver 

transplant due to recent alcohol use.


-poor prognosis this was discussed with patient and family.





Coagulopathy


-Secondary to liver disease.


-See treatment as above.





Acute renal failure


-Per nephrologist this may be partly due to hepatorenal syndrome versus 

dehydration with ATN.


-Nephrologist consulted and following.  Recommend to continue with IV fluids 

with bicarb, midodrine by mouth as well as octreotide subcutaneously and IV 

albumin.  


-Cr improving


-Todd discontinued


-Patient may require dialysis.  Renal function most likely will not improve 

unless liver improves.





Diarrhea with C. difficile negative 


-Improving.  Continue Questran, probiotics and Lomotil as well as IV hydration.

  Status post sigmoidoscopy pathology negative 





Fever with leukocytosis and diarrhea.  Patient complaining cough with abnormal 

chest x-ray.  Improving continue Zosyn.  Flagyl discontinued per ID.  Monitor 

cultures.  Blood cultures if febrile.





Anxiety


-on Ativan when necessary.





Urticaria


-on Vistaril.





Wound care





Physical therapy evaluation for deconditioning





DVT prophylaxis


-INR is elevated so chemoprophylaxis contraindicated.


Discharge Planning


Stable for transfer to floor








Wilder Todd MD Apr 6, 2017 09:57

## 2017-04-06 NOTE — HHI.IDPN
Subjective


Subjective


Remarks


Notes reviewed


Temps ok


Voiding ok


Still with diarrhea


Good UO


UA (+) pyuria


Antibiotics


Zosyn


Past Medical History


Reviewed


Allergies:  


Coded Allergies:  


     No Known Allergies (Verified , 3/11/17)





Objective


.





 Vital Signs








  Date Time  Temp Pulse Resp B/P Pulse Ox O2 Delivery O2 Flow Rate FiO2


 


4/6/17 06:00  86      


 


4/6/17 04:00  87      


 


4/6/17 04:00 98.2 87 20 112/69 95   


 


4/6/17 02:00  92      


 


4/6/17 00:00  94      


 


4/6/17 00:00 98.6 94 19 99/71 96   


 


4/5/17 22:00  95      


 


4/5/17 20:31     100   21


 


4/5/17 20:00 98.2 97 34 119/80 96   


 


4/5/17 20:00  97      


 


4/5/17 18:00  94      


 


4/5/17 16:00  108      


 


4/5/17 16:00 98.6 98 24 120/78 99   


 


4/5/17 12:00 98.4 91 14 119/70 100   


 


4/5/17 12:00  91      














 4/5/17 4/5/17 4/6/17





 15:00 23:00 07:00


 


Intake Total 1107 ml 681 ml 796 ml


 


Output Total 750 ml 1100 ml 4 ml


 


Balance 357 ml -419 ml 792 ml


 


   


 


Intake Oral 520 ml 200 ml 350 ml


 


IV Total 587 ml 481 ml 446 ml


 


Output Urine Total 750 ml 1100 ml 4 ml


 


# Bowel Movements   4








.





Laboratory Tests








Test 4/5/17





 03:28


 


White Blood Count 20.3 TH/MM3


 


Red Blood Count 2.47 MIL/MM3


 


Hemoglobin 9.2 GM/DL


 


Hematocrit 26.3 %


 


Mean Corpuscular Volume 106.2 FL


 


Mean Corpuscular Hemoglobin 37.1 PG


 


Mean Corpuscular Hemoglobin 34.9 %





Concent 


 


Red Cell Distribution Width 15.8 %


 


Platelet Count 122 TH/MM3


 


Mean Platelet Volume 10.5 FL


 


Neutrophils (%) (Auto) 85.8 %


 


Lymphocytes (%) (Auto) 4.0 %


 


Monocytes (%) (Auto) 8.5 %


 


Eosinophils (%) (Auto) 1.4 %


 


Basophils (%) (Auto) 0.3 %


 


Neutrophils # (Auto) 17.4 TH/MM3


 


Lymphocytes # (Auto) 0.8 TH/MM3


 


Monocytes # (Auto) 1.7 TH/MM3


 


Eosinophils # (Auto) 0.3 TH/MM3


 


Basophils # (Auto) 0.1 TH/MM3


 


CBC Comment DIFF FINAL 


 


Differential Comment  








Laboratory Tests








Test 4/5/17 4/6/17





 03:28 05:10


 


Sodium Level 138 MEQ/L 140 MEQ/L


 


Potassium Level 3.6 MEQ/L 3.8 MEQ/L


 


Chloride Level 106 MEQ/L 108 MEQ/L


 


Carbon Dioxide Level 17.6 MEQ/L 21.1 MEQ/L


 


Anion Gap 14 MEQ/L 11 MEQ/L


 


Blood Urea Nitrogen 31 MG/DL 28 MG/DL


 


Creatinine 6.49 MG/DL 6.15 MG/DL


 


Estimat Glomerular Filtration 10 ML/MIN 11 ML/MIN





Rate  


 


Random Glucose 114 MG/DL 92 MG/DL


 


Calcium Level 8.1 MG/DL 8.4 MG/DL


 


Phosphorus Level 2.2 MG/DL 2.5 MG/DL


 


Magnesium Level 2.0 MG/DL 


 


Total Bilirubin 33.0 MG/DL 


 


Aspartate Amino Transf 56 U/L 





(AST/SGOT)  


 


Alanine Aminotransferase 30 U/L 





(ALT/SGPT)  


 


Alkaline Phosphatase 133 U/L 


 


Total Protein 4.5 GM/DL 


 


Albumin 2.6 GM/DL 2.7 GM/DL








Microbiology








 Date/Time Procedure Status





Source Growth 


 


 4/4/17 13:00 Gram Stain - Final Complete





Sputum Expectorated Sputum  





 4/4/17 13:00 Sputum Culture - Final Complete





Sputum Expectorated Sputum MODERATE GROWTH NORMAL RESPIRATORY MARTITA 


 


 4/4/17 14:30 Aerobic Blood Culture - Preliminary Resulted





Blood Peripheral NO GROWTH IN 1 DAY 





 4/4/17 14:30 Anaerobic Blood Culture - Preliminary Resulted





Blood Peripheral NO GROWTH IN 1 DAY 


 


 4/4/17 14:38 Aerobic Blood Culture - Preliminary Resulted





Blood Peripheral NO GROWTH IN 1 DAY 





 4/4/17 14:38 Anaerobic Blood Culture - Preliminary Resulted





Blood Peripheral NO GROWTH IN 1 DAY 


 


 4/5/17 16:00 Legionella Antigen - Final Complete





Urine Random Urine PRESUMPTIVE NEGATIVE FOR LEGIONELLA P... 


 


 4/5/17 16:00 Streptococcus pneumoniae Antigen (M - Final Complete





Urine Clean Catch PRESUMPTIVE NEGATIVE FOR STREPTOCOCCU... 


 


 4/5/17 16:00 Urine Culture Received





Urine Catheterized Urine Pending 








Imaging














Lower Extremity Ultrasound 4/4/17 0000 Signed





Impressions: 





 Service Date/Time:  Tuesday, April 4, 2017 14:04 - CONCLUSION:  Normal 





 examination.       Jose Khan MD 


 


Abdomen Ultrasound 4/4/17 0000 Signed





Impressions: 





 Service Date/Time:  Tuesday, April 4, 2017 14:00 - CONCLUSION:  The exam 





 demonstrates small pockets of ascites within the midline of the pelvis. There 

is 





 a small amount of ascites around the liver and spleen. The liver appears 





 cirrhotic.     Valdemar Del Angel MD 


 


Chest X-Ray 4/2/17 0000 Signed





Impressions: 





 Service Date/Time:  Sunday, April 2, 2017 15:31 - CONCLUSION:  1. Slight 





 increase in basilar airspace consolidation since March 27.     Ralph Barrett MD 


 


Hepatobiliary Scan Nuclear Medicine 3/28/17 0000 Signed





Impressions: 





 Service Date/Time:  Tuesday, March 28, 2017 14:36 - CONCLUSION:  Markedly 





 delayed uptake in the liver. Findings suggest diffuse hepatocellular disease 





 versus biliary obstruction. 24 hour delayed scan could be performed.     

Estevan Longoria MD ADDENDUM:  24 hour scans still shows no activity in bile ducts or 





 small bowel suggesting diffuse hepatocellular disease.    Jona Del Angel MD  





 FACR


 


Abdomen/Pelvis CT 3/27/17 0000 Signed





Impressions: 





 Service Date/Time:  Monday, March 27, 2017 16:50 - CONCLUSION: Findings 

suggest 





 advanced hepatocellular disease with varicosities, prominent spleen and 





 interestingly only a small amount of ascites.  Most of the ascites is in the 





 pelvis.     Jona Del Angel MD  FACR








Physical Exam


GENERAL:Awake and alert,  has deep jaundice,  NAD   


SKIN:   Cool and dry.  Has deep jaundice.  No generalized rash or ecchymosis.  


HEENT:   Pink conjunctivae, no petechia or hemorrhage.  Has deep scleral 

icterus.   Moist oral mucosa


NECK: Trachea midline. No JVD or lymphadenopathy. Supple, nontender, no 

meningeal signs.


CARDIOVASCULAR: Regular rate and rhythm without murmurs, gallops, or rubs. 


RESPIRATORY: Clear to auscultation anteriorly. No wheezes,  or rhonchi.  Has 

some rales at the bases.


GASTROINTESTINAL: Abdomen is distended, with mild diffuse abdominal tenderness.

  No guarding.  Bowel sounds are present and hypoactive.  No rebound.


MUSCULOSKELETAL: Extremities without clubbing, or cyanosis.  Has bilateral 

pitting pedal edema. 


NEUROLOGICAL:  Grossly non-focal. 


PSYCH:  Calm and cooperative


LINE:  PIV with no evidence of infection.





Assessment & Plan


Remarks


IMPRESSION


Intermittent low grade fevers,  etiology? better


   -  ?infection:  ,  pulmonary,  ?ascites/SBP


   -  ?non-infectious


Leukocytosis


Hepatic failure


Renal failure


   -  possible hepatorenal syndrome


   -  non-oliguric


Known ETOH abuse











RECOMMENDATION


Follow C/S


Monitor temps


Monitor progress


Follow CBC


Continue Edna Mercer RN, MD Apr 6, 2017 10:15

## 2017-04-06 NOTE — HHI.GIFU
Subjective


Remarks


Resting in bed.  No n/v.  States he had 18 bowel movements yesterday- but small 

amounts each time.  States this seems to have improved today.  Making more 

urine.   (Lizette Amin)





Objective


Vitals I&O





 Vital Signs








  Date Time  Temp Pulse Resp B/P Pulse Ox O2 Delivery O2 Flow Rate FiO2


 


4/6/17 12:00  107      


 


4/6/17 10:00  95      


 


4/6/17 08:00 98.3 93 20 127/65 96   


 


4/6/17 08:00  93      


 


4/6/17 06:00  86      


 


4/6/17 04:00  87      


 


4/6/17 04:00 98.2 87 20 112/69 95   


 


4/6/17 02:00  92      


 


4/6/17 00:00  94      


 


4/6/17 00:00 98.6 94 19 99/71 96   


 


4/5/17 22:00  95      


 


4/5/17 20:31     100   21


 


4/5/17 20:00 98.2 97 34 119/80 96   


 


4/5/17 20:00  97      


 


4/5/17 18:00  94      


 


4/5/17 16:00  108      


 


4/5/17 16:00 98.6 98 24 120/78 99   








 I/O








 4/5/17 4/5/17 4/5/17 4/6/17 4/6/17 4/6/17





 07:00 15:00 23:00 07:00 15:00 23:00


 


Intake Total 1229 ml 1107 ml 681 ml 796 ml  


 


Output Total 1000 ml 750 ml 1100 ml 4 ml  


 


Balance 229 ml 357 ml -419 ml 792 ml  


 


      


 


Intake Oral 600 ml 520 ml 200 ml 350 ml  


 


IV Total 579 ml 587 ml 481 ml 446 ml  


 


Albumin 50 ml     


 


Output Urine Total 1000 ml 750 ml 1100 ml 4 ml  


 


# Bowel Movements 2   4  








Laboratory





Laboratory Tests








Test 4/5/17 4/6/17





 16:00 05:10


 


Urine Color DARK-YELLOW  


 


Urine Turbidity HAZY  


 


Urine pH 6.0  


 


Urine Specific Gravity 1.010  


 


Urine Protein 30  


 


Urine Glucose (UA) NEG  


 


Urine Ketones NEG  


 


Urine Occult Blood MOD  


 


Urine Nitrite NEG  


 


Urine Bilirubin LARGE  


 


Urine Urobilinogen LESS THAN 2.0  


 


Urine Leukocyte Esterase MOD  


 


Urine RBC 60  


 


Urine WBC   


 


Urine Squamous Epithelial 2  





Cells  


 


Urine Bacteria OCC  


 


Microscopic Urinalysis Comment CATH-CULTURE 





 IND 


 


Sodium Level  140 


 


Potassium Level  3.8 


 


Chloride Level  108 


 


Carbon Dioxide Level  21.1 


 


Anion Gap  11 


 


Blood Urea Nitrogen  28 


 


Creatinine  6.15 


 


Estimat Glomerular Filtration  11 





Rate  


 


Random Glucose  92 


 


Calcium Level  8.4 


 


Phosphorus Level  2.5 


 


Albumin  2.7 














 Date/Time Procedure Status





Source Growth 


 


 4/5/17 16:00 Streptococcus pneumoniae Antigen (M - Final Complete





Urine Clean Catch PRESUMPTIVE NEGATIVE FOR STREPTOCOCCU... 


 


 4/5/17 16:00 Legionella Antigen - Final Complete





Urine Random Urine PRESUMPTIVE NEGATIVE FOR LEGIONELLA P... 


 


 4/5/17 16:00  Cancelled





Urine Catheterized Urine  


 


 4/4/17 14:38 Aerobic Blood Culture - Preliminary Resulted





Blood Peripheral NO GROWTH IN 2 DAYS 





 4/4/17 14:38 Anaerobic Blood Culture - Preliminary Resulted





Blood Peripheral NO GROWTH IN 2 DAYS 


 


 4/4/17 13:00 Gram Stain - Final Complete





Sputum Expectorated Sputum  





 4/4/17 13:00 Sputum Culture - Final Complete





Sputum Expectorated Sputum MODERATE GROWTH NORMAL RESPIRATORY MARTITA 








Imaging





Last Impressions








Lower Extremity Ultrasound 4/4/17 0000 Signed





Impressions: 





 Service Date/Time:  Tuesday, April 4, 2017 14:04 - CONCLUSION:  Normal 





 examination.       Jose Khan MD 


 


Abdomen Ultrasound 4/4/17 0000 Signed





Impressions: 





 Service Date/Time:  Tuesday, April 4, 2017 14:00 - CONCLUSION:  The exam 





 demonstrates small pockets of ascites within the midline of the pelvis. There 

is 





 a small amount of ascites around the liver and spleen. The liver appears 





 cirrhotic.     Valdemar Del Angel MD 


 


Chest X-Ray 4/2/17 0000 Signed





Impressions: 





 Service Date/Time:  Sunday, April 2, 2017 15:31 - CONCLUSION:  1. Slight 





 increase in basilar airspace consolidation since March 27.     Ralph Barrett MD 


 


Hepatobiliary Scan Nuclear Medicine 3/28/17 0000 Signed





Impressions: 





 Service Date/Time:  Tuesday, March 28, 2017 14:36 - CONCLUSION:  Markedly 





 delayed uptake in the liver. Findings suggest diffuse hepatocellular disease 





 versus biliary obstruction. 24 hour delayed scan could be performed.     

Estevan Longoria MD ADDENDUM:  24 hour scans still shows no activity in bile ducts or 





 small bowel suggesting diffuse hepatocellular disease.    Jona Del Angel MD  





 FACR


 


Abdomen/Pelvis CT 3/27/17 0000 Signed





Impressions: 





 Service Date/Time:  Monday, March 27, 2017 16:50 - CONCLUSION: Findings 

suggest 





 advanced hepatocellular disease with varicosities, prominent spleen and 





 interestingly only a small amount of ascites.  Most of the ascites is in the 





 pelvis.     Jona Del Angel MD  FACR








Physical Exam


HEENT: Normocephalic; atraumatic; + jaundice.


CHEST:  CTA


CARDIAC:  RRR


ABDOMEN:  Soft, nondistended, nontender;  hepatosplenomegaly; bowel sounds are 

present x 4 quadrants. Ascites


EXTREMITIES: No clubbing, cyanosis, or edema.


SKIN:  Significant jaundice, Spider angiomas


CNS:  No focal deficits; lethargic and oriented x 3 (Lizette Amin ALEX)





Assessment and Plan


Plan


ASSESSMENT:


- Severe Alcoholic hepatitis on what appears to be underlying liver cirrhosis (

based on imaging).  Has hx of ETOH abuse, reportedly drinking 6-7 drinks per


   day x 6 years.  He was hospitalized from 3/11-3/14 for acute alcoholic 

hepatitis.  During that admission, he was treated with Pentoxifylline, 

Prednisone,


   Nadolol, Spironolactone, Lactulose, and Protonix.  Unfortunately, he left 

[place] on 3/14 with LFTs on 3/14 were T. Bili 23.7, , ALT 61, Alkaline 


   Phosph 162.  Hepatitis Panel and Celiac panel was negative at that time.  He 

was taking an OTC diuretic at home.  He is not taking Tylenol products. 


   He is not aware of any other liver disease, although several people on his 

mother's side have autoimmune disorders.  He returned to ER for worsening


   jaundice, nausea without vomiting, worsening abdominal distention, and 

intermittent fever and chills.  Abdomen Ultrasound (3/27/17)-----> 1. Thick-

walled 


   gallbladder containing some sludge and demonstrating pericholecystic fluid.  

If there is clinical concern for acute cholecystitis a hepatobiliary scan may 


   be helpful to confirm cystic duct obstruction. 2. Hepatosplenomegaly.  3. 

Some ascites within the abdomen.  Abdomen/Pelvis CT (3/27/17)---->  Findings


   suggest advanced hepatocellular disease with varicosities, prominent spleen 

and interestingly only a small amount of ascites.  Most of the ascites is in the


   pelvis.  DF 52.70. Pentoxifylline.  He does need complete liver workup to 

rule out other underlying liver disease such as autoimmune hepatitis-DARRYL neg,


   AMA neg, ASMA neg, Iron Saturation 93.1, Ferritin 1666, AFP 3.3, Alpha 1 

antitrypsin 286, Ceruloplasmin 30.  Ferritin and Iron Saturation elevated and 

therefore


   Hfe gene ordered to r/o hemochromatosis.  LFTs persistently elevated 

although appears that they may be starting to trend down, will see what they 

are doing in


   am. T. Bili 33.0, AST 56 , ALT 30, ALk Phosph 133.   


- Ascites with intermittent fevers and chills. Afebrile. Zosyn


- Severe diarrhea.  CDiff negative.  S/P Sigmoidoscopy (4/3/17)----> 

nonspecific colitis, internal hemorrhoids.  Pathology colonic mucosa with no 

significant 


   histopathologic abnormalities.  Probiotics, Cholestyramine, Imodium.  Does 

seem to be improving 


- Hepatic encephalopathy.  Xifaxan.  Lactulose d/c'd because of diarrhea. 


- Coagulopathy.  STABLE.  


- ARF.  Creat 6.15. Renal following, making more urine.  


- Leukocytosis/Fever.  WBC 20.3.  Low grade fever. Zosyn





PLAN:


- 2 gram sodium diet


- Cont. Xifaxan


- Cont. Probiotics


- Continue Cholestyramine


- Continue Imodium prn


- Cont. Pentoxifylline


- Cont. PPI


- Await Hfe Gene


- Renal following


- Monitor labs


- Pt was drinking up until his last hospitalization on 3/11 and therefore is 

not a candidate for liver transplant if his condition worsens.


- Further recommendations to follow based on results of above


- Patient seen and examined by Dr. Evangelista and myself and this note is written 

on his behalf (Lizette Amin)


Physician Comments


Seen and examined, itching remains main concern. On cholestyramine and Atarax. 

Diarrhea about the same, colon biopsies normal.Continue to monitor labs (

Naveen Evangelista MD)








Lizette Amin Apr 6, 2017 13:25


Naveen Evangelista MD Apr 6, 2017 19:17

## 2017-04-07 VITALS
RESPIRATION RATE: 20 BRPM | SYSTOLIC BLOOD PRESSURE: 125 MMHG | TEMPERATURE: 98.1 F | DIASTOLIC BLOOD PRESSURE: 59 MMHG | HEART RATE: 83 BPM | OXYGEN SATURATION: 96 %

## 2017-04-07 VITALS
DIASTOLIC BLOOD PRESSURE: 56 MMHG | TEMPERATURE: 98.8 F | OXYGEN SATURATION: 94 % | SYSTOLIC BLOOD PRESSURE: 108 MMHG | HEART RATE: 84 BPM | RESPIRATION RATE: 16 BRPM

## 2017-04-07 VITALS
OXYGEN SATURATION: 100 % | RESPIRATION RATE: 18 BRPM | SYSTOLIC BLOOD PRESSURE: 111 MMHG | DIASTOLIC BLOOD PRESSURE: 60 MMHG | TEMPERATURE: 98.1 F | HEART RATE: 89 BPM

## 2017-04-07 VITALS
SYSTOLIC BLOOD PRESSURE: 113 MMHG | OXYGEN SATURATION: 95 % | TEMPERATURE: 98.2 F | RESPIRATION RATE: 22 BRPM | HEART RATE: 95 BPM | DIASTOLIC BLOOD PRESSURE: 57 MMHG

## 2017-04-07 VITALS
TEMPERATURE: 97.8 F | HEART RATE: 94 BPM | OXYGEN SATURATION: 94 % | RESPIRATION RATE: 20 BRPM | SYSTOLIC BLOOD PRESSURE: 118 MMHG | DIASTOLIC BLOOD PRESSURE: 68 MMHG

## 2017-04-07 VITALS
SYSTOLIC BLOOD PRESSURE: 112 MMHG | DIASTOLIC BLOOD PRESSURE: 64 MMHG | HEART RATE: 100 BPM | TEMPERATURE: 97.8 F | RESPIRATION RATE: 18 BRPM | OXYGEN SATURATION: 93 %

## 2017-04-07 VITALS — HEART RATE: 88 BPM

## 2017-04-07 VITALS — HEART RATE: 96 BPM

## 2017-04-07 LAB
ALP SERPL-CCNC: 104 U/L (ref 45–117)
ALT SERPL-CCNC: 32 U/L (ref 12–78)
ANION GAP SERPL CALC-SCNC: 10 MEQ/L (ref 5–15)
AST SERPL-CCNC: 79 U/L (ref 15–37)
BASOPHILS # BLD AUTO: 0.1 TH/MM3 (ref 0–0.2)
BASOPHILS NFR BLD: 0.4 % (ref 0–2)
BILIRUB SERPL-MCNC: 32 MG/DL (ref 0.2–1)
BUN SERPL-MCNC: 28 MG/DL (ref 7–18)
CHLORIDE SERPL-SCNC: 108 MEQ/L (ref 98–107)
EOSINOPHIL # BLD: 0.4 TH/MM3 (ref 0–0.4)
EOSINOPHIL NFR BLD: 2 % (ref 0–4)
ERYTHROCYTE [DISTWIDTH] IN BLOOD BY AUTOMATED COUNT: 15.4 % (ref 11.6–17.2)
GFR SERPLBLD BASED ON 1.73 SQ M-ARVRAT: 12 ML/MIN (ref 89–?)
HCO3 BLD-SCNC: 22.1 MEQ/L (ref 21–32)
HCT VFR BLD CALC: 25.4 % (ref 39–51)
HEMO FLAGS: (no result)
INR PPP: 1.7 RATIO
LYMPHOCYTES # BLD AUTO: 1.2 TH/MM3 (ref 1–4.8)
LYMPHOCYTES NFR BLD AUTO: 6 % (ref 9–44)
MAGNESIUM SERPL-MCNC: 1.9 MG/DL (ref 1.5–2.5)
MCH RBC QN AUTO: 36.7 PG (ref 27–34)
MCHC RBC AUTO-ENTMCNC: 35 % (ref 32–36)
MCV RBC AUTO: 104.8 FL (ref 80–100)
MONOCYTES NFR BLD: 8.6 % (ref 0–8)
NEUTROPHILS # BLD AUTO: 17.1 TH/MM3 (ref 1.8–7.7)
NEUTROPHILS NFR BLD AUTO: 83 % (ref 16–70)
PLATELET # BLD: 136 TH/MM3 (ref 150–450)
POTASSIUM SERPL-SCNC: 3.8 MEQ/L (ref 3.5–5.1)
PROTHROMBIN TIME: 19.3 SEC (ref 9.8–11.6)
RBC # BLD AUTO: 2.42 MIL/MM3 (ref 4.5–5.9)
SODIUM SERPL-SCNC: 140 MEQ/L (ref 136–145)
WBC # BLD AUTO: 20.7 TH/MM3 (ref 4–11)

## 2017-04-07 RX ADMIN — ALBUTEROL SULFATE PRN MG: 2.5 SOLUTION RESPIRATORY (INHALATION) at 09:50

## 2017-04-07 RX ADMIN — Medication SCH ML: at 08:43

## 2017-04-07 RX ADMIN — POTASSIUM PHOSPHATE, MONOBASIC SCH MG: 500 TABLET, SOLUBLE ORAL at 17:15

## 2017-04-07 RX ADMIN — NYSTATIN SCH ML: 100000 SUSPENSION ORAL at 13:53

## 2017-04-07 RX ADMIN — CHOLESTYRAMINE SCH GM: 4 POWDER, FOR SUSPENSION ORAL at 06:14

## 2017-04-07 RX ADMIN — IPRATROPIUM BROMIDE AND ALBUTEROL SULFATE SCH AMPULE: .5; 3 SOLUTION RESPIRATORY (INHALATION) at 11:58

## 2017-04-07 RX ADMIN — PENTOXIFYLLINE SCH MG: 400 TABLET, FILM COATED, EXTENDED RELEASE ORAL at 06:13

## 2017-04-07 RX ADMIN — OCTREOTIDE ACETATE SCH MCG: 100 INJECTION, SOLUTION INTRAVENOUS; SUBCUTANEOUS at 13:59

## 2017-04-07 RX ADMIN — ALBUMIN HUMAN SCH GM: 0.25 SOLUTION INTRAVENOUS at 21:41

## 2017-04-07 RX ADMIN — RIFAXIMIN SCH MG: 550 TABLET ORAL at 08:47

## 2017-04-07 RX ADMIN — POTASSIUM PHOSPHATE, MONOBASIC SCH MG: 500 TABLET, SOLUBLE ORAL at 13:54

## 2017-04-07 RX ADMIN — COLLAGENASE SANTYL SCH APPLIC: 250 OINTMENT TOPICAL at 08:48

## 2017-04-07 RX ADMIN — CEFUROXIME AXETIL SCH MG: 250 TABLET ORAL at 11:11

## 2017-04-07 RX ADMIN — Medication SCH ML: at 21:40

## 2017-04-07 RX ADMIN — MIDODRINE HYDROCHLORIDE SCH MG: 5 TABLET ORAL at 11:13

## 2017-04-07 RX ADMIN — ALBUMIN HUMAN SCH GM: 0.25 SOLUTION INTRAVENOUS at 13:54

## 2017-04-07 RX ADMIN — Medication SCH TAB: at 08:48

## 2017-04-07 RX ADMIN — OCTREOTIDE ACETATE SCH MCG: 100 INJECTION, SOLUTION INTRAVENOUS; SUBCUTANEOUS at 06:13

## 2017-04-07 RX ADMIN — CHOLESTYRAMINE SCH GM: 4 POWDER, FOR SUSPENSION ORAL at 17:16

## 2017-04-07 RX ADMIN — OCTREOTIDE ACETATE SCH MCG: 100 INJECTION, SOLUTION INTRAVENOUS; SUBCUTANEOUS at 21:49

## 2017-04-07 RX ADMIN — NYSTATIN SCH ML: 100000 SUSPENSION ORAL at 08:47

## 2017-04-07 RX ADMIN — IPRATROPIUM BROMIDE AND ALBUTEROL SULFATE SCH AMPULE: .5; 3 SOLUTION RESPIRATORY (INHALATION) at 16:28

## 2017-04-07 RX ADMIN — ALBUMIN HUMAN SCH GM: 0.25 SOLUTION INTRAVENOUS at 01:10

## 2017-04-07 RX ADMIN — ALBUMIN HUMAN SCH GM: 0.25 SOLUTION INTRAVENOUS at 08:44

## 2017-04-07 RX ADMIN — RIFAXIMIN SCH MG: 550 TABLET ORAL at 21:39

## 2017-04-07 RX ADMIN — TAZOBACTAM SODIUM AND PIPERACILLIN SODIUM SCH MLS/HR: 250; 2 INJECTION, SOLUTION INTRAVENOUS at 01:10

## 2017-04-07 RX ADMIN — Medication SCH TAB: at 17:15

## 2017-04-07 RX ADMIN — POTASSIUM PHOSPHATE, MONOBASIC SCH MG: 500 TABLET, SOLUBLE ORAL at 08:47

## 2017-04-07 RX ADMIN — TAZOBACTAM SODIUM AND PIPERACILLIN SODIUM SCH MLS/HR: 250; 2 INJECTION, SOLUTION INTRAVENOUS at 06:14

## 2017-04-07 RX ADMIN — NYSTATIN SCH ML: 100000 SUSPENSION ORAL at 21:39

## 2017-04-07 RX ADMIN — LOPERAMIDE HYDROCHLORIDE PRN MG: 2 CAPSULE ORAL at 11:42

## 2017-04-07 RX ADMIN — BENZONATATE PRN MG: 100 CAPSULE ORAL at 17:23

## 2017-04-07 RX ADMIN — LOPERAMIDE HYDROCHLORIDE PRN MG: 2 CAPSULE ORAL at 21:38

## 2017-04-07 RX ADMIN — NYSTATIN SCH ML: 100000 SUSPENSION ORAL at 17:14

## 2017-04-07 RX ADMIN — Medication SCH TAB: at 13:54

## 2017-04-07 RX ADMIN — PENTOXIFYLLINE SCH MG: 400 TABLET, FILM COATED, EXTENDED RELEASE ORAL at 21:39

## 2017-04-07 RX ADMIN — LOPERAMIDE HYDROCHLORIDE PRN MG: 2 CAPSULE ORAL at 17:23

## 2017-04-07 RX ADMIN — MIDODRINE HYDROCHLORIDE SCH MG: 5 TABLET ORAL at 17:14

## 2017-04-07 RX ADMIN — CEFUROXIME AXETIL SCH MG: 250 TABLET ORAL at 21:40

## 2017-04-07 RX ADMIN — PENTOXIFYLLINE SCH MG: 400 TABLET, FILM COATED, EXTENDED RELEASE ORAL at 13:53

## 2017-04-07 RX ADMIN — IPRATROPIUM BROMIDE AND ALBUTEROL SULFATE SCH AMPULE: .5; 3 SOLUTION RESPIRATORY (INHALATION) at 20:16

## 2017-04-07 RX ADMIN — MIDODRINE HYDROCHLORIDE SCH MG: 5 TABLET ORAL at 06:12

## 2017-04-07 NOTE — HHI.PR
Subjective


Remarks


F/u hepatitis, CARLEE and diarrhea. Still with loose stools 16 times past 24 

hours. +PO dw ID and RN





Objective


Vitals





 Vital Signs








  Date Time  Temp Pulse Resp B/P Pulse Ox O2 Delivery O2 Flow Rate FiO2


 


4/7/17 08:00 98.8 84 16 108/56 94   


 


4/7/17 04:03 97.8 94 20 118/68 94   


 


4/7/17 00:02 98.1 83 20 125/59 96   


 


4/6/17 21:03     97   


 


4/6/17 20:00 97.9 86 20 128/72 96   


 


4/6/17 18:00  92      


 


4/6/17 16:00 98.4 92 12 107/67 93   


 


4/6/17 16:00  95      


 


4/6/17 14:00  89      


 


4/6/17 12:00  107      


 


4/6/17 12:00 98.2 107 21 115/69 94   


 


4/6/17 10:00  95      








 I/O








 4/6/17 4/6/17 4/6/17 4/7/17 4/7/17 4/7/17





 07:00 15:00 23:00 07:00 15:00 23:00


 


Intake Total 796 ml 2428 ml  946 ml  


 


Output Total 4 ml 4 ml    


 


Balance 792 ml 2424 ml  946 ml  


 


      


 


Intake Oral 350 ml 1280 ml  946 ml  


 


IV Total 446 ml 1048 ml    


 


Albumin  100 ml    


 


Output Urine Total 4 ml 4 ml    


 


# Voids    5  


 


# Bowel Movements 4 4    








Result Diagram:  


4/7/17 0703                                                                    

            4/7/17 0703





Imaging





Last Impressions








Lower Extremity Ultrasound 4/4/17 0000 Signed





Impressions: 





 Service Date/Time:  Tuesday, April 4, 2017 14:04 - CONCLUSION:  Normal 





 examination.       Jose Khan MD 


 


Abdomen Ultrasound 4/4/17 0000 Signed





Impressions: 





 Service Date/Time:  Tuesday, April 4, 2017 14:00 - CONCLUSION:  The exam 





 demonstrates small pockets of ascites within the midline of the pelvis. There 

is 





 a small amount of ascites around the liver and spleen. The liver appears 





 cirrhotic.     Valdemar Del Angel MD 


 


Chest X-Ray 4/2/17 0000 Signed





Impressions: 





 Service Date/Time:  Sunday, April 2, 2017 15:31 - CONCLUSION:  1. Slight 





 increase in basilar airspace consolidation since March 27.     Ralph Barrett MD 


 


Hepatobiliary Scan Nuclear Medicine 3/28/17 0000 Signed





Impressions: 





 Service Date/Time:  Tuesday, March 28, 2017 14:36 - CONCLUSION:  Markedly 





 delayed uptake in the liver. Findings suggest diffuse hepatocellular disease 





 versus biliary obstruction. 24 hour delayed scan could be performed.     

Estevan Longoria MD ADDENDUM:  24 hour scans still shows no activity in bile ducts or 





 small bowel suggesting diffuse hepatocellular disease.    Jona Del Angel MD  





 FACR


 


Abdomen/Pelvis CT 3/27/17 0000 Signed





Impressions: 





 Service Date/Time:  Monday, March 27, 2017 16:50 - CONCLUSION: Findings 

suggest 





 advanced hepatocellular disease with varicosities, prominent spleen and 





 interestingly only a small amount of ascites.  Most of the ascites is in the 





 pelvis.     Jona Del Angel MD  FACR








Objective Remarks


GENERAL: Chronically ill male in no acute distress.


SKIN: Jaundiced


HEAD: Normocephalic.


EYES: scleral icterus. 


RESPIRATORY: Breath sounds equal bilaterally. No accessory muscle use. + wheeze


GASTROINTESTINAL: Abdomen soft, + epigastric, RUQ and RLQ areas mild TT,  

positive for distention.


MUSCULOSKELETAL: No cyanosis with pitting edema


BACK: Nontender without obvious deformity. No CVA tenderness.


Alert and oriented nonfocal


Procedures


Sigmoidoscopy





A/P


Problem List:  


(1) Acute hepatitis


ICD Code:  B17.9


Status:  Acute


(2) Acute renal failure


ICD Code:  N17.9


Status:  Acute


Assessment and Plan


26-year-old male with history of hepatitis





Severe alcoholic hepatitis complicated by varices, ascites and coagulopathy. 

Cirrhosis on imaging


-CT scan show hepatocellular disease with varices.


-Patient has not been drinking alcohol since last admission in [place].


-Hepatitis panel reviewed from last admission which was all negative.


-Liver ultrasound shows possible acute cholecystitis. HIDA scan negative. 


-GI consulted. Consider steroids if infection ruled out


-Unremarkable DARRYL, AMA, ASM.  Negative hemochromatosis panel. US guided 

paracentesis, diagnostic (? SBP) if enough fluid to safely drain


-Continue with supportive care, trental and rifaximin.  Patient is not a 

candidate for liver transplant due to recent alcohol use.


-poor prognosis this was discussed with patient and family.





Coagulopathy


-Secondary to liver disease.


-See treatment as above.





Acute renal failure


-Per nephrologist this may be partly due to hepatorenal syndrome versus 

dehydration with ATN.


-Nephrologist consulted and following.  Recommend to continue with midodrine by 

mouth as well as octreotide subcutaneously total 2 weeks and IV albumin.  


-Cr improving IVF dc 2/2 edema.


-Todd discontinued


-Patient may require dialysis. 





Diarrhea with C. difficile negative 


-Still going.  Continue Questran, probiotics and Lomotil as well as IV 

hydration.  Status post sigmoidoscopy pathology negative . may need to restart 

IVF encourage po





Fever with leukocytosis and diarrhea.  Patient complaining cough with abnormal 

chest x-ray.  Still with leukocytosis  Zosyn switched to ceftin by ID.  Flagyl 

discontinued per ID.  Monitor cultures.  Blood cultures if febrile.





Anxiety


-on Ativan when necessary.





Urticaria


-on Vistaril.





Wound care





Physical therapy evaluation for deconditioning





DVT prophylaxis


-INR is elevated so chemoprophylaxis contraindicated.


Discharge Planning


Not ready for dc








Wilder Todd MD Apr 7, 2017 09:33

## 2017-04-07 NOTE — HHI.NPPN
Subjective


History of Present Illness


The patient is a 25 yo [place] male who presented to this facility 3/27 with 

complaints of abd pain, nausea, and poor oral intake for the past 2 weeks.  He 

was recently admitted to [facility] (3/11-3/14) for similar complaints, but signed 

himself out AMA.  He has a hx of heavy EtOH intake for the past several years, 

but says he has been trying to cut back since he was admitted to [facility].  As per 

records, he was using Lasix since his discharge from the hospital despite 

little po intake, but he says he was only using "over the counter diuretics" 

and Benadryl.  Denies any NSAID use at home.  No prior hx of renal decline.  

Mentions multiple family member with RA.


Admitting SCr 3.63


Labs from beginning of month from [facility] show preserved renal function at 0.6-0.8 

baseline SCr.


Interval History


Pt appears to be in better mood.


Eating a little improved.


Diarrhea improving. (Prerna Ball)





Review of Systems


General


Constitutional:  Fatigue (Prerna Ball)





Gastrointestinal


Gastrointestinal:  Abdominal Pain, Diarrhea (Prerna Ball)





Objective Data


Data











 4/6/17 4/7/17





 19:00 07:00


 


Intake Total 2428 ml 946 ml


 


Output Total 4 ml 


 


Balance 2424 ml 946 ml


 


  


 


Intake Oral 1280 ml 946 ml


 


IV Total 1048 ml 


 


Albumin 100 ml 


 


Output Urine Total 4 ml 


 


# Voids  5


 


# Bowel Movements 4 











 Vital Signs








  Date Time  Temp Pulse Resp B/P Pulse Ox O2 Delivery O2 Flow Rate FiO2


 


4/7/17 16:00 98.1 89 18 111/60 100   


 


4/7/17 12:00 97.8 100 18 112/64 93   


 


4/7/17 08:01  88      


 


4/7/17 08:00 98.8 84 16 108/56 94   


 


4/7/17 04:03 97.8 94 20 118/68 94   


 


4/7/17 00:02 98.1 83 20 125/59 96   


 


4/6/17 21:03     97   


 


4/6/17 20:00 97.9 86 20 128/72 96   


 


4/6/17 18:00  92      





 (Prerna Ball)


-:  


4/7/17 0703                                                                    

            4/7/17 0703





Medication Review





 Current Medications








 Medications


  (Trade)  Dose


 Ordered  Sig/Brittany


 Route  Start Time


 Stop Time Status Last Admin


 


  (NS Flush)  2 ml  UNSCH  PRN


 IV FLUSH  3/27/17 18:15


    4/5/17 01:04


 


 


  (NS Flush)  2 ml  BID


 IV FLUSH  3/27/17 21:00


    4/7/17 08:43


 


 


  (Zofran Inj)  4 mg  Q6H  PRN


 IVP  3/27/17 18:15


    4/5/17 15:58


 


 


  (Narcan Inj)  0.4 mg  UNSCH  PRN


 IV  3/27/17 18:15


     


 


 


  (TRENtal SR)  400 mg  Q8HR


 PO  3/28/17 14:00


    4/7/17 13:53


 


 


  (Tessalon)  200 mg  TID  PRN


 PO  3/29/17 15:15


    4/5/17 20:08


 


 


  (Vistaril)  50 mg  Q6H  PRN


 PO  3/30/17 09:15


    4/6/17 09:48


 


 


  (Xifaxan)  550 mg  BID


 PO  3/30/17 21:00


    4/7/17 08:47


 


 


  (Lactulose Liq)  30 ml  DAILY


 PO  3/30/17 15:00


   Hold  


 


 


  (Pill Splitter)  1 ea  UNSCH  PRN


 OTHER  3/31/17 12:45


     


 


 


  (Ativan Inj)  0.5 mg  BID  PRN


 IV PUSH  3/31/17 14:30


    4/7/17 14:36


 


 


  (Proamatine)  10 mg  TID@07,12,17


 PO  4/1/17 17:00


    4/7/17 11:13


 


 


  (SandoSTATIN INJ)  200 mcg  Q8HR


 SQ  4/1/17 14:00


    4/7/17 13:59


 


 


  (Imodium)  2 mg  UNSCH  PRN


 PO  4/1/17 14:15


    4/7/17 11:42


 


 


  (Questran Light


 Pkt)  4 gm  Q12H


 PO  4/1/17 18:00


    4/7/17 06:14


 


 


  (Mycostatin  Liq)  5 ml  QID


 SWISH-SWAL  4/2/17 13:00


 4/16/17 12:59  4/7/17 13:53


 


 


  (Magic Mouthwash


 Adult Liq)  5 ml  QID  PRN


 SWISH-SWAL  4/2/17 11:00


     


 


 


  (Lactinex)  1 tab  TID


 PO  4/2/17 18:00


    4/7/17 13:54


 


 


  (Albumin 25% Inj)  12.5 gm  Q6H


 IV  4/2/17 20:00


    4/7/17 13:54


 


 


  (K-Phos)  1,000 mg  TID


 PO  4/5/17 13:00


    4/7/17 13:54


 


 


  (Santyl Oint)  1 applic  DAILY


 TOPICAL  4/5/17 11:15


    4/7/17 08:48


 


 


  (Ceftin)  250 mg  Q12HR


 PO  4/7/17 09:45


 4/14/17 09:44  4/7/17 11:11


 





 (Prerna Ball)





Physical Exam


General


Appearance:  No Acute Distress, Comfortable (Prerna Ball)





Eyes


Eye Exam:  Jaundice (Prerna Ball)





Pulmonary


Resp Exam:  Clear Bilaterally, Breath Sounds Equal, No Distress (Prerna Ball)





Cardiology


CV Exam:  Regular, Normal Sinus Rhythm (Prerna Ball)





Gastrointestinal/Abdomen


GI Exam:  Soft, Non-Tender, Distended (moderately) (Prerna Ball)





Integumentary


Skin Exam:  Clear, Warm, Jaundice (Prerna Ball)





Extremeties


Extremities Exam:  Moderate Edema


Extremeties Remarks


BLE up to thighs. (Prerna Ball)





Neurologic


Neuro Exam:  Sedated (Prerna Ball)





Assessment/Plan


Discussed Condition With:  Patient, Parent, Sibling


Problem List:  


(1) Acute renal failure


Plan:  SCr improving and UOP good.


Family and patient advised that although the renal indices are improving he 

still has severe azotemia but I'm hopeful current trend will continue.





Continue Octreotide to 200mch q8h as well as Midodrine to 10mg TID to enhance 

renal perfusion.  Will leave on Octreotide x2 weeks total.


If the patient's renal function normalizes otherwise midodrine may be required 

long-term.





Decrease Albumin to q8h.


Consider adding Spironolactone back if po intake continues to improve.





Despite signs of improvement patient's prognosis is guarded and his long term 

prognosis remains be determined.





Medications should be adjusted for the patient's renal decline.  Avoid 

nephrotoxic medications such as iodinated contrast dyes and NSAIDs.  Avoid 

gadolinium when eGFR <30





(2) Acidosis


Plan:  Resolved.  If the bicarbonate level falls again will start by mouth 

bicarbonate.





(3) Cirrhosis with alcoholism


Plan:  LFTs a little better today





(4) Diarrhea


Plan:  Improved.  Management per GI.  Apparently does have mild colitis.


 (Prerna Ball)


Plan


Adequate urine output and renal indices of improving slowly.


Given slow rate of recovery I believe that hepatorenal syndrome is likely the 

primary etiology of the patient's azotemia. (RADHA Alan MD)








Prerna Ball Apr 7, 2017 16:26


RADHA Alan MD Apr 8, 2017 17:08

## 2017-04-07 NOTE — HHI.GIFU
Subjective


Remarks


Resting in bed.  Still having diarrhea, but improved and smaller amounts each 

time.  So far 3-4 stools today.   (Lizette Amin)





Objective


Vitals I&O





 Vital Signs








  Date Time  Temp Pulse Resp B/P Pulse Ox O2 Delivery O2 Flow Rate FiO2


 


4/7/17 12:00 97.8 100 18 112/64 93   


 


4/7/17 08:01  88      


 


4/7/17 08:00 98.8 84 16 108/56 94   


 


4/7/17 04:03 97.8 94 20 118/68 94   


 


4/7/17 00:02 98.1 83 20 125/59 96   


 


4/6/17 21:03     97   


 


4/6/17 20:00 97.9 86 20 128/72 96   


 


4/6/17 18:00  92      


 


4/6/17 16:00 98.4 92 12 107/67 93   


 


4/6/17 16:00  95      


 


4/6/17 14:00  89      








 I/O








 4/6/17 4/6/17 4/6/17 4/7/17 4/7/17 4/7/17





 07:00 15:00 23:00 07:00 15:00 23:00


 


Intake Total 796 ml 2428 ml  946 ml  


 


Output Total 4 ml 4 ml    


 


Balance 792 ml 2424 ml  946 ml  


 


      


 


Intake Oral 350 ml 1280 ml  946 ml  


 


IV Total 446 ml 1048 ml    


 


Albumin  100 ml    


 


Output Urine Total 4 ml 4 ml    


 


# Voids    5  


 


# Bowel Movements 4 4    








Laboratory





Laboratory Tests








Test 4/7/17





 07:03


 


White Blood Count 20.7 


 


Red Blood Count 2.42 


 


Hemoglobin 8.9 


 


Hematocrit 25.4 


 


Mean Corpuscular Volume 104.8 


 


Mean Corpuscular Hemoglobin 36.7 


 


Mean Corpuscular Hemoglobin 35.0 





Concent 


 


Red Cell Distribution Width 15.4 


 


Platelet Count 136 


 


Mean Platelet Volume 10.3 


 


Neutrophils (%) (Auto) 83.0 


 


Lymphocytes (%) (Auto) 6.0 


 


Monocytes (%) (Auto) 8.6 


 


Eosinophils (%) (Auto) 2.0 


 


Basophils (%) (Auto) 0.4 


 


Neutrophils # (Auto) 17.1 


 


Lymphocytes # (Auto) 1.2 


 


Monocytes # (Auto) 1.8 


 


Eosinophils # (Auto) 0.4 


 


Basophils # (Auto) 0.1 


 


CBC Comment DIFF FINAL 


 


Differential Comment  


 


Prothrombin Time 19.3 


 


Prothromb Time International 1.7 





Ratio 


 


Sodium Level 140 


 


Potassium Level 3.8 


 


Chloride Level 108 


 


Carbon Dioxide Level 22.1 


 


Anion Gap 10 


 


Blood Urea Nitrogen 28 


 


Creatinine 5.74 


 


Estimat Glomerular Filtration 12 





Rate 


 


Random Glucose 113 


 


Calcium Level 8.4 


 


Magnesium Level 1.9 


 


Total Bilirubin 32.0 


 


Aspartate Amino Transf 79 





(AST/SGOT) 


 


Alanine Aminotransferase 32 





(ALT/SGPT) 


 


Alkaline Phosphatase 104 


 


Total Protein 4.6 


 


Albumin 2.6 














 Date/Time Procedure Status





Source Growth 


 


 4/5/17 16:00 Streptococcus pneumoniae Antigen (M - Final Complete





Urine Clean Catch PRESUMPTIVE NEGATIVE FOR STREPTOCOCCU... 


 


 4/5/17 16:00 Legionella Antigen - Final Complete





Urine Random Urine PRESUMPTIVE NEGATIVE FOR LEGIONELLA P... 


 


 4/5/17 16:00  Cancelled





Urine Catheterized Urine  


 


 4/4/17 14:38 Aerobic Blood Culture - Preliminary Resulted





Blood Peripheral NO GROWTH IN 3 DAYS 





 4/4/17 14:38 Anaerobic Blood Culture - Preliminary Resulted





Blood Peripheral NO GROWTH IN 3 DAYS 


 


 4/4/17 13:00 Gram Stain - Final Complete





Sputum Expectorated Sputum  





 4/4/17 13:00 Sputum Culture - Final Complete





Sputum Expectorated Sputum MODERATE GROWTH NORMAL RESPIRATORY MARTITA 








Imaging





Last Impressions








Lower Extremity Ultrasound 4/4/17 0000 Signed





Impressions: 





 Service Date/Time:  Tuesday, April 4, 2017 14:04 - CONCLUSION:  Normal 





 examination.       Jose Khan MD 


 


Abdomen Ultrasound 4/4/17 0000 Signed





Impressions: 





 Service Date/Time:  Tuesday, April 4, 2017 14:00 - CONCLUSION:  The exam 





 demonstrates small pockets of ascites within the midline of the pelvis. There 

is 





 a small amount of ascites around the liver and spleen. The liver appears 





 cirrhotic.     Valdemar Del Angel MD 


 


Chest X-Ray 4/2/17 0000 Signed





Impressions: 





 Service Date/Time:  Sunday, April 2, 2017 15:31 - CONCLUSION:  1. Slight 





 increase in basilar airspace consolidation since March 27.     Ralph Barrett MD 


 


Hepatobiliary Scan Nuclear Medicine 3/28/17 0000 Signed





Impressions: 





 Service Date/Time:  Tuesday, March 28, 2017 14:36 - CONCLUSION:  Markedly 





 delayed uptake in the liver. Findings suggest diffuse hepatocellular disease 





 versus biliary obstruction. 24 hour delayed scan could be performed.     

Estevan Longoria MD ADDENDUM:  24 hour scans still shows no activity in bile ducts or 





 small bowel suggesting diffuse hepatocellular disease.    Jona Del Angel MD  





 FACR


 


Abdomen/Pelvis CT 3/27/17 0000 Signed





Impressions: 





 Service Date/Time:  Monday, March 27, 2017 16:50 - CONCLUSION: Findings 

suggest 





 advanced hepatocellular disease with varicosities, prominent spleen and 





 interestingly only a small amount of ascites.  Most of the ascites is in the 





 pelvis.     Jona Del Angel MD  FACR








Physical Exam


HEENT: Normocephalic; atraumatic; + jaundice.


CHEST:  CTA


CARDIAC:  RRR


ABDOMEN:  Soft, nondistended, nontender;  hepatosplenomegaly; bowel sounds are 

present x 4 quadrants. Ascites


EXTREMITIES: No clubbing, cyanosis, or edema.


SKIN:  Significant jaundice, Spider angiomas


CNS:  No focal deficits; lethargic and oriented x 3 (Lizette Amin)





Assessment and Plan


Plan


ASSESSMENT:


- Severe Alcoholic hepatitis on what appears to be underlying liver cirrhosis (

based on imaging).  Has hx of ETOH abuse, reportedly drinking 6-7 drinks per


   day x 6 years.  He was hospitalized from 3/11-3/14 for acute alcoholic 

hepatitis.  During that admission, he was treated with Pentoxifylline, 

Prednisone,


   Nadolol, Spironolactone, Lactulose, and Protonix.  Unfortunately, he left 

[facility] on 3/14 with LFTs on 3/14 were T. Bili 23.7, , ALT 61, Alkaline 


   Phosph 162.  Hepatitis Panel and Celiac panel was negative at that time.  He 

was taking an OTC diuretic at home.  He is not taking Tylenol products. 


   He is not aware of any other liver disease, although several people on his 

mother's side have autoimmune disorders.  He returned to ER for worsening


   jaundice, nausea without vomiting, worsening abdominal distention, and 

intermittent fever and chills.  Abdomen Ultrasound (3/27/17)-----> 1. Thick-

walled 


   gallbladder containing some sludge and demonstrating pericholecystic fluid.  

If there is clinical concern for acute cholecystitis a hepatobiliary scan may 


   be helpful to confirm cystic duct obstruction. 2. Hepatosplenomegaly.  3. 

Some ascites within the abdomen.  Abdomen/Pelvis CT (3/27/17)---->  Findings


   suggest advanced hepatocellular disease with varicosities, prominent spleen 

and interestingly only a small amount of ascites.  Most of the ascites is in the


   pelvis.  DF 52.70. Pentoxifylline.  He does need complete liver workup to 

rule out other underlying liver disease such as autoimmune hepatitis-DARRYL neg,


   AMA neg, ASMA neg, Iron Saturation 93.1, Ferritin 1666, AFP 3.3, Alpha 1 

antitrypsin 286, Ceruloplasmin 30.  Ferritin and Iron Saturation elevated and 

therefore


   Hfe gene ordered to r/o hemochromatosis and this was negative.  LFTs 

persistently elevated although slowly starting to trend down.  T. Bili 32, AST 

79,


   ALT 32, Alk Phosph 104.  


- Ascites with intermittent fevers and chills. Afebrile. Zosyn


- Severe diarrhea.  CDiff negative.  S/P Sigmoidoscopy (4/3/17)----> 

nonspecific colitis, internal hemorrhoids.  Pathology colonic mucosa with no 

significant 


   histopathologic abnormalities.  Probiotics, Cholestyramine, Imodium.  Does 

seem to be improving, 3-4 today- has not taking the imodium 


- Hepatic encephalopathy.  Xifaxan.  Lactulose d/c'd because of diarrhea. 


- Coagulopathy.  STABLE.  


- ARF.  Creat 5.74. Renal following, making more urine.  


- Leukocytosis/Fever.  WBC 20.7.  Low grade fever. Zosyn





PLAN:


- 2 gram sodium diet


- Cont. Xifaxan


- Cont. Probiotics


- Continue Cholestyramine


- Continue Imodium prn


- Cont. Pentoxifylline


- Cont. PPI


- Renal following


- Monitor labs


- Pt was drinking up until his last hospitalization on 3/11 and therefore is 

not a candidate for liver transplant if his condition worsens.


- Further recommendations to follow based on results of above


- Patient seen and examined by Dr. Evangelista and myself and this note is written 

on his behalf (Lizette Amin)


Physician Comments


Over all slow improvement in symptoms. Renal functions improving. Atarax/

cholestyramine. (Naveen Evangelista MD)








Lizette Amin Apr 7, 2017 12:53


Naveen Evangelista MD Apr 7, 2017 18:57

## 2017-04-07 NOTE — HHI.IDPN
Subjective


Subjective


Remarks


Notes reviewed


Temps ok


Voiding ok


Still with diarrhea


SOB better


Has dry cough


WBC remains 20K


Nothing new on C/S


Creatinine improving


Antibiotics


Zosyn


Past Medical History


Reviewed


Allergies:  


Coded Allergies:  


     No Known Allergies (Verified , 3/11/17)





Objective


.





 Vital Signs








  Date Time  Temp Pulse Resp B/P Pulse Ox O2 Delivery O2 Flow Rate FiO2


 


4/7/17 08:00 98.8 84 16 108/56 94   


 


4/7/17 04:03 97.8 94 20 118/68 94   


 


4/7/17 00:02 98.1 83 20 125/59 96   


 


4/6/17 21:03     97   


 


4/6/17 20:00 97.9 86 20 128/72 96   


 


4/6/17 18:00  92      


 


4/6/17 16:00 98.4 92 12 107/67 93   


 


4/6/17 16:00  95      


 


4/6/17 14:00  89      


 


4/6/17 12:00  107      


 


4/6/17 12:00 98.2 107 21 115/69 94   


 


4/6/17 10:00  95      














 4/6/17 4/6/17 4/7/17





 15:00 23:00 07:00


 


Intake Total 2428 ml  946 ml


 


Output Total 4 ml  


 


Balance 2424 ml  946 ml


 


   


 


Intake Oral 1280 ml  946 ml


 


IV Total 1048 ml  


 


Albumin 100 ml  


 


Output Urine Total 4 ml  


 


# Voids   5


 


# Bowel Movements 4  








.





Laboratory Tests








Test 4/7/17





 07:03


 


White Blood Count 20.7 TH/MM3


 


Red Blood Count 2.42 MIL/MM3


 


Hemoglobin 8.9 GM/DL


 


Hematocrit 25.4 %


 


Mean Corpuscular Volume 104.8 FL


 


Mean Corpuscular Hemoglobin 36.7 PG


 


Mean Corpuscular Hemoglobin 35.0 %





Concent 


 


Red Cell Distribution Width 15.4 %


 


Platelet Count 136 TH/MM3


 


Mean Platelet Volume 10.3 FL


 


Neutrophils (%) (Auto) 83.0 %


 


Lymphocytes (%) (Auto) 6.0 %


 


Monocytes (%) (Auto) 8.6 %


 


Eosinophils (%) (Auto) 2.0 %


 


Basophils (%) (Auto) 0.4 %


 


Neutrophils # (Auto) 17.1 TH/MM3


 


Lymphocytes # (Auto) 1.2 TH/MM3


 


Monocytes # (Auto) 1.8 TH/MM3


 


Eosinophils # (Auto) 0.4 TH/MM3


 


Basophils # (Auto) 0.1 TH/MM3


 


CBC Comment DIFF FINAL 


 


Differential Comment  








Laboratory Tests








Test 4/6/17 4/7/17





 05:10 07:03


 


Sodium Level 140 MEQ/L 140 MEQ/L


 


Potassium Level 3.8 MEQ/L 3.8 MEQ/L


 


Chloride Level 108 MEQ/L 108 MEQ/L


 


Carbon Dioxide Level 21.1 MEQ/L 22.1 MEQ/L


 


Anion Gap 11 MEQ/L 10 MEQ/L


 


Blood Urea Nitrogen 28 MG/DL 28 MG/DL


 


Creatinine 6.15 MG/DL 5.74 MG/DL


 


Estimat Glomerular Filtration 11 ML/MIN 12 ML/MIN





Rate  


 


Random Glucose 92 MG/ MG/DL


 


Calcium Level 8.4 MG/DL 8.4 MG/DL


 


Phosphorus Level 2.5 MG/DL 


 


Albumin 2.7 GM/DL 2.6 GM/DL


 


Magnesium Level  1.9 MG/DL


 


Total Bilirubin  32.0 MG/DL


 


Aspartate Amino Transf  79 U/L





(AST/SGOT)  


 


Alanine Aminotransferase  32 U/L





(ALT/SGPT)  


 


Alkaline Phosphatase  104 U/L


 


Total Protein  4.6 GM/DL








Microbiology








 Date/Time Procedure Status





Source Growth 


 


 4/4/17 13:00 Gram Stain - Final Complete





Sputum Expectorated Sputum  





 4/4/17 13:00 Sputum Culture - Final Complete





Sputum Expectorated Sputum MODERATE GROWTH NORMAL RESPIRATORY MARTITA 


 


 4/4/17 14:30 Aerobic Blood Culture - Preliminary Resulted





Blood Peripheral NO GROWTH IN 2 DAYS 





 4/4/17 14:30 Anaerobic Blood Culture - Preliminary Resulted





Blood Peripheral NO GROWTH IN 2 DAYS 


 


 4/4/17 14:38 Aerobic Blood Culture - Preliminary Resulted





Blood Peripheral NO GROWTH IN 2 DAYS 





 4/4/17 14:38 Anaerobic Blood Culture - Preliminary Resulted





Blood Peripheral NO GROWTH IN 2 DAYS 


 


 4/5/17 16:00 Legionella Antigen - Final Complete





Urine Random Urine PRESUMPTIVE NEGATIVE FOR LEGIONELLA P... 


 


 4/5/17 16:00 Streptococcus pneumoniae Antigen (M - Final Complete





Urine Clean Catch PRESUMPTIVE NEGATIVE FOR STREPTOCOCCU... 


 


 4/5/17 16:00  Cancelled





Urine Catheterized Urine  








Imaging














Lower Extremity Ultrasound 4/4/17 0000 Signed





Impressions: 





 Service Date/Time:  Tuesday, April 4, 2017 14:04 - CONCLUSION:  Normal 





 examination.       Jose Khan MD 


 


Abdomen Ultrasound 4/4/17 0000 Signed





Impressions: 





 Service Date/Time:  Tuesday, April 4, 2017 14:00 - CONCLUSION:  The exam 





 demonstrates small pockets of ascites within the midline of the pelvis. There 

is 





 a small amount of ascites around the liver and spleen. The liver appears 





 cirrhotic.     Valdemar Del Angel MD 


 


Chest X-Ray 4/2/17 0000 Signed





Impressions: 





 Service Date/Time:  Sunday, April 2, 2017 15:31 - CONCLUSION:  1. Slight 





 increase in basilar airspace consolidation since March 27.     Ralph Barrett MD 


 


Hepatobiliary Scan Nuclear Medicine 3/28/17 0000 Signed





Impressions: 





 Service Date/Time:  Tuesday, March 28, 2017 14:36 - CONCLUSION:  Markedly 





 delayed uptake in the liver. Findings suggest diffuse hepatocellular disease 





 versus biliary obstruction. 24 hour delayed scan could be performed.     

Estevan Longoria MD ADDENDUM:  24 hour scans still shows no activity in bile ducts or 





 small bowel suggesting diffuse hepatocellular disease.    Jona Del Angel MD  





 FACR


 


Abdomen/Pelvis CT 3/27/17 0000 Signed





Impressions: 





 Service Date/Time:  Monday, March 27, 2017 16:50 - CONCLUSION: Findings 

suggest 





 advanced hepatocellular disease with varicosities, prominent spleen and 





 interestingly only a small amount of ascites.  Most of the ascites is in the 





 pelvis.     Jona Del Angel MD  FACR








Physical Exam


GENERAL:Awake and alert,  has deep jaundice,  NAD   


SKIN:   Cool and dry.  Has deep jaundice.  No generalized rash or ecchymosis.  


HEENT:   Pink conjunctivae, no petechia or hemorrhage.  Has deep scleral 

icterus.   Moist oral mucosa


NECK: Trachea midline. No JVD or lymphadenopathy. Supple, nontender, no 

meningeal signs.


CARDIOVASCULAR: Regular rate and rhythm without murmurs, gallops, or rubs. 


RESPIRATORY:  Decreased BS at bases


GASTROINTESTINAL: Abdomen is distended, with mild diffuse abdominal tenderness.

  No guarding.  Bowel sounds are present and hypoactive.  No rebound.


MUSCULOSKELETAL: Extremities without clubbing, or cyanosis.  Has bilateral 

pitting pedal edema. 


NEUROLOGICAL:  Grossly non-focal. 


PSYCH:  Calm and cooperative


LINE:  PIV with no evidence of infection.





Assessment & Plan


Remarks


IMPRESSION


Intermittent low grade fevers,  etiology? better


   -  ?infection:  ,  pulmonary,  ?ascites/SBP


   -  ?non-infectious


   -  has ?PNA,  and (+) UA


Leukocytosis, persistent


Hepatic failure


Renal failure


   -  possible hepatorenal syndrome


   -  non-oliguric


Known ETOH abuse


Diarrhea


   -  C diff negative;  likely due to meds 











RECOMMENDATION


Monitor progress


Follow CBC


Change Zosyn to ceftin x 7 days


   -  should cover PNA and UTI


He seems clinically stable from ID standpoint


Monitor for S/Sxs of new infection








D/W Edna Barraza MD Apr 7, 2017 09:37

## 2017-04-08 VITALS
SYSTOLIC BLOOD PRESSURE: 115 MMHG | DIASTOLIC BLOOD PRESSURE: 59 MMHG | RESPIRATION RATE: 20 BRPM | OXYGEN SATURATION: 96 % | HEART RATE: 91 BPM | TEMPERATURE: 98.3 F

## 2017-04-08 VITALS
SYSTOLIC BLOOD PRESSURE: 134 MMHG | TEMPERATURE: 98.3 F | RESPIRATION RATE: 20 BRPM | OXYGEN SATURATION: 95 % | DIASTOLIC BLOOD PRESSURE: 62 MMHG | HEART RATE: 90 BPM

## 2017-04-08 VITALS — HEART RATE: 82 BPM

## 2017-04-08 VITALS
HEART RATE: 89 BPM | DIASTOLIC BLOOD PRESSURE: 58 MMHG | OXYGEN SATURATION: 93 % | SYSTOLIC BLOOD PRESSURE: 116 MMHG | TEMPERATURE: 98.3 F | RESPIRATION RATE: 20 BRPM

## 2017-04-08 VITALS
SYSTOLIC BLOOD PRESSURE: 130 MMHG | RESPIRATION RATE: 16 BRPM | TEMPERATURE: 97.8 F | OXYGEN SATURATION: 95 % | HEART RATE: 95 BPM | DIASTOLIC BLOOD PRESSURE: 68 MMHG

## 2017-04-08 VITALS
OXYGEN SATURATION: 97 % | TEMPERATURE: 98 F | SYSTOLIC BLOOD PRESSURE: 107 MMHG | DIASTOLIC BLOOD PRESSURE: 57 MMHG | HEART RATE: 87 BPM | RESPIRATION RATE: 16 BRPM

## 2017-04-08 VITALS
TEMPERATURE: 97.8 F | DIASTOLIC BLOOD PRESSURE: 60 MMHG | OXYGEN SATURATION: 94 % | HEART RATE: 91 BPM | SYSTOLIC BLOOD PRESSURE: 108 MMHG | RESPIRATION RATE: 16 BRPM

## 2017-04-08 VITALS — HEART RATE: 86 BPM

## 2017-04-08 LAB
ALP SERPL-CCNC: 98 U/L (ref 45–117)
ALT SERPL-CCNC: 34 U/L (ref 12–78)
ANION GAP SERPL CALC-SCNC: 11 MEQ/L (ref 5–15)
AST SERPL-CCNC: 83 U/L (ref 15–37)
BASOPHILS # BLD AUTO: 0.1 TH/MM3 (ref 0–0.2)
BASOPHILS NFR BLD: 0.6 % (ref 0–2)
BILIRUB SERPL-MCNC: 33.6 MG/DL (ref 0.2–1)
BUN SERPL-MCNC: 27 MG/DL (ref 7–18)
CHLORIDE SERPL-SCNC: 108 MEQ/L (ref 98–107)
EOSINOPHIL # BLD: 0.5 TH/MM3 (ref 0–0.4)
EOSINOPHIL NFR BLD: 2.1 % (ref 0–4)
ERYTHROCYTE [DISTWIDTH] IN BLOOD BY AUTOMATED COUNT: 15.4 % (ref 11.6–17.2)
GFR SERPLBLD BASED ON 1.73 SQ M-ARVRAT: 13 ML/MIN (ref 89–?)
HCO3 BLD-SCNC: 21.3 MEQ/L (ref 21–32)
HCT VFR BLD CALC: 26.2 % (ref 39–51)
HEMO FLAGS: (no result)
LYMPHOCYTES # BLD AUTO: 1.7 TH/MM3 (ref 1–4.8)
LYMPHOCYTES NFR BLD AUTO: 7.7 % (ref 9–44)
MAGNESIUM SERPL-MCNC: 1.9 MG/DL (ref 1.5–2.5)
MCH RBC QN AUTO: 36.8 PG (ref 27–34)
MCHC RBC AUTO-ENTMCNC: 35.1 % (ref 32–36)
MCV RBC AUTO: 104.9 FL (ref 80–100)
MONOCYTES NFR BLD: 8.2 % (ref 0–8)
NEUTROPHILS # BLD AUTO: 17.8 TH/MM3 (ref 1.8–7.7)
NEUTROPHILS NFR BLD AUTO: 81.4 % (ref 16–70)
PLATELET # BLD: 138 TH/MM3 (ref 150–450)
POTASSIUM SERPL-SCNC: 3.9 MEQ/L (ref 3.5–5.1)
RBC # BLD AUTO: 2.5 MIL/MM3 (ref 4.5–5.9)
SODIUM SERPL-SCNC: 140 MEQ/L (ref 136–145)
WBC # BLD AUTO: 21.8 TH/MM3 (ref 4–11)

## 2017-04-08 RX ADMIN — Medication SCH TAB: at 10:18

## 2017-04-08 RX ADMIN — Medication SCH ML: at 23:12

## 2017-04-08 RX ADMIN — MIDODRINE HYDROCHLORIDE SCH MG: 5 TABLET ORAL at 13:09

## 2017-04-08 RX ADMIN — CHOLESTYRAMINE SCH GM: 4 POWDER, FOR SUSPENSION ORAL at 18:24

## 2017-04-08 RX ADMIN — CEFUROXIME AXETIL SCH MG: 250 TABLET ORAL at 23:12

## 2017-04-08 RX ADMIN — ALBUMIN HUMAN SCH GM: 0.25 SOLUTION INTRAVENOUS at 23:11

## 2017-04-08 RX ADMIN — PENTOXIFYLLINE SCH MG: 400 TABLET, FILM COATED, EXTENDED RELEASE ORAL at 23:12

## 2017-04-08 RX ADMIN — NYSTATIN SCH ML: 100000 SUSPENSION ORAL at 17:54

## 2017-04-08 RX ADMIN — LOPERAMIDE HYDROCHLORIDE PRN MG: 2 CAPSULE ORAL at 05:08

## 2017-04-08 RX ADMIN — PENTOXIFYLLINE SCH MG: 400 TABLET, FILM COATED, EXTENDED RELEASE ORAL at 05:07

## 2017-04-08 RX ADMIN — ALBUMIN HUMAN SCH GM: 0.25 SOLUTION INTRAVENOUS at 05:06

## 2017-04-08 RX ADMIN — NYSTATIN SCH ML: 100000 SUSPENSION ORAL at 23:11

## 2017-04-08 RX ADMIN — BENZONATATE PRN MG: 100 CAPSULE ORAL at 23:30

## 2017-04-08 RX ADMIN — CEFUROXIME AXETIL SCH MG: 250 TABLET ORAL at 10:18

## 2017-04-08 RX ADMIN — IPRATROPIUM BROMIDE AND ALBUTEROL SULFATE SCH AMPULE: .5; 3 SOLUTION RESPIRATORY (INHALATION) at 21:13

## 2017-04-08 RX ADMIN — COLLAGENASE SANTYL SCH APPLIC: 250 OINTMENT TOPICAL at 10:22

## 2017-04-08 RX ADMIN — NYSTATIN SCH ML: 100000 SUSPENSION ORAL at 10:18

## 2017-04-08 RX ADMIN — OCTREOTIDE ACETATE SCH MCG: 100 INJECTION, SOLUTION INTRAVENOUS; SUBCUTANEOUS at 05:07

## 2017-04-08 RX ADMIN — RIFAXIMIN SCH MG: 550 TABLET ORAL at 23:12

## 2017-04-08 RX ADMIN — LOPERAMIDE HYDROCHLORIDE PRN MG: 2 CAPSULE ORAL at 13:17

## 2017-04-08 RX ADMIN — RIFAXIMIN SCH MG: 550 TABLET ORAL at 10:18

## 2017-04-08 RX ADMIN — POTASSIUM PHOSPHATE, MONOBASIC SCH MG: 500 TABLET, SOLUBLE ORAL at 13:09

## 2017-04-08 RX ADMIN — OCTREOTIDE ACETATE SCH MCG: 100 INJECTION, SOLUTION INTRAVENOUS; SUBCUTANEOUS at 23:12

## 2017-04-08 RX ADMIN — LOPERAMIDE HYDROCHLORIDE PRN MG: 2 CAPSULE ORAL at 17:53

## 2017-04-08 RX ADMIN — MIDODRINE HYDROCHLORIDE SCH MG: 5 TABLET ORAL at 17:54

## 2017-04-08 RX ADMIN — ALBUMIN HUMAN SCH GM: 0.25 SOLUTION INTRAVENOUS at 13:11

## 2017-04-08 RX ADMIN — IPRATROPIUM BROMIDE AND ALBUTEROL SULFATE SCH AMPULE: .5; 3 SOLUTION RESPIRATORY (INHALATION) at 16:11

## 2017-04-08 RX ADMIN — Medication SCH TAB: at 17:54

## 2017-04-08 RX ADMIN — IPRATROPIUM BROMIDE AND ALBUTEROL SULFATE SCH AMPULE: .5; 3 SOLUTION RESPIRATORY (INHALATION) at 08:26

## 2017-04-08 RX ADMIN — POTASSIUM PHOSPHATE, MONOBASIC SCH MG: 500 TABLET, SOLUBLE ORAL at 10:18

## 2017-04-08 RX ADMIN — Medication SCH ML: at 10:18

## 2017-04-08 RX ADMIN — IPRATROPIUM BROMIDE AND ALBUTEROL SULFATE SCH AMPULE: .5; 3 SOLUTION RESPIRATORY (INHALATION) at 11:40

## 2017-04-08 RX ADMIN — POTASSIUM PHOSPHATE, MONOBASIC SCH MG: 500 TABLET, SOLUBLE ORAL at 18:23

## 2017-04-08 RX ADMIN — NYSTATIN SCH ML: 100000 SUSPENSION ORAL at 13:07

## 2017-04-08 RX ADMIN — PENTOXIFYLLINE SCH MG: 400 TABLET, FILM COATED, EXTENDED RELEASE ORAL at 13:09

## 2017-04-08 RX ADMIN — OCTREOTIDE ACETATE SCH MCG: 100 INJECTION, SOLUTION INTRAVENOUS; SUBCUTANEOUS at 13:12

## 2017-04-08 RX ADMIN — MIDODRINE HYDROCHLORIDE SCH MG: 5 TABLET ORAL at 06:11

## 2017-04-08 RX ADMIN — Medication SCH TAB: at 13:08

## 2017-04-08 RX ADMIN — CHOLESTYRAMINE SCH GM: 4 POWDER, FOR SUSPENSION ORAL at 05:06

## 2017-04-08 NOTE — HHI.NPPN
Subjective


History of Present Illness


The patient is a 25 yo [place] male who presented to this facility 3/27 with 

complaints of abd pain, nausea, and poor oral intake for the past 2 weeks.  He 

was recently admitted to [facility] (3/11-3/14) for similar complaints, but signed 

himself out AMA.  He has a hx of heavy EtOH intake for the past several years, 

but says he has been trying to cut back since he was admitted to [facility].  As per 

records, he was using Lasix since his discharge from the hospital despite 

little po intake, but he says he was only using "over the counter diuretics" 

and Benadryl.  Denies any NSAID use at home.  No prior hx of renal decline.  

Mentions multiple family member with RA.


Admitting SCr 3.63


Labs from beginning of month from [facility] show preserved renal function at 0.6-0.8 

baseline SCr.


Interval History


Patient resting comfortably.  No verbal complaints when seen.





Review of Systems


General


Constitutional:  Fatigue





Gastrointestinal


Gastrointestinal:  Abdominal Pain, Diarrhea





Objective Data


Data











 4/7/17 4/8/17





 18:59 06:59


 


Intake Total  240 ml


 


Balance  240 ml


 


  


 


Intake Oral  240 ml


 


# Voids 2 4


 


# Bowel Movements 2 1











 Vital Signs








  Date Time  Temp Pulse Resp B/P Pulse Ox O2 Delivery O2 Flow Rate FiO2


 


4/8/17 16:00 97.8 95 16 130/68 95   


 


4/8/17 12:00 97.8 91 16 108/60 94   


 


4/8/17 08:00 98.0 87 16 107/57 97   


 


4/8/17 07:58  82      


 


4/8/17 04:00 98.3 89 20 116/58 93   


 


4/8/17 00:00 98.3 91 20 115/59 96   


 


4/7/17 20:08  96      


 


4/7/17 20:00 98.2 95 22 113/57 95   








-:  


4/8/17 0734                                                                    

            4/8/17 0734








Physical Exam


General


Appearance:  No Acute Distress, Comfortable





Eyes


Eye Exam:  Jaundice





Pulmonary


Resp Exam:  Clear Bilaterally, Breath Sounds Equal, No Distress





Cardiology


CV Exam:  Regular, Normal Sinus Rhythm





Gastrointestinal/Abdomen


GI Exam:  Soft, Non-Tender, Distended (moderately)





Integumentary


Skin Exam:  Clear, Warm, Jaundice





Extremeties


Extremities Exam:  Moderate Edema


Extremeties Remarks


One plus pitting edema of lower legs trace edema thighs.





Neurologic


Neuro Exam:  Sedated





Assessment/Plan


Discussed Condition With:  Patient, Parent, Sibling


Problem List:  


(1) Acute renal failure


Plan:  


Patient's creatinine level continues to improve albeit slowly.


Urine output adequate.


Continue Octreotide to 200mch q8h as well as Midodrine to 10mg TID to enhance 

renal perfusion.  Will leave on Octreotide x2 weeks total.


If the patient's renal function normalizes we'll consider discontinuing 

amiodarone however otherwise may be needed indefinitely unless hepatic function 

normalizes.





Decrease Albumin to every 12 hourly.


Can consider Aldactone if required for ascites management at low dosage.








Despite signs of improvement patient's prognosis is guarded and his long term 

prognosis remains be determined.





Medications should be adjusted for the patient's renal decline.  Avoid 

nephrotoxic medications such as iodinated contrast dyes and NSAIDs.  Avoid 

gadolinium when eGFR <30





(2) Acidosis


Plan:  Resolved.  If the bicarbonate level falls again will start by mouth 

bicarbonate.





(3) Cirrhosis with alcoholism


(4) Diarrhea


Plan:  Improved.  Management per GI.  Apparently does have mild colitis.











RADHA Alan MD Apr 8, 2017 17:05

## 2017-04-08 NOTE — HHI.PR
Subjective


Remarks


follow up alcohol hepatitis/cirrhosis/PNA/UTI


04/08/17-patient seen and examined; +some abdominal pain. renal indices 

trending down. Afebrile





Objective


Vitals





 Vital Signs








  Date Time  Temp Pulse Resp B/P Pulse Ox O2 Delivery O2 Flow Rate FiO2


 


4/8/17 08:00 98.0 87 16 107/57 97   


 


4/8/17 04:00 98.3 89 20 116/58 93   


 


4/8/17 00:00 98.3 91 20 115/59 96   


 


4/7/17 20:08  96      


 


4/7/17 20:00 98.2 95 22 113/57 95   


 


4/7/17 16:00 98.1 89 18 111/60 100   








 I/O








 4/7/17 4/7/17 4/7/17 4/8/17 4/8/17 4/8/17





 07:00 15:00 23:00 07:00 15:00 23:00


 


Intake Total 946 ml  240 ml 0 ml  


 


Balance 946 ml  240 ml 0 ml  


 


      


 


Intake Oral 946 ml  240 ml 0 ml  


 


# Voids 5 2 2 2  


 


# Bowel Movements  2 0 1  








Result Diagram:  


4/8/17 0734                                                                    

            4/8/17 0734





Imaging





Last Impressions








Lower Extremity Ultrasound 4/4/17 0000 Signed





Impressions: 





 Service Date/Time:  Tuesday, April 4, 2017 14:04 - CONCLUSION:  Normal 





 examination.       Jose Khan MD 


 


Abdomen Ultrasound 4/4/17 0000 Signed





Impressions: 





 Service Date/Time:  Tuesday, April 4, 2017 14:00 - CONCLUSION:  The exam 





 demonstrates small pockets of ascites within the midline of the pelvis. There 

is 





 a small amount of ascites around the liver and spleen. The liver appears 





 cirrhotic.     Valdemar Del Angel MD 


 


Chest X-Ray 4/2/17 0000 Signed





Impressions: 





 Service Date/Time:  Sunday, April 2, 2017 15:31 - CONCLUSION:  1. Slight 





 increase in basilar airspace consolidation since March 27.     Ralph Barrett MD 


 


Hepatobiliary Scan Nuclear Medicine 3/28/17 0000 Signed





Impressions: 





 Service Date/Time:  Tuesday, March 28, 2017 14:36 - CONCLUSION:  Markedly 





 delayed uptake in the liver. Findings suggest diffuse hepatocellular disease 





 versus biliary obstruction. 24 hour delayed scan could be performed.     

Estevan Longoria MD ADDENDUM:  24 hour scans still shows no activity in bile ducts or 





 small bowel suggesting diffuse hepatocellular disease.    Jona Del Angel MD  





 FACR


 


Abdomen/Pelvis CT 3/27/17 0000 Signed





Impressions: 





 Service Date/Time:  Monday, March 27, 2017 16:50 - CONCLUSION: Findings 

suggest 





 advanced hepatocellular disease with varicosities, prominent spleen and 





 interestingly only a small amount of ascites.  Most of the ascites is in the 





 pelvis.     Jona Del Angel MD  FACR








Objective Remarks


GENERAL: NAD


SKIN: +jaundiced


HEAD: Normocephalic.


EYES: No scleral icterus. No injection or drainage. 


NECK: Supple, trachea midline. No JVD or lymphadenopathy.


CARDIOVASCULAR: Regular rate and rhythm without murmurs, gallops, or rubs. 


RESPIRATORY: Breath sounds equal bilaterally. No accessory muscle use.


GASTROINTESTINAL: Abdomen hard, mildly tender, distended. +HSM


MUSCULOSKELETAL: No cyanosis, or edema. 


BACK: Nontender without obvious deformity. No CVA tenderness.





Procedures


Sigmoidoscopy





A/P


Problem List:  


(1) Acute hepatitis


ICD Code:  B17.9


Status:  Acute


(2) Acute renal failure


ICD Code:  N17.9


Status:  Acute


Assessment and Plan


26-year-old male with history of hepatitis





Severe alcoholic hepatitis complicated by varices, ascites and coagulopathy. 

Cirrhosis on imaging


-CT scan show hepatocellular disease with varices.


-Patient has not been drinking alcohol since last admission in [place].


-Hepatitis panel reviewed from last admission which was all negative.


-Liver ultrasound shows possible acute cholecystitis. HIDA scan negative. 


-GI consulted. Consider steroids if infection ruled out


-Unremarkable DARRYL, AMA, ASM.  Negative hemochromatosis panel. US guided 

paracentesis, diagnostic (? SBP) if enough fluid to safely drain


-Continue with supportive care, trental and rifaximin.  Patient is not a 

candidate for liver transplant due to recent alcohol use.





Coagulopathy


-Secondary to liver disease.


-See treatment as above.





Acute renal failure


-Per nephrologist this may be partly due to hepatorenal syndrome versus 

dehydration with ATN.


-Nephrologist following.  continue with midodrine by mouth as well as 

octreotide subcutaneously total 2 weeks and IV albumin.  


-Cr improving 





Diarrhea with C. difficile negative 


- Continue Questran, probiotics and Lomotil as well as IV hydration.  Status 

post sigmoidoscopy pathology negative .





PNA/UTI: s/p Zosyn and now on Ceftin by ID. 





Anxiety


-on Ativan when necessary.





Urticaria


-on Vistaril.





DVT prophylaxis: B-SCD








Manish Azevedo MD Apr 8, 2017 12:33

## 2017-04-08 NOTE — HHI.DS
Discharge Summary


Admission Date


Mar 11, 2017 at 15:54


Discharge Date:  Mar 14, 2017


Admitting Diagnosis


Acute Liver Failure, Alcoholism





(1) Acute hepatitis


(2) Hepatic encephalopathy


(3) Hyperammonemia


(4) Transaminitis


(5) Alcohol abuse


(6) Alcoholic hepatitis


(7) Cirrhosis of liver


(8) Cirrhosis with alcoholism


Hospital Course


Patient signed AMA on 03/14/17; however prior to this he was treated for:


1-EtOH hepatitis: Hepatitis profile negative.  Currently on vitamin K 2/2 Liver 

disease


2-Alcohol abuse: Counseled to quit, continue with rally pack, FAIZA protocol, 

Librium when necessary


3-Cirrhosis with alcoholism and cirrhosis of the liver: Continue with Aldactone

, nadolol, Xifaxan.  Appreciate input from GI


4-Hepatic encephalopathy: Resolved.  Currently on lactulose, continue Baker act 

as well as sitter one-to-one.  Monitor NH 3


5-Hyperammonemia: Continue with lactulose and monitor NH 3


6-Adjustment disorder with acute mood: Appreciate input from psychiatry








Manish Azevedo MD Apr 8, 2017 16:10

## 2017-04-08 NOTE — HHI.GIFU
Subjective


Remarks


Pt resting in bed, about to receive breathing treatment.  No change in abd 

pain.  Some nausea, no recent vomiting.





Objective


Vitals I&O





 Vital Signs








  Date Time  Temp Pulse Resp B/P Pulse Ox O2 Delivery O2 Flow Rate FiO2


 


4/8/17 16:00 97.8 95 16 130/68 95   


 


4/8/17 12:00 97.8 91 16 108/60 94   


 


4/8/17 08:00 98.0 87 16 107/57 97   


 


4/8/17 07:58  82      


 


4/8/17 04:00 98.3 89 20 116/58 93   


 


4/8/17 00:00 98.3 91 20 115/59 96   


 


4/7/17 20:08  96      


 


4/7/17 20:00 98.2 95 22 113/57 95   








 I/O








 4/7/17 4/7/17 4/7/17 4/8/17 4/8/17 4/8/17





 07:00 15:00 23:00 07:00 15:00 23:00


 


Intake Total 946 ml  240 ml 0 ml 1600 ml 


 


Balance 946 ml  240 ml 0 ml 1600 ml 


 


      


 


Intake Oral 946 ml  240 ml 0 ml 1600 ml 


 


# Voids 5 2 2 2 5 


 


# Bowel Movements  2 0 1 3 








Laboratory





Laboratory Tests








Test 4/8/17





 07:34


 


White Blood Count 21.8 


 


Red Blood Count 2.50 


 


Hemoglobin 9.2 


 


Hematocrit 26.2 


 


Mean Corpuscular Volume 104.9 


 


Mean Corpuscular Hemoglobin 36.8 


 


Mean Corpuscular Hemoglobin 35.1 





Concent 


 


Red Cell Distribution Width 15.4 


 


Platelet Count 138 


 


Mean Platelet Volume 10.4 


 


Neutrophils (%) (Auto) 81.4 


 


Lymphocytes (%) (Auto) 7.7 


 


Monocytes (%) (Auto) 8.2 


 


Eosinophils (%) (Auto) 2.1 


 


Basophils (%) (Auto) 0.6 


 


Neutrophils # (Auto) 17.8 


 


Lymphocytes # (Auto) 1.7 


 


Monocytes # (Auto) 1.8 


 


Eosinophils # (Auto) 0.5 


 


Basophils # (Auto) 0.1 


 


CBC Comment DIFF FINAL 


 


Differential Comment  


 


Sodium Level 140 


 


Potassium Level 3.9 


 


Chloride Level 108 


 


Carbon Dioxide Level 21.3 


 


Anion Gap 11 


 


Blood Urea Nitrogen 27 


 


Creatinine 5.41 


 


Estimat Glomerular Filtration 13 





Rate 


 


Random Glucose 106 


 


Calcium Level 8.7 


 


Phosphorus Level 2.9 


 


Magnesium Level 1.9 


 


Total Bilirubin 33.6 


 


Aspartate Amino Transf 83 





(AST/SGOT) 


 


Alanine Aminotransferase 34 





(ALT/SGPT) 


 


Alkaline Phosphatase 98 


 


Total Protein 4.9 


 


Albumin 2.9 














 Date/Time Procedure Status





Source Growth 


 


 4/5/17 16:00 Streptococcus pneumoniae Antigen (M - Final Complete





Urine Clean Catch PRESUMPTIVE NEGATIVE FOR STREPTOCOCCU... 


 


 4/5/17 16:00 Legionella Antigen - Final Complete





Urine Random Urine PRESUMPTIVE NEGATIVE FOR LEGIONELLA P... 


 


 4/5/17 16:00  Cancelled





Urine Catheterized Urine  


 


 4/4/17 14:38 Aerobic Blood Culture - Preliminary Resulted





Blood Peripheral NO GROWTH IN 4 DAYS 





 4/4/17 14:38 Anaerobic Blood Culture - Preliminary Resulted





Blood Peripheral NO GROWTH IN 4 DAYS 


 


 4/4/17 13:00 Gram Stain - Final Complete





Sputum Expectorated Sputum  





 4/4/17 13:00 Sputum Culture - Final Complete





Sputum Expectorated Sputum MODERATE GROWTH NORMAL RESPIRATORY MARTITA 








Imaging





Last Impressions








Lower Extremity Ultrasound 4/4/17 0000 Signed





Impressions: 





 Service Date/Time:  Tuesday, April 4, 2017 14:04 - CONCLUSION:  Normal 





 examination.       Jose Khan MD 


 


Abdomen Ultrasound 4/4/17 0000 Signed





Impressions: 





 Service Date/Time:  Tuesday, April 4, 2017 14:00 - CONCLUSION:  The exam 





 demonstrates small pockets of ascites within the midline of the pelvis. There 

is 





 a small amount of ascites around the liver and spleen. The liver appears 





 cirrhotic.     Valdemar Del Angel MD 


 


Chest X-Ray 4/2/17 0000 Signed





Impressions: 





 Service Date/Time:  Sunday, April 2, 2017 15:31 - CONCLUSION:  1. Slight 





 increase in basilar airspace consolidation since March 27.     Ralph Barrett MD 


 


Hepatobiliary Scan Nuclear Medicine 3/28/17 0000 Signed





Impressions: 





 Service Date/Time:  Tuesday, March 28, 2017 14:36 - CONCLUSION:  Markedly 





 delayed uptake in the liver. Findings suggest diffuse hepatocellular disease 





 versus biliary obstruction. 24 hour delayed scan could be performed.     

Estevan Longoria MD ADDENDUM:  24 hour scans still shows no activity in bile ducts or 





 small bowel suggesting diffuse hepatocellular disease.    Jona Del Angel MD  





 FACR


 


Abdomen/Pelvis CT 3/27/17 0000 Signed





Impressions: 





 Service Date/Time:  Monday, March 27, 2017 16:50 - CONCLUSION: Findings 

suggest 





 advanced hepatocellular disease with varicosities, prominent spleen and 





 interestingly only a small amount of ascites.  Most of the ascites is in the 





 pelvis.     Jona Del Angel MD  FACR








Physical Exam


HEENT: Normocephalic; atraumatic; + jaundice.


CHEST:  CTA


CARDIAC:  RRR


ABDOMEN:  Soft, distended, nontender;  hepatosplenomegaly; bowel sounds are 

present x 4 quadrants. Ascites


EXTREMITIES: No clubbing, cyanosis, +2 pitting edema BLE


SKIN:  Significant jaundice 


CNS:  No focal deficits; oriented x 3





Assessment and Plan


Plan


ASSESSMENT:


- Severe Alcoholic hepatitis on what appears to be underlying liver cirrhosis (

based on imaging).  Has hx of ETOH abuse, reportedly drinking 6-7 drinks per


   day x 6 years.  He was hospitalized from 3/11-3/14 for acute alcoholic 

hepatitis.  During that admission, he was treated with Pentoxifylline, 

Prednisone,


   Nadolol, Spironolactone, Lactulose, and Protonix.  Unfortunately, he left 

[facility] on 3/14 with LFTs on 3/14 were T. Bili 23.7, , ALT 61, Alkaline 


   Phosph 162.  Hepatitis Panel and Celiac panel was negative at that time.  He 

was taking an OTC diuretic at home.  He is not taking Tylenol products. 


   He is not aware of any other liver disease, although several people on his 

mother's side have autoimmune disorders.  He returned to ER for worsening


   jaundice, nausea without vomiting, worsening abdominal distention, and 

intermittent fever and chills.  Abdomen Ultrasound (3/27/17)-----> 1. Thick-

walled 


   gallbladder containing some sludge and demonstrating pericholecystic fluid.  

If there is clinical concern for acute cholecystitis a hepatobiliary scan may 


   be helpful to confirm cystic duct obstruction. 2. Hepatosplenomegaly.  3. 

Some ascites within the abdomen.  Abdomen/Pelvis CT (3/27/17)---->  Findings


   suggest advanced hepatocellular disease with varicosities, prominent spleen 

and interestingly only a small amount of ascites.  Most of the ascites is in the


   pelvis.  DF 52.70. Pentoxifylline.   DARRYL neg,  AMA neg, ASMA neg, Iron 

Saturation 93.1, Ferritin 1666, AFP 3.3, Alpha 1 antitrypsin 286, Ceruloplasmin 

30.  Ferritin and Iron Saturation elevated and therefore


   Hfe gene ordered to r/o hemochromatosis and this was negative.  LFTs 

persistently elevated although slowly starting to trend down.  T. Bili 33.6, 

AST 83,


   ALT 34, Alk Phosph 98.  


- Ascites with intermittent fevers and chills. Afebrile. Ceftin


- Severe diarrhea.  CDiff negative.  S/P Sigmoidoscopy (4/3/17)----> 

nonspecific colitis, internal hemorrhoids.  Pathology colonic mucosa with no 

significant 


   histopathologic abnormalities.  Probiotics, Cholestyramine, Imodium. 

Unchanged from yesterday


- Hepatic encephalopathy.  Xifaxan.  Lactulose d/c'd because of diarrhea. 


- Coagulopathy.  STABLE.  


- ARF.  Creat 5.41. Renal following 


- Leukocytosis.  WBC 21.8. Ceftin





PLAN:


- 2 gram sodium diet


- Cont. Xifaxan


- Cont. Probiotics


- Continue Cholestyramine


- Continue Imodium prn


- Cont. Pentoxifylline


- Cont. PPI


- Renal following


- Monitor labs


- Pt was drinking up until his last hospitalization on 3/11 and therefore is 

not a candidate for liver transplant if his condition worsens.


- Further recommendations to follow based on results of above


- Patient seen and examined by Dr. Can and myself and this note is written 

on his behalf








Zahraa Calixto Apr 8, 2017 17:03

## 2017-04-09 VITALS
RESPIRATION RATE: 20 BRPM | DIASTOLIC BLOOD PRESSURE: 66 MMHG | HEART RATE: 92 BPM | TEMPERATURE: 98.4 F | SYSTOLIC BLOOD PRESSURE: 142 MMHG | OXYGEN SATURATION: 96 %

## 2017-04-09 VITALS
TEMPERATURE: 98.1 F | RESPIRATION RATE: 18 BRPM | OXYGEN SATURATION: 94 % | SYSTOLIC BLOOD PRESSURE: 100 MMHG | HEART RATE: 86 BPM | DIASTOLIC BLOOD PRESSURE: 55 MMHG

## 2017-04-09 VITALS
DIASTOLIC BLOOD PRESSURE: 58 MMHG | HEART RATE: 92 BPM | TEMPERATURE: 98.4 F | RESPIRATION RATE: 18 BRPM | OXYGEN SATURATION: 95 % | SYSTOLIC BLOOD PRESSURE: 119 MMHG

## 2017-04-09 VITALS
TEMPERATURE: 98.1 F | DIASTOLIC BLOOD PRESSURE: 75 MMHG | HEART RATE: 92 BPM | SYSTOLIC BLOOD PRESSURE: 124 MMHG | OXYGEN SATURATION: 98 % | RESPIRATION RATE: 18 BRPM

## 2017-04-09 VITALS
HEART RATE: 91 BPM | TEMPERATURE: 98 F | RESPIRATION RATE: 18 BRPM | OXYGEN SATURATION: 94 % | SYSTOLIC BLOOD PRESSURE: 124 MMHG | DIASTOLIC BLOOD PRESSURE: 59 MMHG

## 2017-04-09 VITALS — OXYGEN SATURATION: 95 %

## 2017-04-09 LAB
ANION GAP SERPL CALC-SCNC: 12 MEQ/L (ref 5–15)
BUN SERPL-MCNC: 24 MG/DL (ref 7–18)
CHLORIDE SERPL-SCNC: 108 MEQ/L (ref 98–107)
GFR SERPLBLD BASED ON 1.73 SQ M-ARVRAT: 15 ML/MIN (ref 89–?)
HCO3 BLD-SCNC: 19.8 MEQ/L (ref 21–32)
POTASSIUM SERPL-SCNC: 3.7 MEQ/L (ref 3.5–5.1)
SODIUM SERPL-SCNC: 140 MEQ/L (ref 136–145)

## 2017-04-09 RX ADMIN — LOPERAMIDE HYDROCHLORIDE PRN MG: 2 CAPSULE ORAL at 22:52

## 2017-04-09 RX ADMIN — LOPERAMIDE HYDROCHLORIDE PRN MG: 2 CAPSULE ORAL at 10:31

## 2017-04-09 RX ADMIN — Medication SCH ML: at 10:20

## 2017-04-09 RX ADMIN — ALBUMIN HUMAN SCH GM: 0.25 SOLUTION INTRAVENOUS at 10:20

## 2017-04-09 RX ADMIN — PENTOXIFYLLINE SCH MG: 400 TABLET, FILM COATED, EXTENDED RELEASE ORAL at 13:45

## 2017-04-09 RX ADMIN — COLLAGENASE SANTYL SCH APPLIC: 250 OINTMENT TOPICAL at 10:31

## 2017-04-09 RX ADMIN — OCTREOTIDE ACETATE SCH MCG: 100 INJECTION, SOLUTION INTRAVENOUS; SUBCUTANEOUS at 05:49

## 2017-04-09 RX ADMIN — NYSTATIN SCH ML: 100000 SUSPENSION ORAL at 13:45

## 2017-04-09 RX ADMIN — PENTOXIFYLLINE SCH MG: 400 TABLET, FILM COATED, EXTENDED RELEASE ORAL at 22:52

## 2017-04-09 RX ADMIN — MIDODRINE HYDROCHLORIDE SCH MG: 5 TABLET ORAL at 13:43

## 2017-04-09 RX ADMIN — Medication SCH ML: at 20:01

## 2017-04-09 RX ADMIN — Medication PRN ML: at 05:50

## 2017-04-09 RX ADMIN — PENTOXIFYLLINE SCH MG: 400 TABLET, FILM COATED, EXTENDED RELEASE ORAL at 05:50

## 2017-04-09 RX ADMIN — POTASSIUM PHOSPHATE, MONOBASIC SCH MG: 500 TABLET, SOLUBLE ORAL at 18:50

## 2017-04-09 RX ADMIN — POTASSIUM PHOSPHATE, MONOBASIC SCH MG: 500 TABLET, SOLUBLE ORAL at 10:21

## 2017-04-09 RX ADMIN — IPRATROPIUM BROMIDE AND ALBUTEROL SULFATE SCH AMPULE: .5; 3 SOLUTION RESPIRATORY (INHALATION) at 08:26

## 2017-04-09 RX ADMIN — LOPERAMIDE HYDROCHLORIDE PRN MG: 2 CAPSULE ORAL at 14:34

## 2017-04-09 RX ADMIN — CHOLESTYRAMINE SCH GM: 4 POWDER, FOR SUSPENSION ORAL at 18:50

## 2017-04-09 RX ADMIN — IPRATROPIUM BROMIDE AND ALBUTEROL SULFATE SCH AMPULE: .5; 3 SOLUTION RESPIRATORY (INHALATION) at 11:48

## 2017-04-09 RX ADMIN — RIFAXIMIN SCH MG: 550 TABLET ORAL at 10:21

## 2017-04-09 RX ADMIN — Medication SCH TAB: at 18:49

## 2017-04-09 RX ADMIN — CEFUROXIME AXETIL SCH MG: 250 TABLET ORAL at 13:44

## 2017-04-09 RX ADMIN — NYSTATIN SCH ML: 100000 SUSPENSION ORAL at 20:00

## 2017-04-09 RX ADMIN — IPRATROPIUM BROMIDE AND ALBUTEROL SULFATE SCH AMPULE: .5; 3 SOLUTION RESPIRATORY (INHALATION) at 20:25

## 2017-04-09 RX ADMIN — BENZONATATE PRN MG: 100 CAPSULE ORAL at 14:33

## 2017-04-09 RX ADMIN — OCTREOTIDE ACETATE SCH MCG: 100 INJECTION, SOLUTION INTRAVENOUS; SUBCUTANEOUS at 13:45

## 2017-04-09 RX ADMIN — IPRATROPIUM BROMIDE AND ALBUTEROL SULFATE SCH AMPULE: .5; 3 SOLUTION RESPIRATORY (INHALATION) at 15:58

## 2017-04-09 RX ADMIN — Medication SCH TAB: at 10:21

## 2017-04-09 RX ADMIN — BENZONATATE PRN MG: 100 CAPSULE ORAL at 22:52

## 2017-04-09 RX ADMIN — RIFAXIMIN SCH MG: 550 TABLET ORAL at 20:00

## 2017-04-09 RX ADMIN — OCTREOTIDE ACETATE SCH MCG: 100 INJECTION, SOLUTION INTRAVENOUS; SUBCUTANEOUS at 22:52

## 2017-04-09 RX ADMIN — NYSTATIN SCH ML: 100000 SUSPENSION ORAL at 10:21

## 2017-04-09 RX ADMIN — MIDODRINE HYDROCHLORIDE SCH MG: 5 TABLET ORAL at 18:49

## 2017-04-09 RX ADMIN — MIDODRINE HYDROCHLORIDE SCH MG: 5 TABLET ORAL at 05:50

## 2017-04-09 RX ADMIN — BENZONATATE PRN MG: 100 CAPSULE ORAL at 05:59

## 2017-04-09 RX ADMIN — Medication SCH TAB: at 13:45

## 2017-04-09 RX ADMIN — NYSTATIN SCH ML: 100000 SUSPENSION ORAL at 18:49

## 2017-04-09 RX ADMIN — CEFUROXIME AXETIL SCH MG: 250 TABLET ORAL at 20:01

## 2017-04-09 RX ADMIN — CHOLESTYRAMINE SCH GM: 4 POWDER, FOR SUSPENSION ORAL at 05:59

## 2017-04-09 RX ADMIN — POTASSIUM PHOSPHATE, MONOBASIC SCH MG: 500 TABLET, SOLUBLE ORAL at 14:34

## 2017-04-09 RX ADMIN — ALBUMIN HUMAN SCH GM: 0.25 SOLUTION INTRAVENOUS at 20:01

## 2017-04-09 NOTE — HHI.GIFU
Subjective


Remarks


Pt resting in bed.  He complains of itching, subjective fever today.  No n/v.  

Diarrhea unchanged.  His nurse says he is eating more today.





Objective


Vitals I&O





 Vital Signs








  Date Time  Temp Pulse Resp B/P Pulse Ox O2 Delivery O2 Flow Rate FiO2


 


4/9/17 12:00 98.1 86 18 100/55 94   


 


4/9/17 08:00 98.0 91 18 124/59 94   


 


4/9/17 04:00 98.4 92 20 142/66 96   


 


4/9/17 04:00      Room Air  


 


4/8/17 20:14  86      


 


4/8/17 20:00      Room Air  


 


4/8/17 20:00 98.3 90 20 134/62 95   


 


4/8/17 16:00 97.8 95 16 130/68 95   








 I/O








 4/8/17 4/8/17 4/8/17 4/9/17 4/9/17 4/9/17





 07:00 15:00 23:00 07:00 15:00 23:00


 


Intake Total 0 ml 1600 ml 240 ml 480 ml  


 


Balance 0 ml 1600 ml 240 ml 480 ml  


 


      


 


Intake Oral 0 ml 1600 ml 240 ml 480 ml  


 


# Voids 2 5 2 2  


 


# Bowel Movements 1 3  2  








Laboratory





Laboratory Tests








Test 4/9/17





 06:35


 


Sodium Level 140 


 


Potassium Level 3.7 


 


Chloride Level 108 


 


Carbon Dioxide Level 19.8 


 


Anion Gap 12 


 


Blood Urea Nitrogen 24 


 


Creatinine 4.86 


 


Estimat Glomerular Filtration 15 





Rate 


 


Random Glucose 106 


 


Calcium Level 8.9 














 Date/Time Procedure Status





Source Growth 


 


 4/5/17 16:00 Streptococcus pneumoniae Antigen (M - Final Complete





Urine Clean Catch PRESUMPTIVE NEGATIVE FOR STREPTOCOCCU... 


 


 4/5/17 16:00 Legionella Antigen - Final Complete





Urine Random Urine PRESUMPTIVE NEGATIVE FOR LEGIONELLA P... 


 


 4/5/17 16:00  Cancelled





Urine Catheterized Urine  








Imaging





Last Impressions








Lower Extremity Ultrasound 4/4/17 0000 Signed





Impressions: 





 Service Date/Time:  Tuesday, April 4, 2017 14:04 - CONCLUSION:  Normal 





 examination.       Jose Khan MD 


 


Abdomen Ultrasound 4/4/17 0000 Signed





Impressions: 





 Service Date/Time:  Tuesday, April 4, 2017 14:00 - CONCLUSION:  The exam 





 demonstrates small pockets of ascites within the midline of the pelvis. There 

is 





 a small amount of ascites around the liver and spleen. The liver appears 





 cirrhotic.     Valdemar Del Angel MD 


 


Chest X-Ray 4/2/17 0000 Signed





Impressions: 





 Service Date/Time:  Sunday, April 2, 2017 15:31 - CONCLUSION:  1. Slight 





 increase in basilar airspace consolidation since March 27.     Ralph Barrett MD 


 


Hepatobiliary Scan Nuclear Medicine 3/28/17 0000 Signed





Impressions: 





 Service Date/Time:  Tuesday, March 28, 2017 14:36 - CONCLUSION:  Markedly 





 delayed uptake in the liver. Findings suggest diffuse hepatocellular disease 





 versus biliary obstruction. 24 hour delayed scan could be performed.     

Estevan Longoria MD ADDENDUM:  24 hour scans still shows no activity in bile ducts or 





 small bowel suggesting diffuse hepatocellular disease.    Jona Del Angel MD  





 FACR


 


Abdomen/Pelvis CT 3/27/17 0000 Signed





Impressions: 





 Service Date/Time:  Monday, March 27, 2017 16:50 - CONCLUSION: Findings 

suggest 





 advanced hepatocellular disease with varicosities, prominent spleen and 





 interestingly only a small amount of ascites.  Most of the ascites is in the 





 pelvis.     Jona Del Angel MD  FACR








Physical Exam


HEENT: Normocephalic; atraumatic; + jaundice.


CHEST:  CTA


CARDIAC:  RRR


ABDOMEN:  Soft, distended, nontender;  hepatosplenomegaly; bowel sounds are 

present x 4 quadrants. Ascites


EXTREMITIES: No clubbing, cyanosis, +1 pitting edema BLE


SKIN:  Significant jaundice 


CNS:  No focal deficits; oriented x 3





Assessment and Plan


Plan


ASSESSMENT:


- Severe Alcoholic hepatitis on what appears to be underlying liver cirrhosis (

based on imaging).  Has hx of ETOH abuse, reportedly drinking 6-7 drinks per


   day x 6 years.  He was hospitalized from 3/11-3/14 for acute alcoholic 

hepatitis.  During that admission, he was treated with Pentoxifylline, 

Prednisone,


   Nadolol, Spironolactone, Lactulose, and Protonix.  Unfortunately, he left 

[facility] on 3/14 with LFTs on 3/14 were T. Bili 23.7, , ALT 61, Alkaline 


   Phosph 162.  Hepatitis Panel and Celiac panel was negative at that time.  He 

was taking an OTC diuretic at home.  He is not taking Tylenol products. 


   He is not aware of any other liver disease, although several people on his 

mother's side have autoimmune disorders.  He returned to ER for worsening


   jaundice, nausea without vomiting, worsening abdominal distention, and 

intermittent fever and chills.  Abdomen Ultrasound (3/27/17)-----> 1. Thick-

walled 


   gallbladder containing some sludge and demonstrating pericholecystic fluid.  

If there is clinical concern for acute cholecystitis a hepatobiliary scan may 


   be helpful to confirm cystic duct obstruction. 2. Hepatosplenomegaly.  3. 

Some ascites within the abdomen.  Abdomen/Pelvis CT (3/27/17)---->  Findings


   suggest advanced hepatocellular disease with varicosities, prominent spleen 

and interestingly only a small amount of ascites.  Most of the ascites is in the


   pelvis.  DF 52.70. Pentoxifylline.   DARRYL neg,  AMA neg, ASMA neg, Iron 

Saturation 93.1, Ferritin 1666, AFP 3.3, Alpha 1 antitrypsin 286, Ceruloplasmin 

30.  Ferritin and Iron    Saturation elevated and therefore Hfe gene ordered to 

r/o hemochromatosis and this was negative.  LFTs persistently elevated although 

slowly starting to trend down.  


   4/8/17 T. Bili 33.6, AST 83, ALT 34, Alk Phosph 98.  


- Ascites with intermittent fevers and chills. Afebrile. Ceftin started


- Severe diarrhea.  CDiff negative.  S/P Sigmoidoscopy (4/3/17)----> 

nonspecific colitis, internal hemorrhoids.  Pathology colonic mucosa with no 

significant 


   histopathologic abnormalities.  Probiotics, Cholestyramine, Imodium. 

Unchanged from yesterday


- Hepatic encephalopathy.  Xifaxan.  Lactulose d/c'd because of diarrhea. 


- Coagulopathy.  STABLE.  


- ARF.  Creat 4.86, GFR 15. Renal following 


- Leukocytosis.WBC 21.8. Ceftin.   ID following.  





PLAN:


- 2 gram sodium diet


- Cont. Xifaxan


- Cont. Probiotics


- Continue Cholestyramine


- Continue Imodium prn


- Cont. Pentoxifylline


- Cont. PPI


- Renal following


- Monitor labs


- Pt was drinking up until his last hospitalization on 3/11 and therefore is 

not a candidate for liver transplant if his condition worsens.


- Further recommendations to follow based on results of above


- Patient seen and examined by Dr. Can and myself and this note is written 

on his behalf








Zahraa Calixto Apr 9, 2017 15:07

## 2017-04-09 NOTE — HHI.NPPN
Subjective


History of Present Illness


The patient is a 25 yo [place] male who presented to this facility 3/27 with 

complaints of abd pain, nausea, and poor oral intake for the past 2 weeks.  He 

was recently admitted to [facility] (3/11-3/14) for similar complaints, but signed 

himself out AMA.  He has a hx of heavy EtOH intake for the past several years, 

but says he has been trying to cut back since he was admitted to [facility].  As per 

records, he was using Lasix since his discharge from the hospital despite 

little po intake, but he says he was only using "over the counter diuretics" 

and Benadryl.  Denies any NSAID use at home.  No prior hx of renal decline.  

Mentions multiple family member with RA.


Admitting SCr 3.63


Labs from beginning of month from [facility] show preserved renal function at 0.6-0.8 

baseline SCr.


Interval History


Mother by bedside.  Patient had no verbal complaints.





Review of Systems


General


Constitutional:  Fatigue





Gastrointestinal


Gastrointestinal:  Abdominal Pain, Diarrhea





Objective Data


Data











 4/8/17 4/9/17





 19:00 07:00


 


Intake Total 1600 ml 720 ml


 


Balance 1600 ml 720 ml


 


  


 


Intake Oral 1600 ml 720 ml


 


# Voids 5 4


 


# Bowel Movements 3 2











 Vital Signs








  Date Time  Temp Pulse Resp B/P Pulse Ox O2 Delivery O2 Flow Rate FiO2


 


4/9/17 12:00 98.1 86 18 100/55 94   


 


4/9/17 08:00 98.0 91 18 124/59 94   


 


4/9/17 04:00 98.4 92 20 142/66 96   


 


4/9/17 04:00      Room Air  


 


4/8/17 20:14  86      


 


4/8/17 20:00      Room Air  


 


4/8/17 20:00 98.3 90 20 134/62 95   


 


4/8/17 16:00 97.8 95 16 130/68 95   








-:  


4/8/17 0734                                                                    

            4/9/17 0635








Physical Exam


General


Appearance:  No Acute Distress, Comfortable





Eyes


Eye Exam:  Jaundice





Pulmonary


Resp Exam:  Clear Bilaterally, Breath Sounds Equal, No Distress





Cardiology


CV Exam:  Regular, Normal Sinus Rhythm





Gastrointestinal/Abdomen


GI Exam:  Soft, Non-Tender, Distended (moderately)





Integumentary


Skin Exam:  Clear, Warm, Jaundice





Extremeties


Extremities Exam:  Moderate Edema


Extremeties Remarks


One plus pitting edema of lower legs trace edema thighs.





Neurologic


Neuro Exam:  Sedated





Assessment/Plan


Discussed Condition With:  Patient, Parent, Sibling


Problem List:  


(1) Acute renal failure


Plan:  


Patient's creatinine level continues to improve albeit slowly.


Urine output adequate.


Continue Octreotide to 200mch q8h patient's blood pressure appears to be 

trending upward with a reading above 140.  I will reduce midodrine slightly to 

7.5 mg 3 times daily


Consider discontinuing albumin tomorrow.


Can consider Aldactone if required for ascites management at low dosage.


Considering the slow improvement in renal indices I suspect that the primary 

etiology of the patient's acute renal insufficiency was hepatorenal syndrome.





Despite signs of improvement patient's prognosis is guarded and his long term 

prognosis remains be determined.





Medications should be adjusted for the patient's renal decline.  Avoid 

nephrotoxic medications such as iodinated contrast dyes and NSAIDs.  Avoid 

gadolinium when eGFR <30





(2) Acidosis


Plan:  Resolved.  If the bicarbonate level falls again will start by mouth 

bicarbonate.





(3) Cirrhosis with alcoholism


(4) Diarrhea


Plan:  Improved.  Management per GI.  Apparently does have mild colitis.











RADHA Alan MD Apr 9, 2017 15:28

## 2017-04-09 NOTE — HHI.PR
Subjective


Remarks


follow up alcohol hepatitis/cirrhosis/PNA/UTI


04/08/17-patient seen and examined; +some abdominal pain. renal indices 

trending down. Afebrile


04/09/17-patient seen and examined, and he is stable and denies any abdominal 

pain. continue to have runs of diarrhea.





Objective


Vitals





 Vital Signs








  Date Time  Temp Pulse Resp B/P Pulse Ox O2 Delivery O2 Flow Rate FiO2


 


4/9/17 08:00 98.0 91 18 124/59 94   


 


4/9/17 04:00 98.4 92 20 142/66 96   


 


4/9/17 04:00      Room Air  


 


4/8/17 20:14  86      


 


4/8/17 20:00      Room Air  


 


4/8/17 20:00 98.3 90 20 134/62 95   


 


4/8/17 16:00 97.8 95 16 130/68 95   


 


4/8/17 12:00 97.8 91 16 108/60 94   








 I/O








 4/8/17 4/8/17 4/8/17 4/9/17 4/9/17 4/9/17





 07:00 15:00 23:00 07:00 15:00 23:00


 


Intake Total 0 ml 1600 ml 240 ml 480 ml  


 


Balance 0 ml 1600 ml 240 ml 480 ml  


 


      


 


Intake Oral 0 ml 1600 ml 240 ml 480 ml  


 


# Voids 2 5 2 2  


 


# Bowel Movements 1 3  2  








Result Diagram:  


4/8/17 0734                                                                    

            4/9/17 0635





Objective Remarks


GENERAL: NAD


SKIN: +jaundiced


HEAD: Normocephalic.


EYES: No scleral icterus. No injection or drainage. 


NECK: Supple, trachea midline. No JVD or lymphadenopathy.


CARDIOVASCULAR: Regular rate and rhythm without murmurs, gallops, or rubs. 


RESPIRATORY: Breath sounds equal bilaterally. No accessory muscle use.


GASTROINTESTINAL: Abdomen hard, mildly tender, distended. +HSM


MUSCULOSKELETAL: No cyanosis, or edema. 


BACK: Nontender without obvious deformity. No CVA tenderness.





Procedures


Sigmoidoscopy





A/P


Problem List:  


(1) Acute hepatitis


ICD Code:  B17.9


Status:  Acute


(2) Acute renal failure


ICD Code:  N17.9


Status:  Acute


Assessment and Plan


26-year-old male with history of hepatitis and now with hepatorenal





Severe alcoholic hepatitis complicated by varices, ascites and coagulopathy. 

Cirrhosis on imaging


-CT scan show hepatocellular disease with varices.


-Hepatitis panel reviewed from last admission which was all negative.


-Liver ultrasound shows possible acute cholecystitis. HIDA scan negative. 


-GI consulted. Consider steroids if infection ruled out


-Unremarkable DARRYL, AMA, ASM.  Negative hemochromatosis panel. US guided 

paracentesis, diagnostic (? SBP) if enough fluid to safely drain


-Continue with supportive care, Trental and rifaximin.  Patient is not a 

candidate for liver transplant due to recent alcohol use.





Coagulopathy


-Secondary to liver disease.


-See treatment as above.





Acute renal failure


-Per nephrologist this may be partly due to hepatorenal syndrome versus 

dehydration with ATN.


-Nephrologist following.  continue with midodrine by mouth as well as 

octreotide subcutaneously total 2 weeks and IV albumin.  


-Cr improving 





Diarrhea with C. difficile negative 


- Continue Questran, probiotics and Lomotil as well as IV hydration.  Status 

post sigmoidoscopy pathology negative .





PNA/UTI: s/p Zosyn and now on Ceftin by ID. 





Anxiety


-on Ativan when necessary.





Urticaria


-on Vistaril.





DVT prophylaxis: B-SCD








Manish Azevedo MD Apr 9, 2017 11:17

## 2017-04-10 VITALS
TEMPERATURE: 98 F | HEART RATE: 96 BPM | DIASTOLIC BLOOD PRESSURE: 67 MMHG | OXYGEN SATURATION: 93 % | SYSTOLIC BLOOD PRESSURE: 129 MMHG | RESPIRATION RATE: 16 BRPM

## 2017-04-10 VITALS
SYSTOLIC BLOOD PRESSURE: 108 MMHG | HEART RATE: 89 BPM | DIASTOLIC BLOOD PRESSURE: 53 MMHG | OXYGEN SATURATION: 98 % | RESPIRATION RATE: 17 BRPM | TEMPERATURE: 98.5 F

## 2017-04-10 VITALS
RESPIRATION RATE: 18 BRPM | SYSTOLIC BLOOD PRESSURE: 120 MMHG | OXYGEN SATURATION: 94 % | DIASTOLIC BLOOD PRESSURE: 60 MMHG | HEART RATE: 88 BPM | TEMPERATURE: 98 F

## 2017-04-10 VITALS
DIASTOLIC BLOOD PRESSURE: 67 MMHG | SYSTOLIC BLOOD PRESSURE: 133 MMHG | HEART RATE: 98 BPM | TEMPERATURE: 97.4 F | RESPIRATION RATE: 18 BRPM | OXYGEN SATURATION: 94 %

## 2017-04-10 VITALS
DIASTOLIC BLOOD PRESSURE: 59 MMHG | HEART RATE: 92 BPM | SYSTOLIC BLOOD PRESSURE: 134 MMHG | TEMPERATURE: 98.1 F | RESPIRATION RATE: 20 BRPM

## 2017-04-10 VITALS
TEMPERATURE: 98.8 F | OXYGEN SATURATION: 94 % | RESPIRATION RATE: 16 BRPM | DIASTOLIC BLOOD PRESSURE: 60 MMHG | HEART RATE: 94 BPM | SYSTOLIC BLOOD PRESSURE: 117 MMHG

## 2017-04-10 VITALS — HEART RATE: 102 BPM

## 2017-04-10 VITALS — OXYGEN SATURATION: 94 %

## 2017-04-10 VITALS — OXYGEN SATURATION: 95 %

## 2017-04-10 LAB
ALP SERPL-CCNC: 92 U/L (ref 45–117)
ALT SERPL-CCNC: 37 U/L (ref 12–78)
ANION GAP SERPL CALC-SCNC: 12 MEQ/L (ref 5–15)
AST SERPL-CCNC: 88 U/L (ref 15–37)
BASOPHILS # BLD AUTO: 0.1 TH/MM3 (ref 0–0.2)
BASOPHILS NFR BLD: 0.6 % (ref 0–2)
BILIRUB SERPL-MCNC: 32.8 MG/DL (ref 0.2–1)
BUN SERPL-MCNC: 23 MG/DL (ref 7–18)
CHLORIDE SERPL-SCNC: 110 MEQ/L (ref 98–107)
EOSINOPHIL # BLD: 0.3 TH/MM3 (ref 0–0.4)
EOSINOPHIL NFR BLD: 1.4 % (ref 0–4)
ERYTHROCYTE [DISTWIDTH] IN BLOOD BY AUTOMATED COUNT: 15.3 % (ref 11.6–17.2)
GFR SERPLBLD BASED ON 1.73 SQ M-ARVRAT: 17 ML/MIN (ref 89–?)
HCO3 BLD-SCNC: 20.3 MEQ/L (ref 21–32)
HCT VFR BLD CALC: 22.1 % (ref 39–51)
HEMO FLAGS: (no result)
LYMPHOCYTES # BLD AUTO: 1.3 TH/MM3 (ref 1–4.8)
LYMPHOCYTES NFR BLD AUTO: 7 % (ref 9–44)
MCH RBC QN AUTO: 37.6 PG (ref 27–34)
MCHC RBC AUTO-ENTMCNC: 35.8 % (ref 32–36)
MCV RBC AUTO: 104.9 FL (ref 80–100)
MONOCYTES NFR BLD: 7.2 % (ref 0–8)
NEUTROPHILS # BLD AUTO: 15.3 TH/MM3 (ref 1.8–7.7)
NEUTROPHILS NFR BLD AUTO: 83.8 % (ref 16–70)
PLATELET # BLD: 130 TH/MM3 (ref 150–450)
POTASSIUM SERPL-SCNC: 3.8 MEQ/L (ref 3.5–5.1)
RBC # BLD AUTO: 2.11 MIL/MM3 (ref 4.5–5.9)
SODIUM SERPL-SCNC: 142 MEQ/L (ref 136–145)
WBC # BLD AUTO: 18.3 TH/MM3 (ref 4–11)

## 2017-04-10 RX ADMIN — OCTREOTIDE ACETATE SCH MCG: 100 INJECTION, SOLUTION INTRAVENOUS; SUBCUTANEOUS at 06:15

## 2017-04-10 RX ADMIN — POTASSIUM PHOSPHATE, MONOBASIC SCH MG: 500 TABLET, SOLUBLE ORAL at 14:27

## 2017-04-10 RX ADMIN — CHOLESTYRAMINE SCH GM: 4 POWDER, FOR SUSPENSION ORAL at 17:15

## 2017-04-10 RX ADMIN — NYSTATIN SCH ML: 100000 SUSPENSION ORAL at 08:13

## 2017-04-10 RX ADMIN — Medication SCH TAB: at 17:14

## 2017-04-10 RX ADMIN — NYSTATIN SCH ML: 100000 SUSPENSION ORAL at 12:12

## 2017-04-10 RX ADMIN — OCTREOTIDE ACETATE SCH MCG: 100 INJECTION, SOLUTION INTRAVENOUS; SUBCUTANEOUS at 14:27

## 2017-04-10 RX ADMIN — IPRATROPIUM BROMIDE AND ALBUTEROL SULFATE SCH AMPULE: .5; 3 SOLUTION RESPIRATORY (INHALATION) at 12:30

## 2017-04-10 RX ADMIN — CEFUROXIME AXETIL SCH MG: 250 TABLET ORAL at 08:38

## 2017-04-10 RX ADMIN — IPRATROPIUM BROMIDE AND ALBUTEROL SULFATE SCH AMPULE: .5; 3 SOLUTION RESPIRATORY (INHALATION) at 15:55

## 2017-04-10 RX ADMIN — IPRATROPIUM BROMIDE AND ALBUTEROL SULFATE SCH AMPULE: .5; 3 SOLUTION RESPIRATORY (INHALATION) at 20:01

## 2017-04-10 RX ADMIN — LOPERAMIDE HYDROCHLORIDE PRN MG: 2 CAPSULE ORAL at 08:22

## 2017-04-10 RX ADMIN — MIDODRINE HYDROCHLORIDE SCH MG: 5 TABLET ORAL at 17:15

## 2017-04-10 RX ADMIN — ALBUMIN HUMAN SCH GM: 0.25 SOLUTION INTRAVENOUS at 08:15

## 2017-04-10 RX ADMIN — RIFAXIMIN SCH MG: 550 TABLET ORAL at 08:14

## 2017-04-10 RX ADMIN — LOPERAMIDE HYDROCHLORIDE PRN MG: 2 CAPSULE ORAL at 12:25

## 2017-04-10 RX ADMIN — COLLAGENASE SANTYL SCH APPLIC: 250 OINTMENT TOPICAL at 08:33

## 2017-04-10 RX ADMIN — CEFUROXIME AXETIL SCH MG: 250 TABLET ORAL at 21:22

## 2017-04-10 RX ADMIN — NYSTATIN SCH ML: 100000 SUSPENSION ORAL at 17:14

## 2017-04-10 RX ADMIN — BENZONATATE PRN MG: 100 CAPSULE ORAL at 21:39

## 2017-04-10 RX ADMIN — OCTREOTIDE ACETATE SCH MCG: 100 INJECTION, SOLUTION INTRAVENOUS; SUBCUTANEOUS at 21:24

## 2017-04-10 RX ADMIN — MIDODRINE HYDROCHLORIDE SCH MG: 5 TABLET ORAL at 06:15

## 2017-04-10 RX ADMIN — IPRATROPIUM BROMIDE AND ALBUTEROL SULFATE SCH AMPULE: .5; 3 SOLUTION RESPIRATORY (INHALATION) at 08:35

## 2017-04-10 RX ADMIN — Medication SCH TAB: at 12:12

## 2017-04-10 RX ADMIN — Medication SCH ML: at 21:23

## 2017-04-10 RX ADMIN — NYSTATIN SCH ML: 100000 SUSPENSION ORAL at 21:23

## 2017-04-10 RX ADMIN — CHOLESTYRAMINE SCH GM: 4 POWDER, FOR SUSPENSION ORAL at 06:15

## 2017-04-10 RX ADMIN — Medication SCH ML: at 08:13

## 2017-04-10 RX ADMIN — MIDODRINE HYDROCHLORIDE SCH MG: 5 TABLET ORAL at 12:12

## 2017-04-10 RX ADMIN — RIFAXIMIN SCH MG: 550 TABLET ORAL at 21:22

## 2017-04-10 RX ADMIN — Medication SCH TAB: at 08:14

## 2017-04-10 RX ADMIN — PENTOXIFYLLINE SCH MG: 400 TABLET, FILM COATED, EXTENDED RELEASE ORAL at 14:27

## 2017-04-10 RX ADMIN — PENTOXIFYLLINE SCH MG: 400 TABLET, FILM COATED, EXTENDED RELEASE ORAL at 06:14

## 2017-04-10 RX ADMIN — LOPERAMIDE HYDROCHLORIDE PRN MG: 2 CAPSULE ORAL at 06:16

## 2017-04-10 RX ADMIN — POTASSIUM PHOSPHATE, MONOBASIC SCH MG: 500 TABLET, SOLUBLE ORAL at 08:14

## 2017-04-10 RX ADMIN — PENTOXIFYLLINE SCH MG: 400 TABLET, FILM COATED, EXTENDED RELEASE ORAL at 21:22

## 2017-04-10 RX ADMIN — POTASSIUM PHOSPHATE, MONOBASIC SCH MG: 500 TABLET, SOLUBLE ORAL at 17:14

## 2017-04-10 NOTE — HHI.NPPN
Subjective


History of Present Illness


The patient is a 27 yo [place] male who presented to this facility 3/27 with 

complaints of abd pain, nausea, and poor oral intake for the past 2 weeks.  He 

was recently admitted to [facility] (3/11-3/14) for similar complaints, but signed 

himself out AMA.  He has a hx of heavy EtOH intake for the past several years, 

but says he has been trying to cut back since he was admitted to [facility].  As per 

records, he was using Lasix since his discharge from the hospital despite 

little po intake, but he says he was only using "over the counter diuretics" 

and Benadryl.  Denies any NSAID use at home.  No prior hx of renal decline.  

Mentions multiple family member with RA.


Admitting SCr 3.63


Labs from beginning of month from [facility] show preserved renal function at 0.6-0.8 

baseline SCr.


Interval History


Pt feeling OK today.  Still with some diarrhea, but improved.


Appetite improved (Prerna Ball)





Review of Systems


General


Constitutional:  Fatigue (Prerna Ball)





Gastrointestinal


Gastrointestinal:  Abdominal Pain, Diarrhea (Prerna Ball)





Objective Data


Data











 4/9/17 4/10/17





 19:00 07:00


 


Intake Total 480 ml 50 ml


 


Output Total  2 ml


 


Balance 480 ml 48 ml


 


  


 


Intake Oral 480 ml 


 


Albumin  50 ml


 


Stool Total  2 ml


 


# Voids  4


 


# Bowel Movements 5 











 Vital Signs








  Date Time  Temp Pulse Resp B/P Pulse Ox O2 Delivery O2 Flow Rate FiO2


 


4/10/17 09:14      Room Air  


 


4/10/17 08:35     95   21


 


4/10/17 08:00 98.0 97 18 120/60 94   


 


4/10/17 08:00  88      


 


4/10/17 04:00      Room Air  


 


4/10/17 04:00 97.4 98 18 133/67 94   


 


4/10/17 00:00      Room Air  


 


4/10/17 00:00 98.8 94 16 117/60 94   


 


4/9/17 20:27     95   21


 


4/9/17 20:00      Room Air  


 


4/9/17 20:00  92      


 


4/9/17 20:00 98.4 90 18 119/58 95   


 


4/9/17 16:00 98.1 92 18 124/75 98   


 


4/9/17 12:00 98.1 86 18 100/55 94   





 (Prerna Ball)


-:  


4/10/17 0637                                                                   

             4/10/17 0637





Medication Review





 Current Medications








 Medications


  (Trade)  Dose


 Ordered  Sig/Brittany


 Route  Start Time


 Stop Time Status Last Admin


 


  (NS Flush)  2 ml  UNSCH  PRN


 IV FLUSH  3/27/17 18:15


    4/9/17 05:50


 


 


  (NS Flush)  2 ml  BID


 IV FLUSH  3/27/17 21:00


    4/10/17 08:13


 


 


  (Zofran Inj)  4 mg  Q6H  PRN


 IVP  3/27/17 18:15


    4/5/17 15:58


 


 


  (Narcan Inj)  0.4 mg  UNSCH  PRN


 IV  3/27/17 18:15


     


 


 


  (TRENtal SR)  400 mg  Q8HR


 PO  3/28/17 14:00


    4/10/17 06:14


 


 


  (Tessalon)  200 mg  TID  PRN


 PO  3/29/17 15:15


    4/9/17 22:52


 


 


  (Vistaril)  50 mg  Q6H  PRN


 PO  3/30/17 09:15


    4/9/17 22:52


 


 


  (Xifaxan)  550 mg  BID


 PO  3/30/17 21:00


    4/10/17 08:14


 


 


  (Lactulose Liq)  30 ml  DAILY


 PO  3/30/17 15:00


   Hold  


 


 


  (Pill Splitter)  1 ea  UNSCH  PRN


 OTHER  3/31/17 12:45


     


 


 


  (Ativan Inj)  0.5 mg  BID  PRN


 IV PUSH  3/31/17 14:30


    4/10/17 08:27


 


 


  (SandoSTATIN INJ)  200 mcg  Q8HR


 SQ  4/1/17 14:00


    4/10/17 06:15


 


 


  (Imodium)  2 mg  UNSCH  PRN


 PO  4/1/17 14:15


    4/10/17 08:22


 


 


  (Questran Light


 Pkt)  4 gm  Q12H


 PO  4/1/17 18:00


    4/10/17 06:15


 


 


  (Mycostatin  Liq)  5 ml  QID


 SWISH-SWAL  4/2/17 13:00


 4/16/17 12:59  4/10/17 08:13


 


 


  (Magic Mouthwash


 Adult Liq)  5 ml  QID  PRN


 SWISH-SWAL  4/2/17 11:00


     


 


 


  (Lactinex)  1 tab  TID


 PO  4/2/17 18:00


    4/10/17 08:14


 


 


  (K-Phos)  1,000 mg  TID


 PO  4/5/17 13:00


    4/10/17 08:14


 


 


  (Santyl Oint)  1 applic  DAILY


 TOPICAL  4/5/17 11:15


    4/10/17 08:33


 


 


  (Ceftin)  250 mg  Q12HR


 PO  4/7/17 09:45


 4/14/17 09:44  4/10/17 08:38


 


 


  (Albumin 25% Inj)  12.5 gm  Q12HR


 IV  4/9/17 09:00


    4/10/17 08:15


 


 


  (Proamatine)  7.5 mg  TID@07,12,17


 PO  4/9/17 12:00


    4/10/17 06:15


 





 (Prerna Ball)





Physical Exam


General


Appearance:  No Acute Distress, Comfortable (Prerna Ball)





Eyes


Eye Exam:  Jaundice (Prerna Ball)





Pulmonary


Resp Exam:  Clear Bilaterally, Breath Sounds Equal, No Distress (Prerna Ball)





Cardiology


CV Exam:  Regular, Normal Sinus Rhythm (Prerna Ball)





Gastrointestinal/Abdomen


GI Exam:  Soft, Non-Tender, Distended (moderately) (Prerna Ball)





Integumentary


Skin Exam:  Clear, Warm, Jaundice (Prerna Ball)





Extremeties


Extremities Exam:  Trace Edema


Extremeties Remarks


Bilat feet and ankles (Prerna Ball)





Neurologic


Neuro Exam:  Alert, Awake, Oriented, Sedated (Prerna Ball)





Psychiatric


Psych Exam:  Appropriate Responses (Prerna Ball)





Assessment/Plan


Discussed Condition With:  Patient, Parent, Sibling


Problem List:  


(1) Acute renal failure


Plan:  Patient's creatinine level continues to improve albeit slowly.


Urine output adequate.


Continue Octreotide to 200mch q8h.  Midodrine has been reduced to 7.5mg TID--BP 

good





Will hold albumin given his increased po intake





Can consider Aldactone if required for ascites management at low dosage





Considering the slow improvement in renal indices I suspect that the primary 

etiology of the patient's acute renal insufficiency was hepatorenal syndrome.


Despite signs of improvement patient's prognosis is guarded and his long term 

prognosis remains be determined.





Medications should be adjusted for the patient's renal decline.  Avoid 

nephrotoxic medications such as iodinated contrast dyes and NSAIDs.  Avoid 

gadolinium when eGFR <30





(2) Acidosis


Plan:  Resolved.  If the bicarbonate level falls again will start by mouth 

bicarbonate.





(3) Cirrhosis with alcoholism


(4) Diarrhea


Plan:  Improved.  Management per GI.  Apparently does have mild colitis.


 (Prerna Ball)


Plan


The exam, history, and the medical decision-making described in the above note 

were completed with the assistance of the KAIDEN. I reviewed and agree with the 

findings presented. (RADHA Alan MD)








Prerna Ball Apr 10, 2017 10:43


RADHA Alan MD Apr 11, 2017 11:39

## 2017-04-10 NOTE — HHI.PR
Subjective


Remarks


follow up alcohol hepatitis/cirrhosis/PNA/UTI


04/08/17-patient seen and examined; +some abdominal pain. renal indices 

trending down. Afebrile


04/09/17-patient seen and examined, and he is stable and denies any abdominal 

pain. continue to have runs of diarrhea.


04/10/17-patient seen and examined; Renal indices trending down; Afebrile.  

Alert and oriented 3





Objective


Vitals





 Vital Signs








  Date Time  Temp Pulse Resp B/P Pulse Ox O2 Delivery O2 Flow Rate FiO2


 


4/10/17 09:14      Room Air  


 


4/10/17 08:35     95   21


 


4/10/17 08:00 98.0 97 18 120/60 94   


 


4/10/17 08:00  88      


 


4/10/17 04:00      Room Air  


 


4/10/17 04:00 97.4 98 18 133/67 94   


 


4/10/17 00:00      Room Air  


 


4/10/17 00:00 98.8 94 16 117/60 94   


 


4/9/17 20:27     95   21


 


4/9/17 20:00      Room Air  


 


4/9/17 20:00  92      


 


4/9/17 20:00 98.4 90 18 119/58 95   


 


4/9/17 16:00 98.1 92 18 124/75 98   


 


4/9/17 12:00 98.1 86 18 100/55 94   








 I/O








 4/9/17 4/9/17 4/9/17 4/10/17 4/10/17 4/10/17





 07:00 15:00 23:00 07:00 15:00 23:00


 


Intake Total 480 ml 480 ml 50 ml   


 


Output Total    2 ml  


 


Balance 480 ml 480 ml 50 ml -2 ml  


 


      


 


Intake Oral 480 ml 480 ml    


 


Albumin   50 ml   


 


Stool Total    2 ml  


 


# Voids 2   4  


 


# Bowel Movements 2 5    








Result Diagram:  


4/10/17 0637                                                                   

             4/10/17 0637





Objective Remarks


GENERAL: NAD


SKIN: +jaundiced


HEAD: Normocephalic.


EYES: No scleral icterus. No injection or drainage. 


NECK: Supple, trachea midline. No JVD or lymphadenopathy.


CARDIOVASCULAR: Regular rate and rhythm without murmurs, gallops, or rubs. 


RESPIRATORY: Breath sounds equal bilaterally. No accessory muscle use.


GASTROINTESTINAL: Abdomen hard, mildly tender, distended. +HSM


MUSCULOSKELETAL: No cyanosis, or edema. 


BACK: Nontender without obvious deformity. No CVA tenderness.





Procedures


Sigmoidoscopy





A/P


Problem List:  


(1) Acute hepatitis


ICD Code:  B17.9


Status:  Acute


(2) Acute renal failure


ICD Code:  N17.9


Status:  Acute


Assessment and Plan


26-year-old male with history of hepatitis and now with hepatorenal





Severe alcoholic hepatitis complicated by varices, ascites and coagulopathy. 

Cirrhosis on imaging


-CT scan show hepatocellular disease with varices.


-Hepatitis panel reviewed from last admission which was all negative.


-Liver ultrasound shows possible acute cholecystitis. HIDA scan negative. 


-GI consulted. Consider steroids if infection ruled out


-Unremarkable DARRYL, AMA, ASM.  Negative hemochromatosis panel. US guided 

paracentesis, diagnostic (? SBP) if enough fluid to safely drain


-Continue with supportive care, Trental and rifaximin.  Patient is not a 

candidate for liver transplant due to recent alcohol use.





Coagulopathy


-Secondary to liver disease.


-See treatment as above.





Acute renal failure


-Per nephrologist this may be partly due to hepatorenal syndrome versus 

dehydration with ATN.


-Nephrologist following.  continue with midodrine by mouth as well as 

octreotide subcutaneously total 2 weeks and IV albumin.  


-Cr improving 





Diarrhea with C. difficile negative 


- Continue Questran, probiotics and Lomotil as well as IV hydration.  Status 

post sigmoidoscopy pathology negative .





PNA/UTI: s/p Zosyn and now on Ceftin by ID. 





Anxiety


-on Ativan when necessary.





Urticaria


-on Vistaril.





DVT prophylaxis: B-SCD








Manish Azevedo MD Apr 10, 2017 11:14

## 2017-04-10 NOTE — HHI.GIFU
Subjective


Remarks


Resting in bed.  Lethargic.  No n/v.  Still with multiple bowel movements, but 

states much improved- only passing small amount of liquid stools and the 

frequency has improved.





Objective


Vitals I&O





 Vital Signs








  Date Time  Temp Pulse Resp B/P Pulse Ox O2 Delivery O2 Flow Rate FiO2


 


4/10/17 12:00 98.5 89 17 108/53 98   


 


4/10/17 09:14      Room Air  


 


4/10/17 08:35     95   21


 


4/10/17 08:00 98.0 97 18 120/60 94   


 


4/10/17 08:00  88      


 


4/10/17 04:00      Room Air  


 


4/10/17 04:00 97.4 98 18 133/67 94   


 


4/10/17 00:00      Room Air  


 


4/10/17 00:00 98.8 94 16 117/60 94   


 


4/9/17 20:27     95   21


 


4/9/17 20:00      Room Air  


 


4/9/17 20:00  92      


 


4/9/17 20:00 98.4 90 18 119/58 95   


 


4/9/17 16:00 98.1 92 18 124/75 98   








 I/O








 4/9/17 4/9/17 4/9/17 4/10/17 4/10/17 4/10/17





 06:59 14:59 22:59 06:59 14:59 22:59


 


Intake Total 480 ml 480 ml 50 ml   


 


Output Total    2 ml  


 


Balance 480 ml 480 ml 50 ml -2 ml  


 


      


 


Intake Oral 480 ml 480 ml    


 


Albumin   50 ml   


 


Stool Total    2 ml  


 


# Voids 2   4  


 


# Bowel Movements 2 5    








Laboratory





Laboratory Tests








Test 4/10/17





 06:37


 


White Blood Count 18.3 


 


Red Blood Count 2.11 


 


Hemoglobin 7.9 


 


Hematocrit 22.1 


 


Mean Corpuscular Volume 104.9 


 


Mean Corpuscular Hemoglobin 37.6 


 


Mean Corpuscular Hemoglobin 35.8 





Concent 


 


Red Cell Distribution Width 15.3 


 


Platelet Count 130 


 


Mean Platelet Volume 10.0 


 


Neutrophils (%) (Auto) 83.8 


 


Lymphocytes (%) (Auto) 7.0 


 


Monocytes (%) (Auto) 7.2 


 


Eosinophils (%) (Auto) 1.4 


 


Basophils (%) (Auto) 0.6 


 


Neutrophils # (Auto) 15.3 


 


Lymphocytes # (Auto) 1.3 


 


Monocytes # (Auto) 1.3 


 


Eosinophils # (Auto) 0.3 


 


Basophils # (Auto) 0.1 


 


CBC Comment DIFF FINAL 


 


Differential Comment  


 


Sodium Level 142 


 


Potassium Level 3.8 


 


Chloride Level 110 


 


Carbon Dioxide Level 20.3 


 


Anion Gap 12 


 


Blood Urea Nitrogen 23 


 


Creatinine 4.31 


 


Estimat Glomerular Filtration 17 





Rate 


 


Random Glucose 97 


 


Calcium Level 8.6 


 


Total Bilirubin 32.8 


 


Aspartate Amino Transf 88 





(AST/SGOT) 


 


Alanine Aminotransferase 37 





(ALT/SGPT) 


 


Alkaline Phosphatase 92 


 


Total Protein 4.8 


 


Albumin 2.6 














 Date/Time Procedure Status





Source Growth 


 


 4/5/17 16:00 Streptococcus pneumoniae Antigen (M - Final Complete





Urine Clean Catch PRESUMPTIVE NEGATIVE FOR STREPTOCOCCU... 


 


 4/5/17 16:00 Legionella Antigen - Final Complete





Urine Random Urine PRESUMPTIVE NEGATIVE FOR LEGIONELLA P... 


 


 4/5/17 16:00  Cancelled





Urine Catheterized Urine  








Imaging





Last Impressions








Lower Extremity Ultrasound 4/4/17 0000 Signed





Impressions: 





 Service Date/Time:  Tuesday, April 4, 2017 14:04 - CONCLUSION:  Normal 





 examination.       Jose Khan MD 


 


Abdomen Ultrasound 4/4/17 0000 Signed





Impressions: 





 Service Date/Time:  Tuesday, April 4, 2017 14:00 - CONCLUSION:  The exam 





 demonstrates small pockets of ascites within the midline of the pelvis. There 

is 





 a small amount of ascites around the liver and spleen. The liver appears 





 cirrhotic.     Valdemar Del Angel MD 


 


Chest X-Ray 4/2/17 0000 Signed





Impressions: 





 Service Date/Time:  Sunday, April 2, 2017 15:31 - CONCLUSION:  1. Slight 





 increase in basilar airspace consolidation since March 27.     Ralph Barrett MD 


 


Hepatobiliary Scan Nuclear Medicine 3/28/17 0000 Signed





Impressions: 





 Service Date/Time:  Tuesday, March 28, 2017 14:36 - CONCLUSION:  Markedly 





 delayed uptake in the liver. Findings suggest diffuse hepatocellular disease 





 versus biliary obstruction. 24 hour delayed scan could be performed.     

Estevan Longoria MD ADDENDUM:  24 hour scans still shows no activity in bile ducts or 





 small bowel suggesting diffuse hepatocellular disease.    Jona Del Angel MD  





 FACR


 


Abdomen/Pelvis CT 3/27/17 0000 Signed





Impressions: 





 Service Date/Time:  Monday, March 27, 2017 16:50 - CONCLUSION: Findings 

suggest 





 advanced hepatocellular disease with varicosities, prominent spleen and 





 interestingly only a small amount of ascites.  Most of the ascites is in the 





 pelvis.     Jona Del Angel MD  FACR








Physical Exam


HEENT: Normocephalic; atraumatic; + jaundice.


CHEST:  CTA


CARDIAC:  RRR


ABDOMEN:  Soft, distended, nontender;  hepatosplenomegaly; bowel sounds are 

present x 4 quadrants. Ascites


EXTREMITIES: No clubbing, cyanosis, +1 pitting edema BLE


SKIN:  Significant jaundice 


CNS:  Lethargic, oriented x 3





Assessment and Plan


Plan


ASSESSMENT:


- Severe Alcoholic hepatitis on what appears to be underlying liver cirrhosis (

based on imaging).  Has hx of ETOH abuse, reportedly drinking 6-7 drinks per


   day x 6 years.  He was hospitalized from 3/11-3/14 for acute alcoholic 

hepatitis.  During that admission, he was treated with Pentoxifylline, 

Prednisone,


   Nadolol, Spironolactone, Lactulose, and Protonix.  Unfortunately, he left 

[facility] on 3/14 with LFTs on 3/14 were T. Bili 23.7, , ALT 61, Alkaline 


   Phosph 162.  Hepatitis Panel and Celiac panel was negative at that time.  He 

was taking an OTC diuretic at home.  He is not taking Tylenol products. 


   He is not aware of any other liver disease, although several people on his 

mother's side have autoimmune disorders.  He returned to ER for worsening


   jaundice, nausea without vomiting, worsening abdominal distention, and 

intermittent fever and chills.  Abdomen Ultrasound (3/27/17)-----> 1. Thick-

walled 


   gallbladder containing some sludge and demonstrating pericholecystic fluid.  

If there is clinical concern for acute cholecystitis a hepatobiliary scan may 


   be helpful to confirm cystic duct obstruction. 2. Hepatosplenomegaly.  3. 

Some ascites within the abdomen.  Abdomen/Pelvis CT (3/27/17)---->  Findings


   suggest advanced hepatocellular disease with varicosities, prominent spleen 

and interestingly only a small amount of ascites.  Most of the ascites is in the


   pelvis.  DF 52.70. DARRYL neg,  AMA neg, ASMA neg, Iron Saturation 93.1, 

Ferritin 1666, AFP 3.3, Alpha 1 antitrypsin 286, Ceruloplasmin 30.  Ferritin 

and Iron   


   Saturation elevated and therefore Hfe gene ordered to r/o hemochromatosis 

and this was negative.  Pt still with persistent LFT elevation- T. Bili 


   T. Bili 32.8, AST 37, ALT 92, Alk Phosph 92.  Pentoxifylline.


- Ascites with intermittent fevers and chills. Afebrile, but with persistent 

leukocytosis at 18.3.  WBC Ceftin. 


- Severe diarrhea.  CDiff negative.  S/P Sigmoidoscopy (4/3/17)----> 

nonspecific colitis, internal hemorrhoids.  Pathology colonic mucosa with no 

significant 


   histopathologic abnormalities.  Probiotics, Cholestyramine, Imodium. 

Improved.


- Hepatic encephalopathy.  Xifaxan.  Lactulose d/c'd because of diarrhea. 


- Coagulopathy.  STABLE.  


- ARF.  Creat 4.31, GFR 15. Renal following 


- Leukocytosis.WBC 18.3. Ceftin.   ID following.  ? inflammatory.





PLAN:


- 2 gram sodium diet


- Cont. Xifaxan


- Cont. Probiotics


- Continue Cholestyramine


- Continue Imodium prn


- Cont. Pentoxifylline


- Cont. PPI


- Monitor labs


- Renal following


- ? Hematology consult for persistent leukocytosis.  If thought only 

inflammatory, then ? trial of steroids for alcoholic hepatitis


- Pt was drinking up until his last hospitalization on 3/11 and therefore is 

not a candidate for liver transplant if his condition worsens.


- Further recommendations to follow based on results of above


- Patient seen and examined by Dr. Can and myself and this note is written 

on his behalf








Lizette Amin Apr 10, 2017 14:07

## 2017-04-11 VITALS
DIASTOLIC BLOOD PRESSURE: 59 MMHG | HEART RATE: 86 BPM | OXYGEN SATURATION: 94 % | SYSTOLIC BLOOD PRESSURE: 105 MMHG | TEMPERATURE: 98 F | RESPIRATION RATE: 20 BRPM

## 2017-04-11 VITALS
SYSTOLIC BLOOD PRESSURE: 122 MMHG | DIASTOLIC BLOOD PRESSURE: 58 MMHG | HEART RATE: 83 BPM | TEMPERATURE: 98.3 F | OXYGEN SATURATION: 93 % | RESPIRATION RATE: 18 BRPM

## 2017-04-11 VITALS
HEART RATE: 91 BPM | RESPIRATION RATE: 24 BRPM | SYSTOLIC BLOOD PRESSURE: 107 MMHG | TEMPERATURE: 98.2 F | OXYGEN SATURATION: 93 % | DIASTOLIC BLOOD PRESSURE: 58 MMHG

## 2017-04-11 VITALS
HEART RATE: 82 BPM | DIASTOLIC BLOOD PRESSURE: 63 MMHG | RESPIRATION RATE: 24 BRPM | TEMPERATURE: 97.9 F | OXYGEN SATURATION: 94 % | SYSTOLIC BLOOD PRESSURE: 122 MMHG

## 2017-04-11 VITALS — HEART RATE: 86 BPM

## 2017-04-11 VITALS — HEART RATE: 84 BPM

## 2017-04-11 VITALS
RESPIRATION RATE: 20 BRPM | HEART RATE: 100 BPM | DIASTOLIC BLOOD PRESSURE: 58 MMHG | TEMPERATURE: 98.2 F | OXYGEN SATURATION: 92 % | SYSTOLIC BLOOD PRESSURE: 116 MMHG

## 2017-04-11 VITALS
OXYGEN SATURATION: 92 % | SYSTOLIC BLOOD PRESSURE: 120 MMHG | TEMPERATURE: 98.4 F | HEART RATE: 99 BPM | RESPIRATION RATE: 24 BRPM | DIASTOLIC BLOOD PRESSURE: 62 MMHG

## 2017-04-11 VITALS — OXYGEN SATURATION: 94 %

## 2017-04-11 LAB
ANION GAP SERPL CALC-SCNC: 11 MEQ/L (ref 5–15)
BASOPHILS # BLD AUTO: 0.3 TH/MM3 (ref 0–0.2)
BASOPHILS NFR BLD: 1.6 % (ref 0–2)
BUN SERPL-MCNC: 22 MG/DL (ref 7–18)
CHLORIDE SERPL-SCNC: 107 MEQ/L (ref 98–107)
EOSINOPHIL # BLD: 0.2 TH/MM3 (ref 0–0.4)
EOSINOPHIL NFR BLD: 1 % (ref 0–4)
ERYTHROCYTE [DISTWIDTH] IN BLOOD BY AUTOMATED COUNT: 15.3 % (ref 11.6–17.2)
GFR SERPLBLD BASED ON 1.73 SQ M-ARVRAT: 19 ML/MIN (ref 89–?)
HCO3 BLD-SCNC: 21.3 MEQ/L (ref 21–32)
HCT VFR BLD CALC: 23.8 % (ref 39–51)
HEMO FLAGS: (no result)
LYMPHOCYTES # BLD AUTO: 1.6 TH/MM3 (ref 1–4.8)
LYMPHOCYTES NFR BLD AUTO: 8 % (ref 9–44)
MCH RBC QN AUTO: 36.7 PG (ref 27–34)
MCHC RBC AUTO-ENTMCNC: 34.9 % (ref 32–36)
MCV RBC AUTO: 105.2 FL (ref 80–100)
MONOCYTES NFR BLD: 8.3 % (ref 0–8)
NEUTROPHILS # BLD AUTO: 16.4 TH/MM3 (ref 1.8–7.7)
NEUTROPHILS NFR BLD AUTO: 81.1 % (ref 16–70)
PLATELET # BLD: 134 TH/MM3 (ref 150–450)
POTASSIUM SERPL-SCNC: 3.9 MEQ/L (ref 3.5–5.1)
RBC # BLD AUTO: 2.27 MIL/MM3 (ref 4.5–5.9)
SODIUM SERPL-SCNC: 139 MEQ/L (ref 136–145)
VIT B12 SERPL-MCNC: 1240 PG/ML (ref 193–986)
WBC # BLD AUTO: 20.2 TH/MM3 (ref 4–11)

## 2017-04-11 RX ADMIN — PENTOXIFYLLINE SCH MG: 400 TABLET, FILM COATED, EXTENDED RELEASE ORAL at 05:59

## 2017-04-11 RX ADMIN — CEFUROXIME AXETIL SCH MG: 250 TABLET ORAL at 08:35

## 2017-04-11 RX ADMIN — NYSTATIN SCH ML: 100000 SUSPENSION ORAL at 08:35

## 2017-04-11 RX ADMIN — BENZONATATE PRN MG: 100 CAPSULE ORAL at 06:04

## 2017-04-11 RX ADMIN — Medication SCH TAB: at 08:35

## 2017-04-11 RX ADMIN — NYSTATIN SCH ML: 100000 SUSPENSION ORAL at 17:46

## 2017-04-11 RX ADMIN — POTASSIUM PHOSPHATE, MONOBASIC SCH MG: 500 TABLET, SOLUBLE ORAL at 08:35

## 2017-04-11 RX ADMIN — OCTREOTIDE ACETATE SCH MCG: 100 INJECTION, SOLUTION INTRAVENOUS; SUBCUTANEOUS at 21:21

## 2017-04-11 RX ADMIN — MIDODRINE HYDROCHLORIDE SCH MG: 5 TABLET ORAL at 12:57

## 2017-04-11 RX ADMIN — BENZONATATE PRN MG: 100 CAPSULE ORAL at 17:46

## 2017-04-11 RX ADMIN — PENTOXIFYLLINE SCH MG: 400 TABLET, FILM COATED, EXTENDED RELEASE ORAL at 21:22

## 2017-04-11 RX ADMIN — COLLAGENASE SANTYL SCH APPLIC: 250 OINTMENT TOPICAL at 08:35

## 2017-04-11 RX ADMIN — CEFUROXIME AXETIL SCH MG: 250 TABLET ORAL at 21:22

## 2017-04-11 RX ADMIN — OCTREOTIDE ACETATE SCH MCG: 100 INJECTION, SOLUTION INTRAVENOUS; SUBCUTANEOUS at 06:00

## 2017-04-11 RX ADMIN — RIFAXIMIN SCH MG: 550 TABLET ORAL at 08:35

## 2017-04-11 RX ADMIN — CHOLESTYRAMINE SCH GM: 4 POWDER, FOR SUSPENSION ORAL at 17:46

## 2017-04-11 RX ADMIN — MIDODRINE HYDROCHLORIDE SCH MG: 5 TABLET ORAL at 17:46

## 2017-04-11 RX ADMIN — CHOLESTYRAMINE SCH GM: 4 POWDER, FOR SUSPENSION ORAL at 06:00

## 2017-04-11 RX ADMIN — IPRATROPIUM BROMIDE AND ALBUTEROL SULFATE SCH AMPULE: .5; 3 SOLUTION RESPIRATORY (INHALATION) at 08:26

## 2017-04-11 RX ADMIN — PENTOXIFYLLINE SCH MG: 400 TABLET, FILM COATED, EXTENDED RELEASE ORAL at 12:57

## 2017-04-11 RX ADMIN — Medication SCH TAB: at 12:57

## 2017-04-11 RX ADMIN — RIFAXIMIN SCH MG: 550 TABLET ORAL at 21:22

## 2017-04-11 RX ADMIN — NYSTATIN SCH ML: 100000 SUSPENSION ORAL at 21:22

## 2017-04-11 RX ADMIN — Medication SCH ML: at 08:35

## 2017-04-11 RX ADMIN — ALBUTEROL SULFATE PRN MG: 2.5 SOLUTION RESPIRATORY (INHALATION) at 13:50

## 2017-04-11 RX ADMIN — Medication SCH ML: at 21:23

## 2017-04-11 RX ADMIN — NYSTATIN SCH ML: 100000 SUSPENSION ORAL at 12:57

## 2017-04-11 RX ADMIN — MIDODRINE HYDROCHLORIDE SCH MG: 5 TABLET ORAL at 06:00

## 2017-04-11 RX ADMIN — Medication SCH TAB: at 17:46

## 2017-04-11 RX ADMIN — OCTREOTIDE ACETATE SCH MCG: 100 INJECTION, SOLUTION INTRAVENOUS; SUBCUTANEOUS at 13:00

## 2017-04-11 NOTE — MB
cc:

CHARLEE TRUJILLO M.D.

****

 

 

DATE OF CONSULTATION

04/10/2017

 

REASON FOR CONSULTATION

Consult requested by Nurse Practitioner Mary Amin for evaluation of

persistent leukocytosis.

 

HISTORY OF PRESENT ILLNESS

Scar is a 26-year-old male who has a history of alcoholism with cirrhosis of

the liver.  The patient was admitted to the hospital on March 27 for abdominal

pain, nausea and not able to eat that much.

 

HISTORY OF PRESENT ILLNESS

This is 26-year-old male who has a history of alcoholism and has developed

cirrhosis of the liver.  He was recently admitted to [facility] for

abdominal pain, nausea and vomiting.  He was treated and the patient signed out

against medical advise.  Now he is readmitted to [facility].  GI was

consulted for his abdominal pain and nausea.  The patient had blood tests on

admission on March 27 that showed white count of 15.2, hemoglobin 11.8,

hematocrit of 35, , platelet count 181.  Subsequently his platelet count

dropped to 130, hemoglobin also dropped to 7.9 and the white count remains

elevated at 18.3.  Because of the persistent leukocytosis, I have been asked to

see the patient for further evaluation.

 

The patient has been complaining of abdominal pain, nausea, vomiting and had

diarrhea which has not resolved.  He denies any fevers.  He is complaining of

some joint pains.  The rest of the review of systems is negative.

 

PAST MEDICAL HISTORY

1. Alcoholism.

2. Tobaccoism.

3. Cirrhosis of the liver.

 

PAST SURGICAL HISTORY

None.

 

ALLERGIES

None.

 

MEDICATIONS

Please see the EMR.

 

FAMILY HISTORY

The family is noncontributory.

 

SOCIAL HISTORY

Patient smoked cigarettes one pack a day but he "quit" since he has been in the

hospital.  Also drinks alcohol heavy, 5-6 drinks per day for the last several

years.

 

PHYSICAL EXAMINATION

GENERAL:  This is a well-developed, well-nourished young white male in no

apparent distress.

VITAL SIGNS: Temperature 94.1, heart is 92, blood pressure 134/59.

HEENT:  PERRLA, EOMI, anicteric.  No oral lesions noted.

NECK:  Supple.

LYMPHATICS:  There is no cervical, supraclavicular, axillary lymphadenopathy

noted.

LUNGS:  Clear.  No wheezing, rhonchi or rales.

HEART:  Regular rate and rhythm.

ABDOMEN:  Distended, ascites noted.

EXTREMITIES:  No pedal edema.

NEUROLOGY:  Awake, alert, oriented x 3.

SKIN:  No significant lesions noted.

 

ASSESSMENT

Persistent leukocytosis with neutrophilia.  This is most likely reactive

leukocytosis.

 

PLAN

I have reviewed his available records.  I have discussed with the patient

regarding the persistent leukocytosis.  I have reviewed the peripheral smear.

I reviewed the differential count.  The absolute neutrophil count is elevated

at 15.3 and absolute monocyte count is elevated at 1.3.  The patient has

left-sided shift which I believe is due to some chronic inflammation.  I doubt

we would be dealing with any leukemia.  He has mild thrombocytopenia which is

due to hypersplenism from cirrhosis of the liver and he does not require any

intervention for the mild thrombocytopenia.  However, he is anemic and my

recommendation is for blood transfusion.

 

The patient has persistent leukocytosis and I will check the sed rate and

C-reactive protein for evaluation of chronic inflammation.

 

 

We will make further recommendations once we have the results of the above

tests.

 

Thank you for asking my opinion.

 

 

 

                              _________________________________

                              MD IZABEL Alex/TRINY

D:  4/10/2017/10:45 PM

T:  4/11/2017/6:27 AM

Visit #:  [id number]

Job #:  [id number]

## 2017-04-11 NOTE — HHI.NPPN
Subjective


History of Present Illness


The patient is a 27 yo [place] male who presented to this facility 3/27 with 

complaints of abd pain, nausea, and poor oral intake for the past 2 weeks.  He 

was recently admitted to [facility] (3/11-3/14) for similar complaints, but signed 

himself out AMA.  He has a hx of heavy EtOH intake for the past several years, 

but says he has been trying to cut back since he was admitted to [facility].  As per 

records, he was using Lasix since his discharge from the hospital despite 

little po intake, but he says he was only using "over the counter diuretics" 

and Benadryl.  Denies any NSAID use at home.  No prior hx of renal decline.  

Mentions multiple family member with RA.


Admitting SCr 3.63


Labs from beginning of month from [facility] show preserved renal function at 0.6-0.8 

baseline SCr.


Interval History


Patient is sitting on the side of the bed,


Has been walking the reddy with physical therapy.





Review of Systems


General


Constitutional:  Fatigue





Gastrointestinal


Gastrointestinal:  Abdominal Pain, Diarrhea





Objective Data


Data











 4/10/17 4/11/17





 19:00 07:00


 


Intake Total 240 ml 960 ml


 


Balance 240 ml 960 ml


 


  


 


Intake Oral 240 ml 960 ml


 


# Voids 3 1


 


# Bowel Movements 0 1











 Vital Signs








  Date Time  Temp Pulse Resp B/P Pulse Ox O2 Delivery O2 Flow Rate FiO2


 


4/11/17 08:29     94   


 


4/11/17 08:00 98.0 86 20 105/59 94   


 


4/11/17 04:37 98.4 99 24 120/62 92   


 


4/11/17 01:15 98.2 100 20 116/58 92   


 


4/10/17 20:35 98.1 92 20 134/59    


 


4/10/17 20:02     94   21


 


4/10/17 20:00  102      


 


4/10/17 20:00      Room Air  


 


4/10/17 16:00 98.0 96 16 129/67 93   


 


4/10/17 12:00 98.5 89 17 108/53 98   








-:  


4/10/17 0637                                                                   

             4/11/17 0905








Physical Exam


General


Appearance:  No Acute Distress, Comfortable





Eyes


Eye Exam:  Jaundice





Pulmonary


Resp Exam:  Clear Bilaterally, Breath Sounds Equal, No Distress





Cardiology


CV Exam:  Regular, Normal Sinus Rhythm





Gastrointestinal/Abdomen


GI Exam:  Soft, Non-Tender, Distended (moderately)





Integumentary


Skin Exam:  Clear, Warm, Jaundice





Extremeties


Extremities Exam:  Trace Edema


Extremeties Remarks


One plus pitting edema of lower legs trace edema thighs.





Neurologic


Neuro Exam:  Alert, Awake, Oriented, Sedated





Psychiatric


Psych Exam:  Appropriate Responses





Assessment/Plan


Discussed Condition With:  Patient, Parent, Sibling


Problem List:  


(1) Acute renal failure


Plan:  





Patient's creatinine continues its improvement.


Continue octreotide until April 15, 2017.


Continue midodrine as clinically indicated and it will be required as an 

outpatient most likely unless his renal indices normalize,


Considering the slow improvement in renal indices I suspect that the primary 

etiology of the patient's acute renal insufficiency was hepatorenal syndrome.


Despite signs of improvement patient's prognosis is guarded and his long term 

prognosis remains be determined.





Medications should be adjusted for the patient's renal decline.  Avoid 

nephrotoxic medications such as iodinated contrast dyes and NSAIDs.  Avoid 

gadolinium when EGFR <30





(2) Acidosis


Plan:  Resolved.  If the bicarbonate level falls again will start by mouth 

bicarbonate.





(3) Cirrhosis with alcoholism


(4) Diarrhea


Plan:  Improved.  Management per GI.  Apparently does have mild colitis.











RADHA Alan MD Apr 11, 2017 11:38

## 2017-04-11 NOTE — PD.ONC.PN
Subjective


Subjective


Remarks


Afebrile overnight. Patient resting with stepfather at bedside.  He has no 

acute complaints.





Objective


Data











  Date Time  Temp Pulse Resp B/P Pulse Ox O2 Delivery O2 Flow Rate FiO2


 


4/11/17 12:00 97.9 82 24 122/63 94   


 


4/11/17 08:29     94   


 


4/11/17 08:00 98.0 86 20 105/59 94   


 


4/11/17 04:37 98.4 99 24 120/62 92   


 


4/11/17 01:15 98.2 100 20 116/58 92   


 


4/10/17 20:35 98.1 92 20 134/59    


 


4/10/17 20:02     94   21


 


4/10/17 20:00  102      


 


4/10/17 20:00      Room Air  


 


4/10/17 16:00 98.0 96 16 129/67 93   














 4/11/17 4/11/17 4/11/17





 07:00 15:00 23:00


 


Intake Total 960 ml  


 


Balance 960 ml  








Result Diagram:  


4/11/17 0905 4/11/17 0905





Laboratory Results





Laboratory Tests








Test 4/11/17





 09:05


 


White Blood Count 20.2 TH/MM3


 


Red Blood Count 2.27 MIL/MM3


 


Hemoglobin 8.3 GM/DL


 


Hematocrit 23.8 %


 


Mean Corpuscular Volume 105.2 FL


 


Mean Corpuscular Hemoglobin 36.7 PG


 


Mean Corpuscular Hemoglobin 34.9 %





Concent 


 


Red Cell Distribution Width 15.3 %


 


Platelet Count 134 TH/MM3


 


Mean Platelet Volume 10.2 FL


 


Neutrophils (%) (Auto) 81.1 %


 


Lymphocytes (%) (Auto) 8.0 %


 


Monocytes (%) (Auto) 8.3 %


 


Eosinophils (%) (Auto) 1.0 %


 


Basophils (%) (Auto) 1.6 %


 


Neutrophils # (Auto) 16.4 TH/MM3


 


Lymphocytes # (Auto) 1.6 TH/MM3


 


Monocytes # (Auto) 1.7 TH/MM3


 


Eosinophils # (Auto) 0.2 TH/MM3


 


Basophils # (Auto) 0.3 TH/MM3


 


CBC Comment DIFF FINAL 


 


Differential Comment  


 


Erythrocyte Sedimentation Rate 48 mm/hr


 


Sodium Level 139 MEQ/L


 


Potassium Level 3.9 MEQ/L


 


Chloride Level 107 MEQ/L


 


Carbon Dioxide Level 21.3 MEQ/L


 


Anion Gap 11 MEQ/L


 


Blood Urea Nitrogen 22 MG/DL


 


Creatinine 3.90 MG/DL


 


Estimat Glomerular Filtration 19 ML/MIN





Rate 


 


Random Glucose 87 MG/DL


 


Calcium Level 8.9 MG/DL


 


Phosphorus Level 2.9 MG/DL


 


C-Reactive Protein 1.80 MG/DL


 


Albumin 2.9 GM/DL


 


Vitamin B12 Level 1240 PG/ML


 


Folate 5.2 NG/ML











Administered Medications








 Medications


  (Trade)  Dose


 Ordered  Sig/Brittany


 Route


 PRN Reason  Start Time


 Stop Time Status Last Admin


Dose Admin


 


 Sodium Chloride


  (NS Flush)  2 ml  UNSCH  PRN


 IV FLUSH


 FLUSH AFTER USING IV ACCESS  3/27/17 18:15


    4/9/17 05:50


 


 


 Sodium Chloride


  (NS Flush)  2 ml  BID


 IV FLUSH


   3/27/17 21:00


    4/11/17 08:35


 


 


 Ondansetron HCl


  (Zofran Inj)  4 mg  Q6H  PRN


 IVP


 NAUSEA OR VOMITING  3/27/17 18:15


    4/5/17 15:58


 


 


 Pentoxifylline


  (TRENtal SR)  400 mg  Q8HR


 PO


   3/28/17 14:00


    4/11/17 05:59


 


 


 Benzonatate


  (Tessalon)  200 mg  TID  PRN


 PO


 cough  3/29/17 15:15


    4/11/17 06:04


 


 


 Hydroxyzine


 Pamoate


  (Vistaril)  50 mg  Q6H  PRN


 PO


 ITCHING  3/30/17 09:15


    4/11/17 06:04


 


 


 Rifaximin


  (Xifaxan)  550 mg  BID


 PO


   3/30/17 21:00


    4/11/17 08:35


 


 


 Lorazepam


  (Ativan Inj)  0.5 mg  BID  PRN


 IV PUSH


 ANXIETY  3/31/17 14:30


    4/11/17 08:35


 


 


 Octreotide Acetate


  (SandoSTATIN INJ)  200 mcg  Q8HR


 SQ


   4/1/17 14:00


    4/10/17 21:24


 


 


 Loperamide HCl


  (Imodium)  2 mg  UNSCH  PRN


 PO


 DIARRHEA  4/1/17 14:15


    4/10/17 12:25


 


 


 Cholestyramine


 Resin


  (Questran Light


 Pkt)  4 gm  Q12H


 PO


   4/1/17 18:00


    4/11/17 06:00


 


 


 Nystatin


  (Mycostatin  Liq)  5 ml  QID


 SWISH-SWAL


   4/2/17 13:00


 4/16/17 12:59  4/11/17 08:35


 


 


 Lactobacillus


 Acidophilus


  (Lactinex)  1 tab  TID


 PO


   4/2/17 18:00


    4/11/17 08:35


 


 


 Collagenase


  (Santyl Oint)  1 applic  DAILY


 TOPICAL


   4/5/17 11:15


    4/11/17 08:35


 


 


 Cefuroxime Axetil


  (Ceftin)  250 mg  Q12HR


 PO


   4/7/17 09:45


 4/14/17 09:44  4/11/17 08:35


 


 


 Midodrine


  (Proamatine)  7.5 mg  TID@07,12,17


 PO


   4/9/17 12:00


    4/11/17 06:00


 








Objective Remarks


GENERAL: Severely jaundiced male, sitting up in bed in nad


SKIN: Warm and dry.


HEAD: Normocephalic.


EYES: ++scleral icterus. No injection or drainage. 


NECK: Supple, trachea midline.


CARDIOVASCULAR: Regular rate and rhythm


RESPIRATORY: Breath sounds equal bilaterally. No accessory muscle use.


GASTROINTESTINAL: Abdomen soft, +ascites,  


EXTREMITIES: No cyanosis


MUSCULOSKELETAL: Adequate muscle tone.


NEUROLOGICAL: awake and alert, normal speech. moving extremities.





Assessment/Plan


Problem List:  


(1) Leukocytosis


Status:  Acute


Plan:  4/11: CRP and ESR both elevated, consistent with chronic inflammation. 

monitor blood counts.


--Persistent leukocytosis with neutrophilia.  


-- most likely reactive leukocytosis.


--mild thrombocytopenia due to hypersplenism from cirrhosis of the liver 





Assessment


27y/o male with history of alcoholism with cirrhosis of the liver. Hematology 

following for leukocytosis


h/o Alcoholism.


Tobaccoism.


Cirrhosis of the liver.


Attending Statement


no new c/o


ESR and CRP both are high c/w reactive leucocytosis. recommend monitor cbc


will follow.








Cat Slaughter Apr 11, 2017 12:50


Kwame Boss MD Apr 11, 2017 20:18

## 2017-04-11 NOTE — HHI.GIFU
Subjective


Remarks


Resting in bed.  Lethargic.  States diarrhea about the same- multiple small 

loose stools.





Objective


Vitals I&O





 Vital Signs








  Date Time  Temp Pulse Resp B/P Pulse Ox O2 Delivery O2 Flow Rate FiO2


 


4/11/17 12:00 97.9 82 24 122/63 94   


 


4/11/17 08:29     94   


 


4/11/17 08:00 98.0 86 20 105/59 94   


 


4/11/17 04:37 98.4 99 24 120/62 92   


 


4/11/17 01:15 98.2 100 20 116/58 92   


 


4/10/17 20:35 98.1 92 20 134/59    


 


4/10/17 20:02     94   21


 


4/10/17 20:00  102      


 


4/10/17 20:00      Room Air  


 


4/10/17 16:00 98.0 96 16 129/67 93   








 I/O








 4/10/17 4/10/17 4/10/17 4/11/17 4/11/17 4/11/17





 07:00 15:00 23:00 07:00 15:00 23:00


 


Intake Total  240 ml  960 ml  


 


Output Total 2 ml     


 


Balance -2 ml 240 ml  960 ml  


 


      


 


Intake Oral  240 ml  960 ml  


 


Stool Total 2 ml     


 


# Voids 4 1 2 1  


 


# Bowel Movements  0  1  








Laboratory





Laboratory Tests








Test 4/11/17





 09:05


 


White Blood Count 20.2 


 


Red Blood Count 2.27 


 


Hemoglobin 8.3 


 


Hematocrit 23.8 


 


Mean Corpuscular Volume 105.2 


 


Mean Corpuscular Hemoglobin 36.7 


 


Mean Corpuscular Hemoglobin 34.9 





Concent 


 


Red Cell Distribution Width 15.3 


 


Platelet Count 134 


 


Mean Platelet Volume 10.2 


 


Neutrophils (%) (Auto) 81.1 


 


Lymphocytes (%) (Auto) 8.0 


 


Monocytes (%) (Auto) 8.3 


 


Eosinophils (%) (Auto) 1.0 


 


Basophils (%) (Auto) 1.6 


 


Neutrophils # (Auto) 16.4 


 


Lymphocytes # (Auto) 1.6 


 


Monocytes # (Auto) 1.7 


 


Eosinophils # (Auto) 0.2 


 


Basophils # (Auto) 0.3 


 


CBC Comment DIFF FINAL 


 


Differential Comment  


 


Erythrocyte Sedimentation Rate 48 


 


Sodium Level 139 


 


Potassium Level 3.9 


 


Chloride Level 107 


 


Carbon Dioxide Level 21.3 


 


Anion Gap 11 


 


Blood Urea Nitrogen 22 


 


Creatinine 3.90 


 


Estimat Glomerular Filtration 19 





Rate 


 


Random Glucose 87 


 


Calcium Level 8.9 


 


Phosphorus Level 2.9 


 


C-Reactive Protein 1.80 


 


Albumin 2.9 


 


Vitamin B12 Level 1240 


 


Folate 5.2 








Imaging





Last Impressions








Lower Extremity Ultrasound 4/4/17 0000 Signed





Impressions: 





 Service Date/Time:  Tuesday, April 4, 2017 14:04 - CONCLUSION:  Normal 





 examination.       Jose Khan MD 


 


Abdomen Ultrasound 4/4/17 0000 Signed





Impressions: 





 Service Date/Time:  Tuesday, April 4, 2017 14:00 - CONCLUSION:  The exam 





 demonstrates small pockets of ascites within the midline of the pelvis. There 

is 





 a small amount of ascites around the liver and spleen. The liver appears 





 cirrhotic.     Valdemar Del Angel MD 


 


Chest X-Ray 4/2/17 0000 Signed





Impressions: 





 Service Date/Time:  Sunday, April 2, 2017 15:31 - CONCLUSION:  1. Slight 





 increase in basilar airspace consolidation since March 27.     Ralph Barrett MD 


 


Hepatobiliary Scan Nuclear Medicine 3/28/17 0000 Signed





Impressions: 





 Service Date/Time:  Tuesday, March 28, 2017 14:36 - CONCLUSION:  Markedly 





 delayed uptake in the liver. Findings suggest diffuse hepatocellular disease 





 versus biliary obstruction. 24 hour delayed scan could be performed.     

Estevan Longoria MD ADDENDUM:  24 hour scans still shows no activity in bile ducts or 





 small bowel suggesting diffuse hepatocellular disease.    Jona Del Angel MD  





 FACR


 


Abdomen/Pelvis CT 3/27/17 0000 Signed





Impressions: 





 Service Date/Time:  Monday, March 27, 2017 16:50 - CONCLUSION: Findings 

suggest 





 advanced hepatocellular disease with varicosities, prominent spleen and 





 interestingly only a small amount of ascites.  Most of the ascites is in the 





 pelvis.     Jona Del Angel MD  FACR








Physical Exam


HEENT: Normocephalic; atraumatic; + jaundice.


CHEST:  CTA


CARDIAC:  RRR


ABDOMEN:  Soft, distended, nontender;  hepatosplenomegaly; bowel sounds are 

present x 4 quadrants. Ascites


EXTREMITIES: No clubbing, cyanosis, +1 pitting edema BLE


SKIN:  Significant jaundice 


CNS:  Lethargic, oriented x 3





Assessment and Plan


Plan


ASSESSMENT:


- Severe Alcoholic hepatitis on what appears to be underlying liver cirrhosis (

based on imaging).  Has hx of ETOH abuse, reportedly drinking 6-7 drinks per


   day x 6 years.  He was hospitalized from 3/11-3/14 for acute alcoholic 

hepatitis.  During that admission, he was treated with Pentoxifylline, 

Prednisone,


   Nadolol, Spironolactone, Lactulose, and Protonix.  Unfortunately, he left 

[facility] on 3/14 with LFTs on 3/14 were T. Bili 23.7, , ALT 61, Alkaline 


   Phosph 162.  Hepatitis Panel and Celiac panel was negative at that time.  He 

was taking an OTC diuretic at home.  He is not taking Tylenol products. 


   He is not aware of any other liver disease, although several people on his 

mother's side have autoimmune disorders.  He returned to ER for worsening


   jaundice, nausea without vomiting, worsening abdominal distention, and 

intermittent fever and chills.  Abdomen Ultrasound (3/27/17)-----> 1. Thick-

walled 


   gallbladder containing some sludge and demonstrating pericholecystic fluid.  

If there is clinical concern for acute cholecystitis a hepatobiliary scan may 


   be helpful to confirm cystic duct obstruction. 2. Hepatosplenomegaly.  3. 

Some ascites within the abdomen.  Abdomen/Pelvis CT (3/27/17)---->  Findings


   suggest advanced hepatocellular disease with varicosities, prominent spleen 

and interestingly only a small amount of ascites.  Most of the ascites is in the


   pelvis.  DF 52.70. DARRYL neg,  AMA neg, ASMA neg, Iron Saturation 93.1, 

Ferritin 1666, AFP 3.3, Alpha 1 antitrypsin 286, Ceruloplasmin 30.  Ferritin 

and Iron   


   Saturation elevated and therefore Hfe gene ordered to r/o hemochromatosis 

and this was negative.  Pt still with persistent LFT elevation- T. Bili 


   T. Bili 32.8, AST 37, ALT 92, Alk Phosph 92 yesterday.  Pentoxifylline.  

Spoke with ID- okay for steroids.  


- Ascites with intermittent fevers and chills. Afebrile, but with persistent 

leukocytosis at 20.2.  WBC Ceftin. 


- Severe diarrhea.  CDiff negative.  S/P Sigmoidoscopy (4/3/17)----> 

nonspecific colitis, internal hemorrhoids.  Pathology colonic mucosa with no 

significant 


   histopathologic abnormalities.  Probiotics, Cholestyramine, Imodium. 

Improved.


- Hepatic encephalopathy.  Xifaxan.  Lactulose d/c'd because of diarrhea. 


- Coagulopathy.  STABLE.  


- ARF.  Creat 3.90, GFR 15. Renal following 


- Leukocytosis. WBC 20.2. Ceftin.   ID following- treating for possible uti/pna

, but okay for steroids. ? inflammatory, ESR 48, CRP 1.80.  Hematology 

following.





PLAN:


- 2 gram sodium diet


- Add prednisone 40mg po daily- d/w vee Max to add steroids.  


- Cont. Xifaxan


- Cont. Probiotics


- Continue Cholestyramine


- Continue Imodium prn


- Cont. Pentoxifylline


- Cont. PPI


- Monitor labs


- Renal following


- Hematology following


- Pt was drinking up until his last hospitalization on 3/11 and therefore is 

not a candidate for liver transplant if his condition worsens.


- Further recommendations to follow based on results of above


- Patient seen and examined by Dr. Can and myself and this note is written 

on his behalf








Lizette Amin Apr 11, 2017 14:31

## 2017-04-11 NOTE — HHI.PR
Subjective


Remarks


follow up alcohol hepatitis/cirrhosis/PNA/UTI


04/08/17-patient seen and examined; +some abdominal pain. renal indices 

trending down. Afebrile


04/09/17-patient seen and examined, and he is stable and denies any abdominal 

pain. continue to have runs of diarrhea.


04/10/17-patient seen and examined; Renal indices trending down; Afebrile.  

Alert and oriented 3


04/11/17-patient seen and examined; stable and no acute event overnight.  

Increase by mouth intake





Objective


Vitals





 Vital Signs








  Date Time  Temp Pulse Resp B/P Pulse Ox O2 Delivery O2 Flow Rate FiO2


 


4/11/17 04:37 98.4 99 24 120/62 92   


 


4/11/17 01:15 98.2 100 20 116/58 92   


 


4/10/17 20:35 98.1 92 20 134/59    


 


4/10/17 20:02     94   21


 


4/10/17 20:00  102      


 


4/10/17 20:00      Room Air  


 


4/10/17 16:00 98.0 96 16 129/67 93   


 


4/10/17 12:00 98.5 89 17 108/53 98   


 


4/10/17 09:14      Room Air  


 


4/10/17 08:35     95   21








 I/O








 4/10/17 4/10/17 4/10/17 4/11/17 4/11/17 4/11/17





 07:00 15:00 23:00 07:00 15:00 23:00


 


Intake Total  240 ml  960 ml  


 


Output Total 2 ml     


 


Balance -2 ml 240 ml  960 ml  


 


      


 


Intake Oral  240 ml  960 ml  


 


Stool Total 2 ml     


 


# Voids 4 1 2 1  


 


# Bowel Movements  0  1  








Result Diagram:  


4/10/17 0637                                                                   

             4/10/17 0637





Objective Remarks


GENERAL: NAD


SKIN: +jaundiced


HEAD: Normocephalic.


EYES: No scleral icterus. No injection or drainage. 


NECK: Supple, trachea midline. No JVD or lymphadenopathy.


CARDIOVASCULAR: Regular rate and rhythm without murmurs, gallops, or rubs. 


RESPIRATORY: Breath sounds equal bilaterally. No accessory muscle use.


GASTROINTESTINAL: Abdomen hard, mildly tender, distended. +HSM


MUSCULOSKELETAL: No cyanosis, or edema. 


BACK: Nontender without obvious deformity. No CVA tenderness.





Procedures


Sigmoidoscopy





A/P


Problem List:  


(1) Acute hepatitis


ICD Code:  B17.9


Status:  Acute


(2) Acute renal failure


ICD Code:  N17.9


Status:  Acute


Assessment and Plan


26-year-old male with history of hepatitis and now with hepatorenal





Severe alcoholic hepatitis complicated by varices, ascites and coagulopathy. 

Cirrhosis on imaging


-CT scan show hepatocellular disease with varices.


-Hepatitis panel reviewed from last admission which was all negative.


-Liver ultrasound shows possible acute cholecystitis. HIDA scan negative. 


-GI consulted. Consider steroids if infection ruled out


-Unremarkable DARRYL, AMA, ASM.  Negative hemochromatosis panel. US guided 

paracentesis, diagnostic (? SBP) if enough fluid to safely drain


-Continue with supportive care, Trental and rifaximin.  Patient is not a 

candidate for liver transplant due to recent alcohol use.





Coagulopathy


-Secondary to liver disease.


-See treatment as above.





Acute renal failure


-Per nephrologist this may be partly due to hepatorenal syndrome versus 

dehydration with ATN.


-Nephrologist following.  continue with midodrine by mouth as well as 

octreotide subcutaneously total 2 weeks and IV albumin on hold.  


-Cr improving 





Diarrhea with C. difficile negative 


- Continue Questran, probiotics and Lomotil as well as IV hydration.  Status 

post sigmoidoscopy pathology negative .


-Improving





PNA/UTI: s/p Zosyn and now on Ceftin until 04/14/17 by ID. 





Anxiety


-on Ativan when necessary.





Urticaria


-on Vistaril.





DVT prophylaxis: B-SCD








Manish Azevedo MD Apr 11, 2017 08:23

## 2017-04-12 VITALS
HEART RATE: 86 BPM | RESPIRATION RATE: 17 BRPM | OXYGEN SATURATION: 93 % | SYSTOLIC BLOOD PRESSURE: 118 MMHG | DIASTOLIC BLOOD PRESSURE: 60 MMHG | TEMPERATURE: 97.9 F

## 2017-04-12 VITALS
HEART RATE: 76 BPM | DIASTOLIC BLOOD PRESSURE: 56 MMHG | SYSTOLIC BLOOD PRESSURE: 101 MMHG | RESPIRATION RATE: 18 BRPM | TEMPERATURE: 97.3 F | OXYGEN SATURATION: 93 %

## 2017-04-12 VITALS
OXYGEN SATURATION: 92 % | DIASTOLIC BLOOD PRESSURE: 60 MMHG | TEMPERATURE: 97.8 F | RESPIRATION RATE: 16 BRPM | SYSTOLIC BLOOD PRESSURE: 119 MMHG | HEART RATE: 82 BPM

## 2017-04-12 VITALS
SYSTOLIC BLOOD PRESSURE: 129 MMHG | TEMPERATURE: 97.2 F | DIASTOLIC BLOOD PRESSURE: 58 MMHG | HEART RATE: 86 BPM | OXYGEN SATURATION: 93 % | RESPIRATION RATE: 18 BRPM

## 2017-04-12 VITALS
TEMPERATURE: 98.1 F | RESPIRATION RATE: 17 BRPM | SYSTOLIC BLOOD PRESSURE: 129 MMHG | DIASTOLIC BLOOD PRESSURE: 57 MMHG | OXYGEN SATURATION: 92 % | HEART RATE: 84 BPM

## 2017-04-12 VITALS
OXYGEN SATURATION: 93 % | RESPIRATION RATE: 16 BRPM | TEMPERATURE: 97.9 F | HEART RATE: 84 BPM | DIASTOLIC BLOOD PRESSURE: 53 MMHG | SYSTOLIC BLOOD PRESSURE: 110 MMHG

## 2017-04-12 VITALS — HEART RATE: 81 BPM

## 2017-04-12 VITALS — HEART RATE: 80 BPM

## 2017-04-12 LAB
ALP SERPL-CCNC: 104 U/L (ref 45–117)
ALT SERPL-CCNC: 38 U/L (ref 12–78)
ANION GAP SERPL CALC-SCNC: 9 MEQ/L (ref 5–15)
AST SERPL-CCNC: 69 U/L (ref 15–37)
BACTERIA #/AREA URNS HPF: (no result) /HPF
BASOPHILS # BLD AUTO: 0 TH/MM3 (ref 0–0.2)
BASOPHILS NFR BLD: 0.2 % (ref 0–2)
BILIRUB INDIRECT SERPL-MCNC: 6.9 MG/DL (ref 0–0.8)
BILIRUB SERPL-MCNC: 32.1 MG/DL (ref 0.2–1)
BUN SERPL-MCNC: 27 MG/DL (ref 7–18)
CHLORIDE SERPL-SCNC: 110 MEQ/L (ref 98–107)
COLOR UR: (no result)
COMMENT (UR): (no result)
CULTURE IF INDICATED: (no result)
EOSINOPHIL # BLD: 0 TH/MM3 (ref 0–0.4)
EOSINOPHIL NFR BLD: 0.1 % (ref 0–4)
ERYTHROCYTE [DISTWIDTH] IN BLOOD BY AUTOMATED COUNT: 15.4 % (ref 11.6–17.2)
GFR SERPLBLD BASED ON 1.73 SQ M-ARVRAT: 21 ML/MIN (ref 89–?)
GLUCOSE UR STRIP-MCNC: (no result) MG/DL
HCO3 BLD-SCNC: 21.7 MEQ/L (ref 21–32)
HCT VFR BLD CALC: 24.5 % (ref 39–51)
HEMO FLAGS: (no result)
HGB UR QL STRIP: (no result)
KETONES UR STRIP-MCNC: (no result) MG/DL
LYMPHOCYTES # BLD AUTO: 1.1 TH/MM3 (ref 1–4.8)
LYMPHOCYTES NFR BLD AUTO: 5.1 % (ref 9–44)
MCH RBC QN AUTO: 37.4 PG (ref 27–34)
MCHC RBC AUTO-ENTMCNC: 35.8 % (ref 32–36)
MCV RBC AUTO: 104.7 FL (ref 80–100)
MONOCYTES NFR BLD: 5.7 % (ref 0–8)
NEUTROPHILS # BLD AUTO: 19.5 TH/MM3 (ref 1.8–7.7)
NEUTROPHILS NFR BLD AUTO: 88.9 % (ref 16–70)
NITRITE UR QL STRIP: (no result)
PLATELET # BLD: 142 TH/MM3 (ref 150–450)
POTASSIUM SERPL-SCNC: 4.6 MEQ/L (ref 3.5–5.1)
RBC # BLD AUTO: 2.34 MIL/MM3 (ref 4.5–5.9)
SODIUM SERPL-SCNC: 141 MEQ/L (ref 136–145)
SP GR UR STRIP: 1.01 (ref 1–1.03)
WBC # BLD AUTO: 22 TH/MM3 (ref 4–11)

## 2017-04-12 RX ADMIN — OCTREOTIDE ACETATE SCH MCG: 100 INJECTION, SOLUTION INTRAVENOUS; SUBCUTANEOUS at 20:08

## 2017-04-12 RX ADMIN — CHOLESTYRAMINE SCH GM: 4 POWDER, FOR SUSPENSION ORAL at 05:05

## 2017-04-12 RX ADMIN — MIDODRINE HYDROCHLORIDE SCH MG: 5 TABLET ORAL at 17:16

## 2017-04-12 RX ADMIN — MIDODRINE HYDROCHLORIDE SCH MG: 5 TABLET ORAL at 06:21

## 2017-04-12 RX ADMIN — COLLAGENASE SANTYL SCH APPLIC: 250 OINTMENT TOPICAL at 08:12

## 2017-04-12 RX ADMIN — CEFUROXIME AXETIL SCH MG: 250 TABLET ORAL at 08:12

## 2017-04-12 RX ADMIN — BENZONATATE PRN MG: 100 CAPSULE ORAL at 05:06

## 2017-04-12 RX ADMIN — NYSTATIN SCH ML: 100000 SUSPENSION ORAL at 20:05

## 2017-04-12 RX ADMIN — IPRATROPIUM BROMIDE AND ALBUTEROL SULFATE SCH AMPULE: .5; 3 SOLUTION RESPIRATORY (INHALATION) at 16:00

## 2017-04-12 RX ADMIN — RIFAXIMIN SCH MG: 550 TABLET ORAL at 08:12

## 2017-04-12 RX ADMIN — Medication SCH TAB: at 08:12

## 2017-04-12 RX ADMIN — MIDODRINE HYDROCHLORIDE SCH MG: 5 TABLET ORAL at 11:37

## 2017-04-12 RX ADMIN — PENTOXIFYLLINE SCH MG: 400 TABLET, FILM COATED, EXTENDED RELEASE ORAL at 05:06

## 2017-04-12 RX ADMIN — BENZONATATE PRN MG: 100 CAPSULE ORAL at 17:19

## 2017-04-12 RX ADMIN — OCTREOTIDE ACETATE SCH MCG: 100 INJECTION, SOLUTION INTRAVENOUS; SUBCUTANEOUS at 05:04

## 2017-04-12 RX ADMIN — CEFUROXIME AXETIL SCH MG: 250 TABLET ORAL at 20:05

## 2017-04-12 RX ADMIN — Medication SCH ML: at 20:04

## 2017-04-12 RX ADMIN — NYSTATIN SCH ML: 100000 SUSPENSION ORAL at 17:19

## 2017-04-12 RX ADMIN — Medication SCH TAB: at 11:37

## 2017-04-12 RX ADMIN — PENTOXIFYLLINE SCH MG: 400 TABLET, FILM COATED, EXTENDED RELEASE ORAL at 11:37

## 2017-04-12 RX ADMIN — CHOLESTYRAMINE SCH GM: 4 POWDER, FOR SUSPENSION ORAL at 17:19

## 2017-04-12 RX ADMIN — NYSTATIN SCH ML: 100000 SUSPENSION ORAL at 08:12

## 2017-04-12 RX ADMIN — OCTREOTIDE ACETATE SCH MCG: 100 INJECTION, SOLUTION INTRAVENOUS; SUBCUTANEOUS at 20:09

## 2017-04-12 RX ADMIN — OCTREOTIDE ACETATE SCH MCG: 100 INJECTION, SOLUTION INTRAVENOUS; SUBCUTANEOUS at 11:34

## 2017-04-12 RX ADMIN — RIFAXIMIN SCH MG: 550 TABLET ORAL at 20:05

## 2017-04-12 RX ADMIN — Medication SCH ML: at 08:12

## 2017-04-12 RX ADMIN — Medication SCH TAB: at 17:18

## 2017-04-12 RX ADMIN — PENTOXIFYLLINE SCH MG: 400 TABLET, FILM COATED, EXTENDED RELEASE ORAL at 20:07

## 2017-04-12 RX ADMIN — NYSTATIN SCH ML: 100000 SUSPENSION ORAL at 11:37

## 2017-04-12 NOTE — HHI.PR
Subjective


Remarks


follow up alcohol hepatitis/cirrhosis/PNA/UTI


04/08/17-patient seen and examined; +some abdominal pain. renal indices 

trending down. Afebrile


04/09/17-patient seen and examined, and he is stable and denies any abdominal 

pain. continue to have runs of diarrhea.


04/10/17-patient seen and examined; Renal indices trending down; Afebrile.  

Alert and oriented 3


04/11/17-patient seen and examined; stable and no acute event overnight.  

Increase by mouth intake


04/12/17-patient seen and examined; some shortness of breath and slightly 

wheezing however afebrile





Objective


Vitals





 Vital Signs








  Date Time  Temp Pulse Resp B/P Pulse Ox O2 Delivery O2 Flow Rate FiO2


 


4/12/17 08:02 97.9 84 16 110/53 93   


 


4/12/17 08:00      Room Air  


 


4/12/17 04:00 97.3 76 18 101/56 93   


 


4/12/17 00:00 97.2 86 18 129/58 93   


 


4/11/17 20:00      Room Air  


 


4/11/17 20:00 98.3 83 18 122/58 93   


 


4/11/17 18:20  86      


 


4/11/17 16:00 98.2 91 24 107/58 93   








 I/O








 4/11/17 4/11/17 4/11/17 4/12/17 4/12/17 4/12/17





 07:00 15:00 23:00 07:00 15:00 23:00


 


Intake Total 960 ml  480 ml 480 ml  


 


Balance 960 ml  480 ml 480 ml  


 


      


 


Intake Oral 960 ml  480 ml 480 ml  


 


# Voids 1 3 1 2  


 


# Bowel Movements 1 3 0 0  








Result Diagram:  


4/12/17 1009                                                                   

             4/12/17 1009





Imaging





Last Impressions








Lower Extremity Ultrasound 4/4/17 0000 Signed





Impressions: 





 Service Date/Time:  Tuesday, April 4, 2017 14:04 - CONCLUSION:  Normal 





 examination.       Jose Khan MD 


 


Abdomen Ultrasound 4/4/17 0000 Signed





Impressions: 





 Service Date/Time:  Tuesday, April 4, 2017 14:00 - CONCLUSION:  The exam 





 demonstrates small pockets of ascites within the midline of the pelvis. There 

is 





 a small amount of ascites around the liver and spleen. The liver appears 





 cirrhotic.     Valdemar Del Angel MD 


 


Chest X-Ray 4/2/17 0000 Signed





Impressions: 





 Service Date/Time:  Sunday, April 2, 2017 15:31 - CONCLUSION:  1. Slight 





 increase in basilar airspace consolidation since March 27.     Ralph Barrett MD 


 


Hepatobiliary Scan Nuclear Medicine 3/28/17 0000 Signed





Impressions: 





 Service Date/Time:  Tuesday, March 28, 2017 14:36 - CONCLUSION:  Markedly 





 delayed uptake in the liver. Findings suggest diffuse hepatocellular disease 





 versus biliary obstruction. 24 hour delayed scan could be performed.     

Estevan Longoria MD ADDENDUM:  24 hour scans still shows no activity in bile ducts or 





 small bowel suggesting diffuse hepatocellular disease.    Jona Del Angel MD  





 FACR


 


Abdomen/Pelvis CT 3/27/17 0000 Signed





Impressions: 





 Service Date/Time:  Monday, March 27, 2017 16:50 - CONCLUSION: Findings 

suggest 





 advanced hepatocellular disease with varicosities, prominent spleen and 





 interestingly only a small amount of ascites.  Most of the ascites is in the 





 pelvis.     Jona Del Angel MD  FACR








Objective Remarks


GENERAL: NAD


SKIN: +jaundiced


HEAD: Normocephalic.


EYES: No scleral icterus. No injection or drainage. 


NECK: Supple, trachea midline. No JVD or lymphadenopathy.


CARDIOVASCULAR: Regular rate and rhythm without murmurs, gallops, or rubs. 


RESPIRATORY: Breath sounds equal bilaterally. No accessory muscle use.


GASTROINTESTINAL: Abdomen hard, mildly tender, distended. +HSM


MUSCULOSKELETAL: No cyanosis, or edema. 


BACK: Nontender without obvious deformity. No CVA tenderness.





Procedures


Sigmoidoscopy





A/P


Problem List:  


(1) Acute hepatitis


ICD Code:  B17.9


Status:  Acute


(2) Acute renal failure


ICD Code:  N17.9


Status:  Acute


Assessment and Plan


26-year-old male with history of hepatitis and now with hepatorenal





Severe alcoholic hepatitis complicated by varices, ascites and coagulopathy. 

Cirrhosis on imaging


-CT scan show hepatocellular disease with varices.


-Hepatitis panel reviewed from last admission which was all negative.


-Liver ultrasound shows possible acute cholecystitis. HIDA scan negative. 


-GI consulted.  Continue with prednisone 40 mg daily


-Unremarkable DARRYL, AMA, ASM.  Negative hemochromatosis panel. 


-Continue with supportive care, Trental, prednisone and rifaximin.  Patient is 

not a candidate for liver transplant due to recent alcohol use.





Coagulopathy


-Secondary to liver disease.


-See treatment as above.





Leukocytosis: Worsening, likely secondary to prednisone





Acute renal failure


-Per nephrologist this may be partly due to hepatorenal syndrome versus 

dehydration with ATN.


-Nephrologist following.  continue with midodrine by mouth as well as 

octreotide subcutaneously total 2 weeks and IV albumin on hold.  


-Cr improving 





Diarrhea with C. difficile negative 


- Continue Questran, probiotics and Lomotil as well as IV hydration.  Status 

post sigmoidoscopy pathology negative .


-Improving





PNA/UTI: s/p Zosyn and now on Ceftin until 04/14/17 by ID. 





Anxiety


-on Ativan when necessary.





Urticaria


-Stable on on Vistaril.





DVT prophylaxis: B-SCD








Manish Azevedo MD Apr 12, 2017 12:16

## 2017-04-12 NOTE — HHI.GIFU
Subjective


Remarks


Resting in bed.  Appetite improved.  Still weak.  Diarrhea improved.





Objective


Vitals I&O





 Vital Signs








  Date Time  Temp Pulse Resp B/P Pulse Ox O2 Delivery O2 Flow Rate FiO2


 


4/12/17 12:03 98.1 84 17 129/57 92   


 


4/12/17 08:02 97.9 84 16 110/53 93   


 


4/12/17 08:00      Room Air  


 


4/12/17 04:00 97.3 76 18 101/56 93   


 


4/12/17 00:00 97.2 86 18 129/58 93   


 


4/11/17 20:00      Room Air  


 


4/11/17 20:00 98.3 83 18 122/58 93   


 


4/11/17 18:20  86      


 


4/11/17 16:00 98.2 91 24 107/58 93   








 I/O








 4/11/17 4/11/17 4/11/17 4/12/17 4/12/17 4/12/17





 07:00 15:00 23:00 07:00 15:00 23:00


 


Intake Total 960 ml  480 ml 480 ml  


 


Balance 960 ml  480 ml 480 ml  


 


      


 


Intake Oral 960 ml  480 ml 480 ml  


 


# Voids 1 3 1 2  


 


# Bowel Movements 1 3 0 0  








Laboratory





Laboratory Tests








Test 4/12/17





 10:09


 


White Blood Count 22.0 


 


Red Blood Count 2.34 


 


Hemoglobin 8.7 


 


Hematocrit 24.5 


 


Mean Corpuscular Volume 104.7 


 


Mean Corpuscular Hemoglobin 37.4 


 


Mean Corpuscular Hemoglobin 35.8 





Concent 


 


Red Cell Distribution Width 15.4 


 


Platelet Count 142 


 


Mean Platelet Volume 10.7 


 


Neutrophils (%) (Auto) 88.9 


 


Lymphocytes (%) (Auto) 5.1 


 


Monocytes (%) (Auto) 5.7 


 


Eosinophils (%) (Auto) 0.1 


 


Basophils (%) (Auto) 0.2 


 


Neutrophils # (Auto) 19.5 


 


Lymphocytes # (Auto) 1.1 


 


Monocytes # (Auto) 1.2 


 


Eosinophils # (Auto) 0.0 


 


Basophils # (Auto) 0.0 


 


CBC Comment DIFF FINAL 


 


Differential Comment  


 


Sodium Level 141 


 


Potassium Level 4.6 


 


Chloride Level 110 


 


Carbon Dioxide Level 21.7 


 


Anion Gap 9 


 


Blood Urea Nitrogen 27 


 


Creatinine 3.54 


 


Estimat Glomerular Filtration 21 





Rate 


 


Random Glucose 123 


 


Calcium Level 8.9 


 


Total Bilirubin 32.1 


 


Direct Bilirubin 25.2 


 


Indirect Bilirubin 6.9 


 


Aspartate Amino Transf 69 





(AST/SGOT) 


 


Alanine Aminotransferase 38 





(ALT/SGPT) 


 


Alkaline Phosphatase 104 


 


Total Protein 5.1 


 


Albumin 2.7 








Imaging





Last Impressions








Lower Extremity Ultrasound 4/4/17 0000 Signed





Impressions: 





 Service Date/Time:  Tuesday, April 4, 2017 14:04 - CONCLUSION:  Normal 





 examination.       Jose Khan MD 


 


Abdomen Ultrasound 4/4/17 0000 Signed





Impressions: 





 Service Date/Time:  Tuesday, April 4, 2017 14:00 - CONCLUSION:  The exam 





 demonstrates small pockets of ascites within the midline of the pelvis. There 

is 





 a small amount of ascites around the liver and spleen. The liver appears 





 cirrhotic.     Valdemar Del Angel MD 


 


Chest X-Ray 4/2/17 0000 Signed





Impressions: 





 Service Date/Time:  Sunday, April 2, 2017 15:31 - CONCLUSION:  1. Slight 





 increase in basilar airspace consolidation since March 27.     Ralph Barrett MD 


 


Hepatobiliary Scan Nuclear Medicine 3/28/17 0000 Signed





Impressions: 





 Service Date/Time:  Tuesday, March 28, 2017 14:36 - CONCLUSION:  Markedly 





 delayed uptake in the liver. Findings suggest diffuse hepatocellular disease 





 versus biliary obstruction. 24 hour delayed scan could be performed.     

Estevan Longoria MD ADDENDUM:  24 hour scans still shows no activity in bile ducts or 





 small bowel suggesting diffuse hepatocellular disease.    Jona Del Angel MD  





 FACR


 


Abdomen/Pelvis CT 3/27/17 0000 Signed





Impressions: 





 Service Date/Time:  Monday, March 27, 2017 16:50 - CONCLUSION: Findings 

suggest 





 advanced hepatocellular disease with varicosities, prominent spleen and 





 interestingly only a small amount of ascites.  Most of the ascites is in the 





 pelvis.     Jona Del Angel MD  FACR








Physical Exam


HEENT: Normocephalic; atraumatic; + jaundice.


CHEST:  CTA


CARDIAC:  RRR


ABDOMEN:  Soft, distended, nontender;  hepatosplenomegaly; bowel sounds are 

present x 4 quadrants. Ascites


EXTREMITIES: No clubbing, cyanosis, +1 pitting edema BLE


SKIN:  Significant jaundice 


CNS:  Lethargic, oriented x 3





Assessment and Plan


Plan


ASSESSMENT:


- Severe Alcoholic hepatitis on what appears to be underlying liver cirrhosis (

based on imaging).  Has hx of ETOH abuse, reportedly drinking 6-7 drinks per


   day x 6 years.  He was hospitalized from 3/11-3/14 for acute alcoholic 

hepatitis.  During that admission, he was treated with Pentoxifylline, 

Prednisone,


   Nadolol, Spironolactone, Lactulose, and Protonix.  Unfortunately, he left 

[facility] on 3/14 with LFTs on 3/14 were T. Bili 23.7, , ALT 61, Alkaline 


   Phosph 162.  Hepatitis Panel and Celiac panel was negative at that time.  He 

was taking an OTC diuretic at home.  He is not taking Tylenol products. 


   He is not aware of any other liver disease, although several people on his 

mother's side have autoimmune disorders.  He returned to ER for worsening


   jaundice, nausea without vomiting, worsening abdominal distention, and 

intermittent fever and chills.  Abdomen Ultrasound (3/27/17)-----> 1. Thick-

walled 


   gallbladder containing some sludge and demonstrating pericholecystic fluid.  

If there is clinical concern for acute cholecystitis a hepatobiliary scan may 


   be helpful to confirm cystic duct obstruction. 2. Hepatosplenomegaly.  3. 

Some ascites within the abdomen.  Abdomen/Pelvis CT (3/27/17)---->  Findings


   suggest advanced hepatocellular disease with varicosities, prominent spleen 

and interestingly only a small amount of ascites.  Most of the ascites is in the


   pelvis.  DF 52.70. DARRYL neg,  AMA neg, ASMA neg, Iron Saturation 93.1, 

Ferritin 1666, AFP 3.3, Alpha 1 antitrypsin 286, Ceruloplasmin 30.  Ferritin 

and Iron   


   Saturation elevated and therefore Hfe gene ordered to r/o hemochromatosis 

and this was negative.  Pt still with persistent LFT elevation- T. Bili 


   T. Bili 32.1, AST 69, ALT 38, Alk Phosph 104 yesterday.  Pentoxifylline.  

Prednisone (Okay with ID)  


- Ascites with intermittent fevers and chills. Afebrile, but with persistent 

leukocytosis at 22.2.  WBC Ceftin. 


- Severe diarrhea.  CDiff negative.  S/P Sigmoidoscopy (4/3/17)----> 

nonspecific colitis, internal hemorrhoids.  Pathology colonic mucosa with no 

significant 


   histopathologic abnormalities.  Probiotics, Cholestyramine, Imodium. 

Improved.


- Hepatic encephalopathy.  Xifaxan.  Lactulose d/c'd because of diarrhea. 


- Coagulopathy.  STABLE.  


- ARF.  Creat 3.54, GFR 21. Renal following 


- Leukocytosis. WBC 20.2. Ceftin.   ID following- treating for possible uti/pna

, but okay for steroids. ? inflammatory, ESR 48, CRP 1.80.  Workup per 

hematology 





PLAN:


- 2 gram sodium diet


- Cont. Prednisone


- Cont. Xifaxan


- Cont. Probiotics


- Continue Cholestyramine


- Continue Imodium prn


- Cont. Pentoxifylline


- Cont. PPI


- Monitor labs


- Renal following


- Hematology following


- Pt was drinking up until his last hospitalization on 3/11 and therefore is 

not a candidate for liver transplant if his condition worsens.


- Further recommendations to follow based on results of above


- Patient seen and examined by Dr. aCn and myself and this note is written 

on his behalf








Lizette Amin Apr 12, 2017 15:02

## 2017-04-12 NOTE — HHI.IDPN
Subjective


Subjective


Remarks


Notes reviewed


Temps ok


Voiding ok, no dysuria


Still with diarrhea


Min abdominal discomfort


Ambulating


Started on prednisone yesterday


WBC remains high


Creatinine improving


Antibiotics


Zosyn


Lines


PIV with no evidence of infection


Past Medical History


Reviewed


Allergies:  


Coded Allergies:  


     No Known Allergies (Verified , 3/11/17)





Objective


.





 Vital Signs








  Date Time  Temp Pulse Resp B/P Pulse Ox O2 Delivery O2 Flow Rate FiO2


 


4/12/17 08:02 97.9 84 16 110/53 93   


 


4/12/17 08:00      Room Air  


 


4/12/17 04:00 97.3 76 18 101/56 93   


 


4/12/17 00:00 97.2 86 18 129/58 93   


 


4/11/17 20:00      Room Air  


 


4/11/17 20:00 98.3 83 18 122/58 93   


 


4/11/17 18:20  86      


 


4/11/17 16:00 98.2 91 24 107/58 93   


 


4/11/17 12:00 97.9 82 24 122/63 94   














 4/11/17 4/11/17 4/12/17





 15:00 23:00 07:00


 


Intake Total  480 ml 480 ml


 


Balance  480 ml 480 ml


 


   


 


Intake Oral  480 ml 480 ml


 


# Voids 3 1 2


 


# Bowel Movements 3 0 0








.





Laboratory Tests








Test 4/11/17





 09:05


 


White Blood Count 20.2 TH/MM3


 


Red Blood Count 2.27 MIL/MM3


 


Hemoglobin 8.3 GM/DL


 


Hematocrit 23.8 %


 


Mean Corpuscular Volume 105.2 FL


 


Mean Corpuscular Hemoglobin 36.7 PG


 


Mean Corpuscular Hemoglobin 34.9 %





Concent 


 


Red Cell Distribution Width 15.3 %


 


Platelet Count 134 TH/MM3


 


Mean Platelet Volume 10.2 FL


 


Neutrophils (%) (Auto) 81.1 %


 


Lymphocytes (%) (Auto) 8.0 %


 


Monocytes (%) (Auto) 8.3 %


 


Eosinophils (%) (Auto) 1.0 %


 


Basophils (%) (Auto) 1.6 %


 


Neutrophils # (Auto) 16.4 TH/MM3


 


Lymphocytes # (Auto) 1.6 TH/MM3


 


Monocytes # (Auto) 1.7 TH/MM3


 


Eosinophils # (Auto) 0.2 TH/MM3


 


Basophils # (Auto) 0.3 TH/MM3


 


CBC Comment DIFF FINAL 


 


Differential Comment  


 


Erythrocyte Sedimentation Rate 48 mm/hr








Laboratory Tests








Test 4/11/17





 09:05


 


Sodium Level 139 MEQ/L


 


Potassium Level 3.9 MEQ/L


 


Chloride Level 107 MEQ/L


 


Carbon Dioxide Level 21.3 MEQ/L


 


Anion Gap 11 MEQ/L


 


Blood Urea Nitrogen 22 MG/DL


 


Creatinine 3.90 MG/DL


 


Estimat Glomerular Filtration 19 ML/MIN





Rate 


 


Random Glucose 87 MG/DL


 


Calcium Level 8.9 MG/DL


 


Phosphorus Level 2.9 MG/DL


 


C-Reactive Protein 1.80 MG/DL


 


Albumin 2.9 GM/DL


 


Vitamin B12 Level 1240 PG/ML


 


Folate 5.2 NG/ML








Imaging














Lower Extremity Ultrasound 4/4/17 0000 Signed





Impressions: 





 Service Date/Time:  Tuesday, April 4, 2017 14:04 - CONCLUSION:  Normal 





 examination.       Jose Khan MD 


 


Abdomen Ultrasound 4/4/17 0000 Signed





Impressions: 





 Service Date/Time:  Tuesday, April 4, 2017 14:00 - CONCLUSION:  The exam 





 demonstrates small pockets of ascites within the midline of the pelvis. There 

is 





 a small amount of ascites around the liver and spleen. The liver appears 





 cirrhotic.     Valdemar Del Angel MD 


 


Chest X-Ray 4/2/17 0000 Signed





Impressions: 





 Service Date/Time:  Sunday, April 2, 2017 15:31 - CONCLUSION:  1. Slight 





 increase in basilar airspace consolidation since March 27.     Ralph Barrett MD 


 


Hepatobiliary Scan Nuclear Medicine 3/28/17 0000 Signed





Impressions: 





 Service Date/Time:  Tuesday, March 28, 2017 14:36 - CONCLUSION:  Markedly 





 delayed uptake in the liver. Findings suggest diffuse hepatocellular disease 





 versus biliary obstruction. 24 hour delayed scan could be performed.     

Estevan Longoria MD ADDENDUM:  24 hour scans still shows no activity in bile ducts or 





 small bowel suggesting diffuse hepatocellular disease.    Jona Del Angel MD  





 FACR


 


Abdomen/Pelvis CT 3/27/17 0000 Signed





Impressions: 





 Service Date/Time:  Monday, March 27, 2017 16:50 - CONCLUSION: Findings 

suggest 





 advanced hepatocellular disease with varicosities, prominent spleen and 





 interestingly only a small amount of ascites.  Most of the ascites is in the 





 pelvis.     Jona Del Angel MD  FACR








Physical Exam


GENERAL:Awake and alert,  has deep jaundice,  NAD   


SKIN:   Cool and dry.  Has deep jaundice.  No generalized rash or ecchymosis.  

Has spider angiomatas in upper chest


HEENT:   Pink conjunctivae.    Has deep scleral icterus.   Moist oral mucosa


NECK:  Supple, nontender, no meningeal signs.


CARDIOVASCULAR: Regular rate and rhythm without murmurs, gallops, or rubs. 


RESPIRATORY:  Decreased BS at bases


GASTROINTESTINAL: Abdomen is mildly distended, with mild diffuse abdominal 

tenderness.  No guarding.  Bowel sounds are present and hypoactive.  No rebound.


MUSCULOSKELETAL: Extremities without clubbing, or cyanosis.  Has bilateral 

pitting pedal edema. 


NEUROLOGICAL:  Grossly non-focal. 


PSYCH:  Calm and cooperative


LINE:  PIV with no evidence of infection.





Assessment & Plan


Remarks


IMPRESSION


Intermittent low grade fevers,  etiology? resolved


Leukocytosis, persistent


   -  reactive most likely


   -  no new infection found


Hepatic failure


Renal failure


   -  creatinine improving


Known ETOH abuse


Diarrhea


   -  C diff negative;  likely due to meds 








RECOMMENDATION


Monitor progress


Follow CBC


Repeat UA and C/S


Continue ceftin x 7 days


   -  should cover PNA and UTI








Edna Reyes MD Apr 12, 2017 10:27

## 2017-04-12 NOTE — PD.ONC.PN
Subjective


Subjective


Remarks


no n/v


start eating more


no fevers





Objective


Data











  Date Time  Temp Pulse Resp B/P Pulse Ox O2 Delivery O2 Flow Rate FiO2


 


4/12/17 08:02 97.9 84 16 110/53 93   


 


4/12/17 08:00      Room Air  


 


4/12/17 04:00 97.3 76 18 101/56 93   


 


4/12/17 00:00 97.2 86 18 129/58 93   


 


4/11/17 20:00      Room Air  


 


4/11/17 20:00 98.3 83 18 122/58 93   


 


4/11/17 18:20  86      


 


4/11/17 16:00 98.2 91 24 107/58 93   


 


4/11/17 12:00 97.9 82 24 122/63 94   














 4/12/17 4/12/17 4/12/17





 07:00 15:00 23:00


 


Intake Total 480 ml  


 


Balance 480 ml  








Result Diagram:  


4/11/17 0905 4/11/17 0905








Administered Medications








 Medications


  (Trade)  Dose


 Ordered  Sig/Brittany


 Route


 PRN Reason  Start Time


 Stop Time Status Last Admin


Dose Admin


 


 Sodium Chloride


  (NS Flush)  2 ml  UNSCH  PRN


 IV FLUSH


 FLUSH AFTER USING IV ACCESS  3/27/17 18:15


    4/9/17 05:50


 


 


 Sodium Chloride


  (NS Flush)  2 ml  BID


 IV FLUSH


   3/27/17 21:00


    4/12/17 08:12


 


 


 Ondansetron HCl


  (Zofran Inj)  4 mg  Q6H  PRN


 IVP


 NAUSEA OR VOMITING  3/27/17 18:15


    4/5/17 15:58


 


 


 Pentoxifylline


  (TRENtal SR)  400 mg  Q8HR


 PO


   3/28/17 14:00


    4/12/17 05:06


 


 


 Benzonatate


  (Tessalon)  200 mg  TID  PRN


 PO


 cough  3/29/17 15:15


    4/12/17 05:06


 


 


 Hydroxyzine


 Pamoate


  (Vistaril)  50 mg  Q6H  PRN


 PO


 ITCHING  3/30/17 09:15


    4/12/17 05:06


 


 


 Rifaximin


  (Xifaxan)  550 mg  BID


 PO


   3/30/17 21:00


    4/12/17 08:12


 


 


 Lorazepam


  (Ativan Inj)  0.5 mg  BID  PRN


 IV PUSH


 ANXIETY  3/31/17 14:30


    4/11/17 21:28


 


 


 Octreotide Acetate


  (SandoSTATIN INJ)  200 mcg  Q8HR


 SQ


   4/1/17 14:00


    4/10/17 21:24


 


 


 Loperamide HCl


  (Imodium)  2 mg  UNSCH  PRN


 PO


 DIARRHEA  4/1/17 14:15


    4/10/17 12:25


 


 


 Cholestyramine


 Resin


  (Questran Light


 Pkt)  4 gm  Q12H


 PO


   4/1/17 18:00


    4/11/17 17:46


 


 


 Nystatin


  (Mycostatin  Liq)  5 ml  QID


 SWISH-SWAL


   4/2/17 13:00


 4/16/17 12:59  4/12/17 08:12


 


 


 Lactobacillus


 Acidophilus


  (Lactinex)  1 tab  TID


 PO


   4/2/17 18:00


    4/12/17 08:12


 


 


 Collagenase


  (Santyl Oint)  1 applic  DAILY


 TOPICAL


   4/5/17 11:15


    4/12/17 08:12


 


 


 Cefuroxime Axetil


  (Ceftin)  250 mg  Q12HR


 PO


   4/7/17 09:45


 4/14/17 09:44  4/12/17 08:12


 


 


 Midodrine


  (Proamatine)  7.5 mg  TID@07,12,17


 PO


   4/9/17 12:00


    4/12/17 06:21


 


 


 Prednisone


  (Deltasone)  40 mg  DAILY


 PO


   4/11/17 14:30


    4/12/17 08:12


 








Objective Remarks


GENERAL: Well-nourished, well-developed patient.


SKIN: Warm and dry.


HEAD: Normocephalic.


EYES: No scleral icterus. No injection or drainage. 


NECK: Supple, trachea midline. No JVD or lymphadenopathy.


LYMPHATIC: No adenopathy.


CARDIOVASCULAR: Regular rate and rhythm without murmurs. 


RESPIRATORY: Breath sounds equal bilaterally. No accessory muscle use.


GASTROINTESTINAL: Abdomen soft, non-tender, nondistended. 


EXTREMITIES: No cyanosis, or edema. 


NEUROLOGICAL: No obvious focal deficit. Awake, alert, and oriented x3.


PSYCHIATRIC: Appropriate mood and affect; insight and judgment normal.





Assessment/Plan


Problem List:  


(1) Leukocytosis


Status:  Acute


Plan:  4/12 Has reactive leucocytosis. Check Araceli-2 for MPD ( DOUBT it )


       Monoitor cbc


4/11: CRP and ESR both elevated, consistent with chronic inflammation. monitor 

blood counts.


--Persistent leukocytosis with neutrophilia.  


-- most likely reactive leukocytosis.


--mild thrombocytopenia due to hypersplenism from cirrhosis of the liver 





Assessment


25y/o male with history of alcoholism with cirrhosis of the liver. Hematology 

following for leukocytosis


h/o Alcoholism.


Tobaccoism.


Cirrhosis of the liver.








Kwame Boss MD Apr 12, 2017 09:35

## 2017-04-13 VITALS
RESPIRATION RATE: 20 BRPM | HEART RATE: 78 BPM | DIASTOLIC BLOOD PRESSURE: 54 MMHG | SYSTOLIC BLOOD PRESSURE: 111 MMHG | OXYGEN SATURATION: 92 % | TEMPERATURE: 97.8 F

## 2017-04-13 VITALS
OXYGEN SATURATION: 91 % | RESPIRATION RATE: 20 BRPM | HEART RATE: 93 BPM | TEMPERATURE: 97.7 F | SYSTOLIC BLOOD PRESSURE: 123 MMHG | DIASTOLIC BLOOD PRESSURE: 67 MMHG

## 2017-04-13 VITALS
OXYGEN SATURATION: 93 % | SYSTOLIC BLOOD PRESSURE: 128 MMHG | TEMPERATURE: 97.8 F | RESPIRATION RATE: 20 BRPM | DIASTOLIC BLOOD PRESSURE: 67 MMHG | HEART RATE: 89 BPM

## 2017-04-13 VITALS
SYSTOLIC BLOOD PRESSURE: 104 MMHG | HEART RATE: 90 BPM | OXYGEN SATURATION: 92 % | RESPIRATION RATE: 20 BRPM | DIASTOLIC BLOOD PRESSURE: 58 MMHG | TEMPERATURE: 97.8 F

## 2017-04-13 VITALS
HEART RATE: 80 BPM | TEMPERATURE: 98.1 F | DIASTOLIC BLOOD PRESSURE: 54 MMHG | SYSTOLIC BLOOD PRESSURE: 109 MMHG | RESPIRATION RATE: 16 BRPM | OXYGEN SATURATION: 91 %

## 2017-04-13 VITALS
OXYGEN SATURATION: 91 % | DIASTOLIC BLOOD PRESSURE: 61 MMHG | TEMPERATURE: 98.1 F | HEART RATE: 87 BPM | SYSTOLIC BLOOD PRESSURE: 113 MMHG | RESPIRATION RATE: 14 BRPM

## 2017-04-13 VITALS — HEART RATE: 88 BPM

## 2017-04-13 VITALS — OXYGEN SATURATION: 92 %

## 2017-04-13 VITALS — HEART RATE: 78 BPM

## 2017-04-13 VITALS — OXYGEN SATURATION: 91 %

## 2017-04-13 LAB
ALP SERPL-CCNC: 103 U/L (ref 45–117)
ALT SERPL-CCNC: 44 U/L (ref 12–78)
AST SERPL-CCNC: 65 U/L (ref 15–37)
BASOPHILS # BLD AUTO: 0.1 TH/MM3 (ref 0–0.2)
BASOPHILS NFR BLD: 0.3 % (ref 0–2)
BILIRUB INDIRECT SERPL-MCNC: 4.2 MG/DL (ref 0–0.8)
BILIRUB SERPL-MCNC: 30.3 MG/DL (ref 0.2–1)
EOSINOPHIL # BLD: 0 TH/MM3 (ref 0–0.4)
EOSINOPHIL NFR BLD: 0.1 % (ref 0–4)
ERYTHROCYTE [DISTWIDTH] IN BLOOD BY AUTOMATED COUNT: 15.6 % (ref 11.6–17.2)
HCT VFR BLD CALC: 22.5 % (ref 39–51)
HEMO FLAGS: (no result)
LYMPHOCYTES # BLD AUTO: 1.2 TH/MM3 (ref 1–4.8)
LYMPHOCYTES NFR BLD AUTO: 4.5 % (ref 9–44)
MCH RBC QN AUTO: 36 PG (ref 27–34)
MCHC RBC AUTO-ENTMCNC: 34.4 % (ref 32–36)
MCV RBC AUTO: 104.5 FL (ref 80–100)
MONOCYTES NFR BLD: 13.6 % (ref 0–8)
NEUTROPHILS # BLD AUTO: 22 TH/MM3 (ref 1.8–7.7)
NEUTROPHILS NFR BLD AUTO: 81.5 % (ref 16–70)
NEUTS BAND # BLD MANUAL: 24.8 TH/MM3 (ref 1.8–7.7)
NEUTS BAND NFR BLD: 4 % (ref 0–6)
NEUTS SEG NFR BLD MANUAL: 88 % (ref 16–70)
PLAT MORPH BLD: NORMAL
PLATELET # BLD: 134 TH/MM3 (ref 150–450)
PLATELET BLD QL SMEAR: (no result)
RBC # BLD AUTO: 2.16 MIL/MM3 (ref 4.5–5.9)
SCAN/DIFF: (no result)
TARGETS BLD QL SMEAR: (no result)
WBC # BLD AUTO: 27 TH/MM3 (ref 4–11)
WBC DIFF SAMPLE: 100

## 2017-04-13 RX ADMIN — Medication SCH TAB: at 09:00

## 2017-04-13 RX ADMIN — OCTREOTIDE ACETATE SCH MCG: 100 INJECTION, SOLUTION INTRAVENOUS; SUBCUTANEOUS at 04:51

## 2017-04-13 RX ADMIN — OCTREOTIDE ACETATE SCH MCG: 100 INJECTION, SOLUTION INTRAVENOUS; SUBCUTANEOUS at 20:30

## 2017-04-13 RX ADMIN — BENZONATATE PRN MG: 100 CAPSULE ORAL at 09:41

## 2017-04-13 RX ADMIN — NYSTATIN SCH ML: 100000 SUSPENSION ORAL at 13:02

## 2017-04-13 RX ADMIN — Medication SCH ML: at 20:29

## 2017-04-13 RX ADMIN — MIDODRINE HYDROCHLORIDE SCH MG: 5 TABLET ORAL at 12:00

## 2017-04-13 RX ADMIN — PENTOXIFYLLINE SCH MG: 400 TABLET, FILM COATED, EXTENDED RELEASE ORAL at 05:00

## 2017-04-13 RX ADMIN — IPRATROPIUM BROMIDE AND ALBUTEROL SULFATE SCH AMPULE: .5; 3 SOLUTION RESPIRATORY (INHALATION) at 11:23

## 2017-04-13 RX ADMIN — CEFUROXIME AXETIL SCH MG: 250 TABLET ORAL at 09:00

## 2017-04-13 RX ADMIN — RIFAXIMIN SCH MG: 550 TABLET ORAL at 20:27

## 2017-04-13 RX ADMIN — CHOLESTYRAMINE SCH GM: 4 POWDER, FOR SUSPENSION ORAL at 05:00

## 2017-04-13 RX ADMIN — IPRATROPIUM BROMIDE AND ALBUTEROL SULFATE SCH AMPULE: .5; 3 SOLUTION RESPIRATORY (INHALATION) at 00:19

## 2017-04-13 RX ADMIN — CHOLESTYRAMINE SCH GM: 4 POWDER, FOR SUSPENSION ORAL at 17:56

## 2017-04-13 RX ADMIN — BENZONATATE PRN MG: 100 CAPSULE ORAL at 20:27

## 2017-04-13 RX ADMIN — IPRATROPIUM BROMIDE AND ALBUTEROL SULFATE SCH AMPULE: .5; 3 SOLUTION RESPIRATORY (INHALATION) at 20:46

## 2017-04-13 RX ADMIN — BENZONATATE PRN MG: 100 CAPSULE ORAL at 05:14

## 2017-04-13 RX ADMIN — Medication SCH TAB: at 17:55

## 2017-04-13 RX ADMIN — IPRATROPIUM BROMIDE AND ALBUTEROL SULFATE SCH AMPULE: .5; 3 SOLUTION RESPIRATORY (INHALATION) at 07:57

## 2017-04-13 RX ADMIN — MIDODRINE HYDROCHLORIDE SCH MG: 5 TABLET ORAL at 17:56

## 2017-04-13 RX ADMIN — NYSTATIN SCH ML: 100000 SUSPENSION ORAL at 17:55

## 2017-04-13 RX ADMIN — NYSTATIN SCH ML: 100000 SUSPENSION ORAL at 09:25

## 2017-04-13 RX ADMIN — PENTOXIFYLLINE SCH MG: 400 TABLET, FILM COATED, EXTENDED RELEASE ORAL at 20:27

## 2017-04-13 RX ADMIN — Medication SCH ML: at 09:27

## 2017-04-13 RX ADMIN — IPRATROPIUM BROMIDE AND ALBUTEROL SULFATE SCH AMPULE: .5; 3 SOLUTION RESPIRATORY (INHALATION) at 16:36

## 2017-04-13 RX ADMIN — PENTOXIFYLLINE SCH MG: 400 TABLET, FILM COATED, EXTENDED RELEASE ORAL at 13:02

## 2017-04-13 RX ADMIN — MIDODRINE HYDROCHLORIDE SCH MG: 5 TABLET ORAL at 05:00

## 2017-04-13 RX ADMIN — CEFUROXIME AXETIL SCH MG: 250 TABLET ORAL at 20:28

## 2017-04-13 RX ADMIN — Medication SCH TAB: at 13:00

## 2017-04-13 RX ADMIN — COLLAGENASE SANTYL SCH APPLIC: 250 OINTMENT TOPICAL at 09:00

## 2017-04-13 RX ADMIN — RIFAXIMIN SCH MG: 550 TABLET ORAL at 09:26

## 2017-04-13 RX ADMIN — OCTREOTIDE ACETATE SCH MCG: 100 INJECTION, SOLUTION INTRAVENOUS; SUBCUTANEOUS at 13:03

## 2017-04-13 RX ADMIN — NYSTATIN SCH ML: 100000 SUSPENSION ORAL at 20:27

## 2017-04-13 NOTE — HHI.GIFU
GI Follow-up Note


Consult Follow-up


Pt off floor.  Nurse reports that his sister took him outside.





Entered by: Lizette Cancino Apr 13, 2017 16:34

## 2017-04-13 NOTE — HHI.PR
Subjective


Remarks


I came to see the patient but not in room.


Repeat renal panel tomorrow and will follow up the patient tomorrow also.





Objective





 Vital Signs








  Date Time  Temp Pulse Resp B/P Pulse Ox O2 Delivery O2 Flow Rate FiO2


 


4/13/17 12:00 97.8 89 20 128/67 93   


 


4/13/17 08:20      Room Air  


 


4/13/17 08:00 97.8 90 20 104/58 92   


 


4/13/17 07:57  88      


 


4/13/17 04:00 98.1 80 16 109/54 91   


 


4/13/17 00:00 98.1 87 14 113/61 91   


 


4/12/17 21:10      Room Air  


 


4/12/17 20:26  80      


 


4/12/17 20:03 97.8 82 16 119/60 92   


 


4/12/17 18:23  81      








 I/O








 4/12/17 4/12/17 4/12/17 4/13/17 4/13/17 4/13/17





 07:00 15:00 23:00 07:00 15:00 23:00


 


Intake Total 480 ml 360 ml 284 ml 960 ml 0 ml 


 


Balance 480 ml 360 ml 284 ml 960 ml 0 ml 


 


      


 


Intake Oral 480 ml 360 ml 280 ml 960 ml  


 


IV Total   4 ml  0 ml 


 


# Voids 2 4 3 5  


 


# Bowel Movements 0 1 1 5  








Result Diagram:  


4/13/17 0547                                                                   

             4/12/17 1009











RADHA Alan MD Apr 13, 2017 17:48

## 2017-04-13 NOTE — HHI.PR
Subjective


Remarks


follow up alcohol hepatitis/cirrhosis/PNA/UTI


04/08/17-patient seen and examined; +some abdominal pain. renal indices 

trending down. Afebrile


04/09/17-patient seen and examined, and he is stable and denies any abdominal 

pain. continue to have runs of diarrhea.


04/10/17-patient seen and examined; Renal indices trending down; Afebrile.  

Alert and oriented 3


04/11/17-patient seen and examined; stable and no acute event overnight.  

Increase by mouth intake


04/12/17-patient seen and examined; some shortness of breath and slightly 

wheezing however afebrile


04/13/17-patient seen and examined;stable and no complaint





Objective


Vitals





 Vital Signs








  Date Time  Temp Pulse Resp B/P Pulse Ox O2 Delivery O2 Flow Rate FiO2


 


4/13/17 08:20      Room Air  


 


4/13/17 08:00 97.8 90 20 104/58 92   


 


4/13/17 07:57  88      


 


4/13/17 04:00 98.1 80 16 109/54 91   


 


4/13/17 00:00 98.1 87 14 113/61 91   


 


4/12/17 21:10      Room Air  


 


4/12/17 20:26  80      


 


4/12/17 20:03 97.8 82 16 119/60 92   


 


4/12/17 18:23  81      


 


4/12/17 16:03 97.9 86 17 118/60 93   


 


4/12/17 12:03 98.1 84 17 129/57 92   








 I/O








 4/12/17 4/12/17 4/12/17 4/13/17 4/13/17 4/13/17





 07:00 15:00 23:00 07:00 15:00 23:00


 


Intake Total 480 ml 360 ml 284 ml 960 ml  


 


Balance 480 ml 360 ml 284 ml 960 ml  


 


      


 


Intake Oral 480 ml 360 ml 280 ml 960 ml  


 


IV Total   4 ml   


 


# Voids 2 4 3 5  


 


# Bowel Movements 0 1 1 5  








Result Diagram:  


4/13/17 0547                                                                   

             4/12/17 1009





Objective Remarks


GENERAL: NAD


SKIN: +jaundiced


HEAD: Normocephalic.


EYES: No scleral icterus. No injection or drainage. 


NECK: Supple, trachea midline. No JVD or lymphadenopathy.


CARDIOVASCULAR: Regular rate and rhythm without murmurs, gallops, or rubs. 


RESPIRATORY: Breath sounds equal bilaterally. No accessory muscle use.


GASTROINTESTINAL: Abdomen hard, mildly tender, distended. +HSM


MUSCULOSKELETAL: No cyanosis, or edema. 


BACK: Nontender without obvious deformity. No CVA tenderness.





Procedures


Sigmoidoscopy





A/P


Problem List:  


(1) Acute hepatitis


ICD Code:  B17.9


Status:  Acute


(2) Acute renal failure


ICD Code:  N17.9


Status:  Acute


Assessment and Plan


26-year-old male with history of hepatitis and now with hepatorenal





Severe alcoholic hepatitis complicated by varices, ascites and coagulopathy. 

Cirrhosis on imaging


-CT scan show hepatocellular disease with varices.


-Hepatitis panel reviewed from last admission which was all negative.


-Liver ultrasound shows possible acute cholecystitis. HIDA scan negative. 


-GI consulted.  Continue with prednisone 40 mg daily


-Unremarkable DARRYL, AMA, ASM.  Negative hemochromatosis panel. 


-Continue with supportive care, Trental, prednisone and rifaximin.  Patient is 

not a candidate for liver transplant due to recent alcohol use.





Coagulopathy


-Secondary to liver disease.


-See treatment as above.





Leukocytosis:  likely secondary to prednisone, continue to monitor





Acute renal failure


-Per nephrologist this may be partly due to hepatorenal syndrome versus 

dehydration with ATN.


-Nephrologist following.  continue with midodrine by mouth as well as 

octreotide subcutaneously total 2 weeks and IV albumin on hold.  


-Cr improving 





Diarrhea with C. difficile negative 


- Continue Questran, probiotics and Lomotil as well as IV hydration.  Status 

post sigmoidoscopy pathology negative .


-Improving





PNA/UTI: s/p Zosyn and now on Ceftin until tomorrow 04/14/17 by ID. 





Anxiety


-on Ativan when necessary.





Urticaria


-Stable on on Vistaril.





DVT prophylaxis: B-SCD








Manish Azevedo MD Apr 13, 2017 11:30

## 2017-04-14 VITALS
RESPIRATION RATE: 20 BRPM | TEMPERATURE: 97.8 F | SYSTOLIC BLOOD PRESSURE: 112 MMHG | OXYGEN SATURATION: 94 % | DIASTOLIC BLOOD PRESSURE: 64 MMHG | HEART RATE: 68 BPM

## 2017-04-14 VITALS
TEMPERATURE: 98.1 F | DIASTOLIC BLOOD PRESSURE: 75 MMHG | OXYGEN SATURATION: 94 % | RESPIRATION RATE: 20 BRPM | SYSTOLIC BLOOD PRESSURE: 126 MMHG | HEART RATE: 80 BPM

## 2017-04-14 VITALS
SYSTOLIC BLOOD PRESSURE: 122 MMHG | TEMPERATURE: 97.7 F | OXYGEN SATURATION: 92 % | HEART RATE: 70 BPM | DIASTOLIC BLOOD PRESSURE: 59 MMHG | RESPIRATION RATE: 16 BRPM

## 2017-04-14 VITALS
OXYGEN SATURATION: 95 % | HEART RATE: 61 BPM | SYSTOLIC BLOOD PRESSURE: 110 MMHG | RESPIRATION RATE: 16 BRPM | DIASTOLIC BLOOD PRESSURE: 55 MMHG | TEMPERATURE: 98.1 F

## 2017-04-14 VITALS
SYSTOLIC BLOOD PRESSURE: 119 MMHG | OXYGEN SATURATION: 92 % | RESPIRATION RATE: 16 BRPM | TEMPERATURE: 97.5 F | DIASTOLIC BLOOD PRESSURE: 60 MMHG | HEART RATE: 91 BPM

## 2017-04-14 VITALS
TEMPERATURE: 97.3 F | RESPIRATION RATE: 20 BRPM | HEART RATE: 78 BPM | SYSTOLIC BLOOD PRESSURE: 132 MMHG | OXYGEN SATURATION: 96 % | DIASTOLIC BLOOD PRESSURE: 71 MMHG

## 2017-04-14 VITALS
TEMPERATURE: 98.4 F | SYSTOLIC BLOOD PRESSURE: 111 MMHG | HEART RATE: 79 BPM | RESPIRATION RATE: 16 BRPM | OXYGEN SATURATION: 94 % | DIASTOLIC BLOOD PRESSURE: 55 MMHG

## 2017-04-14 LAB
ANION GAP SERPL CALC-SCNC: 10 MEQ/L (ref 5–15)
BASOPHILS # BLD AUTO: 0 TH/MM3 (ref 0–0.2)
BASOPHILS NFR BLD: 0.1 % (ref 0–2)
BUN SERPL-MCNC: 35 MG/DL (ref 7–18)
CHLORIDE SERPL-SCNC: 111 MEQ/L (ref 98–107)
EOSINOPHIL # BLD: 0 TH/MM3 (ref 0–0.4)
EOSINOPHIL NFR BLD: 0 % (ref 0–4)
ERYTHROCYTE [DISTWIDTH] IN BLOOD BY AUTOMATED COUNT: 15.5 % (ref 11.6–17.2)
GFR SERPLBLD BASED ON 1.73 SQ M-ARVRAT: 24 ML/MIN (ref 89–?)
HCO3 BLD-SCNC: 20.2 MEQ/L (ref 21–32)
HCT VFR BLD CALC: 22.5 % (ref 39–51)
HEMO FLAGS: (no result)
LYMPHOCYTES # BLD AUTO: 1.5 TH/MM3 (ref 1–4.8)
LYMPHOCYTES NFR BLD AUTO: 6 % (ref 9–44)
MCH RBC QN AUTO: 36.9 PG (ref 27–34)
MCHC RBC AUTO-ENTMCNC: 35.6 % (ref 32–36)
MCV RBC AUTO: 103.6 FL (ref 80–100)
MONOCYTES NFR BLD: 6.6 % (ref 0–8)
NEUTROPHILS # BLD AUTO: 21.5 TH/MM3 (ref 1.8–7.7)
NEUTROPHILS NFR BLD AUTO: 87.3 % (ref 16–70)
PLAT MORPH BLD: NORMAL
PLATELET # BLD: 145 TH/MM3 (ref 150–450)
PLATELET BLD QL SMEAR: (no result)
POTASSIUM SERPL-SCNC: 4.2 MEQ/L (ref 3.5–5.1)
RBC # BLD AUTO: 2.17 MIL/MM3 (ref 4.5–5.9)
SCAN/DIFF: (no result)
SODIUM SERPL-SCNC: 141 MEQ/L (ref 136–145)
TARGETS BLD QL SMEAR: (no result)
WBC # BLD AUTO: 24.6 TH/MM3 (ref 4–11)

## 2017-04-14 RX ADMIN — PENTOXIFYLLINE SCH MG: 400 TABLET, FILM COATED, EXTENDED RELEASE ORAL at 14:31

## 2017-04-14 RX ADMIN — COLLAGENASE SANTYL SCH APPLIC: 250 OINTMENT TOPICAL at 10:21

## 2017-04-14 RX ADMIN — OCTREOTIDE ACETATE SCH MCG: 100 INJECTION, SOLUTION INTRAVENOUS; SUBCUTANEOUS at 14:31

## 2017-04-14 RX ADMIN — Medication SCH TAB: at 14:31

## 2017-04-14 RX ADMIN — OCTREOTIDE ACETATE SCH MCG: 100 INJECTION, SOLUTION INTRAVENOUS; SUBCUTANEOUS at 01:50

## 2017-04-14 RX ADMIN — Medication SCH TAB: at 10:19

## 2017-04-14 RX ADMIN — RIFAXIMIN SCH MG: 550 TABLET ORAL at 23:18

## 2017-04-14 RX ADMIN — IPRATROPIUM BROMIDE AND ALBUTEROL SULFATE SCH AMPULE: .5; 3 SOLUTION RESPIRATORY (INHALATION) at 16:00

## 2017-04-14 RX ADMIN — IPRATROPIUM BROMIDE AND ALBUTEROL SULFATE SCH AMPULE: .5; 3 SOLUTION RESPIRATORY (INHALATION) at 20:00

## 2017-04-14 RX ADMIN — IPRATROPIUM BROMIDE AND ALBUTEROL SULFATE SCH AMPULE: .5; 3 SOLUTION RESPIRATORY (INHALATION) at 07:42

## 2017-04-14 RX ADMIN — NYSTATIN SCH ML: 100000 SUSPENSION ORAL at 10:18

## 2017-04-14 RX ADMIN — Medication SCH TAB: at 17:51

## 2017-04-14 RX ADMIN — PENTOXIFYLLINE SCH MG: 400 TABLET, FILM COATED, EXTENDED RELEASE ORAL at 23:19

## 2017-04-14 RX ADMIN — MIDODRINE HYDROCHLORIDE SCH MG: 5 TABLET ORAL at 17:51

## 2017-04-14 RX ADMIN — NYSTATIN SCH ML: 100000 SUSPENSION ORAL at 23:18

## 2017-04-14 RX ADMIN — MIDODRINE HYDROCHLORIDE SCH MG: 5 TABLET ORAL at 05:08

## 2017-04-14 RX ADMIN — BENZONATATE PRN MG: 100 CAPSULE ORAL at 10:18

## 2017-04-14 RX ADMIN — IPRATROPIUM BROMIDE AND ALBUTEROL SULFATE SCH AMPULE: .5; 3 SOLUTION RESPIRATORY (INHALATION) at 12:00

## 2017-04-14 RX ADMIN — NYSTATIN SCH ML: 100000 SUSPENSION ORAL at 14:31

## 2017-04-14 RX ADMIN — CHOLESTYRAMINE SCH GM: 4 POWDER, FOR SUSPENSION ORAL at 17:52

## 2017-04-14 RX ADMIN — OCTREOTIDE ACETATE SCH MCG: 100 INJECTION, SOLUTION INTRAVENOUS; SUBCUTANEOUS at 22:00

## 2017-04-14 RX ADMIN — LOPERAMIDE HYDROCHLORIDE PRN MG: 2 CAPSULE ORAL at 14:32

## 2017-04-14 RX ADMIN — Medication SCH ML: at 10:20

## 2017-04-14 RX ADMIN — LOPERAMIDE HYDROCHLORIDE PRN MG: 2 CAPSULE ORAL at 23:28

## 2017-04-14 RX ADMIN — MIDODRINE HYDROCHLORIDE SCH MG: 5 TABLET ORAL at 10:20

## 2017-04-14 RX ADMIN — Medication SCH ML: at 23:25

## 2017-04-14 RX ADMIN — CEFUROXIME AXETIL SCH MG: 250 TABLET ORAL at 10:19

## 2017-04-14 RX ADMIN — RIFAXIMIN SCH MG: 550 TABLET ORAL at 10:18

## 2017-04-14 RX ADMIN — NYSTATIN SCH ML: 100000 SUSPENSION ORAL at 17:51

## 2017-04-14 RX ADMIN — CHOLESTYRAMINE SCH GM: 4 POWDER, FOR SUSPENSION ORAL at 05:08

## 2017-04-14 RX ADMIN — PENTOXIFYLLINE SCH MG: 400 TABLET, FILM COATED, EXTENDED RELEASE ORAL at 05:07

## 2017-04-14 RX ADMIN — BENZONATATE PRN MG: 100 CAPSULE ORAL at 17:51

## 2017-04-14 NOTE — HHI.GIFU
Subjective


Remarks


pt resting comfortably in bed, visiting with family.  He says his diarrhea is 

somewaht improved but not completely and he will ask for the immodium at 

dinner.  He is eating more and feels better. Mild abd tenderness.  He feels 

bloated. No nausea or vomiting, no blood in stool.  He is wanting to know when 

he can go home.





Objective


Vitals I&O





 Vital Signs








  Date Time  Temp Pulse Resp B/P Pulse Ox O2 Delivery O2 Flow Rate FiO2


 


4/14/17 08:00 97.8 68 20 112/64 94   


 


4/14/17 04:00 98.4 79 16 111/55 94   


 


4/14/17 00:09 97.5 91 16 119/60 92   


 


4/13/17 21:02     92 Nasal Cannula 3.00 


 


4/13/17 20:48     91   21


 


4/13/17 20:13      Room Air  


 


4/13/17 20:00 97.8 78 20 111/54 92   


 


4/13/17 19:14  78      


 


4/13/17 16:00 97.7 93 20 123/67 91   








 I/O








 4/13/17 4/13/17 4/13/17 4/14/17 4/14/17 4/14/17





 07:00 15:00 23:00 07:00 15:00 23:00


 


Intake Total 960 ml 600 ml 484 ml 1082 ml  


 


Balance 960 ml 600 ml 484 ml 1082 ml  


 


      


 


Intake Oral 960 ml 600 ml 480 ml 1080 ml  


 


IV Total  0 ml 4 ml 2 ml  


 


# Voids 5 4 2 2  


 


# Bowel Movements 5 4  1  








Laboratory





Laboratory Tests








Test 4/14/17





 06:30


 


White Blood Count 24.6 


 


Red Blood Count 2.17 


 


Hemoglobin 8.0 


 


Hematocrit 22.5 


 


Mean Corpuscular Volume 103.6 


 


Mean Corpuscular Hemoglobin 36.9 


 


Mean Corpuscular Hemoglobin 35.6 





Concent 


 


Red Cell Distribution Width 15.5 


 


Platelet Count 145 


 


Mean Platelet Volume 10.8 


 


Neutrophils (%) (Auto) 87.3 


 


Lymphocytes (%) (Auto) 6.0 


 


Monocytes (%) (Auto) 6.6 


 


Eosinophils (%) (Auto) 0.0 


 


Basophils (%) (Auto) 0.1 


 


Neutrophils # (Auto) 21.5 


 


Lymphocytes # (Auto) 1.5 


 


Monocytes # (Auto) 1.6 


 


Eosinophils # (Auto) 0.0 


 


Basophils # (Auto) 0.0 


 


CBC Comment AUTO DIFF 


 


Differential Comment AUTO DIFF





 CONFIRMED


 


Platelet Estimate LOW 


 


Platelet Morphology Comment NORMAL 


 


Target Cells 2+ 


 


Sodium Level 141 


 


Potassium Level 4.2 


 


Chloride Level 111 


 


Carbon Dioxide Level 20.2 


 


Anion Gap 10 


 


Blood Urea Nitrogen 35 


 


Creatinine 3.15 


 


Estimat Glomerular Filtration 24 





Rate 


 


Random Glucose 97 


 


Calcium Level 8.5 


 


Phosphorus Level 3.2 


 


Albumin 2.7 








Imaging





Last Impressions








Lower Extremity Ultrasound 4/4/17 0000 Signed





Impressions: 





 Service Date/Time:  Tuesday, April 4, 2017 14:04 - CONCLUSION:  Normal 





 examination.       Jose Khan MD 


 


Abdomen Ultrasound 4/4/17 0000 Signed





Impressions: 





 Service Date/Time:  Tuesday, April 4, 2017 14:00 - CONCLUSION:  The exam 





 demonstrates small pockets of ascites within the midline of the pelvis. There 

is 





 a small amount of ascites around the liver and spleen. The liver appears 





 cirrhotic.     Valdemar Del Angel MD 


 


Chest X-Ray 4/2/17 0000 Signed





Impressions: 





 Service Date/Time:  Sunday, April 2, 2017 15:31 - CONCLUSION:  1. Slight 





 increase in basilar airspace consolidation since March 27.     Ralph Barrett MD 


 


Hepatobiliary Scan Nuclear Medicine 3/28/17 0000 Signed





Impressions: 





 Service Date/Time:  Tuesday, March 28, 2017 14:36 - CONCLUSION:  Markedly 





 delayed uptake in the liver. Findings suggest diffuse hepatocellular disease 





 versus biliary obstruction. 24 hour delayed scan could be performed.     

Estevan Longoria MD ADDENDUM:  24 hour scans still shows no activity in bile ducts or 





 small bowel suggesting diffuse hepatocellular disease.    Jona Del Angel MD  





 FACR


 


Abdomen/Pelvis CT 3/27/17 0000 Signed





Impressions: 





 Service Date/Time:  Monday, March 27, 2017 16:50 - CONCLUSION: Findings 

suggest 





 advanced hepatocellular disease with varicosities, prominent spleen and 





 interestingly only a small amount of ascites.  Most of the ascites is in the 





 pelvis.     Jona Del Angel MD  FACR








Physical Exam


HEENT: Normocephalic; atraumatic; + icterus.


CHEST:  CTA


CARDIAC:  RRR


ABDOMEN:  Soft, distended, mildly tender diffusely;  hepatosplenomegaly; bowel 

sounds are present x 4 quadrants. Ascites


EXTREMITIES: No clubbing, cyanosis


SKIN:  Significant jaundice 


CNS:  alert, oriented x 3





Assessment and Plan


Plan


ASSESSMENT:


- Severe Alcoholic hepatitis on what appears to be underlying liver cirrhosis (

based on imaging). 


    T. Bili 30.3, AST 65, ALT 44, Alk Phosph 103 today.Has hx of ETOH abuse, 

reportedly drinking 6-7 drinks per


   day x 6 years.  He was hospitalized from 3/11-3/14 for acute alcoholic 

hepatitis.  During that admission, he was treated with Pentoxifylline, 

Prednisone,


   Nadolol, Spironolactone, Lactulose, and Protonix.  Unfortunately, he left 

[place] on 3/14 with LFTs on 3/14 were T. Bili 23.7, , ALT 61, Alkaline 


   Phosph 162.  Hepatitis Panel and Celiac panel was negative at that time.  He 

was taking an OTC diuretic at home.  He is not taking Tylenol products. 


   He is not aware of any other liver disease, although several people on his 

mother's side have autoimmune disorders.  He returned to ER for worsening


   jaundice, nausea without vomiting, worsening abdominal distention, and 

intermittent fever and chills.  Abdomen Ultrasound (3/27/17)-----> 1. Thick-

walled 


   gallbladder containing some sludge and demonstrating pericholecystic fluid.  

If there is clinical concern for acute cholecystitis a hepatobiliary scan may 


   be helpful to confirm cystic duct obstruction. 2. Hepatosplenomegaly.  3. 

Some ascites within the abdomen.  Abdomen/Pelvis CT (3/27/17)---->  Findings


   suggest advanced hepatocellular disease with varicosities, prominent spleen 

and interestingly only a small amount of ascites.  Most of the ascites is in the


   pelvis.  DF 52.70. DARRYL neg,  AMA neg, ASMA neg, Iron Saturation 93.1, 

Ferritin 1666, AFP 3.3, Alpha 1 antitrypsin 286, Ceruloplasmin 30.  Ferritin 

and Iron   


   Saturation elevated and therefore Hfe gene ordered to r/o hemochromatosis 

and this was negative.    Pentoxifylline.  Prednisone (Okay with ID)  


- Ascites with intermittent fevers and chills. Afebrile, but with persistent 

leukocytosis at 24.6 WBC.    


- Severe diarrhea. improved. CDiff negative.  S/P Sigmoidoscopy (4/3/17)----> 

nonspecific colitis, internal hemorrhoids.  Pathology colonic mucosa with no 

significant 


   histopathologic abnormalities.  Probiotics, Cholestyramine, Imodium.


- Hepatic encephalopathy.  Xifaxan.  Lactulose d/c'd because of diarrhea. 


- Coagulopathy.  STABLE.  


- ARF.  Creat 315, GFR 24. Renal following 


- Leukocytosis. WBC 24.6.   Probably reactive per ID. ? inflammatory, ESR 48, 

CRP 1.80.  Workup per hematology 





PLAN:


- 2 gram sodium diet


- Cont. Prednisone


- Cont. Xifaxan


- Cont. Probiotics


- Continue Cholestyramine


- Continue Imodium prn


- Cont. Pentoxifylline


- Cont. PPI


- Monitor labs


- Renal following


- Hematology following


- ok to d/c from GI standpoint with home health arrangement, and off IV


- Pt was drinking up until his last hospitalization on 3/11 and therefore is 

not a candidate for liver transplant if his condition worsens.


- Further recommendations to follow based on results of above


- Patient seen and examined by Dr. Can and myself and this note is written 

on his behalf








Zahraa Calixto Apr 14, 2017 14:17

## 2017-04-14 NOTE — HHI.IDPN
Subjective


Subjective


Remarks


Notes reviewed


Temps ok


No new complaint


WBC down to 24


Repeat UA ok


Finished Ceftin today


Antibiotics


Ceftin


Lines


PIV with no evidence of infection


Past Medical History


Reviewed


Allergies:  


Coded Allergies:  


     No Known Allergies (Verified , 3/11/17)





Objective


.





 Vital Signs








  Date Time  Temp Pulse Resp B/P Pulse Ox O2 Delivery O2 Flow Rate FiO2


 


4/14/17 08:00 97.8 68 20 112/64 94   


 


4/14/17 04:00 98.4 79 16 111/55 94   


 


4/14/17 00:09 97.5 91 16 119/60 92   


 


4/13/17 21:02     92 Nasal Cannula 3.00 


 


4/13/17 20:48     91   21


 


4/13/17 20:13      Room Air  


 


4/13/17 20:00 97.8 78 20 111/54 92   


 


4/13/17 19:14  78      


 


4/13/17 16:00 97.7 93 20 123/67 91   














 4/13/17 4/13/17 4/14/17





 15:00 23:00 07:00


 


Intake Total 600 ml 484 ml 1082 ml


 


Balance 600 ml 484 ml 1082 ml


 


   


 


Intake Oral 600 ml 480 ml 1080 ml


 


IV Total 0 ml 4 ml 2 ml


 


# Voids 4 2 2


 


# Bowel Movements 4  1








.





Laboratory Tests








Test 4/13/17 4/14/17





 05:47 06:30


 


White Blood Count 27.0 TH/MM3 24.6 TH/MM3


 


Red Blood Count 2.16 MIL/MM3 2.17 MIL/MM3


 


Hemoglobin 7.8 GM/DL 8.0 GM/DL


 


Hematocrit 22.5 % 22.5 %


 


Mean Corpuscular Volume 104.5 .6 FL


 


Mean Corpuscular Hemoglobin 36.0 PG 36.9 PG


 


Mean Corpuscular Hemoglobin 34.4 % 35.6 %





Concent  


 


Red Cell Distribution Width 15.6 % 15.5 %


 


Platelet Count 134 TH/MM3 145 TH/MM3


 


Mean Platelet Volume 10.5 FL 10.8 FL


 


Neutrophils (%) (Auto) 81.5 % 87.3 %


 


Lymphocytes (%) (Auto) 4.5 % 6.0 %


 


Monocytes (%) (Auto) 13.6 % 6.6 %


 


Eosinophils (%) (Auto) 0.1 % 0.0 %


 


Basophils (%) (Auto) 0.3 % 0.1 %


 


Neutrophils # (Auto) 22.0 TH/MM3 21.5 TH/MM3


 


Lymphocytes # (Auto) 1.2 TH/MM3 1.5 TH/MM3


 


Monocytes # (Auto) 3.7 TH/MM3 1.6 TH/MM3


 


Eosinophils # (Auto) 0.0 TH/MM3 0.0 TH/MM3


 


Basophils # (Auto) 0.1 TH/MM3 0.0 TH/MM3


 


CBC Comment AUTO DIFF  AUTO DIFF 


 


Differential Total Cells 100  





Counted  


 


Neutrophils % (Manual) 88 % 


 


Band Neutrophils % 4 % 


 


Lymphocytes % 3 % 


 


Monocytes % 5 % 


 


Neutrophils # (Manual) 24.8 TH/MM3 


 


Differential Comment FINAL DIFF AUTO DIFF





 MANUAL CONFIRMED


 


Platelet Estimate LOW  LOW 


 


Platelet Morphology Comment NORMAL  NORMAL 


 


Target Cells 1+  2+ 








Laboratory Tests








Test 4/13/17 4/14/17





 05:47 06:30


 


Total Bilirubin 30.3 MG/DL 


 


Direct Bilirubin 26.1 MG/DL 


 


Indirect Bilirubin 4.2 MG/DL 


 


Aspartate Amino Transf 65 U/L 





(AST/SGOT)  


 


Alanine Aminotransferase 44 U/L 





(ALT/SGPT)  


 


Alkaline Phosphatase 103 U/L 


 


Total Protein 5.1 GM/DL 


 


Albumin 2.7 GM/DL 2.7 GM/DL


 


Sodium Level  141 MEQ/L


 


Potassium Level  4.2 MEQ/L


 


Chloride Level  111 MEQ/L


 


Carbon Dioxide Level  20.2 MEQ/L


 


Anion Gap  10 MEQ/L


 


Blood Urea Nitrogen  35 MG/DL


 


Creatinine  3.15 MG/DL


 


Estimat Glomerular Filtration  24 ML/MIN





Rate  


 


Random Glucose  97 MG/DL


 


Calcium Level  8.5 MG/DL


 


Phosphorus Level  3.2 MG/DL








Imaging














Lower Extremity Ultrasound 4/4/17 0000 Signed





Impressions: 





 Service Date/Time:  Tuesday, April 4, 2017 14:04 - CONCLUSION:  Normal 





 examination.       Jose Khan MD 


 


Abdomen Ultrasound 4/4/17 0000 Signed





Impressions: 





 Service Date/Time:  Tuesday, April 4, 2017 14:00 - CONCLUSION:  The exam 





 demonstrates small pockets of ascites within the midline of the pelvis. There 

is 





 a small amount of ascites around the liver and spleen. The liver appears 





 cirrhotic.     Valdemar Del Angel MD 


 


Chest X-Ray 4/2/17 0000 Signed





Impressions: 





 Service Date/Time:  Sunday, April 2, 2017 15:31 - CONCLUSION:  1. Slight 





 increase in basilar airspace consolidation since March 27.     Ralph Barrett MD 


 


Hepatobiliary Scan Nuclear Medicine 3/28/17 0000 Signed





Impressions: 





 Service Date/Time:  Tuesday, March 28, 2017 14:36 - CONCLUSION:  Markedly 





 delayed uptake in the liver. Findings suggest diffuse hepatocellular disease 





 versus biliary obstruction. 24 hour delayed scan could be performed.     

Estevan Longoria MD ADDENDUM:  24 hour scans still shows no activity in bile ducts or 





 small bowel suggesting diffuse hepatocellular disease.    Jona Del Angel MD  





 FACR


 


Abdomen/Pelvis CT 3/27/17 0000 Signed





Impressions: 





 Service Date/Time:  Monday, March 27, 2017 16:50 - CONCLUSION: Findings 

suggest 





 advanced hepatocellular disease with varicosities, prominent spleen and 





 interestingly only a small amount of ascites.  Most of the ascites is in the 





 pelvis.     Jona Del Angel MD  FACR








Physical Exam


GENERAL:Awake and alert,  has deep jaundice,  NAD   


SKIN:   Cool and dry.  Has deep jaundice.  No generalized rash or ecchymosis.  

Has spider angiomatas in upper chest


HEENT:   Pink conjunctivae.    Has deep scleral icterus.   Moist oral mucosa


NECK:  Supple, nontender, no meningeal signs.


CARDIOVASCULAR: Regular rate and rhythm without murmurs, gallops, or rubs. 


RESPIRATORY:  Decreased BS at bases


GASTROINTESTINAL: Abdomen is mildly distended, with min abdominal tenderness.  

No guarding.  Bowel sounds are present and hypoactive.  No rebound.


MUSCULOSKELETAL: Extremities without clubbing, or cyanosis.  Has bilateral 

pitting pedal edema. 


NEUROLOGICAL:  Grossly non-focal. 


PSYCH:  Calm and cooperative


LINE:  PIV with no evidence of infection.





Assessment & Plan


Remarks


IMPRESSION


Intermittent low grade fevers,  etiology? resolved


Leukocytosis, persistent, decreasing


   -  reactive most likely


   -  no new infection found


Hepatic failure


Renal failure


   -  creatinine improving


Known ETOH abuse


Diarrhea


   -  C diff negative;  likely due to meds 








RECOMMENDATION


Clinically stable from ID standpoint


Since WBC decreasing, could monitor this as outpatient


I will sign off


Please reconsult if with any new ID issue or question








Edna Reyes MD Apr 14, 2017 14:02

## 2017-04-14 NOTE — HHI.PR
Subjective


Remarks


follow up alcohol hepatitis/cirrhosis/PNA/UTI


04/08/17-patient seen and examined; +some abdominal pain. renal indices 

trending down. Afebrile


04/09/17-patient seen and examined, and he is stable and denies any abdominal 

pain. continue to have runs of diarrhea.


04/10/17-patient seen and examined; Renal indices trending down; Afebrile.  

Alert and oriented 3


04/11/17-patient seen and examined; stable and no acute event overnight.  

Increase by mouth intake


04/12/17-patient seen and examined; some shortness of breath and slightly 

wheezing however afebrile


04/13/17-patient seen and examined;stable and no complaint


04/14/17-patient seen and examined; patient is wondering when can he be 

discharged from here. denies any belly pain





Objective


Vitals





 Vital Signs








  Date Time  Temp Pulse Resp B/P Pulse Ox O2 Delivery O2 Flow Rate FiO2


 


4/14/17 08:00 97.8 68 20 112/64 94   


 


4/14/17 04:00 98.4 79 16 111/55 94   


 


4/14/17 00:09 97.5 91 16 119/60 92   


 


4/13/17 21:02     92 Nasal Cannula 3.00 


 


4/13/17 20:48     91   21


 


4/13/17 20:13      Room Air  


 


4/13/17 20:00 97.8 78 20 111/54 92   


 


4/13/17 19:14  78      


 


4/13/17 16:00 97.7 93 20 123/67 91   


 


4/13/17 12:00 97.8 89 20 128/67 93   








 I/O








 4/13/17 4/13/17 4/13/17 4/14/17 4/14/17 4/14/17





 07:00 15:00 23:00 07:00 15:00 23:00


 


Intake Total 960 ml 600 ml 484 ml 1082 ml  


 


Balance 960 ml 600 ml 484 ml 1082 ml  


 


      


 


Intake Oral 960 ml 600 ml 480 ml 1080 ml  


 


IV Total  0 ml 4 ml 2 ml  


 


# Voids 5 4 2 2  


 


# Bowel Movements 5 4  1  








Result Diagram:  


4/14/17 0630                                                                   

             4/14/17 0630





Objective Remarks


GENERAL: NAD


SKIN: +jaundiced


HEAD: Normocephalic.


EYES: No scleral icterus. No injection or drainage. 


NECK: Supple, trachea midline. No JVD or lymphadenopathy.


CARDIOVASCULAR: Regular rate and rhythm without murmurs, gallops, or rubs. 


RESPIRATORY: Breath sounds equal bilaterally. No accessory muscle use.


GASTROINTESTINAL: Abdomen hard, mildly tender, distended. +HSM


MUSCULOSKELETAL: No cyanosis, or edema. 


BACK: Nontender without obvious deformity. No CVA tenderness.





Procedures


Sigmoidoscopy





A/P


Problem List:  


(1) Acute hepatitis


ICD Code:  B17.9


Status:  Acute


(2) Acute renal failure


ICD Code:  N17.9


Status:  Acute


(3) Cirrhosis with alcoholism


ICD Code:  K70.30


Status:  Acute


(4) Hepatorenal syndrome


ICD Code:  K76.7


Status:  Acute


Assessment and Plan


26-year-old male with history of hepatitis and now with hepatorenal





Severe alcoholic hepatitis complicated by varices, ascites and coagulopathy. 

Cirrhosis on imaging


-CT scan show hepatocellular disease with varices.


-Hepatitis panel reviewed from last admission which was all negative.


-Liver ultrasound shows possible acute cholecystitis. HIDA scan negative. 


-GI consulted.  Continue with prednisone 40 mg daily


-Unremarkable DARRYL, AMA, ASM.  Negative hemochromatosis panel. 


-Continue with supportive care, Trental, prednisone and rifaximin.  Patient is 

not a candidate for liver transplant due to recent alcohol use.





Coagulopathy


-Secondary to liver disease.


-See treatment as above.





Leukocytosis:  likely secondary to prednisone, continue to monitor





Acute renal failure


-Per nephrologist this may be partly due to hepatorenal syndrome versus 

dehydration with ATN.


-Nephrologist following. Renal indices improving and continue with midodrine by 

mouth as well as octreotide subcutaneously total 2 weeks and IV albumin on 

hold.  





Diarrhea with C. difficile negative 


- Continue Questran, probiotics and Lomotil as well as IV hydration.  Status 

post sigmoidoscopy pathology negative .


-Improving





PNA/UTI: s/p Zosyn and now on Ceftin until today 04/14/17 by ID. 





Anxiety


-on Ativan when necessary.





Urticaria-Resolved


-continue Vistaril.





DVT prophylaxis: B-SCD








Manish Azevedo MD Apr 14, 2017 11:44

## 2017-04-14 NOTE — HHI.NPPN
Subjective


History of Present Illness


The patient is a 27 yo [place] male who presented to this facility 3/27 with 

complaints of abd pain, nausea, and poor oral intake for the past 2 weeks.  He 

was recently admitted to [facility] (3/11-3/14) for similar complaints, but signed 

himself out AMA.  He has a hx of heavy EtOH intake for the past several years, 

but says he has been trying to cut back since he was admitted to [facility].  As per 

records, he was using Lasix since his discharge from the hospital despite 

little po intake, but he says he was only using "over the counter diuretics" 

and Benadryl.  Denies any NSAID use at home.  No prior hx of renal decline.  

Mentions multiple family member with RA.


Admitting SCr 3.63


Labs from beginning of month from [facility] show preserved renal function at 0.6-0.8 

baseline SCr.


Interval History


Patient had no verbal complaints.  Anxious to get home.





Review of Systems


General


Constitutional:  Fatigue





Gastrointestinal


Gastrointestinal:  Abdominal Pain, Diarrhea





Objective Data


Data











 4/13/17 4/14/17





 19:00 07:00


 


Intake Total 600 ml 1566 ml


 


Balance 600 ml 1566 ml


 


  


 


Intake Oral 600 ml 1560 ml


 


IV Total 0 ml 6 ml


 


# Voids 4 4


 


# Bowel Movements 4 1











 Vital Signs








  Date Time  Temp Pulse Resp B/P Pulse Ox O2 Delivery O2 Flow Rate FiO2


 


4/14/17 08:00 97.8 68 20 112/64 94   


 


4/14/17 04:00 98.4 79 16 111/55 94   


 


4/14/17 00:09 97.5 91 16 119/60 92   


 


4/13/17 21:02     92 Nasal Cannula 3.00 


 


4/13/17 20:48     91   21


 


4/13/17 20:13      Room Air  


 


4/13/17 20:00 97.8 78 20 111/54 92   


 


4/13/17 19:14  78      








-:  


4/14/17 0630                                                                   

             4/14/17 0630








Physical Exam


General


Appearance:  No Acute Distress, Comfortable





Eyes


Eye Exam:  Jaundice





Pulmonary


Resp Exam:  Clear Bilaterally, Breath Sounds Equal, No Distress





Cardiology


CV Exam:  Regular, Normal Sinus Rhythm





Gastrointestinal/Abdomen


GI Exam:  Soft, Non-Tender, Distended (moderately)





Integumentary


Skin Exam:  Clear, Warm, Jaundice





Extremeties


Extremities Exam:  Trace Edema


Extremeties Remarks


Lower legs.





Neurologic


Neuro Exam:  Alert, Awake, Oriented, Sedated





Psychiatric


Psych Exam:  Appropriate Responses





Assessment/Plan


Discussed Condition With:  Patient, Parent, Sibling


Problem List:  


(1) Acute renal failure


Plan:  





Patient's creatinine continues its improvement.


I will discontinue octreotide tomorrow.





Recommend continuance of midodrine as an outpatient at current dosage.  Patient 

advised home blood pressure monitoring and contact me if he develops 

hypertension.


If patient's renal function fails to normalize completely with significant 

evidence of continued hepatic hepatic dysfunction recommend continuance of 

midodrine indefinitely.


Patient advised the importance of avoidance of alcohol as well as NSAIDs.


Recommend outpatient BNP approximately 4-5 days post discharge and follow-up 

with me in the office if patient agrees in approximately 1-2 weeks.





Considering the slow improvement in renal indices I suspect that the primary 

etiology of the patient's acute renal insufficiency was hepatorenal syndrome.


Despite signs of improvement patient's prognosis is guarded and his long term 

prognosis remains be determined.





Medications should be adjusted for the patient's renal decline.  Avoid 

nephrotoxic medications such as iodinated contrast dyes and NSAIDs.  Avoid 

gadolinium when EGFR <30





(2) Acidosis


Plan:  Resolved.  If the bicarbonate level falls again will start by mouth 

bicarbonate.





(3) Cirrhosis with alcoholism


(4) Diarrhea


Plan:  Improved.  Management per GI.  Apparently does have mild colitis.











RADHA Alan MD Apr 14, 2017 16:42

## 2017-04-15 VITALS — OXYGEN SATURATION: 95 %

## 2017-04-15 VITALS
RESPIRATION RATE: 20 BRPM | TEMPERATURE: 98 F | DIASTOLIC BLOOD PRESSURE: 59 MMHG | OXYGEN SATURATION: 92 % | SYSTOLIC BLOOD PRESSURE: 110 MMHG | HEART RATE: 72 BPM

## 2017-04-15 VITALS
TEMPERATURE: 97.8 F | RESPIRATION RATE: 20 BRPM | OXYGEN SATURATION: 96 % | SYSTOLIC BLOOD PRESSURE: 135 MMHG | HEART RATE: 74 BPM | DIASTOLIC BLOOD PRESSURE: 60 MMHG

## 2017-04-15 VITALS
SYSTOLIC BLOOD PRESSURE: 118 MMHG | OXYGEN SATURATION: 92 % | HEART RATE: 75 BPM | RESPIRATION RATE: 16 BRPM | DIASTOLIC BLOOD PRESSURE: 61 MMHG | TEMPERATURE: 98.1 F

## 2017-04-15 LAB
ALP SERPL-CCNC: 107 U/L (ref 45–117)
ALT SERPL-CCNC: 66 U/L (ref 12–78)
ANION GAP SERPL CALC-SCNC: 11 MEQ/L (ref 5–15)
AST SERPL-CCNC: 95 U/L (ref 15–37)
BILIRUB INDIRECT SERPL-MCNC: 4.5 MG/DL (ref 0–0.8)
BILIRUB SERPL-MCNC: 23 MG/DL (ref 0.2–1)
BUN SERPL-MCNC: 37 MG/DL (ref 7–18)
CHLORIDE SERPL-SCNC: 112 MEQ/L (ref 98–107)
GFR SERPLBLD BASED ON 1.73 SQ M-ARVRAT: 26 ML/MIN (ref 89–?)
HCO3 BLD-SCNC: 17.8 MEQ/L (ref 21–32)
POTASSIUM SERPL-SCNC: 4.4 MEQ/L (ref 3.5–5.1)
SODIUM SERPL-SCNC: 141 MEQ/L (ref 136–145)

## 2017-04-15 RX ADMIN — IPRATROPIUM BROMIDE AND ALBUTEROL SULFATE SCH AMPULE: .5; 3 SOLUTION RESPIRATORY (INHALATION) at 12:00

## 2017-04-15 RX ADMIN — NYSTATIN SCH ML: 100000 SUSPENSION ORAL at 13:21

## 2017-04-15 RX ADMIN — PENTOXIFYLLINE SCH MG: 400 TABLET, FILM COATED, EXTENDED RELEASE ORAL at 13:21

## 2017-04-15 RX ADMIN — MIDODRINE HYDROCHLORIDE SCH MG: 5 TABLET ORAL at 13:21

## 2017-04-15 RX ADMIN — CHOLESTYRAMINE SCH GM: 4 POWDER, FOR SUSPENSION ORAL at 06:33

## 2017-04-15 RX ADMIN — NYSTATIN SCH ML: 100000 SUSPENSION ORAL at 08:46

## 2017-04-15 RX ADMIN — MIDODRINE HYDROCHLORIDE SCH MG: 5 TABLET ORAL at 16:09

## 2017-04-15 RX ADMIN — COLLAGENASE SANTYL SCH APPLIC: 250 OINTMENT TOPICAL at 08:46

## 2017-04-15 RX ADMIN — Medication SCH TAB: at 13:21

## 2017-04-15 RX ADMIN — BENZONATATE PRN MG: 100 CAPSULE ORAL at 08:45

## 2017-04-15 RX ADMIN — OCTREOTIDE ACETATE SCH MCG: 100 INJECTION, SOLUTION INTRAVENOUS; SUBCUTANEOUS at 06:00

## 2017-04-15 RX ADMIN — IPRATROPIUM BROMIDE AND ALBUTEROL SULFATE SCH AMPULE: .5; 3 SOLUTION RESPIRATORY (INHALATION) at 16:00

## 2017-04-15 RX ADMIN — MIDODRINE HYDROCHLORIDE SCH MG: 5 TABLET ORAL at 06:30

## 2017-04-15 RX ADMIN — NYSTATIN SCH ML: 100000 SUSPENSION ORAL at 16:08

## 2017-04-15 RX ADMIN — LOPERAMIDE HYDROCHLORIDE PRN MG: 2 CAPSULE ORAL at 08:45

## 2017-04-15 RX ADMIN — Medication SCH TAB: at 16:09

## 2017-04-15 RX ADMIN — PENTOXIFYLLINE SCH MG: 400 TABLET, FILM COATED, EXTENDED RELEASE ORAL at 06:30

## 2017-04-15 RX ADMIN — CHOLESTYRAMINE SCH GM: 4 POWDER, FOR SUSPENSION ORAL at 16:13

## 2017-04-15 RX ADMIN — RIFAXIMIN SCH MG: 550 TABLET ORAL at 08:46

## 2017-04-15 RX ADMIN — Medication SCH TAB: at 08:46

## 2017-04-15 RX ADMIN — IPRATROPIUM BROMIDE AND ALBUTEROL SULFATE SCH AMPULE: .5; 3 SOLUTION RESPIRATORY (INHALATION) at 09:22

## 2017-04-15 NOTE — HHI.PR
Subjective


Remarks


follow up alcohol hepatitis/cirrhosis/PNA/UTI


04/08/17-patient seen and examined; +some abdominal pain. renal indices 

trending down. Afebrile


04/09/17-patient seen and examined, and he is stable and denies any abdominal 

pain. continue to have runs of diarrhea.


04/10/17-patient seen and examined; Renal indices trending down; Afebrile.  

Alert and oriented 3


04/11/17-patient seen and examined; stable and no acute event overnight.  

Increase by mouth intake


04/12/17-patient seen and examined; some shortness of breath and slightly 

wheezing however afebrile


04/13/17-patient seen and examined;stable and no complaint


04/14/17-patient seen and examined; patient is wondering when can he be 

discharged from here. denies any belly pain


04/15/17-patient seen and examined, denies any abdominal pain, states when he 

is discharged home he would not be drinking.  Afebrile





Objective


Vitals





 Vital Signs








  Date Time  Temp Pulse Resp B/P Pulse Ox O2 Delivery O2 Flow Rate FiO2


 


4/15/17 09:23     95   21


 


4/15/17 04:00 98.1 75 16 118/61 92   


 


4/14/17 23:17 97.7 70 16 122/59 92   


 


4/14/17 20:08 98.1 61 16 110/55 95   


 


4/14/17 20:00      Room Air  


 


4/14/17 16:00 97.3 78 20 132/71 96   


 


4/14/17 12:00 98.1 80 20 126/75 94   








 I/O








 4/14/17 4/14/17 4/14/17 4/15/17 4/15/17 4/15/17





 07:00 15:00 23:00 07:00 15:00 23:00


 


Intake Total 1082 ml 840 ml 960 ml 240 ml  


 


Balance 1082 ml 840 ml 960 ml 240 ml  


 


      


 


Intake Oral 1080 ml 840 ml 960 ml 240 ml  


 


IV Total 2 ml     


 


# Voids 2 5 6 4  


 


# Bowel Movements 1 5 1 4  








Result Diagram:  


4/14/17 0630                                                                   

             4/15/17 0930





Imaging





Last Impressions








Lower Extremity Ultrasound 4/4/17 0000 Signed





Impressions: 





 Service Date/Time:  Tuesday, April 4, 2017 14:04 - CONCLUSION:  Normal 





 examination.       Jose Khan MD 


 


Abdomen Ultrasound 4/4/17 0000 Signed





Impressions: 





 Service Date/Time:  Tuesday, April 4, 2017 14:00 - CONCLUSION:  The exam 





 demonstrates small pockets of ascites within the midline of the pelvis. There 

is 





 a small amount of ascites around the liver and spleen. The liver appears 





 cirrhotic.     aVldemar Del Angel MD 


 


Chest X-Ray 4/2/17 0000 Signed





Impressions: 





 Service Date/Time:  Sunday, April 2, 2017 15:31 - CONCLUSION:  1. Slight 





 increase in basilar airspace consolidation since March 27.     Ralph Barrett MD 


 


Hepatobiliary Scan Nuclear Medicine 3/28/17 0000 Signed





Impressions: 





 Service Date/Time:  Tuesday, March 28, 2017 14:36 - CONCLUSION:  Markedly 





 delayed uptake in the liver. Findings suggest diffuse hepatocellular disease 





 versus biliary obstruction. 24 hour delayed scan could be performed.     

Estevan Longoria MD ADDENDUM:  24 hour scans still shows no activity in bile ducts or 





 small bowel suggesting diffuse hepatocellular disease.    Jona Del Angel MD  





 FACR


 


Abdomen/Pelvis CT 3/27/17 0000 Signed





Impressions: 





 Service Date/Time:  Monday, March 27, 2017 16:50 - CONCLUSION: Findings 

suggest 





 advanced hepatocellular disease with varicosities, prominent spleen and 





 interestingly only a small amount of ascites.  Most of the ascites is in the 





 pelvis.     Jona Del Angel MD  FACR








Objective Remarks


GENERAL: NAD


SKIN: +jaundiced


HEAD: Normocephalic.


EYES: No scleral icterus. No injection or drainage. 


NECK: Supple, trachea midline. No JVD or lymphadenopathy.


CARDIOVASCULAR: Regular rate and rhythm without murmurs, gallops, or rubs. 


RESPIRATORY: Breath sounds equal bilaterally. No accessory muscle use.


GASTROINTESTINAL: Abdomen hard, mildly tender, distended. +HSM


MUSCULOSKELETAL: No cyanosis, or edema. 


BACK: Nontender without obvious deformity. No CVA tenderness.





Procedures


Sigmoidoscopy





A/P


Problem List:  


(1) Acute hepatitis


ICD Code:  B17.9


Status:  Acute


(2) Acute renal failure


ICD Code:  N17.9


Status:  Acute


(3) Cirrhosis with alcoholism


ICD Code:  K70.30


Status:  Acute


(4) Hepatorenal syndrome


ICD Code:  K76.7


Status:  Acute


(5) Acidosis


ICD Code:  E87.2


Status:  Resolved


(6) Jaundice


ICD Code:  R17


Status:  Acute


(7) Alcoholic hepatitis


ICD Code:  K70.10


Status:  Chronic


(8) Hepatic encephalopathy


ICD Code:  K72.90


Status:  Resolved


Assessment and Plan


26-year-old male with history of hepatitis and now with hepatorenal





Severe alcoholic hepatitis complicated by varices, ascites and coagulopathy. 

Cirrhosis on imaging


-CT scan show hepatocellular disease with varices.


-Hepatitis panel reviewed from last admission which was all negative.


-Liver ultrasound shows possible acute cholecystitis. HIDA scan negative. 


-GI consulted.  Continue with prednisone 40 mg daily


-Unremarkable DARRYL, AMA, ASM.  Negative hemochromatosis panel. 


-Continue with supportive care, Trental, prednisone and rifaximin.  Patient is 

not a candidate for liver transplant due to recent alcohol use.





Coagulopathy


-Secondary to liver disease.


-See treatment as above.





Leukocytosis:  likely secondary to prednisone, continue to monitor





Acute renal failure-likely secondary to hepatorenal syndrome


-Nephrologist following. Renal indices improving and continue with midodrine by 

mouth as well as octreotide subcutaneously which should be discontinued today 

and s/p IV albumin  





Diarrhea with C. difficile negative 


- Continue Questran, probiotics and Lomotil as well as IV hydration.  Status 

post sigmoidoscopy pathology negative .


-Improving





PNA/UTI: s/p Zosyn and completed Ceftin 04/14/17 by ID. 





Anxiety


-on Ativan when necessary.





Urticaria-Resolved


-continue Vistaril.





DVT prophylaxis: B-SCD








Manish Azevedo MD Apr 15, 2017 10:59

## 2017-04-15 NOTE — HHI.DS
__________________________________________________





Discharge Summary


Admission Date


Mar 27, 2017 at 17:45


Discharge Date:  Apr 15, 2017


Admitting Diagnosis


cirrhosis





(1) Acute hepatitis


ICD Code:  B17.9


(2) Acute renal failure


ICD Code:  N17.9


(3) Cirrhosis with alcoholism


ICD Code:  K70.30


(4) Hepatorenal syndrome


ICD Code:  K76.7


(5) Acidosis


ICD Code:  E87.2


(6) Jaundice


ICD Code:  R17


(7) Alcoholic hepatitis


ICD Code:  K70.10


(8) Hepatic encephalopathy


ICD Code:  K72.90


Procedures


Sigmoidoscopy


Brief History - From Admission


This is a 25 y/o M with hx of alcoholism and tobacco dependence who was 

admitted in [facility] a few weeks ago for alcoholic hepatitis.  Patient left 

[facility] due to the care that was given there and try to manage himself.  

Patient stated upon discharge he was taking his Lasix and potassium as 

directed.  Patient stated that abdominal pain did not worsen but he did felt 

nauseated.  Denies any vomiting.  Patient stated his urine was a [employer] 

color.  Patient has family history of autoimmune disease.  Denies any family 

history of any type of liver disease or cancer.  Patient states his main 

complaint is just feeling fatigued.





Patient stated that he has not drank any alcohol since he was admitted to [facility].  Per patient's sister she stated that she can confirm this and that he 

was not drinking any alcohol.











Patient sisters at the bedside and she stated that patient tends to downplay 

his symptoms.  Per sister patient looks worse and he is more jaundiced.  He 

stated that he is still making good amount of urine.


CBC/BMP:  


4/14/17 0630                                                                   

             4/15/17 0930





Significant Findings





Laboratory Tests








Test 4/12/17 4/13/17 4/14/17 4/15/17





 17:22 05:47 06:30 09:30


 


Urine Color DARK-BROWN   





 (YELLW/STRAW)   


 


Urine Occult Blood MOD  (NEG)   


 


Urine Bilirubin LARGE  (NEG)   


 


Urine RBC 23 /hpf (0-3)   


 


Urine Bacteria RARE /hpf   





 (NONE)   


 


White Blood Count  27.0 TH/MM3 24.6 TH/MM3 





  (4.0-11.0) (4.0-11.0) 


 


Red Blood Count  2.16 MIL/MM3 2.17 MIL/MM3 





  (4.50-5.90) (4.50-5.90) 


 


Hemoglobin  7.8 GM/DL 8.0 GM/DL 





  (13.0-17.0) (13.0-17.0) 


 


Hematocrit  22.5 % 22.5 % 





  (39.0-51.0) (39.0-51.0) 


 


Mean Corpuscular Volume  104.5 .6 FL 





  (80.0-100.0) (80.0-100.0) 


 


Mean Corpuscular Hemoglobin  36.0 PG 36.9 PG 





  (27.0-34.0) (27.0-34.0) 


 


Platelet Count  134 TH/MM3 145 TH/MM3 





  (150-450) (150-450) 


 


Neutrophils (%) (Auto)  81.5 % 87.3 % 





  (16.0-70.0) (16.0-70.0) 


 


Lymphocytes (%) (Auto)  4.5 % 6.0 % 





  (9.0-44.0) (9.0-44.0) 


 


Monocytes (%) (Auto)  13.6 %  





  (0.0-8.0)  


 


Neutrophils # (Auto)  22.0 TH/MM3 21.5 TH/MM3 





  (1.8-7.7) (1.8-7.7) 


 


Monocytes # (Auto)  3.7 TH/MM3 1.6 TH/MM3 





  (0-0.9) (0-0.9) 


 


Neutrophils % (Manual)  88 % (16-70)  


 


Lymphocytes %  3 % (9-44)  


 


Neutrophils # (Manual)  24.8 TH/MM3  





  (1.8-7.7)  


 


Platelet Estimate  LOW  (NORMAL) LOW  (NORMAL) 


 


Target Cells  1+  (NORMAL) 2+  (NORMAL) 


 


Total Bilirubin  30.3 MG/DL  23.0 MG/DL





  (0.2-1.0)  (0.2-1.0)


 


Direct Bilirubin  26.1 MG/DL  18.5 MG/DL





  (0.0-0.2)  (0.0-0.2)


 


Indirect Bilirubin  4.2 MG/DL  4.5 MG/DL





  (0.0-0.8)  (0.0-0.8)


 


Aspartate Amino Transf  65 U/L (15-37)  95 U/L (15-37)





(AST/SGOT)    


 


Total Protein  5.1 GM/DL  5.3 GM/DL





  (6.4-8.2)  (6.4-8.2)


 


Albumin  2.7 GM/DL 2.7 GM/DL 2.6 GM/DL





  (3.4-5.0) (3.4-5.0) (3.4-5.0)


 


Chloride Level   111 MEQ/L 112 MEQ/L





   () ()


 


Carbon Dioxide Level   20.2 MEQ/L 17.8 MEQ/L





   (21.0-32.0) (21.0-32.0)


 


Blood Urea Nitrogen   35 MG/DL (7-18) 37 MG/DL (7-18)


 


Creatinine   3.15 MG/DL 2.92 MG/DL





   (0.60-1.30) (0.60-1.30)


 


Estimat Glomerular Filtration   24 ML/MIN (>89) 26 ML/MIN (>89)





Rate    


 


Random Glucose    120 MG/DL





    ()


 


Calcium Level    8.2 MG/DL





    (8.5-10.1)








Imaging





Last Impressions








Lower Extremity Ultrasound 4/4/17 0000 Signed





Impressions: 





 Service Date/Time:  Tuesday, April 4, 2017 14:04 - CONCLUSION:  Normal 





 examination.       Jose Khan MD 


 


Abdomen Ultrasound 4/4/17 0000 Signed





Impressions: 





 Service Date/Time:  Tuesday, April 4, 2017 14:00 - CONCLUSION:  The exam 





 demonstrates small pockets of ascites within the midline of the pelvis. There 

is 





 a small amount of ascites around the liver and spleen. The liver appears 





 cirrhotic.     Valdemar Del Angel MD 


 


Chest X-Ray 4/2/17 0000 Signed





Impressions: 





 Service Date/Time:  Sunday, April 2, 2017 15:31 - CONCLUSION:  1. Slight 





 increase in basilar airspace consolidation since March 27.     Ralph Barrett MD 


 


Hepatobiliary Scan Nuclear Medicine 3/28/17 0000 Signed





Impressions: 





 Service Date/Time:  Tuesday, March 28, 2017 14:36 - CONCLUSION:  Markedly 





 delayed uptake in the liver. Findings suggest diffuse hepatocellular disease 





 versus biliary obstruction. 24 hour delayed scan could be performed.     

Estevan Longoria MD ADDENDUM:  24 hour scans still shows no activity in bile ducts or 





 small bowel suggesting diffuse hepatocellular disease.    Jona Del Angel MD  





 FACR


 


Abdomen/Pelvis CT 3/27/17 0000 Signed





Impressions: 





 Service Date/Time:  Monday, March 27, 2017 16:50 - CONCLUSION: Findings 

suggest 





 advanced hepatocellular disease with varicosities, prominent spleen and 





 interestingly only a small amount of ascites.  Most of the ascites is in the 





 pelvis.     Jona Del Angel MD  FACR








PE at Discharge


GENERAL: NAD


SKIN: +jaundiced


HEAD: Normocephalic.


EYES: No scleral icterus. No injection or drainage. 


NECK: Supple, trachea midline. No JVD or lymphadenopathy.


CARDIOVASCULAR: Regular rate and rhythm without murmurs, gallops, or rubs. 


RESPIRATORY: Breath sounds equal bilaterally. No accessory muscle use.


GASTROINTESTINAL: Abdomen hard, mildly tender, distended. +HSM


MUSCULOSKELETAL: No cyanosis, or edema. 


BACK: Nontender without obvious deformity. No CVA tenderness.





Hospital Course


Severe alcoholic hepatitis complicated by varices, ascites and coagulopathy. 

Cirrhosis on imaging


-CT scan show hepatocellular disease with varices.


-Hepatitis panel reviewed from last admission which was all negative.


-Liver ultrasound shows possible acute cholecystitis. HIDA scan negative. 


-GI consulted.  Continue with prednisone 40 mg daily


-Unremarkable DARRYL, AMA, ASM.  Negative hemochromatosis panel. 


-Continue with supportive care, Trental, prednisone and rifaximin.  Patient is 

not a candidate for liver transplant due to recent alcohol use.





Coagulopathy


-Secondary to liver disease.


-See treatment as above.





Leukocytosis:  likely secondary to prednisone, continue to monitor





Acute renal failure-likely secondary to hepatorenal syndrome


-Nephrologist following. Renal indices improving and continue with midodrine by 

mouth as well as octreotide subcutaneously which should be discontinued today 

and s/p IV albumin  





Diarrhea with C. difficile negative 


- Continue Questran, probiotics and Lomotil as well as IV hydration.  Status 

post sigmoidoscopy pathology negative .


-Improving





PNA/UTI: s/p Zosyn and completed Ceftin 04/14/17 by ID. 





Anxiety


-on Ativan when necessary.





Urticaria-Resolved


-continue Vistaril.





DVT prophylaxis: B-SCD


Pt Condition on Discharge:  Fair


Discharge Disposition:  Discharge Home


Discharge Time:  > 30 minutes


Discharge Instructions


DIET: Follow Instructions for:  Renal Failure Diet, Liver Disease Diet


Activities you can perform:  Regular-No Restrictions


Follow up Referrals:  


Gastroenterology


Nephrology


PCP Follow-up





New Orders:  


BASIC METABOLIC PROF - 3-5 Days





New Medications:  


Benzonatate (Tessalon Perles) 100 Mg Cap


200 MG PO TID PRN cough #20 CAP


Cholestyramine Light (Cholestyramine Light) 4 Gm/Pkt Powd


4 GM PO Q12H Bowel Management #60 GM


Hydroxyzine Pamoate (Hydroxyzine Pamoate) 50 Mg Cap


50 MG PO Q6H PRN ITCHING #30 CAP


Lactobacillus Acidophilus (Acidophilus/l-Sporogenes) 1 Tab Tab


1 TAB PO TID Bowel Management #90 TAB


Loperamide (Loperamide) 2 Mg Tab


2 MG PO UNSCH PRN DIARRHEA #20 TAB


Midodrine (Midodrine) 5 Mg Tab


7.5 MG PO TID@07,12,17 Blood Pressure Management #90 TAB


Nystatin Liq (Nystatin Liq) 100,000 unit/ml Susp


5 ML SWISH-SWAL QID Immunosuppression #1 ML


Pentoxifylline ER (Pentoxifylline ER) 400 Mg Tab


400 MG PO Q8HR Bowel Management #90 TAB


Prednisone (Prednisone) 20 Mg Tab


40 MG PO DAILY Immunosuppression #30 TAB


Rifaximin (Xifaxan) 550 Mg Tab


550 MG PO BID Immunosuppression #60 TAB











Manish Azevedo MD Apr 15, 2017 11:09

## 2017-09-15 ENCOUNTER — HOSPITAL ENCOUNTER (EMERGENCY)
Dept: HOSPITAL 17 - NEPC | Age: 27
Discharge: HOME | End: 2017-09-15
Payer: COMMERCIAL

## 2017-09-15 VITALS
HEART RATE: 128 BPM | SYSTOLIC BLOOD PRESSURE: 169 MMHG | DIASTOLIC BLOOD PRESSURE: 102 MMHG | TEMPERATURE: 99.5 F | OXYGEN SATURATION: 95 % | RESPIRATION RATE: 20 BRPM

## 2017-09-15 VITALS — HEIGHT: 73 IN | BODY MASS INDEX: 23.52 KG/M2 | WEIGHT: 177.47 LBS

## 2017-09-15 VITALS
OXYGEN SATURATION: 97 % | DIASTOLIC BLOOD PRESSURE: 89 MMHG | SYSTOLIC BLOOD PRESSURE: 149 MMHG | HEART RATE: 96 BPM | RESPIRATION RATE: 18 BRPM

## 2017-09-15 VITALS — OXYGEN SATURATION: 98 % | RESPIRATION RATE: 18 BRPM

## 2017-09-15 DIAGNOSIS — R20.0: ICD-10-CM

## 2017-09-15 DIAGNOSIS — R94.31: ICD-10-CM

## 2017-09-15 DIAGNOSIS — R41.0: ICD-10-CM

## 2017-09-15 DIAGNOSIS — R51: ICD-10-CM

## 2017-09-15 DIAGNOSIS — K70.30: Primary | ICD-10-CM

## 2017-09-15 DIAGNOSIS — J02.9: ICD-10-CM

## 2017-09-15 LAB
ALP SERPL-CCNC: 283 U/L (ref 45–117)
ALT SERPL-CCNC: 101 U/L (ref 12–78)
ANION GAP SERPL CALC-SCNC: 12 MEQ/L (ref 5–15)
APTT BLD: 28.1 SEC (ref 24.3–30.1)
AST SERPL-CCNC: 282 U/L (ref 15–37)
BASOPHILS # BLD AUTO: 0.1 TH/MM3 (ref 0–0.2)
BASOPHILS NFR BLD: 1.2 % (ref 0–2)
BILIRUB SERPL-MCNC: 2.5 MG/DL (ref 0.2–1)
BUN SERPL-MCNC: 7 MG/DL (ref 7–18)
CHLORIDE SERPL-SCNC: 105 MEQ/L (ref 98–107)
CK SERPL-CCNC: 65 U/L (ref 39–308)
EOSINOPHIL # BLD: 0 TH/MM3 (ref 0–0.4)
EOSINOPHIL NFR BLD: 0.1 % (ref 0–4)
ERYTHROCYTE [DISTWIDTH] IN BLOOD BY AUTOMATED COUNT: 14.3 % (ref 11.6–17.2)
GFR SERPLBLD BASED ON 1.73 SQ M-ARVRAT: 74 ML/MIN (ref 89–?)
HCO3 BLD-SCNC: 25.9 MEQ/L (ref 21–32)
HCT VFR BLD CALC: 37 % (ref 39–51)
HEMO FLAGS: (no result)
INR PPP: 1.1 RATIO
LYMPHOCYTES # BLD AUTO: 0.9 TH/MM3 (ref 1–4.8)
LYMPHOCYTES NFR BLD AUTO: 8.6 % (ref 9–44)
MCH RBC QN AUTO: 33.9 PG (ref 27–34)
MCHC RBC AUTO-ENTMCNC: 34.7 % (ref 32–36)
MCV RBC AUTO: 97.7 FL (ref 80–100)
MONOCYTES NFR BLD: 7.3 % (ref 0–8)
NEUTROPHILS # BLD AUTO: 8.6 TH/MM3 (ref 1.8–7.7)
NEUTROPHILS NFR BLD AUTO: 82.8 % (ref 16–70)
PLATELET # BLD: 131 TH/MM3 (ref 150–450)
POTASSIUM SERPL-SCNC: 2.9 MEQ/L (ref 3.5–5.1)
PROTHROMBIN TIME: 12.1 SEC (ref 9.8–11.6)
RBC # BLD AUTO: 3.79 MIL/MM3 (ref 4.5–5.9)
SODIUM SERPL-SCNC: 143 MEQ/L (ref 136–145)
WBC # BLD AUTO: 10.4 TH/MM3 (ref 4–11)

## 2017-09-15 PROCEDURE — 71010: CPT

## 2017-09-15 PROCEDURE — 87880 STREP A ASSAY W/OPTIC: CPT

## 2017-09-15 PROCEDURE — 87040 BLOOD CULTURE FOR BACTERIA: CPT

## 2017-09-15 PROCEDURE — 87804 INFLUENZA ASSAY W/OPTIC: CPT

## 2017-09-15 PROCEDURE — 85610 PROTHROMBIN TIME: CPT

## 2017-09-15 PROCEDURE — 96374 THER/PROPH/DIAG INJ IV PUSH: CPT

## 2017-09-15 PROCEDURE — 87081 CULTURE SCREEN ONLY: CPT

## 2017-09-15 PROCEDURE — 83605 ASSAY OF LACTIC ACID: CPT

## 2017-09-15 PROCEDURE — 93005 ELECTROCARDIOGRAM TRACING: CPT

## 2017-09-15 PROCEDURE — 85025 COMPLETE CBC W/AUTO DIFF WBC: CPT

## 2017-09-15 PROCEDURE — 82140 ASSAY OF AMMONIA: CPT

## 2017-09-15 PROCEDURE — 80053 COMPREHEN METABOLIC PANEL: CPT

## 2017-09-15 PROCEDURE — 99285 EMERGENCY DEPT VISIT HI MDM: CPT

## 2017-09-15 PROCEDURE — 96361 HYDRATE IV INFUSION ADD-ON: CPT

## 2017-09-15 PROCEDURE — 85730 THROMBOPLASTIN TIME PARTIAL: CPT

## 2017-09-15 PROCEDURE — 82550 ASSAY OF CK (CPK): CPT

## 2017-09-15 NOTE — RADRPT
EXAM DATE/TIME:  09/15/2017 18:54 

 

[facility] COMPARISON:     

CHEST SINGLE AP, April 02, 2017, 15:31.

 

                     

INDICATIONS :     

Fever. Patient also states they have had numbness and tingling in all extremeties and some confusion.
 

                     

 

MEDICAL HISTORY :            

Cirrhosis. Gastroesophageal reflux disease.   

 

SURGICAL HISTORY :     

None.   

 

ENCOUNTER:     

Initial                                        

 

ACUITY:     

1 day      

 

PAIN SCORE:     

0/10

 

LOCATION:      

chest 

 

FINDINGS:     

A single view of the chest demonstrates the lungs to be symmetrically aerated without evidence of mas
s, infiltrate or effusion.  The cardiomediastinal contours are unremarkable.  Osseous structures are 
intact.

 

CONCLUSION:     

No evidence of acute cardiopulmonary disease.

 

 

 

 Jose Irvin MD on September 15, 2017 at 19:01           

Board Certified Radiologist.

 This report was verified electronically.

## 2017-09-15 NOTE — EKG
Date Performed: 09/15/2017       Time Performed: 18:44:40

 

PTAGE:      27 years

 

EKG:      Sinus rhythm 

 

 MODERATE INTRAVENTRICULAR CONDUCTION DELAY BORDERLINE ECG 

 

NO PREVIOUS TRACING            

 

DOCTOR:   Kai Valladares  Interpretating Date/Time  09/15/2017 20:49:23

## 2018-01-13 ENCOUNTER — HOSPITAL ENCOUNTER (INPATIENT)
Dept: HOSPITAL 17 - NEPC | Age: 28
LOS: 7 days | Discharge: HOME | DRG: 871 | End: 2018-01-20
Attending: FAMILY MEDICINE | Admitting: FAMILY MEDICINE
Payer: COMMERCIAL

## 2018-01-13 VITALS
OXYGEN SATURATION: 98 % | TEMPERATURE: 100.8 F | RESPIRATION RATE: 18 BRPM | SYSTOLIC BLOOD PRESSURE: 120 MMHG | DIASTOLIC BLOOD PRESSURE: 55 MMHG | HEART RATE: 106 BPM

## 2018-01-13 VITALS
SYSTOLIC BLOOD PRESSURE: 110 MMHG | OXYGEN SATURATION: 97 % | HEART RATE: 104 BPM | DIASTOLIC BLOOD PRESSURE: 52 MMHG | RESPIRATION RATE: 16 BRPM

## 2018-01-13 VITALS
TEMPERATURE: 98.8 F | DIASTOLIC BLOOD PRESSURE: 58 MMHG | RESPIRATION RATE: 14 BRPM | SYSTOLIC BLOOD PRESSURE: 118 MMHG | HEART RATE: 164 BPM | OXYGEN SATURATION: 99 %

## 2018-01-13 VITALS — HEART RATE: 105 BPM

## 2018-01-13 VITALS — WEIGHT: 194.45 LBS | HEIGHT: 72 IN | BODY MASS INDEX: 26.34 KG/M2

## 2018-01-13 DIAGNOSIS — I10: ICD-10-CM

## 2018-01-13 DIAGNOSIS — K42.9: ICD-10-CM

## 2018-01-13 DIAGNOSIS — R00.0: ICD-10-CM

## 2018-01-13 DIAGNOSIS — K21.9: ICD-10-CM

## 2018-01-13 DIAGNOSIS — K70.40: ICD-10-CM

## 2018-01-13 DIAGNOSIS — Z87.820: ICD-10-CM

## 2018-01-13 DIAGNOSIS — E86.0: ICD-10-CM

## 2018-01-13 DIAGNOSIS — K76.7: ICD-10-CM

## 2018-01-13 DIAGNOSIS — Z79.52: ICD-10-CM

## 2018-01-13 DIAGNOSIS — F32.9: ICD-10-CM

## 2018-01-13 DIAGNOSIS — R49.0: ICD-10-CM

## 2018-01-13 DIAGNOSIS — I85.00: ICD-10-CM

## 2018-01-13 DIAGNOSIS — K76.6: ICD-10-CM

## 2018-01-13 DIAGNOSIS — K70.31: ICD-10-CM

## 2018-01-13 DIAGNOSIS — K70.11: ICD-10-CM

## 2018-01-13 DIAGNOSIS — A41.9: Primary | ICD-10-CM

## 2018-01-13 DIAGNOSIS — R65.20: ICD-10-CM

## 2018-01-13 DIAGNOSIS — E87.6: ICD-10-CM

## 2018-01-13 DIAGNOSIS — J02.9: ICD-10-CM

## 2018-01-13 DIAGNOSIS — J18.9: ICD-10-CM

## 2018-01-13 DIAGNOSIS — Z87.891: ICD-10-CM

## 2018-01-13 DIAGNOSIS — D53.9: ICD-10-CM

## 2018-01-13 DIAGNOSIS — D63.8: ICD-10-CM

## 2018-01-13 DIAGNOSIS — J40: ICD-10-CM

## 2018-01-13 DIAGNOSIS — N17.9: ICD-10-CM

## 2018-01-13 DIAGNOSIS — I95.9: ICD-10-CM

## 2018-01-13 DIAGNOSIS — R01.1: ICD-10-CM

## 2018-01-13 DIAGNOSIS — F10.20: ICD-10-CM

## 2018-01-13 DIAGNOSIS — E87.1: ICD-10-CM

## 2018-01-13 DIAGNOSIS — R04.0: ICD-10-CM

## 2018-01-13 DIAGNOSIS — F90.9: ICD-10-CM

## 2018-01-13 DIAGNOSIS — R16.1: ICD-10-CM

## 2018-01-13 LAB
ALBUMIN SERPL-MCNC: 2.4 GM/DL (ref 3.4–5)
ALP SERPL-CCNC: 153 U/L (ref 45–117)
ALT SERPL-CCNC: 58 U/L (ref 12–78)
AST SERPL-CCNC: 178 U/L (ref 15–37)
BACTERIA #/AREA URNS HPF: (no result) /HPF
BASOPHILS # BLD AUTO: 0 TH/MM3 (ref 0–0.2)
BASOPHILS NFR BLD: 0.1 % (ref 0–2)
BILIRUB INDIRECT SERPL-MCNC: 11.4 MG/DL (ref 0–0.8)
BILIRUB SERPL-MCNC: 26.6 MG/DL (ref 0.2–1)
BUN SERPL-MCNC: 14 MG/DL (ref 7–18)
CALCIUM SERPL-MCNC: 8.4 MG/DL (ref 8.5–10.1)
CHLORIDE SERPL-SCNC: 92 MEQ/L (ref 98–107)
COLOR UR: (no result)
CREAT SERPL-MCNC: 1.82 MG/DL (ref 0.6–1.3)
DIRECT BILIRUBIN ADULT: 15.2 MG/DL (ref 0–0.2)
EOSINOPHIL # BLD: 0 TH/MM3 (ref 0–0.4)
EOSINOPHIL NFR BLD: 0.1 % (ref 0–4)
ERYTHROCYTE [DISTWIDTH] IN BLOOD BY AUTOMATED COUNT: 17.3 % (ref 11.6–17.2)
GFR SERPLBLD BASED ON 1.73 SQ M-ARVRAT: 45 ML/MIN (ref 89–?)
GLUCOSE SERPL-MCNC: 88 MG/DL (ref 74–106)
GLUCOSE UR STRIP-MCNC: (no result) MG/DL
HCO3 BLD-SCNC: 23.1 MEQ/L (ref 21–32)
HCT VFR BLD CALC: 29.5 % (ref 39–51)
HGB BLD-MCNC: 10.7 GM/DL (ref 13–17)
HGB UR QL STRIP: (no result)
INR PPP: 2.3 RATIO
KETONES UR STRIP-MCNC: (no result) MG/DL
LIPASE: 646 U/L (ref 73–393)
LYMPHOCYTES # BLD AUTO: 8.2 TH/MM3 (ref 1–4.8)
LYMPHOCYTES NFR BLD AUTO: 74.3 % (ref 9–44)
LYMPHOCYTES: 4 % (ref 9–44)
MCH RBC QN AUTO: 37.5 PG (ref 27–34)
MCHC RBC AUTO-ENTMCNC: 36.3 % (ref 32–36)
MCV RBC AUTO: 103.4 FL (ref 80–100)
MONOCYTE #: 0.3 TH/MM3 (ref 0–0.9)
MONOCYTES NFR BLD: 2.5 % (ref 0–8)
MONOCYTES: 1 % (ref 0–8)
MUCOUS THREADS #/AREA URNS LPF: (no result) /LPF
NEUTROPHILS # BLD AUTO: 2.5 TH/MM3 (ref 1.8–7.7)
NEUTROPHILS NFR BLD AUTO: 23 % (ref 16–70)
NEUTS BAND # BLD MANUAL: 10.5 TH/MM3 (ref 1.8–7.7)
NEUTS SEG NFR BLD MANUAL: 95 % (ref 16–70)
NITRITE UR QL STRIP: (no result)
PLATELET # BLD: 179 TH/MM3 (ref 150–450)
PMV BLD AUTO: 11 FL (ref 7–11)
PROT SERPL-MCNC: 6.4 GM/DL (ref 6.4–8.2)
PROTHROMBIN TIME: 23.2 SEC (ref 9.8–11.6)
RBC # BLD AUTO: 2.86 MIL/MM3 (ref 4.5–5.9)
SODIUM SERPL-SCNC: 127 MEQ/L (ref 136–145)
SP GR UR STRIP: 1.02 (ref 1–1.03)
URINE LEUKOCYTE ESTERASE: (no result)
WBC # BLD AUTO: 11 TH/MM3 (ref 4–11)

## 2018-01-13 PROCEDURE — 85007 BL SMEAR W/DIFF WBC COUNT: CPT

## 2018-01-13 PROCEDURE — 81001 URINALYSIS AUTO W/SCOPE: CPT

## 2018-01-13 PROCEDURE — 87641 MR-STAPH DNA AMP PROBE: CPT

## 2018-01-13 PROCEDURE — 36430 TRANSFUSION BLD/BLD COMPNT: CPT

## 2018-01-13 PROCEDURE — 71045 X-RAY EXAM CHEST 1 VIEW: CPT

## 2018-01-13 PROCEDURE — 96361 HYDRATE IV INFUSION ADD-ON: CPT

## 2018-01-13 PROCEDURE — 83605 ASSAY OF LACTIC ACID: CPT

## 2018-01-13 PROCEDURE — 86927 PLASMA FRESH FROZEN: CPT

## 2018-01-13 PROCEDURE — 82607 VITAMIN B-12: CPT

## 2018-01-13 PROCEDURE — 82746 ASSAY OF FOLIC ACID SERUM: CPT

## 2018-01-13 PROCEDURE — 80053 COMPREHEN METABOLIC PANEL: CPT

## 2018-01-13 PROCEDURE — 85610 PROTHROMBIN TIME: CPT

## 2018-01-13 PROCEDURE — 83690 ASSAY OF LIPASE: CPT

## 2018-01-13 PROCEDURE — 82272 OCCULT BLD FECES 1-3 TESTS: CPT

## 2018-01-13 PROCEDURE — 82140 ASSAY OF AMMONIA: CPT

## 2018-01-13 PROCEDURE — 70450 CT HEAD/BRAIN W/O DYE: CPT

## 2018-01-13 PROCEDURE — 80048 BASIC METABOLIC PNL TOTAL CA: CPT

## 2018-01-13 PROCEDURE — 85027 COMPLETE CBC AUTOMATED: CPT

## 2018-01-13 PROCEDURE — P9017 PLASMA 1 DONOR FRZ W/IN 8 HR: HCPCS

## 2018-01-13 PROCEDURE — 80307 DRUG TEST PRSMV CHEM ANLYZR: CPT

## 2018-01-13 PROCEDURE — 85025 COMPLETE CBC W/AUTO DIFF WBC: CPT

## 2018-01-13 PROCEDURE — 96365 THER/PROPH/DIAG IV INF INIT: CPT

## 2018-01-13 PROCEDURE — 94664 DEMO&/EVAL PT USE INHALER: CPT

## 2018-01-13 PROCEDURE — 96375 TX/PRO/DX INJ NEW DRUG ADDON: CPT

## 2018-01-13 PROCEDURE — 94640 AIRWAY INHALATION TREATMENT: CPT

## 2018-01-13 PROCEDURE — 84100 ASSAY OF PHOSPHORUS: CPT

## 2018-01-13 PROCEDURE — 87040 BLOOD CULTURE FOR BACTERIA: CPT

## 2018-01-13 PROCEDURE — 94150 VITAL CAPACITY TEST: CPT

## 2018-01-13 PROCEDURE — 74177 CT ABD & PELVIS W/CONTRAST: CPT

## 2018-01-13 PROCEDURE — 87086 URINE CULTURE/COLONY COUNT: CPT

## 2018-01-13 PROCEDURE — 85730 THROMBOPLASTIN TIME PARTIAL: CPT

## 2018-01-13 PROCEDURE — 80076 HEPATIC FUNCTION PANEL: CPT

## 2018-01-13 RX ADMIN — Medication PRN ML: at 16:38
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
## 2018-01-13 NOTE — RADRPT
EXAM DATE/TIME:  01/13/2018 21:34 

 

[facility] COMPARISON:     

CHEST SINGLE AP, September 15, 2017, 18:54.

 

                     

INDICATIONS :     

Cough and fever.

                     

 

MEDICAL HISTORY :            

Cirrhosis. Gastroesophageal reflux disease.   

 

SURGICAL HISTORY :     

None.   

 

ENCOUNTER:     

Initial                                        

 

ACUITY:     

3 days      

 

PAIN SCORE:     

0/10

 

LOCATION:     

Bilateral chest 

 

FINDINGS:     

A single view of the chest demonstrates the lungs to be symmetrically aerated without evidence of mas
s, infiltrate or effusion.  The cardiomediastinal contours are unremarkable.  Osseous structures are 
intact.

 

CONCLUSION:     

No evidence of acute cardiopulmonary disease.

 

 

 

 Jose Irvin MD on January 13, 2018 at 21:46           

Board Certified Radiologist.

 This report was verified electronically.

## 2018-01-13 NOTE — RADRPT
EXAM DATE/TIME:  01/13/2018 19:24 

 

[facility] COMPARISON:     

CT ABDOMEN & PELVIS W CONTRAST, March 11, 2017, 14:41.

 

 

INDICATIONS :     

Abdomen pain with worsening of jaundice.

                      

 

IV CONTRAST:     

97 cc Omnipaque 350 (iohexol) IV 

 

 

ORAL CONTRAST:      

No oral contrast ingested.

                      

 

RADIATION DOSE:     

14.63 CTDIvol (mGy) 

 

 

MEDICAL HISTORY :     

Cirrhosis. Renal insufficiency. 

 

SURGICAL HISTORY :      

None. 

 

ENCOUNTER:      

Initial

 

ACUITY:      

4 - 6 days

 

PAIN SCALE:      

6/10

 

LOCATION:       

Bilateral  abdomen

 

TECHNIQUE:     

Volumetric scanning of the abdomen and pelvis was performed.  Using automated exposure control and ad
justment of the mA and/or kV according to patient size, radiation dose was kept as low as reasonably 
achievable to obtain optimal diagnostic quality images.  DICOM format image data is available electro
nically for review and comparison.  

 

FINDINGS:     

Markedly enlarged liver again noted. The fatty infiltration seen previously has decreased. There is e
vidence of portal hypertension with marked splenomegaly (19.2 cm craniocaudal and larger than before)
 prominent caliber main portal vein at approximately 16 mm and numerous portosystemic collaterals inc
luding gastroesophageal varices and a markedly distended recanalized umbilical vein. The distention o
f the umbilical vein is worse in the interim. Only trace ascites.

 

Gallbladder wall thickening and mucosal enhancement noted, most likely secondary. No obstruction or f
ocal inflammatory changes are seen of the gastrointestinal tract.

 

CONCLUSION:     

Massive hepatomegaly. Worsening splenomegaly and portosystemic collaterals consistent with worsening 
portal hypertension. Only trace ascites.

 

 

 

 Jose Irvin MD on January 13, 2018 at 19:39           

Board Certified Radiologist.

 This report was verified electronically.

## 2018-01-14 VITALS
DIASTOLIC BLOOD PRESSURE: 56 MMHG | HEART RATE: 101 BPM | SYSTOLIC BLOOD PRESSURE: 118 MMHG | OXYGEN SATURATION: 92 % | RESPIRATION RATE: 26 BRPM

## 2018-01-14 VITALS
SYSTOLIC BLOOD PRESSURE: 114 MMHG | DIASTOLIC BLOOD PRESSURE: 56 MMHG | RESPIRATION RATE: 20 BRPM | HEART RATE: 85 BPM | OXYGEN SATURATION: 92 %

## 2018-01-14 VITALS
HEART RATE: 88 BPM | DIASTOLIC BLOOD PRESSURE: 55 MMHG | SYSTOLIC BLOOD PRESSURE: 113 MMHG | RESPIRATION RATE: 17 BRPM | OXYGEN SATURATION: 95 %

## 2018-01-14 VITALS
SYSTOLIC BLOOD PRESSURE: 111 MMHG | RESPIRATION RATE: 24 BRPM | OXYGEN SATURATION: 93 % | DIASTOLIC BLOOD PRESSURE: 55 MMHG | HEART RATE: 88 BPM

## 2018-01-14 VITALS
TEMPERATURE: 99.9 F | OXYGEN SATURATION: 91 % | SYSTOLIC BLOOD PRESSURE: 113 MMHG | DIASTOLIC BLOOD PRESSURE: 53 MMHG | HEART RATE: 92 BPM | RESPIRATION RATE: 20 BRPM

## 2018-01-14 VITALS
DIASTOLIC BLOOD PRESSURE: 56 MMHG | SYSTOLIC BLOOD PRESSURE: 113 MMHG | OXYGEN SATURATION: 91 % | RESPIRATION RATE: 32 BRPM | HEART RATE: 101 BPM

## 2018-01-14 VITALS
OXYGEN SATURATION: 91 % | SYSTOLIC BLOOD PRESSURE: 113 MMHG | RESPIRATION RATE: 23 BRPM | HEART RATE: 96 BPM | DIASTOLIC BLOOD PRESSURE: 53 MMHG

## 2018-01-14 VITALS
RESPIRATION RATE: 18 BRPM | HEART RATE: 105 BPM | TEMPERATURE: 101.1 F | DIASTOLIC BLOOD PRESSURE: 51 MMHG | SYSTOLIC BLOOD PRESSURE: 106 MMHG | OXYGEN SATURATION: 97 %

## 2018-01-14 VITALS
SYSTOLIC BLOOD PRESSURE: 97 MMHG | OXYGEN SATURATION: 93 % | TEMPERATURE: 98.6 F | DIASTOLIC BLOOD PRESSURE: 55 MMHG | RESPIRATION RATE: 23 BRPM | HEART RATE: 96 BPM

## 2018-01-14 VITALS
DIASTOLIC BLOOD PRESSURE: 56 MMHG | SYSTOLIC BLOOD PRESSURE: 115 MMHG | RESPIRATION RATE: 20 BRPM | TEMPERATURE: 100.5 F | HEART RATE: 91 BPM | OXYGEN SATURATION: 96 %

## 2018-01-14 VITALS
HEART RATE: 104 BPM | TEMPERATURE: 103.1 F | RESPIRATION RATE: 30 BRPM | OXYGEN SATURATION: 93 % | SYSTOLIC BLOOD PRESSURE: 119 MMHG | DIASTOLIC BLOOD PRESSURE: 55 MMHG

## 2018-01-14 VITALS
SYSTOLIC BLOOD PRESSURE: 92 MMHG | RESPIRATION RATE: 18 BRPM | OXYGEN SATURATION: 98 % | DIASTOLIC BLOOD PRESSURE: 45 MMHG | TEMPERATURE: 101.5 F | HEART RATE: 107 BPM

## 2018-01-14 VITALS
OXYGEN SATURATION: 94 % | DIASTOLIC BLOOD PRESSURE: 58 MMHG | SYSTOLIC BLOOD PRESSURE: 118 MMHG | HEART RATE: 104 BPM | RESPIRATION RATE: 30 BRPM

## 2018-01-14 VITALS
SYSTOLIC BLOOD PRESSURE: 114 MMHG | DIASTOLIC BLOOD PRESSURE: 57 MMHG | RESPIRATION RATE: 22 BRPM | HEART RATE: 91 BPM | OXYGEN SATURATION: 94 %

## 2018-01-14 VITALS
DIASTOLIC BLOOD PRESSURE: 61 MMHG | HEART RATE: 93 BPM | SYSTOLIC BLOOD PRESSURE: 115 MMHG | OXYGEN SATURATION: 92 % | RESPIRATION RATE: 23 BRPM

## 2018-01-14 VITALS
HEART RATE: 85 BPM | SYSTOLIC BLOOD PRESSURE: 111 MMHG | OXYGEN SATURATION: 92 % | DIASTOLIC BLOOD PRESSURE: 57 MMHG | RESPIRATION RATE: 24 BRPM

## 2018-01-14 VITALS
OXYGEN SATURATION: 94 % | HEART RATE: 89 BPM | SYSTOLIC BLOOD PRESSURE: 113 MMHG | DIASTOLIC BLOOD PRESSURE: 52 MMHG | RESPIRATION RATE: 21 BRPM

## 2018-01-14 VITALS
SYSTOLIC BLOOD PRESSURE: 102 MMHG | HEART RATE: 99 BPM | OXYGEN SATURATION: 96 % | RESPIRATION RATE: 31 BRPM | DIASTOLIC BLOOD PRESSURE: 55 MMHG

## 2018-01-14 VITALS
RESPIRATION RATE: 36 BRPM | HEART RATE: 88 BPM | DIASTOLIC BLOOD PRESSURE: 47 MMHG | OXYGEN SATURATION: 91 % | SYSTOLIC BLOOD PRESSURE: 93 MMHG

## 2018-01-14 VITALS
RESPIRATION RATE: 19 BRPM | TEMPERATURE: 102.3 F | DIASTOLIC BLOOD PRESSURE: 47 MMHG | OXYGEN SATURATION: 96 % | HEART RATE: 110 BPM | SYSTOLIC BLOOD PRESSURE: 99 MMHG

## 2018-01-14 VITALS
OXYGEN SATURATION: 96 % | RESPIRATION RATE: 31 BRPM | SYSTOLIC BLOOD PRESSURE: 96 MMHG | DIASTOLIC BLOOD PRESSURE: 45 MMHG | HEART RATE: 101 BPM | TEMPERATURE: 100.1 F

## 2018-01-14 VITALS
RESPIRATION RATE: 30 BRPM | HEART RATE: 101 BPM | OXYGEN SATURATION: 95 % | SYSTOLIC BLOOD PRESSURE: 109 MMHG | DIASTOLIC BLOOD PRESSURE: 55 MMHG

## 2018-01-14 VITALS
RESPIRATION RATE: 31 BRPM | DIASTOLIC BLOOD PRESSURE: 58 MMHG | HEART RATE: 102 BPM | OXYGEN SATURATION: 91 % | SYSTOLIC BLOOD PRESSURE: 117 MMHG

## 2018-01-14 VITALS — OXYGEN SATURATION: 96 %

## 2018-01-14 VITALS — OXYGEN SATURATION: 92 %

## 2018-01-14 VITALS — HEART RATE: 90 BPM

## 2018-01-14 VITALS — OXYGEN SATURATION: 93 %

## 2018-01-14 VITALS — HEART RATE: 102 BPM

## 2018-01-14 LAB
ACANTHOCYTES BLD QL SMEAR: (no result)
ALBUMIN SERPL-MCNC: 2.1 GM/DL (ref 3.4–5)
ALP SERPL-CCNC: 130 U/L (ref 45–117)
ALT SERPL-CCNC: 45 U/L (ref 12–78)
AST SERPL-CCNC: 105 U/L (ref 15–37)
BASOPHILS # BLD AUTO: 0 TH/MM3 (ref 0–0.2)
BASOPHILS NFR BLD: 0.5 % (ref 0–2)
BILIRUB SERPL-MCNC: 22.9 MG/DL (ref 0.2–1)
BUN SERPL-MCNC: 13 MG/DL (ref 7–18)
CALCIUM SERPL-MCNC: 7.9 MG/DL (ref 8.5–10.1)
CHLORIDE SERPL-SCNC: 94 MEQ/L (ref 98–107)
CREAT SERPL-MCNC: 1.75 MG/DL (ref 0.6–1.3)
EOSINOPHIL # BLD: 0 TH/MM3 (ref 0–0.4)
EOSINOPHIL NFR BLD: 0.8 % (ref 0–4)
ERYTHROCYTE [DISTWIDTH] IN BLOOD BY AUTOMATED COUNT: 17.2 % (ref 11.6–17.2)
FOLATE SERPL-MCNC: 3 NG/ML (ref 3.1–17.5)
GFR SERPLBLD BASED ON 1.73 SQ M-ARVRAT: 47 ML/MIN (ref 89–?)
GLUCOSE SERPL-MCNC: 103 MG/DL (ref 74–106)
HCO3 BLD-SCNC: 21.3 MEQ/L (ref 21–32)
HCT VFR BLD CALC: 25.5 % (ref 39–51)
HGB BLD-MCNC: 9 GM/DL (ref 13–17)
INR PPP: 2.7 RATIO
LIPASE: 977 U/L (ref 73–393)
LYMPHOCYTES # BLD AUTO: 0.4 TH/MM3 (ref 1–4.8)
LYMPHOCYTES NFR BLD AUTO: 6.8 % (ref 9–44)
MCH RBC QN AUTO: 36.8 PG (ref 27–34)
MCHC RBC AUTO-ENTMCNC: 35.4 % (ref 32–36)
MCV RBC AUTO: 104 FL (ref 80–100)
MONOCYTE #: 0.8 TH/MM3 (ref 0–0.9)
MONOCYTES NFR BLD: 12.8 % (ref 0–8)
NEUTROPHILS # BLD AUTO: 4.8 TH/MM3 (ref 1.8–7.7)
NEUTROPHILS NFR BLD AUTO: 79.1 % (ref 16–70)
PLATELET # BLD: 109 TH/MM3 (ref 150–450)
PMV BLD AUTO: 9.3 FL (ref 7–11)
PROT SERPL-MCNC: 5.2 GM/DL (ref 6.4–8.2)
PROTHROMBIN TIME: 27.5 SEC (ref 9.8–11.6)
RBC # BLD AUTO: 2.46 MIL/MM3 (ref 4.5–5.9)
SODIUM SERPL-SCNC: 127 MEQ/L (ref 136–145)
TARGETS BLD QL SMEAR: (no result)
VIT B12 SERPL-MCNC: 1175 PG/ML (ref 193–986)
WBC # BLD AUTO: 6 TH/MM3 (ref 4–11)

## 2018-01-14 PROCEDURE — 30233K1 TRANSFUSION OF NONAUTOLOGOUS FROZEN PLASMA INTO PERIPHERAL VEIN, PERCUTANEOUS APPROACH: ICD-10-PCS | Performed by: FAMILY MEDICINE

## 2018-01-14 RX ADMIN — RIFAXIMIN SCH MG: 550 TABLET ORAL at 09:24

## 2018-01-14 RX ADMIN — POTASSIUM CHLORIDE PRN MLS/HR: 200 INJECTION, SOLUTION INTRAVENOUS at 21:21

## 2018-01-14 RX ADMIN — Medication SCH TAB: at 17:58

## 2018-01-14 RX ADMIN — FAMOTIDINE SCH MG: 20 TABLET, FILM COATED ORAL at 09:24

## 2018-01-14 RX ADMIN — POTASSIUM CHLORIDE PRN MLS/HR: 200 INJECTION, SOLUTION INTRAVENOUS at 15:52

## 2018-01-14 RX ADMIN — Medication SCH TAB: at 13:05

## 2018-01-14 RX ADMIN — POTASSIUM CHLORIDE PRN MLS/HR: 200 INJECTION, SOLUTION INTRAVENOUS at 17:54

## 2018-01-14 RX ADMIN — PHENYTOIN SODIUM SCH MLS/HR: 50 INJECTION INTRAMUSCULAR; INTRAVENOUS at 17:54

## 2018-01-14 RX ADMIN — RIFAXIMIN SCH MG: 550 TABLET ORAL at 21:21

## 2018-01-14 RX ADMIN — POTASSIUM CHLORIDE PRN MLS/HR: 200 INJECTION, SOLUTION INTRAVENOUS at 13:45

## 2018-01-14 RX ADMIN — CEFTRIAXONE SODIUM SCH MLS/HR: 2 INJECTION, POWDER, FOR SOLUTION INTRAMUSCULAR; INTRAVENOUS at 17:58

## 2018-01-14 RX ADMIN — PHENYTOIN SODIUM SCH MLS/HR: 50 INJECTION INTRAMUSCULAR; INTRAVENOUS at 05:17

## 2018-01-14 RX ADMIN — Medication SCH TAB: at 09:24

## 2018-01-14 NOTE — HHI.PR
Addendum to Inpatient Note


Addendum Reason:  Additional Documentation


Additional Information


S:


At 3:28 AM, received a page about patient febrile at 102.3, tachycardia 110, 

blood pressure 99/47. Was informed that Nery was about to be called.


Nurse reported the patient was feeling awkward because he had woken up several 

times to him talking to himself. 


Patient was then seen.





Complained of coughing up blood, then corrected himself and said that he blew 

his nose and blood was in the sputum.





O:


Heart rate tachycardic, normal rhythm


Lungs clear to auscultation


Patient was able to answer questions and had situational awareness


Abdomen with minimal ascites





A/P:


Patient meets SIRS criteria. Source of infection SBP (ALTHOUGH patient does 

have minimal ascites) v pancreatitis v cholecystitis


CXR and lactic acid done on admission wnl


Normal saline bolus of 1 L followed by normal saline IV maintenance fluids at 

75 MLS per hour


Blood cx x2 ordered


Transferred to ICU


If no improvement in blood pressure, will consult intensivist





Tori Daily Dr., MD R1 Jan 14, 2018 04:09
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
## 2018-01-14 NOTE — HHI.PR
Addendum to Inpatient Note


Addendum Reason:  Additional Documentation


Additional Information


Patient evaluated at bedside. Resident paged about fever to 103.1F in midst of 

preparing patient for FFP administration. Patient has no new symptoms but 

complains that his throat hurts. Mother at bedside and expressed concerns 

regarding patient receiving acetaminophen. 





Objective:


Temp 103.1F, pulse 102.


Patient is jaundiced. In no apparent distress, comfortable in bed.


Throat visually inspected and not erythematous.


Breathing comfortably on room air. 


No pain on palpation of abdomen.


No signs of bleeding from nose.





Assessment/Plan:


Patient febrile, admitted to hospital with hx cirrhosis and hepatorenal syndrome

, with acute concerns for SBP. GI consulted and following.


Notable that patient just finished course of steroids prior to admission, which 

can contribute to inflammatory response.


Tylenol 650mg PO x 1 ordered for fever. If patient not tolerating PO due to 

sore throat will switch to IV acetaminophen.


FFP to be administered if fever improves; if fever persists, will hold FFP.


Cultures pending of blood and urine. If fever persists patient likely will need 

diagnostic paracentesis, broadening antibiotics


Continue Rocephin for now, s/p 1 dose yesterday evening.


For sore throat, suspect viral syndrome, but if it is Strep it will be covered 

with Rocephin. Will continue previously ordered Tessalon Perles, Cepachol spray 

for symptoms. Counseled to try ice chips.


Discussed plan with patient, patient's mother at bedside, and nurse Georgia and 

all in agreement.











Corine Fowler MD Jan 14, 2018 13:05

## 2018-01-14 NOTE — PD.CONS
HPI


History of Present Illness


This is a 27 year old M who is known to our service and has been evaluated on 

multiple occasions for alcoholic hepatitis and cirrhosis.  Pt was recently 

followed by Dr. Marley outpatient, but he has been stable and so his care was 

transferred to the PCP to manage.  Pts sister is at bedside and main historian 

due to pt sleeping.  Pt came to the emergency department yesterday with 

complaints of nausea, vomiting, abdominal pain, and dehydration.  Per pt 

abdominal pain is chronic for him.  He has recently ran out of most of his 

medications, including his Xifaxan two and a half weeks ago, his Prednisone 

last Thursday, Xanax, and Paxil.  Per pts sister he began drinking again a few 

weeks ago when his PCP did not refill his Xanax, drank three days in a row and 

has been drinking a few beers intermittently since then.  He has history of 

depression and loses his appetite and begins vomiting when he feels depressed, 

he thinks this is related to the events preceding his admission.  Pt is now 

admitted to the intensive care unit for close observation.  There is no 

evidence of GIB, pt had a BM this morning denies BRB in stool and black, tarry 

stools.  Per pt he never noticed any blood in his vomit or coffee ground 

emesis.  He has had multiple EGDs and colonoscopies and would like to refrain 

from having any GI procedures done unless absolutely necessary.  Pt requesting 

food and per sister he is wanting to leave the hospital.  Pt does have history 

of leaving AMA.  Attending has ordered regular diet, pt has ordered but is 

awaiting tray.  


 (Maria Recinos)





PFSH


Past Medical History


Depression


Alcoholic hepatitis


Cirrhosis


ETOH abuse


GERD


Esophageal varices- without bleeding


Anxiety


Closed head injury


Past Surgical History


Multiple EGDs and Colonoscopies 


 (Maria Recinos)


Coded Allergies:  


     lactulose (Verified  Allergy, Unknown, 1/13/18)


Social History


Heavy ETOH use- drank three days in a row two weeks ago and has been drinking 

beer intermittently since then 


Tobacco- quit 4 months ago


Denies illicit drug use 


 (Maria Recinos)





Review of Systems


Gastrointestinal:  COMPLAINS OF: Abdominal pain, Nausea, Vomiting, Swelling of 

Abdomen, DENIES: Black stools, Bloody stools, Constipation, Diarrhea, 

Difficulty Swallowing, Odynophagia, Heartburn, Hematemesis (Maria Recinos

)





GI Exam


Vitals I&O





Vital Signs








  Date Time  Temp Pulse Resp B/P (MAP) Pulse Ox O2 Delivery O2 Flow Rate FiO2


 


1/14/18 08:14     96   21


 


1/14/18 06:00  102      


 


1/14/18 04:00 101.5 107 18 92/45 (61) 98   


 


1/14/18 03:10 102.3 110 19 99/47 (64) 96   


 


1/14/18 00:17 101.1 105 18 106/51 (69) 97   


 


1/13/18 22:55  105      


 


1/13/18 21:15 100.8 106 18 120/55 (76) 98   


 


1/13/18 19:22  104 16 110/52 (71) 97 Room Air  


 


1/13/18 16:39     (78)    


 


1/13/18 14:45 98.8 104 14 118/58 (78) 99   














I/O      


 


 1/13/18 1/13/18 1/13/18 1/14/18 1/14/18 1/14/18





 07:00 15:00 23:00 07:00 15:00 23:00


 


Intake Total    340 ml  


 


Output Total    350 ml  


 


Balance    -10 ml  


 


      


 


Intake Oral    340 ml  


 


Output Urine Total    350 ml  


 


# Bowel Movements    0  








Imaging





Last Impressions








Abdomen/Pelvis CT 1/13/18 1617 Signed





Impressions: 





 Service Date/Time:  Saturday, January 13, 2018 19:24 - CONCLUSION:  Massive 





 hepatomegaly. Worsening splenomegaly and portosystemic collaterals consistent 





 with worsening portal hypertension. Only trace ascites.     Jose Irvin MD 


 


Chest X-Ray 1/13/18 0000 Signed





Impressions: 





 Service Date/Time:  Saturday, January 13, 2018 21:34 - CONCLUSION:  No 

evidence 





 of acute cardiopulmonary disease.     Jose Irvin MD 








Laboratory











Test


  1/13/18


16:30 1/13/18


18:21 1/13/18


21:35 1/14/18


01:56


 


White Blood Count 11.0 TH/MM3    


 


Red Blood Count 2.86 MIL/MM3    


 


Hemoglobin 10.7 GM/DL    


 


Hematocrit 29.5 %    


 


Mean Corpuscular Volume 103.4 FL    


 


Mean Corpuscular Hemoglobin 37.5 PG    


 


Mean Corpuscular Hemoglobin


Concent 36.3 % 


  


  


  


 


 


Red Cell Distribution Width 17.3 %    


 


Platelet Count 179 TH/MM3    


 


Mean Platelet Volume 11.0 FL    


 


Neutrophils (%) (Auto) 23.0 %    


 


Lymphocytes (%) (Auto) 74.3 %    


 


Monocytes (%) (Auto) 2.5 %    


 


Eosinophils (%) (Auto) 0.1 %    


 


Basophils (%) (Auto) 0.1 %    


 


Neutrophils # (Auto) 2.5 TH/MM3    


 


Lymphocytes # (Auto) 8.2 TH/MM3    


 


Monocytes # (Auto) 0.3 TH/MM3    


 


Eosinophils # (Auto) 0.0 TH/MM3    


 


Basophils # (Auto) 0.0 TH/MM3    


 


CBC Comment AUTO DIFF    


 


Differential Total Cells


Counted 100 


  


  


  


 


 


Neutrophils % (Manual) 95 %    


 


Lymphocytes % 4 %    


 


Monocytes % 1 %    


 


Neutrophils # (Manual) 10.5 TH/MM3    


 


Differential Comment


  FINAL DIFF


MANUAL 


  


  


 


 


Prothrombin Time 23.2 SEC    


 


Prothromb Time International


Ratio 2.3 RATIO 


  


  


  


 


 


Activated Partial


Thromboplast Time 38.3 SEC 


  


  


  


 


 


Blood Urea Nitrogen 14 MG/DL    


 


Creatinine 1.82 MG/DL    


 


Random Glucose 88 MG/DL    


 


Total Protein 6.4 GM/DL    


 


Albumin 2.4 GM/DL    


 


Calcium Level 8.4 MG/DL    


 


Alkaline Phosphatase 153 U/L    


 


Aspartate Amino Transf


(AST/SGOT) 178 U/L 


  


  


  


 


 


Alanine Aminotransferase


(ALT/SGPT) 58 U/L 


  


  


  


 


 


Total Bilirubin 26.6 MG/DL    


 


Direct Bilirubin 15.2 MG/DL    


 


Sodium Level 127 MEQ/L    


 


Potassium Level 5.0 MEQ/L    


 


Chloride Level 92 MEQ/L    


 


Carbon Dioxide Level 23.1 MEQ/L    


 


Anion Gap 12 MEQ/L    


 


Estimat Glomerular Filtration


Rate 45 ML/MIN 


  


  


  


 


 


Lactic Acid Level 1.6 mmol/L    


 


Indirect Bilirubin 11.4 MG/DL    


 


Lipase 646 U/L    


 


Urine Color  DARK-BROWN   


 


Urine Turbidity  HAZY   


 


Urine pH  6.0   


 


Urine Specific Gravity  1.017   


 


Urine Protein  TRACE mg/dL   


 


Urine Glucose (UA)  NEG mg/dL   


 


Urine Ketones  NEG mg/dL   


 


Urine Occult Blood  NEG   


 


Urine Nitrite  NEG   


 


Urine Bilirubin  LARGE   


 


Urine Urobilinogen  2.0 MG/DL   


 


Urine Leukocyte Esterase  NEG   


 


Urine WBC  2 /hpf   


 


Urine Bacteria  OCC /hpf   


 


Urine Mucus  FEW /lpf   


 


Microscopic Urinalysis Comment


  


  CULT NOT


INDICATED 


  


 


 


Urine Opiates Screen   POS  


 


Urine Barbiturates Screen   NEG  


 


Urine Amphetamines Screen   NEG  


 


Urine Benzodiazepines Screen   POS  


 


Urine Cocaine Screen   NEG  


 


Urine Cannabinoids Screen   NEG  


 


Ammonia    64 MCMOL/L 


 


Test


  1/14/18


04:45 1/14/18


08:40 


  


 


 


Nasal Screen MRSA (PCR)


  MRSA NOT


DETECTED 


  


  


 


 


Prothrombin Time  27.5 SEC   


 


Prothromb Time International


Ratio 


  2.7 RATIO 


  


  


 


 


Activated Partial


Thromboplast Time 


  49.5 SEC 


  


  


 


 


Blood Urea Nitrogen  13 MG/DL   


 


Creatinine  1.75 MG/DL   


 


Random Glucose  103 MG/DL   


 


Albumin  2.1 GM/DL   


 


Calcium Level  7.9 MG/DL   


 


Aspartate Amino Transf


(AST/SGOT) 


  105 U/L 


  


  


 


 


Alanine Aminotransferase


(ALT/SGPT) 


  45 U/L 


  


  


 


 


Sodium Level  127 MEQ/L   


 


Potassium Level  3.1 MEQ/L   


 


Chloride Level  94 MEQ/L   


 


Carbon Dioxide Level  21.3 MEQ/L   


 


Anion Gap  12 MEQ/L   


 


Estimat Glomerular Filtration


Rate 


  47 ML/MIN 


  


  


 


 


Lactic Acid Level  2.7 mmol/L   


 


Ammonia  37 MCMOL/L   


 


Lipase  977 U/L   


 


Ethyl Alcohol Level


  


  LESS THAN 3


MG/DL 


  


 














 Date/Time


Source Procedure


Growth Status


 


 


 1/14/18 02:01


Blood Peripheral Aerobic Blood Culture


Pending Received


 


 1/14/18 02:01


Blood Peripheral Anaerobic Blood Culture


Pending Received








Physical Examination


HEENT: Normocephalic; atraumatic; (+) icterus 


CHEST:  Even/unlabored


CARDIAC:  RRR


ABDOMEN:  Distended, soft, diffuse TTP, bowel sounds active 


EXTREMITIES: No clubbing, cyanosis, or edema.


SKIN:   (+) jaundice 


CNS:  Lethargic 


 (Maria Recinos)





Assessment and Plan


Plan


Assessment:


- Alcoholic hepatitis- Labs consistent- currently AST-105 ALT-45 Alk phos-153 T 

bili 26.6 Ammonia-37.


  DF-78 Pt currently on Prednisone PO. 


  Pt has been on low dose Prednisone and Xifaxan and recently ran out of both 

medications.  Reports


  drinking three days in a row two weeks ago and has been drinking beer 

intermittently since then, states


  he has been using it to treat his anxiety since his PCP did not refill his 

Xanax.  Pts complaints that


  brought him to the ER include abdominal pain, nausea, vomiting, dehydration.  

Abdominal pain is 


  chronic and abdominal swelling, has never had enough ascites to drain or 

test.  Denies hematemesis,


  coffee ground emesis, hematochezia, and melena.  Would like to refrain from 

having any GI procedures


  unless absolutely necessary.  He is requesting to eat, regular diet ordered 

per attending.  Per sister


  pt already wanting to leave, has left AMA in the past. 


  CT abdomen  and pelvis W IV contrast (1/13) noted --> Massive hepatomegaly. 

Worsening splenomegaly


  and portosystemic collateral consistent with worsening portal hypertension.  

Only trace ascites. 


- CARLEE- ?hepatorenal vs dehydration 


  Of note, pt reports he can not take Lactulose because it caused severe 

colitis and dehydration leading 


  to CARLEE in the past 





Plan:


- Continue Xifaxan


- Continue Prednisone


- 2 gm sodium diet


- Consider beta blocker if blood pressure normalizes


- Monitor labs


- Supportives care


- ETOH cessation


- Notify GI of active bleeding


- Further recommendations to follow





Pt has been seen and examined by myself and Dr. Parham and this note is written 

on her behalf 


 (Maria Recinos)


Physician Comments


seen, examined


agree with above 


nutritionist consult 


 (Stephanie Parham MD)











Maria Recinos Jan 14, 2018 09:53


Stephanie Parham MD Jan 14, 2018 14:33

## 2018-01-15 VITALS
HEART RATE: 94 BPM | OXYGEN SATURATION: 97 % | DIASTOLIC BLOOD PRESSURE: 56 MMHG | RESPIRATION RATE: 25 BRPM | TEMPERATURE: 99.8 F | SYSTOLIC BLOOD PRESSURE: 112 MMHG

## 2018-01-15 VITALS
OXYGEN SATURATION: 90 % | HEART RATE: 90 BPM | TEMPERATURE: 98.9 F | DIASTOLIC BLOOD PRESSURE: 55 MMHG | RESPIRATION RATE: 20 BRPM | SYSTOLIC BLOOD PRESSURE: 98 MMHG

## 2018-01-15 VITALS
RESPIRATION RATE: 21 BRPM | OXYGEN SATURATION: 96 % | DIASTOLIC BLOOD PRESSURE: 60 MMHG | SYSTOLIC BLOOD PRESSURE: 122 MMHG | HEART RATE: 89 BPM | TEMPERATURE: 99 F

## 2018-01-15 VITALS — HEART RATE: 93 BPM

## 2018-01-15 VITALS
RESPIRATION RATE: 30 BRPM | OXYGEN SATURATION: 100 % | HEART RATE: 94 BPM | SYSTOLIC BLOOD PRESSURE: 112 MMHG | DIASTOLIC BLOOD PRESSURE: 61 MMHG | TEMPERATURE: 99.1 F

## 2018-01-15 VITALS
HEART RATE: 93 BPM | RESPIRATION RATE: 22 BRPM | TEMPERATURE: 99 F | OXYGEN SATURATION: 95 % | SYSTOLIC BLOOD PRESSURE: 115 MMHG | DIASTOLIC BLOOD PRESSURE: 62 MMHG

## 2018-01-15 VITALS
DIASTOLIC BLOOD PRESSURE: 58 MMHG | TEMPERATURE: 98.6 F | HEART RATE: 90 BPM | OXYGEN SATURATION: 93 % | SYSTOLIC BLOOD PRESSURE: 103 MMHG | RESPIRATION RATE: 22 BRPM

## 2018-01-15 VITALS — OXYGEN SATURATION: 95 %

## 2018-01-15 VITALS — HEART RATE: 106 BPM

## 2018-01-15 VITALS — OXYGEN SATURATION: 99 %

## 2018-01-15 VITALS — HEART RATE: 91 BPM

## 2018-01-15 VITALS — HEART RATE: 94 BPM

## 2018-01-15 LAB
ALBUMIN SERPL-MCNC: 2 GM/DL (ref 3.4–5)
ALP SERPL-CCNC: 121 U/L (ref 45–117)
ALT SERPL-CCNC: 41 U/L (ref 12–78)
AST SERPL-CCNC: 80 U/L (ref 15–37)
BILIRUB SERPL-MCNC: 21.9 MG/DL (ref 0.2–1)
BUN SERPL-MCNC: 15 MG/DL (ref 7–18)
CALCIUM SERPL-MCNC: 7.9 MG/DL (ref 8.5–10.1)
CHLORIDE SERPL-SCNC: 104 MEQ/L (ref 98–107)
CREAT SERPL-MCNC: 1.44 MG/DL (ref 0.6–1.3)
ERYTHROCYTE [DISTWIDTH] IN BLOOD BY AUTOMATED COUNT: 17.3 % (ref 11.6–17.2)
GFR SERPLBLD BASED ON 1.73 SQ M-ARVRAT: 59 ML/MIN (ref 89–?)
GLUCOSE SERPL-MCNC: 103 MG/DL (ref 74–106)
HCO3 BLD-SCNC: 20.2 MEQ/L (ref 21–32)
HCT VFR BLD CALC: 23.1 % (ref 39–51)
HGB BLD-MCNC: 8.4 GM/DL (ref 13–17)
INR PPP: 3.1 RATIO
LYMPHOCYTES: 5 % (ref 9–44)
MCH RBC QN AUTO: 38.1 PG (ref 27–34)
MCHC RBC AUTO-ENTMCNC: 36.3 % (ref 32–36)
MCV RBC AUTO: 104.8 FL (ref 80–100)
MONOCYTES: 5 % (ref 0–8)
NEUTS BAND # BLD MANUAL: 5.7 TH/MM3 (ref 1.8–7.7)
NEUTS BAND NFR BLD: 15 % (ref 0–6)
NEUTS SEG NFR BLD MANUAL: 75 % (ref 16–70)
PLATELET # BLD: 109 TH/MM3 (ref 150–450)
PMV BLD AUTO: 9.9 FL (ref 7–11)
PROT SERPL-MCNC: 5.1 GM/DL (ref 6.4–8.2)
PROTHROMBIN TIME: 31.2 SEC (ref 9.8–11.6)
RBC # BLD AUTO: 2.21 MIL/MM3 (ref 4.5–5.9)
SODIUM SERPL-SCNC: 135 MEQ/L (ref 136–145)
TARGETS BLD QL SMEAR: (no result)
WBC # BLD AUTO: 6.3 TH/MM3 (ref 4–11)

## 2018-01-15 RX ADMIN — CEFTRIAXONE SODIUM SCH MLS/HR: 2 INJECTION, POWDER, FOR SOLUTION INTRAMUSCULAR; INTRAVENOUS at 18:29

## 2018-01-15 RX ADMIN — PHENYTOIN SODIUM SCH MLS/HR: 50 INJECTION INTRAMUSCULAR; INTRAVENOUS at 09:01

## 2018-01-15 RX ADMIN — PHENYTOIN SODIUM SCH MLS/HR: 50 INJECTION INTRAMUSCULAR; INTRAVENOUS at 02:33

## 2018-01-15 RX ADMIN — IPRATROPIUM BROMIDE AND ALBUTEROL SULFATE SCH AMPULE: .5; 3 SOLUTION RESPIRATORY (INHALATION) at 20:00

## 2018-01-15 RX ADMIN — PHENYTOIN SODIUM SCH MLS/HR: 50 INJECTION INTRAMUSCULAR; INTRAVENOUS at 18:07

## 2018-01-15 RX ADMIN — SPIRONOLACTONE SCH MG: 25 TABLET, FILM COATED ORAL at 08:09

## 2018-01-15 RX ADMIN — Medication SCH TAB: at 12:36

## 2018-01-15 RX ADMIN — IPRATROPIUM BROMIDE AND ALBUTEROL SULFATE SCH AMPULE: .5; 3 SOLUTION RESPIRATORY (INHALATION) at 16:18

## 2018-01-15 RX ADMIN — IPRATROPIUM BROMIDE AND ALBUTEROL SULFATE SCH AMPULE: .5; 3 SOLUTION RESPIRATORY (INHALATION) at 23:40

## 2018-01-15 RX ADMIN — IPRATROPIUM BROMIDE AND ALBUTEROL SULFATE SCH AMPULE: .5; 3 SOLUTION RESPIRATORY (INHALATION) at 16:00

## 2018-01-15 RX ADMIN — Medication SCH TAB: at 16:45

## 2018-01-15 RX ADMIN — FAMOTIDINE SCH MG: 20 TABLET, FILM COATED ORAL at 08:09

## 2018-01-15 RX ADMIN — PHYTONADIONE SCH MG: 10 INJECTION, EMULSION INTRAMUSCULAR; INTRAVENOUS; SUBCUTANEOUS at 10:47

## 2018-01-15 RX ADMIN — CHLORHEXIDINE GLUCONATE SCH PACK: 500 CLOTH TOPICAL at 04:00

## 2018-01-15 RX ADMIN — RIFAXIMIN SCH MG: 550 TABLET ORAL at 20:37

## 2018-01-15 RX ADMIN — RIFAXIMIN SCH MG: 550 TABLET ORAL at 08:08

## 2018-01-15 RX ADMIN — Medication SCH TAB: at 08:09

## 2018-01-15 NOTE — RADRPT
EXAM DATE/TIME:  01/15/2018 09:57 

 

[facility] COMPARISON:     

CHEST SINGLE AP, January 13, 2018, 21:34.

 

                     

INDICATIONS :     

Cough and short of breath.

                     

 

MEDICAL HISTORY :            

Cirrhosis. Renal insufficiency.   

 

SURGICAL HISTORY :     

None.   

 

ENCOUNTER:     

Subsequent                                        

 

ACUITY:     

2 days      

 

PAIN SCORE:     

0/10

 

LOCATION:     

Bilateral chest 

 

FINDINGS:     

A single view of the chest demonstrates the lungs to be symmetrically aerated without evidence of mas
s, infiltrate or effusion.  The cardiomediastinal contours are unremarkable.  Osseous structures are 
intact.

 

CONCLUSION:     

No acute disease.  No significant change has occurred.  

 

 

 

 Dm Carroll MD on January 15, 2018 at 10:23           

Board Certified Radiologist.

 This report was verified electronically.

## 2018-01-15 NOTE — HHI.FPPN
Subjective


Remarks


No acute events overnight. VS significant for pulse in the 90s and MAP of 70s. 

This morning patient reports feeling better but his biggest concern is his 

constant abdominal pain. Has not worsened since being in the hospital. Also 

reports continued sore throat but lozenges due help.  Patient denies any 

shortness of breath but does endorse wheezing.


 (Temla Covarrubias MD, R3)





Objective


Vitals





Vital Signs








  Date Time  Temp Pulse Resp B/P (MAP) Pulse Ox O2 Delivery O2 Flow Rate FiO2


 


1/15/18 08:33     95   


 


1/15/18 08:00  94      


 


1/15/18 06:00  93      


 


1/15/18 04:00 98.6 90 22 103/58 (73) 93   


 


1/15/18 04:00  90      


 


1/15/18 02:00  91      


 


1/15/18 00:00  90      


 


1/15/18 00:00 98.9 90 20 98/55 (69) 90   


 


1/14/18 23:44     92   


 


1/14/18 22:00  90      


 


1/14/18 20:00 98.6 96 23 97/55 (69) 93   


 


1/14/18 20:00  96      


 


1/14/18 19:30  88 36 93/47 (62) 91   


 


1/14/18 19:00  93 23 115/61 (79) 92   


 


1/14/18 18:30  85 24 111/57 (75) 92   


 


1/14/18 18:00  88 17 113/55 (74) 95   


 


1/14/18 17:30  85 20 114/56 (75) 92   


 


1/14/18 17:00  88 24 111/55 (73) 93   


 


1/14/18 16:30  91 22 114/57 (76) 94   


 


1/14/18 16:15 99.9 91 22 115/56 (75) 96   


 


1/14/18 16:15 100.5 89 20 115/56 96   


 


1/14/18 16:00  89 21 113/52 (72) 94   


 


1/14/18 15:30 99.9 92 20 113/53 91   


 


1/14/18 15:00  96 23 113/53 (73) 91   


 


1/14/18 14:00  101 26 118/56 (76) 92   


 


1/14/18 13:00  102 31 117/58 (77) 91   


 


1/14/18 12:00 103.1 104 30 119/55 (76) 93   


 


1/14/18 11:36     93 Nasal Cannula 3.00 


 


1/14/18 11:00  104 30 118/58 (78) 94   


 


1/14/18 10:00  101 32 113/56 (75) 91   














I/O      


 


 1/14/18 1/14/18 1/14/18 1/15/18 1/15/18 1/15/18





 07:00 15:00 23:00 07:00 15:00 23:00


 


Intake Total 1340 ml  1231 ml 100 ml  


 


Output Total 350 ml  800 ml   


 


Balance 990 ml  431 ml 100 ml  


 


      


 


Intake Oral 340 ml  480 ml   


 


IV Total 1000 ml  420 ml 100 ml  


 


FFP   331 ml   


 


Output Urine Total 350 ml  800 ml   


 


# Bowel Movements 0  2 3  








 (Telma Covarrubias MD, R3)


Result Diagram:  


1/15/18 0537                                                                   

             1/15/18 0537





Objective Remarks


GEN: Well-developed, well-nourished patient. No acute distress.  Obviously 

jaundiced including eyes, generalized skin.


CV: Increased rate but with regular rhythm.  Grade 2/6 ZEYAD present.


LUNGS: No accessory muscle use.  Audible wheezing.  Wheezing and crackles of 

the left lower lobe.


GI: Soft, tender to palpation.  Distended.


NEURO/PSYCH: Awake, alert. Appropriate insight and judgment. Normal speech


 (Telma Covarrubias MD, R3)





A/P


Assessment and Plan


27-year-old male with history of alcoholism, liver cirrhosis, hepatorenal 

syndrome.  Admitted for liver cirrhosis/hepatorenal syndrome, sepsis.


Discharge Planning


1-2 days pending improvement in hydration, renal function, INR.





sdw Dr. Lam


 (Telma Covarrubias MD, R3)


Attending Attestation


Patient seen and examined.  Case reviewed and discussed with the resident team.

  Agree with plan of care as discussed with me and documented in the resident 

note.


will try vitamin k. hopefully his liver can "do the right thing with it"


 (Jany Lam MD)


Problem List:  


(1) Cirrhosis of liver


ICD Codes:  K74.60 - Unspecified cirrhosis of liver


Status:  Chronic


Plan:   History of severe liver cirrhosis associated with jaundice. Meld score 

of 36.6.  Reports most recent alcohol abuse relapse was about 2 weeks ago. No 

signs of withdrawal  CT abdomen significant for massive hepatomegaly and 

worsening splenomegaly and portalsystemic collaterals consistent with worsening 

portal hypertension.  Only trace ascites.  Reported baseline INR is around 1.3 

and hemoglobin baseline of around 10.


-Transfuse 1 unit FFP due to 1 episode of nosebleed in the setting of elevated 

INR


* closely monitor for acute bleeds


-elevated ammonia, down trending


-Hx of chronic prednisone use since 3/2017 with most recent use of 5mg daily. 

Increase temporarily for stress response


GI consulted: appreciate recommendations


* Follows with Dr. Benavides


* Continue Xifaxan and Prednisone


* Consider beta blocker if blood pressure normalizes





Medications:


* Vitamin K 10mg x3 days (1/15-1/17)


* Rocephin 2g daily for possible SBP


* Xifaxan 550 mg BID


* oxycodone for pain


* prednisone 10mg daily


* Spironolactone 25mg daily





(2) Hepatorenal syndrome


ICD Codes:  K76.7 - Hepatorenal syndrome


Status:  Chronic


Plan:  HX of hepatorenal syndrome. CT abdomen showed hepatomegaly and  

worsening portal hypertension. Baseline Cr around 1. Admission Cr 1.82


-Monitor Cr for improvement


-see more detailed plan under liver cirrhosis





(3) Sepsis


ICD Codes:  A41.9 - Sepsis, unspecified organism


Status:  Acute


Plan:  Met sepsis criteria at admission with fever and tachycardia. However 

sister reports pt's baseline HR is around 


-Suspect symptoms may related to cirrhosis. Clinically patient is stable.


-Associated with CARLEE but continues to improve.


-Blood cultures negative x1


-urine culture ordered but not yet pending


-UA and CXR unremarkable.


-continue hydration and abx as described above.





(4) Acute pharyngitis


ICD Codes:  J02.9 - Acute pharyngitis, unspecified


Plan:  Patient complaining of sore throat that developed prior to admission. Pt 

with hoarse voice on admission. Hx of strep throat. Already on abx that would 

provide covered. DDX viral vs strep pharyngitis. Pt now with wheezing and 

coarse breath sounds.


-cepacol lozenges


-CXR ordered


-albuterol +nebulizer ordered


-incentive spirometry





(5) Heart murmur


ICD Codes:  R01.1 - Cardiac murmur, unspecified


Plan:  Murmur louder on admission, has improved. Likely related to dehydration 

and anemia.


-monitor and consider echo





(6) Macrocytic anemia


ICD Codes:  D53.9 - Nutritional anemia, unspecified


Plan:  Likely related to alcohol use.


-B12 normal 


-Folate minimally low at 3.0


-multivitamin ordered





(7) Alcohol dependence


ICD Codes:  F10.20 - Alcohol dependence, uncomplicated


Status:  Chronic


Plan:  Pt reports relapse x2 due to issue with xanax. No signs of withdrawal at 

this time.


-Recommended continued AA meeting attendance


-Plan to [facility] after discharge.





(8) Depression


ICD Codes:  F32.9 - Major depressive disorder, single episode, unspecified


Plan:  Hx of depression. Previously on Paxil but not good with taking it every 

day


-Fluoxetine 20mg started





(9) FEN


Plan:  Fluids: NS at 110mls/hr


Nutrition: Regular, 2g salt restriction


Electrolytes: mild hyponatremia improving.


DVT prophylaxis: SCDs, INR currently >2


GI prophylaxis: Zantac 150 mg daily


 (Telma Covarrubias MD, R3)





Problem Qualifiers





(1) Cirrhosis of liver:  


Qualified Codes:  K70.31 - Alcoholic cirrhosis of liver with ascites








Telma Covarrubias MD, R3 Pal 15, 2018 09:55


Jany Lam MD Jan 16, 2018 14:13

## 2018-01-15 NOTE — HHI.GIFU
Subjective


Remarks


Pt resting in bed, mother at bedside.  Has been nauseous and unable to eat more 

than a few bites.  Did not receive supplement with lunch tray.  Reports liquid 

BMs.  


 (Maria Recinos)





Objective


Vitals I&O





Vital Signs








  Date Time  Temp Pulse Resp B/P (MAP) Pulse Ox O2 Delivery O2 Flow Rate FiO2


 


1/15/18 12:00  93      


 


1/15/18 12:00 99.0 93 22 115/62 (79) 95   


 


1/15/18 10:00  106      


 


1/15/18 09:00   17     


 


1/15/18 08:33     95   


 


1/15/18 08:00 99.1 94 30 112/61 (78) 100   


 


1/15/18 08:00  94      


 


1/15/18 06:00  93      


 


1/15/18 04:00 98.6 90 22 103/58 (73) 93   


 


1/15/18 04:00  90      


 


1/15/18 02:00  91      


 


1/15/18 00:00  90      


 


1/15/18 00:00 98.9 90 20 98/55 (69) 90   


 


1/14/18 23:44     92   


 


1/14/18 22:00  90      


 


1/14/18 20:00 98.6 96 23 97/55 (69) 93   


 


1/14/18 20:00  96      


 


1/14/18 19:30  88 36 93/47 (62) 91   


 


1/14/18 19:00  93 23 115/61 (79) 92   


 


1/14/18 18:30  85 24 111/57 (75) 92   


 


1/14/18 18:00  88 17 113/55 (74) 95   


 


1/14/18 17:30  85 20 114/56 (75) 92   


 


1/14/18 17:00  88 24 111/55 (73) 93   


 


1/14/18 16:30  91 22 114/57 (76) 94   


 


1/14/18 16:15 99.9 91 22 115/56 (75) 96   


 


1/14/18 16:15 100.5 89 20 115/56 96   


 


1/14/18 16:00  89 21 113/52 (72) 94   


 


1/14/18 15:30 99.9 92 20 113/53 91   














I/O      


 


 1/14/18 1/14/18 1/14/18 1/15/18 1/15/18 1/15/18





 07:00 15:00 23:00 07:00 15:00 23:00


 


Intake Total 1340 ml  1231 ml 100 ml 1000 ml 


 


Output Total 350 ml  800 ml   


 


Balance 990 ml  431 ml 100 ml 1000 ml 


 


      


 


Intake Oral 340 ml  480 ml   


 


IV Total 1000 ml  420 ml 100 ml 1000 ml 


 


FFP   331 ml   


 


Output Urine Total 350 ml  800 ml   


 


# Bowel Movements 0  2 3  








Laboratory





Laboratory Tests








Test


  1/15/18


05:37


 


White Blood Count 6.3 


 


Red Blood Count 2.21 


 


Hemoglobin 8.4 


 


Hematocrit 23.1 


 


Mean Corpuscular Volume 104.8 


 


Mean Corpuscular Hemoglobin 38.1 


 


Mean Corpuscular Hemoglobin


Concent 36.3 


 


 


Red Cell Distribution Width 17.3 


 


Platelet Count 109 


 


Mean Platelet Volume 9.9 


 


CBC Comment AUTO DIFF 


 


Differential Total Cells


Counted 100 


 


 


Neutrophils % (Manual) 75 


 


Band Neutrophils % 15 


 


Lymphocytes % 5 


 


Monocytes % 5 


 


Neutrophils # (Manual) 5.7 


 


Differential Comment


  FINAL DIFF


MANUAL


 


Platelet Estimate LOW 


 


Platelet Morphology Comment NORMAL 


 


Target Cells 1+ 


 


Prothrombin Time 31.2 


 


Prothromb Time International


Ratio 3.1 


 


 


Blood Urea Nitrogen 15 


 


Creatinine 1.44 


 


Random Glucose 103 


 


Total Protein 5.1 


 


Albumin 2.0 


 


Calcium Level 7.9 


 


Alkaline Phosphatase 121 


 


Aspartate Amino Transf


(AST/SGOT) 80 


 


 


Alanine Aminotransferase


(ALT/SGPT) 41 


 


 


Total Bilirubin 21.9 


 


Sodium Level 135 


 


Potassium Level 3.6 


 


Chloride Level 104 


 


Carbon Dioxide Level 20.2 


 


Anion Gap 11 


 


Estimat Glomerular Filtration


Rate 59 


 














 Date/Time


Source Procedure


Growth Status


 


 


 1/14/18 02:01


Blood Peripheral Aerobic Blood Culture - Preliminary


NO GROWTH IN 1 DAY Resulted


 


 1/14/18 02:01


Blood Peripheral Anaerobic Blood Culture - Preliminary


NO GROWTH IN 1 DAY Resulted


 


 1/15/18 12:24


Stool Stool Stool Occult Blood (MADELIN)


Pending Received


 


 1/15/18 12:24


Urine Clean Catch Urine Culture


Pending Ordered








Imaging





Last Impressions








Chest X-Ray 1/15/18 0000 Signed





Impressions: 





 Service Date/Time:  Monday, January 15, 2018 09:57 - CONCLUSION:  No acute 





 disease.  No significant change has occurred.       Dm Carroll MD 


 


Abdomen/Pelvis CT 1/13/18 1617 Signed





Impressions: 





 Service Date/Time:  Saturday, January 13, 2018 19:24 - CONCLUSION:  Massive 





 hepatomegaly. Worsening splenomegaly and portosystemic collaterals consistent 





 with worsening portal hypertension. Only trace ascites.     Jose Irvin MD 








Physical Exam


HEENT: Normocephalic; atraumatic; icterus 


CHEST:  Even/unlabored


CARDIAC:  RRR


ABDOMEN: Distended, firm, nontender, bowel sounds active 


EXTREMITIES: No clubbing, cyanosis, or edema.


SKIN:  Normal; no rash; (+) jaundice


CNS:  No focal deficits; alert and oriented times three.


 (Maria Recinos)





Assessment and Plan


Plan


Assessment:


- Alcoholic hepatitis- Labs consistent- currently AST-105 ALT-45 Alk phos-153 T 

bili 26.6 Ammonia-37.


  DF-78 Pt currently on Prednisone PO. 


  Pt has been on low dose Prednisone and Xifaxan and recently ran out of both 

medications.  Reports


  drinking three days in a row two weeks ago and has been drinking beer 

intermittently since then, states


  he has been using it to treat his anxiety since his PCP did not refill his 

Xanax.  Pts complaints that


  brought him to the ER include abdominal pain, nausea, vomiting, dehydration.  

Abdominal pain is 


  chronic and abdominal swelling, has never had enough ascites to drain or 

test.  Denies hematemesis,


  coffee ground emesis, hematochezia, and melena.  Would like to refrain from 

having any GI procedures


  unless absolutely necessary.  He is requesting to eat, regular diet ordered 

per attending.  Per sister


  pt already wanting to leave, has left AMA in the past. 


  CT abdomen  and pelvis W IV contrast (1/13) noted --> Massive hepatomegaly. 

Worsening splenomegaly


  and portosystemic collateral consistent with worsening portal hypertension.  

Only trace ascites. 


- CARLEE- ?hepatorenal vs dehydration 


  Of note, pt reports he can not take Lactulose because it caused severe 

colitis and dehydration leading 


  to CARLEE in the past 





(1/15) - Pt more awake today. Labs improving. HPB411 ALT-41.  Continued on 

Xifaxin and Prednisone.


  Current diet is 2gm sodium with Enlive supplement.  Pt did not receive Enlive 

with lunch tray so he


  has not tried it yet.  Reports nausea today and was only able to tolerate a 

few bites.  Denies vomiting.


  Has been having liquid stools.  He would still like to hold off on any 

endoscopic procedures at this 


  time.  





Plan:


- Continue Xifaxan


- Continue Prednisone


- 2 gm sodium diet with supplements


- Consider beta blocker if blood pressure normalizes


- Monitor labs


- Supportives care


- ETOH cessation


- Notify GI of active bleeding


- Further recommendations to follow





Pt has been seen and examined by myself and Dr. Parker and this note is 

written on his behalf 


 (Maria Recinos)


Physician Comments


Patient seen and examined


Agree with above


Continue with current supportive care


Monitor labs


 (Zac Parker MD)











Maria Recinos Pal 15, 2018 15:36


Zac Parker MD Jan 16, 2018 01:26

## 2018-01-16 VITALS
SYSTOLIC BLOOD PRESSURE: 122 MMHG | OXYGEN SATURATION: 99 % | DIASTOLIC BLOOD PRESSURE: 61 MMHG | TEMPERATURE: 101.1 F | HEART RATE: 115 BPM | RESPIRATION RATE: 35 BRPM

## 2018-01-16 VITALS
HEART RATE: 95 BPM | RESPIRATION RATE: 28 BRPM | TEMPERATURE: 98.5 F | SYSTOLIC BLOOD PRESSURE: 98 MMHG | OXYGEN SATURATION: 95 % | DIASTOLIC BLOOD PRESSURE: 52 MMHG

## 2018-01-16 VITALS
RESPIRATION RATE: 23 BRPM | HEART RATE: 104 BPM | OXYGEN SATURATION: 94 % | DIASTOLIC BLOOD PRESSURE: 61 MMHG | SYSTOLIC BLOOD PRESSURE: 127 MMHG | TEMPERATURE: 99.2 F

## 2018-01-16 VITALS
OXYGEN SATURATION: 95 % | SYSTOLIC BLOOD PRESSURE: 116 MMHG | DIASTOLIC BLOOD PRESSURE: 56 MMHG | TEMPERATURE: 99 F | RESPIRATION RATE: 26 BRPM | HEART RATE: 100 BPM

## 2018-01-16 VITALS
SYSTOLIC BLOOD PRESSURE: 119 MMHG | DIASTOLIC BLOOD PRESSURE: 62 MMHG | OXYGEN SATURATION: 98 % | HEART RATE: 104 BPM | TEMPERATURE: 98.9 F | RESPIRATION RATE: 20 BRPM

## 2018-01-16 VITALS
OXYGEN SATURATION: 97 % | RESPIRATION RATE: 24 BRPM | TEMPERATURE: 101.8 F | SYSTOLIC BLOOD PRESSURE: 121 MMHG | HEART RATE: 119 BPM | DIASTOLIC BLOOD PRESSURE: 58 MMHG

## 2018-01-16 VITALS — HEART RATE: 113 BPM

## 2018-01-16 VITALS — OXYGEN SATURATION: 97 %

## 2018-01-16 VITALS — HEART RATE: 114 BPM

## 2018-01-16 VITALS — HEART RATE: 101 BPM

## 2018-01-16 VITALS — OXYGEN SATURATION: 95 %

## 2018-01-16 VITALS — HEART RATE: 109 BPM

## 2018-01-16 VITALS — HEART RATE: 105 BPM

## 2018-01-16 LAB
ALBUMIN SERPL-MCNC: 2.1 GM/DL (ref 3.4–5)
ALP SERPL-CCNC: 127 U/L (ref 45–117)
ALT SERPL-CCNC: 45 U/L (ref 12–78)
AST SERPL-CCNC: 96 U/L (ref 15–37)
BILIRUB SERPL-MCNC: 21.9 MG/DL (ref 0.2–1)
BUN SERPL-MCNC: 14 MG/DL (ref 7–18)
CALCIUM SERPL-MCNC: 8.1 MG/DL (ref 8.5–10.1)
CHLORIDE SERPL-SCNC: 104 MEQ/L (ref 98–107)
CREAT SERPL-MCNC: 1.43 MG/DL (ref 0.6–1.3)
ERYTHROCYTE [DISTWIDTH] IN BLOOD BY AUTOMATED COUNT: 17.5 % (ref 11.6–17.2)
GFR SERPLBLD BASED ON 1.73 SQ M-ARVRAT: 59 ML/MIN (ref 89–?)
GLUCOSE SERPL-MCNC: 107 MG/DL (ref 74–106)
HCO3 BLD-SCNC: 20.1 MEQ/L (ref 21–32)
HCT VFR BLD CALC: 24.5 % (ref 39–51)
HGB BLD-MCNC: 8.9 GM/DL (ref 13–17)
INR PPP: 2.2 RATIO
LYMPHOCYTES: 2 % (ref 9–44)
MCH RBC QN AUTO: 37.9 PG (ref 27–34)
MCHC RBC AUTO-ENTMCNC: 36.5 % (ref 32–36)
MCV RBC AUTO: 103.8 FL (ref 80–100)
MONOCYTES: 6 % (ref 0–8)
NEUTS BAND # BLD MANUAL: 5.9 TH/MM3 (ref 1.8–7.7)
NEUTS BAND NFR BLD: 10 % (ref 0–6)
NEUTS SEG NFR BLD MANUAL: 81 % (ref 16–70)
PLATELET # BLD: 109 TH/MM3 (ref 150–450)
PMV BLD AUTO: 9.6 FL (ref 7–11)
PROT SERPL-MCNC: 5.4 GM/DL (ref 6.4–8.2)
PROTHROMBIN TIME: 22.7 SEC (ref 9.8–11.6)
RBC # BLD AUTO: 2.36 MIL/MM3 (ref 4.5–5.9)
SODIUM SERPL-SCNC: 134 MEQ/L (ref 136–145)
TARGETS BLD QL SMEAR: (no result)
WBC # BLD AUTO: 6.5 TH/MM3 (ref 4–11)

## 2018-01-16 RX ADMIN — IPRATROPIUM BROMIDE AND ALBUTEROL SULFATE SCH AMPULE: .5; 3 SOLUTION RESPIRATORY (INHALATION) at 11:35

## 2018-01-16 RX ADMIN — Medication SCH TAB: at 17:03

## 2018-01-16 RX ADMIN — IPRATROPIUM BROMIDE AND ALBUTEROL SULFATE SCH AMPULE: .5; 3 SOLUTION RESPIRATORY (INHALATION) at 16:00

## 2018-01-16 RX ADMIN — IPRATROPIUM BROMIDE AND ALBUTEROL SULFATE SCH AMPULE: .5; 3 SOLUTION RESPIRATORY (INHALATION) at 06:27

## 2018-01-16 RX ADMIN — PHENYTOIN SODIUM SCH MLS/HR: 50 INJECTION INTRAMUSCULAR; INTRAVENOUS at 22:24

## 2018-01-16 RX ADMIN — CEFTRIAXONE SODIUM SCH MLS/HR: 2 INJECTION, POWDER, FOR SOLUTION INTRAMUSCULAR; INTRAVENOUS at 17:48

## 2018-01-16 RX ADMIN — RIFAXIMIN SCH MG: 550 TABLET ORAL at 07:54

## 2018-01-16 RX ADMIN — RIFAXIMIN SCH MG: 550 TABLET ORAL at 20:17

## 2018-01-16 RX ADMIN — CHLORHEXIDINE GLUCONATE SCH PACK: 500 CLOTH TOPICAL at 04:00

## 2018-01-16 RX ADMIN — IPRATROPIUM BROMIDE AND ALBUTEROL SULFATE SCH AMPULE: .5; 3 SOLUTION RESPIRATORY (INHALATION) at 19:44

## 2018-01-16 RX ADMIN — PHYTONADIONE SCH MG: 10 INJECTION, EMULSION INTRAMUSCULAR; INTRAVENOUS; SUBCUTANEOUS at 07:55

## 2018-01-16 RX ADMIN — PHENYTOIN SODIUM SCH MLS/HR: 50 INJECTION INTRAMUSCULAR; INTRAVENOUS at 12:19

## 2018-01-16 RX ADMIN — IPRATROPIUM BROMIDE AND ALBUTEROL SULFATE SCH AMPULE: .5; 3 SOLUTION RESPIRATORY (INHALATION) at 03:29

## 2018-01-16 RX ADMIN — AZITHROMYCIN SCH MG: 250 TABLET, FILM COATED ORAL at 10:32

## 2018-01-16 RX ADMIN — PHENYTOIN SODIUM SCH MLS/HR: 50 INJECTION INTRAMUSCULAR; INTRAVENOUS at 02:10

## 2018-01-16 RX ADMIN — FAMOTIDINE SCH MG: 20 TABLET, FILM COATED ORAL at 07:54

## 2018-01-16 RX ADMIN — SPIRONOLACTONE SCH MG: 25 TABLET, FILM COATED ORAL at 07:53

## 2018-01-16 RX ADMIN — Medication SCH TAB: at 12:24

## 2018-01-16 RX ADMIN — ACYCLOVIR SCH TAB: 800 TABLET ORAL at 07:54

## 2018-01-16 RX ADMIN — Medication SCH TAB: at 07:55

## 2018-01-16 NOTE — HHI.FPPN
Subjective


Remarks


No acute events overnight. VS continue to show tachycardia. Recurrent fever 

this AM. This morning patient reports that he is feeling better. Does endorse 

improved abdominal pain with oxycodone. He was also able to to start PT. Diet 

has improved as well. Continues to have cough and sore throat but lozenges have 

been helping.


 (Telma Covarrubias MD, R3)





Objective


Vitals





Vital Signs








  Date Time  Temp Pulse Resp B/P (MAP) Pulse Ox O2 Delivery O2 Flow Rate FiO2


 


1/16/18 08:54   27     


 


1/16/18 08:00 101.1 115 35 122/61 (81) 99   


 


1/16/18 08:00  115      


 


1/16/18 06:00  109      


 


1/16/18 04:00  100      


 


1/16/18 04:00 99.0 100 26 116/56 (76) 95   


 


1/16/18 02:00  105      


 


1/16/18 00:00  95      


 


1/16/18 00:00 98.5 95 28 98/52 (67) 95   


 


1/15/18 22:00  91      


 


1/15/18 20:02     99   21


 


1/15/18 20:00 99.8 94 25 112/56 (74) 97   


 


1/15/18 20:00  94      


 


1/15/18 18:00  94      


 


1/15/18 16:00  89      


 


1/15/18 16:00 99.0 89 21 122/60 (80) 96   


 


1/15/18 14:00  93      


 


1/15/18 12:00  93      


 


1/15/18 12:00 99.0 93 22 115/62 (79) 95   


 


1/15/18 10:00  106      














I/O      


 


 1/15/18 1/15/18 1/15/18 1/16/18 1/16/18 1/16/18





 06:59 14:59 22:59 06:59 14:59 22:59


 


Intake Total 100 ml 1000 ml 1656 ml 925 ml  


 


Output Total   500 ml   


 


Balance 100 ml 1000 ml 1156 ml 925 ml  


 


      


 


Intake Oral   1000 ml 480 ml  


 


IV Total 100 ml 1000 ml 656 ml 445 ml  


 


Output Urine Total   500 ml   


 


# Voids   2 3  


 


# Bowel Movements 3  3 1  








 (Telma Covarrubias MD, R3)


Result Diagram:  


1/16/18 0456                                                                   

             1/16/18 0456





Objective Remarks


GEN: Well-developed, well-nourished patient. No acute distress.  Obviously 

jaundiced including eyes, generalized skin.


CV: Increased rate but with regular rhythm.  Grade 2/6 ZEYAD present.


LUNGS: No accessory muscle use. Wheezing and crackles of the left lower lobe.


GI: Soft, tender to palpation.  Distended.


EXT: No calf tenderness or lower extremity edema.


NEURO/PSYCH: Awake, alert. Appropriate insight and judgment. Normal speech


 (Telma Covarrubias MD, R3)





A/P


Assessment and Plan


27-year-old male with history of alcoholism, liver cirrhosis, hepatorenal 

syndrome.  Admitted for liver cirrhosis/hepatorenal syndrome, sepsis.


Discharge Planning


2-3 days pending improvement in renal function and INR.





dw Dr. Lam


 (Telma Covarrubias MD, R3)


Attending Attestation


Patient seen and examined.  Case reviewed and discussed with the resident team.

  Agree with plan of care as discussed with me and documented in the resident 

note.


had long discussion with pt and his father. pt was very concerned about his 

MELD score. he was disappointed that he "did every single thing I was supposed 

to do for 9 months including having a pill box, not drinking, and I didn't get 

a new liver. I still feel terrible everyday. I'm so sick from my liver."


I discussed that his GI Dr could have him put on the transplant list but he 

needed to "be good" the rest of his life as far as avoiding alcohol and taking 

his meds, getting labs and seeing Drs. If he drank alcohol he'd never make it 

onto the transplant list and if he didn't do the right thing his transplanted 

liver would fail


 (Jany Lam MD)


Problem List:  


(1) Cirrhosis of liver


ICD Codes:  K74.60 - Unspecified cirrhosis of liver


Status:  Chronic


Plan:   History of severe liver cirrhosis associated with jaundice. Meld score 

of 36.6.  Reports most recent alcohol abuse relapse was about 2 weeks ago. No 

signs of withdrawal  CT abdomen significant for massive hepatomegaly and 

worsening splenomegaly and portalsystemic collaterals consistent with worsening 

portal hypertension.  Only trace ascites.  Reported baseline INR is around 1.3 

and hemoglobin baseline of around 10.


-Transfuse 1 unit FFP due to 1 episode of nosebleed in the setting of elevated 

INR


* closely monitor for acute bleeds


* INR improving


-elevated ammonia, down trending


-Hx of chronic prednisone use since 3/2017 with most recent use of 5mg daily. 

Increase temporarily for stress response


GI consulted: appreciate recommendations


* Follows with Dr. Benavides


* Continue Xifaxan and Prednisone


* Consider beta blocker if blood pressure normalizes





Medications:


* Vitamin K 10mg x3 days (1/15-1/17)


* Rocephin 2g daily for possible SBP (1/13-


* Xifaxan 550 mg BID


* oxycodone for pain


* prednisone 10mg daily


* Spironolactone 25mg daily





(2) Hepatorenal syndrome


ICD Codes:  K76.7 - Hepatorenal syndrome


Status:  Chronic


Plan:  HX of hepatorenal syndrome. CT abdomen showed hepatomegaly and  

worsening portal hypertension. Baseline Cr around 1. Admission Cr 1.82


-Monitor Cr for improvement


-see more detailed plan under liver cirrhosis





(3) Sepsis


ICD Codes:  A41.9 - Sepsis, unspecified organism


Status:  Acute


Plan:  Met sepsis criteria at admission with fever and tachycardia. However 

sister reports pt's baseline HR is around 


-Suspect symptoms may related to cirrhosis. Clinically patient is stable.


-Associated with CARLEE but continues to improve.


-Blood cultures negative x2 days


-urine culture pending


-UA and CXR (including repeat) unremarkable.


-continue hydration and abx as described above.





(4) CAP (community acquired pneumonia)


ICD Codes:  J18.9 - Pneumonia, unspecified organism


Status:  Acute


Plan:  Patient complaining of sore throat  and cough that developed prior to 

admission. Pt with hoarse voice on admission. Hx of strep throat. Already on 

abx that would provide coverage of strep. However, PE is now significant for 

Left lower lobe crackles and wheeze that may have become more prominent as 

hydration improved. Will cover for atypicals especially since patient continues 

to recurrent fever.


-Azithromycin 500mg (1/16-


-cepacol lozenges


-Repeat CXR unremarkable.


-albuterol +nebulizer ordered


-incentive spirometry





(5) Heart murmur


ICD Codes:  R01.1 - Cardiac murmur, unspecified


Plan:  Murmur louder on admission, has improved. Likely related to dehydration 

and anemia.


-monitor and consider echo





(6) Macrocytic anemia


ICD Codes:  D53.9 - Nutritional anemia, unspecified


Plan:  Likely related to alcohol use.


-B12 normal 


-Folate minimally low at 3.0


-multivitamin ordered





(7) Alcohol dependence


ICD Codes:  F10.20 - Alcohol dependence, uncomplicated


Status:  Chronic


Plan:  Pt reports relapse x2 due to issue with xanax. No signs of withdrawal at 

this time.


-Recommended continued AA meeting attendance


-[facility] discharge. Will attempt to send to drug/

alcohol rehab at discharge





(8) Depression


ICD Codes:  F32.9 - Major depressive disorder, single episode, unspecified


Plan:  Hx of depression. Previously on Paxil but not good with taking it every 

day


-Fluoxetine 20mg started





(9) FEN


Plan:  Fluids: NS at 110mls/hr


Nutrition: Regular, 2g salt restriction


Electrolytes: mild hyponatremia improving. Hypokalemia, replete 


DVT prophylaxis: SCDs, INR currently >2


GI prophylaxis: Zantac 150 mg daily


 (Telma Covarrubias MD, R3)





Problem Qualifiers





(1) Cirrhosis of liver:  


Qualified Codes:  K70.31 - Alcoholic cirrhosis of liver with ascites


(2) CAP (community acquired pneumonia):  


Qualified Codes:  J18.1 - Lobar pneumonia, unspecified organism








Telma Covarrubias MD, R3 Jan 16, 2018 10:11


Jany Lam MD Jan 16, 2018 14:19

## 2018-01-16 NOTE — HHI.GIFU
Subjective


Remarks


Pt is in the bathroom, reports just having vomiting and BM.  Pt reports not 

eating, however, per RN pt has had family members bringing him every meal and 

he has been eating almost the entire meal.  Pt currently has a sandwich from 

[employer] on bedside tray.  


 (Maria Recinos)





Objective


Vitals I&O





Vital Signs








  Date Time  Temp Pulse Resp B/P (MAP) Pulse Ox O2 Delivery O2 Flow Rate FiO2


 


1/16/18 14:00  113      


 


1/16/18 12:00  119      


 


1/16/18 12:00 101.8 119 24 121/58 (79) 97   


 


1/16/18 11:36     95   


 


1/16/18 10:00  114      


 


1/16/18 08:54   27     


 


1/16/18 08:00 101.1 115 35 122/61 (81) 99   


 


1/16/18 08:00  115      


 


1/16/18 06:00  109      


 


1/16/18 04:00  100      


 


1/16/18 04:00 99.0 100 26 116/56 (76) 95   


 


1/16/18 02:00  105      


 


1/16/18 00:00  95      


 


1/16/18 00:00 98.5 95 28 98/52 (67) 95   


 


1/15/18 22:00  91      


 


1/15/18 20:02     99   21


 


1/15/18 20:00 99.8 94 25 112/56 (74) 97   


 


1/15/18 20:00  94      


 


1/15/18 18:00  94      














I/O      


 


 1/15/18 1/15/18 1/15/18 1/16/18 1/16/18 1/16/18





 07:00 15:00 23:00 07:00 15:00 23:00


 


Intake Total 100 ml 1000 ml 1656 ml 925 ml 1000 ml 


 


Output Total   500 ml   


 


Balance 100 ml 1000 ml 1156 ml 925 ml 1000 ml 


 


      


 


Intake Oral   1000 ml 480 ml  


 


IV Total 100 ml 1000 ml 656 ml 445 ml 1000 ml 


 


Output Urine Total   500 ml   


 


# Voids   2 3  


 


# Bowel Movements 3  3 1  








Laboratory





Laboratory Tests








Test


  1/16/18


04:56


 


White Blood Count 6.5 


 


Red Blood Count 2.36 


 


Hemoglobin 8.9 


 


Hematocrit 24.5 


 


Mean Corpuscular Volume 103.8 


 


Mean Corpuscular Hemoglobin 37.9 


 


Mean Corpuscular Hemoglobin


Concent 36.5 


 


 


Red Cell Distribution Width 17.5 


 


Platelet Count 109 


 


Mean Platelet Volume 9.6 


 


CBC Comment AUTO DIFF 


 


Differential Total Cells


Counted 100 


 


 


Neutrophils % (Manual) 81 


 


Band Neutrophils % 10 


 


Lymphocytes % 2 


 


Monocytes % 6 


 


Eosinophils % 1 


 


Neutrophils # (Manual) 5.9 


 


Differential Comment


  FINAL DIFF


MANUAL


 


Platelet Estimate LOW 


 


Platelet Morphology Comment NORMAL 


 


Target Cells 1+ 


 


Prothrombin Time 22.7 


 


Prothromb Time International


Ratio 2.2 


 


 


Blood Urea Nitrogen 14 


 


Creatinine 1.43 


 


Random Glucose 107 


 


Total Protein 5.4 


 


Albumin 2.1 


 


Calcium Level 8.1 


 


Alkaline Phosphatase 127 


 


Aspartate Amino Transf


(AST/SGOT) 96 


 


 


Alanine Aminotransferase


(ALT/SGPT) 45 


 


 


Total Bilirubin 21.9 


 


Sodium Level 134 


 


Potassium Level 3.4 


 


Chloride Level 104 


 


Carbon Dioxide Level 20.1 


 


Anion Gap 10 


 


Estimat Glomerular Filtration


Rate 59 


 


 


Ammonia 34 














 Date/Time


Source Procedure


Growth Status


 


 


 1/14/18 02:01


Blood Peripheral Aerobic Blood Culture - Preliminary


NO GROWTH IN 2 DAYS Resulted


 


 1/14/18 02:01


Blood Peripheral Anaerobic Blood Culture - Preliminary


NO GROWTH IN 2 DAYS Resulted


 


 1/15/18 19:40


Stool Stool Stool Occult Blood (MADELIN) - Final


HEMOCCULT NEGATIVE Complete


 


 1/15/18 17:29


Urine Clean Catch Urine Culture - Preliminary


NO GROWTH IN 24 HOURS. Resulted








Imaging





Last Impressions








Chest X-Ray 1/15/18 0000 Signed





Impressions: 





 Service Date/Time:  Monday, January 15, 2018 09:57 - CONCLUSION:  No acute 





 disease.  No significant change has occurred.       Dm Carroll MD 


 


Abdomen/Pelvis CT 1/13/18 1617 Signed





Impressions: 





 Service Date/Time:  Saturday, January 13, 2018 19:24 - CONCLUSION:  Massive 





 hepatomegaly. Worsening splenomegaly and portosystemic collaterals consistent 





 with worsening portal hypertension. Only trace ascites.     Jose Irvin MD 








Physical Exam


HEENT: Normocephalic; atraumatic; icterus 


CHEST:  Even/unlabored


CARDIAC:  RRR


ABDOMEN: Distended, firm, nontender, bowel sounds active 


EXTREMITIES: No clubbing, cyanosis, or edema.


SKIN:  Normal; no rash; (+) jaundice


CNS:  No focal deficits; alert and oriented times three.


 (Maria Recinos)





Assessment and Plan


Plan


Assessment:


- Alcoholic hepatitis- Labs consistent- currently AST-105 ALT-45 Alk phos-153 T 

bili 26.6 Ammonia-37.


  DF-78 Pt currently on Prednisone PO. 


  Pt has been on low dose Prednisone and Xifaxan and recently ran out of both 

medications.  Reports


  drinking three days in a row two weeks ago and has been drinking beer 

intermittently since then, states


  he has been using it to treat his anxiety since his PCP did not refill his 

Xanax.  Pts complaints that


  brought him to the ER include abdominal pain, nausea, vomiting, dehydration.  

Abdominal pain is 


  chronic and abdominal swelling, has never had enough ascites to drain or 

test.  Denies hematemesis,


  coffee ground emesis, hematochezia, and melena.  Would like to refrain from 

having any GI procedures


  unless absolutely necessary.  He is requesting to eat, regular diet ordered 

per attending.  Per sister


  pt already wanting to leave, has left AMA in the past. 


  CT abdomen  and pelvis W IV contrast (1/13) noted --> Massive hepatomegaly. 

Worsening splenomegaly


  and portosystemic collateral consistent with worsening portal hypertension.  

Only trace ascites. 


- CARLEE- ?hepatorenal vs dehydration 


  Of note, pt reports he can not take Lactulose because it caused severe 

colitis and dehydration leading 


  to CARLEE in the past 





(1/15) - Pt more awake today. Labs improving. AST-80 ALT-41.  Continued on 

Xifaxin and Prednisone.


  Current diet is 2gm sodium with Enlive supplement.  Pt did not receive Enlive 

with lunch tray so he


  has not tried it yet.  Reports nausea today and was only able to tolerate a 

few bites.  Denies vomiting.


  Has been having liquid stools.  He would still like to hold off on any 

endoscopic procedures at this 


  time.  





(1/16) - LFTs remain stable with some elevation. Currently AST-96 ALT-45 Alk 

phos-127 T bili-21.9.  


  Pt reports he is not eating at all.  However, per RN, pt has had family 

members bring him almost


  every meal and he has been eating the meals they have brought in.  Pt 

currently has a sandwich


  from [employer] sitting on bedside tray.  He reports just having vomited for 

the first time.  Still


  would not like to proceed with EGD at this time, because he does not want to 

be NPO for procedure.





Plan:


- Continue Xifaxan


- Continue Prednisone


- 2 gm sodium diet with supplements


- Consider beta blocker if blood pressure normalizes


- Monitor labs


- Supportives care


- ETOH cessation


- Notify GI of active bleeding


- Further recommendations to follow





Pt has been seen and examined by myself and Dr. Parker and this note is 

written on his behalf 


 (Maria Recinos)


Physician Comments


Patient seen and examined


Agree with above


Continue with current supportive care


Monitor labs


 (Zac Parker MD)











Maria Recinos Jan 16, 2018 16:49


Zac Parker MD Jan 16, 2018 22:18

## 2018-01-17 VITALS — HEART RATE: 96 BPM

## 2018-01-17 VITALS
SYSTOLIC BLOOD PRESSURE: 121 MMHG | HEART RATE: 97 BPM | RESPIRATION RATE: 20 BRPM | DIASTOLIC BLOOD PRESSURE: 62 MMHG | TEMPERATURE: 97.9 F | OXYGEN SATURATION: 97 %

## 2018-01-17 VITALS
OXYGEN SATURATION: 95 % | TEMPERATURE: 98.4 F | RESPIRATION RATE: 16 BRPM | HEART RATE: 97 BPM | SYSTOLIC BLOOD PRESSURE: 114 MMHG | DIASTOLIC BLOOD PRESSURE: 51 MMHG

## 2018-01-17 VITALS
HEART RATE: 106 BPM | SYSTOLIC BLOOD PRESSURE: 115 MMHG | TEMPERATURE: 98.7 F | DIASTOLIC BLOOD PRESSURE: 59 MMHG | RESPIRATION RATE: 22 BRPM

## 2018-01-17 VITALS
HEART RATE: 100 BPM | SYSTOLIC BLOOD PRESSURE: 125 MMHG | TEMPERATURE: 98.4 F | RESPIRATION RATE: 18 BRPM | OXYGEN SATURATION: 91 % | DIASTOLIC BLOOD PRESSURE: 60 MMHG

## 2018-01-17 VITALS
RESPIRATION RATE: 21 BRPM | DIASTOLIC BLOOD PRESSURE: 58 MMHG | SYSTOLIC BLOOD PRESSURE: 112 MMHG | OXYGEN SATURATION: 96 % | HEART RATE: 100 BPM | TEMPERATURE: 98.7 F

## 2018-01-17 VITALS
SYSTOLIC BLOOD PRESSURE: 127 MMHG | HEART RATE: 97 BPM | DIASTOLIC BLOOD PRESSURE: 57 MMHG | OXYGEN SATURATION: 93 % | TEMPERATURE: 99.8 F | RESPIRATION RATE: 17 BRPM

## 2018-01-17 VITALS — HEART RATE: 93 BPM

## 2018-01-17 VITALS — HEART RATE: 98 BPM

## 2018-01-17 VITALS — OXYGEN SATURATION: 93 %

## 2018-01-17 VITALS — OXYGEN SATURATION: 98 %

## 2018-01-17 LAB
ALBUMIN SERPL-MCNC: 2.4 GM/DL (ref 3.4–5)
ALP SERPL-CCNC: 143 U/L (ref 45–117)
ALT SERPL-CCNC: 72 U/L (ref 12–78)
AST SERPL-CCNC: 165 U/L (ref 15–37)
BASOPHILS NFR BLD AUTO: 1 % (ref 0–2)
BILIRUB SERPL-MCNC: 24.7 MG/DL (ref 0.2–1)
BUN SERPL-MCNC: 12 MG/DL (ref 7–18)
CALCIUM SERPL-MCNC: 8.5 MG/DL (ref 8.5–10.1)
CHLORIDE SERPL-SCNC: 102 MEQ/L (ref 98–107)
CREAT SERPL-MCNC: 1.39 MG/DL (ref 0.6–1.3)
ERYTHROCYTE [DISTWIDTH] IN BLOOD BY AUTOMATED COUNT: 17.7 % (ref 11.6–17.2)
GFR SERPLBLD BASED ON 1.73 SQ M-ARVRAT: 61 ML/MIN (ref 89–?)
GLUCOSE SERPL-MCNC: 71 MG/DL (ref 74–106)
HCO3 BLD-SCNC: 21.5 MEQ/L (ref 21–32)
HCT VFR BLD CALC: 28.2 % (ref 39–51)
HGB BLD-MCNC: 9.9 GM/DL (ref 13–17)
INR PPP: 2.6 RATIO
LYMPHOCYTES: 11 % (ref 9–44)
MCH RBC QN AUTO: 36.9 PG (ref 27–34)
MCHC RBC AUTO-ENTMCNC: 35.1 % (ref 32–36)
MCV RBC AUTO: 105.1 FL (ref 80–100)
MONOCYTES: 8 % (ref 0–8)
NEUTS BAND # BLD MANUAL: 4.6 TH/MM3 (ref 1.8–7.7)
NEUTS BAND NFR BLD: 11 % (ref 0–6)
NEUTS SEG NFR BLD MANUAL: 69 % (ref 16–70)
PLATELET # BLD: 112 TH/MM3 (ref 150–450)
PMV BLD AUTO: 10.1 FL (ref 7–11)
PROT SERPL-MCNC: 6.2 GM/DL (ref 6.4–8.2)
PROTHROMBIN TIME: 25.9 SEC (ref 9.8–11.6)
RBC # BLD AUTO: 2.68 MIL/MM3 (ref 4.5–5.9)
SODIUM SERPL-SCNC: 133 MEQ/L (ref 136–145)
TARGETS BLD QL SMEAR: (no result)
WBC # BLD AUTO: 5.7 TH/MM3 (ref 4–11)

## 2018-01-17 RX ADMIN — FAMOTIDINE SCH MG: 20 TABLET, FILM COATED ORAL at 07:50

## 2018-01-17 RX ADMIN — PHYTONADIONE SCH MG: 10 INJECTION, EMULSION INTRAMUSCULAR; INTRAVENOUS; SUBCUTANEOUS at 07:51

## 2018-01-17 RX ADMIN — PHENYTOIN SODIUM SCH MLS/HR: 50 INJECTION INTRAMUSCULAR; INTRAVENOUS at 07:52

## 2018-01-17 RX ADMIN — IPRATROPIUM BROMIDE AND ALBUTEROL SULFATE SCH AMPULE: .5; 3 SOLUTION RESPIRATORY (INHALATION) at 11:57

## 2018-01-17 RX ADMIN — CHLORHEXIDINE GLUCONATE SCH PACK: 500 CLOTH TOPICAL at 04:00

## 2018-01-17 RX ADMIN — SPIRONOLACTONE SCH MG: 25 TABLET, FILM COATED ORAL at 07:51

## 2018-01-17 RX ADMIN — AZITHROMYCIN SCH MG: 250 TABLET, FILM COATED ORAL at 07:51

## 2018-01-17 RX ADMIN — ACYCLOVIR SCH TAB: 800 TABLET ORAL at 07:51

## 2018-01-17 RX ADMIN — IPRATROPIUM BROMIDE AND ALBUTEROL SULFATE SCH AMPULE: .5; 3 SOLUTION RESPIRATORY (INHALATION) at 19:23

## 2018-01-17 RX ADMIN — IPRATROPIUM BROMIDE AND ALBUTEROL SULFATE SCH AMPULE: .5; 3 SOLUTION RESPIRATORY (INHALATION) at 04:00

## 2018-01-17 RX ADMIN — IPRATROPIUM BROMIDE AND ALBUTEROL SULFATE SCH AMPULE: .5; 3 SOLUTION RESPIRATORY (INHALATION) at 10:43

## 2018-01-17 RX ADMIN — RIFAXIMIN SCH MG: 550 TABLET ORAL at 07:50

## 2018-01-17 RX ADMIN — IPRATROPIUM BROMIDE AND ALBUTEROL SULFATE SCH AMPULE: .5; 3 SOLUTION RESPIRATORY (INHALATION) at 15:49

## 2018-01-17 RX ADMIN — POTASSIUM CHLORIDE SCH MEQ: 20 TABLET, EXTENDED RELEASE ORAL at 17:18

## 2018-01-17 RX ADMIN — Medication SCH TAB: at 17:18

## 2018-01-17 RX ADMIN — IPRATROPIUM BROMIDE AND ALBUTEROL SULFATE SCH AMPULE: .5; 3 SOLUTION RESPIRATORY (INHALATION) at 00:00

## 2018-01-17 RX ADMIN — Medication SCH TAB: at 07:50

## 2018-01-17 RX ADMIN — Medication SCH TAB: at 17:37

## 2018-01-17 RX ADMIN — RIFAXIMIN SCH MG: 550 TABLET ORAL at 20:39

## 2018-01-17 RX ADMIN — POTASSIUM CHLORIDE SCH MEQ: 20 TABLET, EXTENDED RELEASE ORAL at 20:39

## 2018-01-17 RX ADMIN — CEFTRIAXONE SODIUM SCH MLS/HR: 2 INJECTION, POWDER, FOR SOLUTION INTRAMUSCULAR; INTRAVENOUS at 17:34

## 2018-01-17 NOTE — HHI.GIFU
Subjective


Remarks


Low-grade fever 99.8


Moved out to private room, day 1


Dozing lightly but responds to verbal stimuli


Cough loose, mild audible wheezing noted


 (Ann Pressley)





Objective


Vitals I&O





Vital Signs








  Date Time  Temp Pulse Resp B/P (MAP) Pulse Ox O2 Delivery O2 Flow Rate FiO2


 


1/17/18 12:00 99.8 97 17 127/57 (80) 93   


 


1/17/18 10:44     98   


 


1/17/18 08:00  100      


 


1/17/18 08:00 98.7 106 22 115/59 (77)    


 


1/17/18 07:00  96      


 


1/17/18 06:00  93      


 


1/17/18 04:00  97      


 


1/17/18 04:00 97.9 97 20 121/62 (81) 97   


 


1/17/18 02:00  98      


 


1/17/18 00:00  100      


 


1/17/18 00:00 98.7 100 21 112/58 (76) 96   


 


1/16/18 22:00  113      


 


1/16/18 20:00  104      


 


1/16/18 20:00 98.9 104 20 119/62 (81) 98   


 


1/16/18 19:44     97   21


 


1/16/18 18:00  101      


 


1/16/18 17:51   20     


 


1/16/18 16:00  104      


 


1/16/18 16:00 99.2 104 23 127/61 (83) 94   














I/O      


 


 1/16/18 1/16/18 1/16/18 1/17/18 1/17/18 1/17/18





 07:00 15:00 23:00 07:00 15:00 23:00


 


Intake Total 925 ml 1000 ml 1750 ml 1330 ml  


 


Balance 925 ml 1000 ml 1750 ml 1330 ml  


 


      


 


Intake Oral 480 ml  750 ml 480 ml  


 


IV Total 445 ml 1000 ml 1000 ml 850 ml  


 


# Voids 3  3 4  


 


# Bowel Movements 1  2 2  








Laboratory





Laboratory Tests








Test


  1/17/18


07:35


 


White Blood Count 5.7 


 


Red Blood Count 2.68 


 


Hemoglobin 9.9 


 


Hematocrit 28.2 


 


Mean Corpuscular Volume 105.1 


 


Mean Corpuscular Hemoglobin 36.9 


 


Mean Corpuscular Hemoglobin


Concent 35.1 


 


 


Red Cell Distribution Width 17.7 


 


Platelet Count 112 


 


Mean Platelet Volume 10.1 


 


CBC Comment AUTO DIFF 


 


Differential Total Cells


Counted 100 


 


 


Neutrophils % (Manual) 69 


 


Band Neutrophils % 11 


 


Lymphocytes % 11 


 


Monocytes % 8 


 


Basophils % 1 


 


Neutrophils # (Manual) 4.6 


 


Differential Comment


  FINAL DIFF


MANUAL


 


Platelet Estimate LOW 


 


Platelet Morphology Comment NORMAL 


 


Target Cells 1+ 


 


Prothrombin Time 25.9 


 


Prothromb Time International


Ratio 2.6 


 


 


Blood Urea Nitrogen 12 


 


Creatinine 1.39 


 


Random Glucose 71 


 


Total Protein 6.2 


 


Albumin 2.4 


 


Calcium Level 8.5 


 


Alkaline Phosphatase 143 


 


Aspartate Amino Transf


(AST/SGOT) 165 


 


 


Alanine Aminotransferase


(ALT/SGPT) 72 


 


 


Total Bilirubin 24.7 


 


Sodium Level 133 


 


Potassium Level 3.4 


 


Chloride Level 102 


 


Carbon Dioxide Level 21.5 


 


Anion Gap 10 


 


Estimat Glomerular Filtration


Rate 61 


 














 Date/Time


Source Procedure


Growth Status


 


 


 1/14/18 02:01


Blood Peripheral Aerobic Blood Culture - Preliminary


NO GROWTH IN 3 DAYS Resulted


 


 1/14/18 02:01


Blood Peripheral Anaerobic Blood Culture - Preliminary


NO GROWTH IN 3 DAYS Resulted


 


 1/15/18 19:40


Stool Stool Stool Occult Blood (MADELIN) - Final


HEMOCCULT NEGATIVE Complete


 


 1/15/18 17:29


Urine Clean Catch Urine Culture - Final


NO GROWTH IN 48 HOURS. Complete








Imaging





Last Impressions








Chest X-Ray 1/15/18 0000 Signed





Impressions: 





 Service Date/Time:  Monday, January 15, 2018 09:57 - CONCLUSION:  No acute 





 disease.  No significant change has occurred.       Dm Carroll MD 


 


Abdomen/Pelvis CT 1/13/18 1617 Signed





Impressions: 





 Service Date/Time:  Saturday, January 13, 2018 19:24 - CONCLUSION:  Massive 





 hepatomegaly. Worsening splenomegaly and portosystemic collaterals consistent 





 with worsening portal hypertension. Only trace ascites.     Jose Irvin MD 








Physical Exam


HEENT: Normocephalic; atraumatic; icterus continues


CHEST:  Even/unlabored at rest, productive cough audible wheezing when awake 

and talking


CARDIAC:  RRR


ABDOMEN: Moderate bloating, taut, nontender, bowel sounds active 


EXTREMITIES: No clubbing, cyanosis, or edema.


SKIN:  Normal; no rash; (+) jaundice


CNS:  No focal deficits; alert and oriented times three.


 (Ann Pressley)





Assessment and Plan


Plan


Assessment: And recent history


- Alcoholic hepatitis- Labs consistent- current labs include hemoglobin 9.9, 

LFTs 165/72, alkaline phosphatase 143, INR 2.6, bilirubin 24.7


  DF-78 Pt currently on Prednisone PO. 


  Pt has been on low dose Prednisone and Xifaxan and recently ran out of both 

medications.  Reports


  drinking three days in a row two weeks ago and has been drinking beer 

intermittently.  Initially stated,  Would like to refrain from having any GI 

procedures


  unless absolutely necessary.  He is requesting to eat, regular diet ordered 

per attending.  Per sister


  pt already wanting to leave, has left AMA in the past.  Discussed again on 01/ 17/18 with mother present in the room the advantages for endoscopy for 

treatment as well as preventative measures. 


  CT abdomen  and pelvis W IV contrast (1/13) noted --> Massive hepatomegaly. 

Worsening splenomegaly


  and portosystemic collateral consistent with worsening portal hypertension.  

Only trace ascites. 


- Anemia probable secondary to chronic disease, and his alcoholic dietary 

habits currently 9.9 but no obvious bleeding


  Of note, pt reports he can not take Lactulose because it caused severe 

colitis and dehydration leading 


  to CARLEE in the past 








Plan:


- Increase activity and room


- Continue Xifaxan and prednisone


- Consider EGD if patient would agree.


- 2 gm sodium diet recommended the patient eats fast food from his family


- Consider beta blocker if blood pressure normalizes


- Monitor labs


- Supportives care


- Notify GI of active bleeding





- Further recommendations to follow


Pt has been seen and examined by myself and Dr. Parker and this note is 

written on his behalf 


 (Ann Pressley)


Physician Comments


Patient seen and examined


Agree with above


Continue with current supportive care


Monitor labs


Overall poor prognosis


 (Zac Parker MD)











Ann Pressley Jan 17, 2018 15:01


Zac Parker MD Jan 17, 2018 22:03

## 2018-01-17 NOTE — HHI.FPPN
Subjective


Remarks


Continued to discuss with him that he will almost certainly die if he does not 

abstain complately from alcohol and he will likely need a liver transplant with 

the way he is going. He knows he cannot have a transplant for many months as he 

last drank 2 weeks ago. I recommended to him that he go back to inpatient 

rehab. he said, "I've been to inpatient rehab twice for 3 months each time and I

'm not interested in going there at this time." He stated he could do well with 

his 12 step and other measures. I told him I was afraid that back in his usual 

environment, he would go back to drinking again. he said he was concerned too 

but believed he could abstain and in any case regardless he did not want any 

rehab.





Objective


Vitals





Vital Signs








  Date Time  Temp Pulse Resp B/P (MAP) Pulse Ox O2 Delivery O2 Flow Rate FiO2


 


1/17/18 08:00  100      


 


1/17/18 08:00 98.7 106 22 115/59 (77)    


 


1/17/18 07:00  96      


 


1/17/18 06:00  93      


 


1/17/18 04:00  97      


 


1/17/18 04:00 97.9 97 20 121/62 (81) 97   


 


1/17/18 02:00  98      


 


1/17/18 00:00  100      


 


1/17/18 00:00 98.7 100 21 112/58 (76) 96   


 


1/16/18 22:00  113      


 


1/16/18 20:00  104      


 


1/16/18 20:00 98.9 104 20 119/62 (81) 98   


 


1/16/18 19:44     97   21


 


1/16/18 18:00  101      


 


1/16/18 17:51   20     


 


1/16/18 16:00  104      


 


1/16/18 16:00 99.2 104 23 127/61 (83) 94   


 


1/16/18 14:00  113      


 


1/16/18 12:00  119      


 


1/16/18 12:00 101.8 119 24 121/58 (79) 97   


 


1/16/18 11:36     95   


 


1/16/18 10:00  114      














I/O      


 


 1/16/18 1/16/18 1/16/18 1/17/18 1/17/18 1/17/18





 06:59 14:59 22:59 06:59 14:59 22:59


 


Intake Total 925 ml 1000 ml 1750 ml 1330 ml  


 


Balance 925 ml 1000 ml 1750 ml 1330 ml  


 


      


 


Intake Oral 480 ml  750 ml 480 ml  


 


IV Total 445 ml 1000 ml 1000 ml 850 ml  


 


# Voids 3  3 4  


 


# Bowel Movements 1  2 2  








Result Diagram:  


1/16/18 0456                                                                   

             1/16/18 0456





Objective Remarks


GEN: Well-developed, well-nourished patient. No acute distress.  Obviously 

jaundiced including eyes, generalized skin.


CV: Increased rate but with regular rhythm.  Grade 2/6 ZEYAD present.


LUNGS: No accessory muscle use. Wheezing and crackles of the left lower lobe.


GI: Soft, tender to palpation.  Distended.


EXT: No calf tenderness or lower extremity edema.


NEURO/PSYCH: Awake, alert. Appropriate insight and judgment. Normal speech





A/P


Assessment and Plan


27-year-old male with history of alcoholism, liver cirrhosis, hepatorenal 

syndrome.  Admitted for liver cirrhosis/hepatorenal syndrome, sepsis.


Discharge Planning


2-3 days pending improvement in renal function and INR.


Problem List:  


(1) Cirrhosis of liver


ICD Codes:  K74.60 - Unspecified cirrhosis of liver


Status:  Chronic


Plan:   History of severe liver cirrhosis associated with jaundice. Meld score 

of 36.6.  Reports most recent alcohol abuse relapse was about 2 weeks ago. No 

signs of withdrawal  CT abdomen significant for massive hepatomegaly and 

worsening splenomegaly and portalsystemic collaterals consistent with worsening 

portal hypertension.  Only trace ascites.  Reported baseline INR is around 1.3 

and hemoglobin baseline of around 10.


-Transfuse 1 unit FFP due to 1 episode of nosebleed in the setting of elevated 

INR


* closely monitor for acute bleeds


* INR improving


-elevated ammonia, down trending


-Hx of chronic prednisone use since 3/2017 with most recent use of 5mg daily. 

Increased temporarily for stress response, will drop back to 5mg daily


GI consulted: appreciate recommendations


* Follows with Dr. Benavides


* Continue Xifaxan and Prednisone


* Consider beta blocker if blood pressure normalizes





Medications:


* Vitamin K 10mg x3 days (1/15-1/17)


* Rocephin 2g daily for possible SBP (1/13-


* Xifaxan 550 mg BID


* oxycodone for pain


* prednisone 10mg daily


* Spironolactone 25mg daily





(2) Hepatorenal syndrome


ICD Codes:  K76.7 - Hepatorenal syndrome


Status:  Chronic


Plan:  HX of hepatorenal syndrome. CT abdomen showed hepatomegaly and  

worsening portal hypertension. Baseline Cr around 1. Admission Cr 1.82


-Monitor Cr for improvement


-see more detailed plan under liver cirrhosis





(3) Sepsis


ICD Codes:  A41.9 - Sepsis, unspecified organism


Status:  Acute


Plan:  Met sepsis criteria at admission with fever and tachycardia. However 

sister reports pt's baseline HR is around 


-Suspect symptoms may related to cirrhosis. Clinically patient is stable.


-Associated with CARLEE but continues to improve.


-Blood cultures negative x2 days


-urine culture pending


-UA and CXR (including repeat) unremarkable.


-continue hydration and abx as described above.





(4) CAP (community acquired pneumonia)


ICD Codes:  J18.9 - Pneumonia, unspecified organism


Status:  Acute


Plan:  Patient complaining of sore throat  and cough that developed prior to 

admission. Pt with hoarse voice on admission. Hx of strep throat. Already on 

abx that would provide coverage of strep. However, PE is now significant for 

Left lower lobe crackles and wheeze that may have become more prominent as 

hydration improved. Will cover for atypicals especially since patient continues 

to recurrent fever.


-Azithromycin 500mg (1/16-


-cepacol lozenges


-Repeat CXR unremarkable.


-albuterol +nebulizer ordered


-incentive spirometry





(5) Heart murmur


ICD Codes:  R01.1 - Cardiac murmur, unspecified


Plan:  Murmur louder on admission, has improved. Likely related to dehydration 

and anemia.


-monitor and consider echo





(6) Macrocytic anemia


ICD Codes:  D53.9 - Nutritional anemia, unspecified


Plan:  Likely related to alcohol use.


-B12 normal 


-Folate minimally low at 3.0


-multivitamin ordered





(7) Alcohol dependence


ICD Codes:  F10.20 - Alcohol dependence, uncomplicated


Status:  Chronic


Plan:  Pt reports relapse x2 due to issue with xanax. No signs of withdrawal at 

this time.


-Recommended continued AA meeting attendance


-[facility]. Will attempt to send to drug/

alcohol rehab at discharge





(8) Depression


ICD Codes:  F32.9 - Major depressive disorder, single episode, unspecified


Plan:  Hx of depression. Previously on Paxil but not good with taking it every 

day


-Fluoxetine 20mg started. this is a good choice as he will not withdrawal from 

it even if he stops suddenly





(9) FEN


Plan:  Fluids: NS at 110mls/hr, will heplock soon as he is taking good po but 

his renal fxn is improving a bit each day so can continue


Nutrition: Regular, 2g salt restriction


Electrolytes: mild hyponatremia improving. Hypokalemia, replete 


DVT prophylaxis: SCDs, INR currently >2


GI prophylaxis: Zantac 150 mg daily


will add K today only 20 meq BID, can recheck








Problem Qualifiers





(1) Cirrhosis of liver:  


Qualified Codes:  K70.31 - Alcoholic cirrhosis of liver with ascites


(2) CAP (community acquired pneumonia):  


Qualified Codes:  J18.1 - Lobar pneumonia, unspecified organism








Jany Lam MD Jan 17, 2018 08:53

## 2018-01-17 NOTE — PD.PSY.CON
Provisional Diagnosis


Admission Date


Jan 13, 2018 at 19:50


Axis I.


Major depressive disorder, recurrent, moderate, without psychosis, severe 

alcohol use disorder


Axis II.


Deferred


Axis III.


Alcohol cirrhosis, hepatorenal syndrome





History of Present Illness


Service


Psychiatry


Consult Requested By


Medicine team


Reason for Consult


Depression


Primary Care Physician


Xuan Erazo MD


HPI


The patient is a 27-year-old  man, domiciled with his girlfriend, 

employed as a , well known by the psychiatric service, he has 

psychiatric history of depression, anxiety, ADHD and conduct disorder as a child

, he has a severe alcohol use disorder, about 4 psychiatric hospitalizations in 

the past,No previous suicidal attempts, patient has been treated with Prozac 20 

mg and Xanax 1 mg 3 times a day PCP, he claims that he has been stable in the 

psychotropic regimen for certain period of time, patient was seen by me last 

year for a very similar situation that this one, he has medical history of 

cirrhosis complicated by varices and hepatorenal syndrome presenting w/ 

jaundice.  Patient was consulted to psychiatry due to symptoms of depression 

and anxiety.  He was sitting in the ICU.  Chart was reviewed.  Collateral 

information from his sister Radha was obtained.  On psychiatric evaluation 

today the patient is calm, cooperative, very pleasant, as usual.  Patient 

recognized me immediately from previous encounters.  He immediately make an 

insightful comment about his current medical situation and the deterioration of 

his health secondary to increased alcohol use.  He says that he was doing very 

good after his last hospitalization, he was about 8 months sober, taking his 

psychotropics medications, happy, working, functioning as his normal level, but 

about a month ago he is started to feel "at the sense of worthlessness, 

helplessness, emptiness"and he relapsed on alcohol, at the beginning just to 3 

times per week, but last week I engage in a binge of 3 consecutive days drinking

".  The patient reports that he has been having episodes of severe depression 

and this is the reason he relapsed in alcohol.  He also reports that he has 

been very anxious and has been drinking alcohol for anxiety.  He says that the 

Xanax 1 mg 3 times a day help him to be sober for 8 months.  At this moment the 

patient reports okay mood, he says that he is motivated at this time to 

continue medical treatment, to achieve sobriety through medications and 

rehabilitation program if possible.  He says that one of the things that he 

needs to do is to quit his job, "I already did that 2 days ago".  He is 

motivated to restart his psychotropics for depression "I am ready to fight 

against alcoholism".  The patient is fully oriented 3, no fluctuation of 

consciousness, no attention deficit present at this moment.  No symptoms of 

withdrawal, no agitation, no aggressive behavior present either.  He denies the 

use of illegal drugs, reports variable use of alcohol, at 3-4 drinks per day, 

but other times evening 1-2 L of hard liquor.





Review of Systems


Constitutional:  DENIES: Diaphoretic episodes, Fatigue, Fever, Weight gain, 

Weight loss, Chills, Dizziness, Change in appetite, Night Sweats


Endocrine:  DENIES: Heat/cold intolerance, Polydipsia, Polyuria, Polyphagia


Eyes:  DENIES: Blurred vision, Diplopia, Eye inflammation, Eye pain, Vision loss

, Photosensitivity, Double Vision


Ears, nose, mouth, throat:  DENIES: Tinnitus, Hearing loss, Vertigo, Nasal 

discharge, Oral lesions, Throat pain, Hoarseness, Ear Pain, Running Nose, 

Epistaxis, Sinus Pain, Toothache, Odynophagia


Respiratory:  DENIES: Apneas, Cough, Snoring, Wheezing, Hemoptysis, Sputum 

production, Shortness of breath


Cardiovascular:  DENIES: Chest pain, Palpitations, Syncope, Dyspnea on Exertion

, PND, Lower Extremity Edema, Orthopnea, Claudication


Gastrointestinal:  DENIES: Abdominal pain, Black stools, Bloody stools, 

Constipation, Diarrhea, Nausea, Vomiting, Difficulty Swallowing, Anorexia


Genitourinary:  DENIES: Sexual dysfunction, Urinary frequency, Urinary 

incontinence, Urgency, Hematuria, Dysuria, Nocturia, Penile Discharge, 

Testicular Pain, Testicular Swelling


Musculoskeletal:  DENIES: Joint pain, Muscle aches, Stiffness, Joint Swelling, 

Back pain, Neck pain


Integumentary:  DENIES: Abnormal pigmentation, Nail changes, Pruritus, Rash


Hematologic/lymphatic:  DENIES: Bruising, Lymphadenopathy


Immunologic/allergic:  DENIES: Eczema, Urticaria


Neurologic:  DENIES: Abnormal gait, Headache, Localized weakness, Paresthesias, 

Seizures, Speech Problems, Tremor, Poor Balance


Psychiatric:  COMPLAINS OF: Anxiety, Mood changes, Depression, DENIES: Confusion

, Hallucinations, Agitation, Suicidal Ideation, Homicidal Ideation, Delusions


Except as stated in HPI:  all other systems reviewed are Neg





Past Family Social History


Coded Allergies:  


     lactulose (Verified  Allergy, Unknown, 1/13/18)


Active Scripts


Pentoxifylline ER (Pentoxifylline ER) 400 Mg Tab, 400 MG PO TID for Inflammation

, #84 TAB 0 Refills


   For 28 days of treatment for severe alcoholic hepatitis


   Prov:Tori Cordero MD         1/11/18


Spironolactone (Spironolactone) 100 Mg Tab, 25 MG PO BID, #30 TAB 0 Refills


   Prov:Tori Cordero MD         1/11/18


Furosemide (Furosemide) 40 Mg Tab, 40 MG PO DAILY, #30 TAB 0 Refills


   Prov:Tori Cordero MD         1/11/18


Ranitidine (Zantac) 150 Mg Tab, 150 MG PO DAILY for Reduce Stomach Acid, #90 

TAB 1 Refill


   Prov:Tori Cordero MD         1/11/18


Alprazolam (Alprazolam) 1 Mg Tab, 1 MG PO DAILY Y for ANXIETY, #60 TAB 1 Refill


   Prov:Tori Cordero MD         1/9/18


Rifaximin (Xifaxan) 550 Mg Tab, 550 MG PO BID for Immunosuppression, #60 TAB


   Prov:Tori Cordero MD         1/9/18


Discontinued Scripts


Paroxetine (Paroxetine) 20 Mg Tab, 20 MG PO DAILY for Anxiety and/or Insomnia, #

30 TAB 1 Refill


   Prov:Tori Cordero MD         1/11/18


Ondansetron (Zofran) 4 Mg Tab, 4 MG PO Q8HR Y for NAUSEA OR VOMITING, #84 TAB 0 

Refills


   Prov:Tori Cordero MD         1/11/18





Current Medications








 Medications


  (Trade)  Dose


 Ordered  Sig/Brittany


 Route  Start Time


 Stop Time Status Last Admin


 


  (NS Flush)  2 ml  UNSCH  PRN


 IV FLUSH  1/13/18 16:30


    1/13/18 16:38


 


 


  (Xifaxan)  550 mg  BID


 PO  1/14/18 09:00


    1/17/18 07:50


 


 


  (Pepcid)  20 mg  DAILY


 PO  1/14/18 09:00


    1/17/18 07:50


 


 


  (Tessalon)  100 mg  TID  PRN


 PO  1/13/18 21:30


     


 


 


  (Zofran Inj)  4 mg  BID  PRN


 IV PUSH  1/13/18 22:15


 1/20/18 22:14   


 


 


 Ceftriaxone


 Sodium 2000 mg/


 Sodium Chloride  100 ml @ 


 200 mls/hr  Q24H


 IV  1/14/18 19:00


    1/16/18 17:48


 


 


 Miscellaneous


 Information  Patient in


 critical care


 unit?  Ass...  [id number]


 .XX  1/14/18 04:45


    1/14/18 04:45


 


 


  (Chlorhexidine


 2% Cloth)  3 pack  DAILY@04


 TOPICAL  1/15/18 04:00


 1/19/18 04:01  1/17/18 04:00


 


 


  (Chlorhexidine


 2% Cloth)  3 pack  UNSCH  PRN


 TOPICAL  1/14/18 04:45


 1/19/18 04:36   


 


 


  (Lactinex)  1 tab  TID


 PO  1/14/18 10:00


    1/17/18 07:50


 


 


  (Roxicodone)  5 mg  Q6H  PRN


 PO  1/14/18 09:00


    1/17/18 03:33


 


 


  (PROzac)  20 mg  DAILY


 PO  1/14/18 11:00


    1/17/18 07:51


 


 


  (Cepacol Extra


 Ridge (Sugar Free))  1 lozenge  Q2HR  PRN


 BUCCAL  1/14/18 09:00


    1/14/18 14:17


 


 


  (Atarax)  10 mg  Q6H  PRN


 PO  1/14/18 09:30


     


 


 


  (Xanax)  1 mg  BID


 PO  1/14/18 09:45


    1/17/18 07:51


 


 


  (Aldactone)  25 mg  DAILY


 PO  1/15/18 09:00


    1/17/18 07:51


 


 


  ([place])  1 lozenge  Q1H  PRN


 BUCCAL  1/14/18 13:00


     


 


 


  (Albuterol Neb)  2.5 mg  Q2HR NEB  PRN


 NEB  1/15/18 09:45


     


 


 


  (Duoneb Neb)  1 ampule  Q4HR  NEB


 NEB  1/15/18 12:00


    1/17/18 10:43


 


 


  (Theragran)  1 tab  DAILY


 PO  1/16/18 09:00


    1/17/18 07:51


 


 


  (Zithromax)  500 mg  Q24H


 PO  1/16/18 10:00


    1/17/18 07:51


 


 


  (Deltasone)  5 mg  DAILY


 PO  1/18/18 09:00


     


 


 


  (KCl)  20 meq  Q12HR


 PO  1/17/18 13:15


 1/19/18 09:00 UNV  


 








Family Psych History


Patient has several family members with alcoholism


Social History


Patient was born and raised in [place], he lives in [place] with his 

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 

education is an associate degree in college.


Patient's Strengths (min. 2)


Patient has an excellent family support, he is in the contemplation state of 

addiction, ready to take action





Physical Exam


At this moment no tremors, no EPS, no withdrawal symptoms, no marked 

psychomotor retardation or agitation present.   Jaundice present.


Vital Signs





Vital Signs








  Date Time  Temp Pulse Resp B/P (MAP) Pulse Ox O2 Delivery O2 Flow Rate FiO2


 


1/17/18 12:00 99.8 97 17 127/57 (80) 93   


 


1/16/18 19:44        21


 


1/14/18 11:36      Nasal Cannula 3.00 














I/O   


 


 1/17/18 1/17/18 1/18/18





 08:00 16:00 00:00


 


Intake Total 1330 ml  


 


Balance 1330 ml  








Lab Results











Test


  1/17/18


07:35


 


White Blood Count 5.7 TH/MM3 


 


Red Blood Count 2.68 MIL/MM3 


 


Hemoglobin 9.9 GM/DL 


 


Hematocrit 28.2 % 


 


Mean Corpuscular Volume 105.1 FL 


 


Mean Corpuscular Hemoglobin 36.9 PG 


 


Mean Corpuscular Hemoglobin


Concent 35.1 % 


 


 


Red Cell Distribution Width 17.7 % 


 


Platelet Count 112 TH/MM3 


 


Mean Platelet Volume 10.1 FL 


 


CBC Comment AUTO DIFF 


 


Differential Total Cells


Counted 100 


 


 


Neutrophils % (Manual) 69 % 


 


Band Neutrophils % 11 % 


 


Lymphocytes % 11 % 


 


Monocytes % 8 % 


 


Basophils % 1 % 


 


Neutrophils # (Manual) 4.6 TH/MM3 


 


Differential Comment


  FINAL DIFF


MANUAL


 


Platelet Estimate LOW 


 


Platelet Morphology Comment NORMAL 


 


Target Cells 1+ 


 


Prothrombin Time 25.9 SEC 


 


Prothromb Time International


Ratio 2.6 RATIO 


 


 


Blood Urea Nitrogen 12 MG/DL 


 


Creatinine 1.39 MG/DL 


 


Random Glucose 71 MG/DL 


 


Total Protein 6.2 GM/DL 


 


Albumin 2.4 GM/DL 


 


Calcium Level 8.5 MG/DL 


 


Alkaline Phosphatase 143 U/L 


 


Aspartate Amino Transf


(AST/SGOT) 165 U/L 


 


 


Alanine Aminotransferase


(ALT/SGPT) 72 U/L 


 


 


Total Bilirubin 24.7 MG/DL 


 


Sodium Level 133 MEQ/L 


 


Potassium Level 3.4 MEQ/L 


 


Chloride Level 102 MEQ/L 


 


Carbon Dioxide Level 21.5 MEQ/L 


 


Anion Gap 10 MEQ/L 


 


Estimat Glomerular Filtration


Rate 61 ML/MIN 


 














 Date/Time


Source Procedure


Growth Status


 


 


 1/14/18 02:01


Blood Peripheral Aerobic Blood Culture - Preliminary


NO GROWTH IN 3 DAYS Resulted


 


 1/14/18 02:01


Blood Peripheral Anaerobic Blood Culture - Preliminary


NO GROWTH IN 3 DAYS Resulted


 


 1/15/18 19:40


Stool Stool Stool Occult Blood (MADELIN) - Final


HEMOCCULT NEGATIVE Complete


 


 1/15/18 17:29


Urine Clean Catch Urine Culture - Final


NO GROWTH IN 48 HOURS. Complete











Mental Status Examination


Appearance:  Appropriate


Consciousness:  Alert


Orientation:  x4


Motor Activity:  Normal gait


Speech:  Unremarkable


Language:  Adequate


Fund of Knowledge:  Adequate


Attention and Concentration:  Adequate


Memory:  Unremarkable


Mood:  Appropriate


Affect:  Appropriate


Thought Process & Associations:  Intact


Thought Content:  Appropriate


Hallucination Type:  None


Delusion Type:  None


Suicidal Ideation:  No


Suicidal Plan:  No


Suicidal Intention:  No


Homicidal Ideation:  No


Homicidal Plan:  No


Homicidal Intention:  No


Insight:  Fair


Judgment:  Impulsive





Assessment & Plan


Problem List:  


(1) Depression


ICD Codes:  F32.9 - Major depressive disorder, single episode, unspecified


Assessment & Plan:  On psychiatric evaluation today the patient reports 

increased symptomatology of depression in the last 2 months, he reports 

episodic sense of hopelessness, helplessness, emptiness, no enjoying life, 

which brings as a consequence relapsed in alcohol.  He also reports increased 

anxiety in the context of not taking his Xanax 1 mg 3 times a day.  The patient 

denies suicidal and homicidal ideation, he denies visual and auditory 

hallucinations.  He is oriented 3, no fluctuation of consciousness, no 

attention deficit present.  He is logical, coherent and relevant, no paranoia, 

no delusions, no agitation or other psychotic symptoms present.  Patient would 

benefit of restart Prozac 20 mg for his depression.  Will start Ativan 1 mg 

twice a day for anxiety, since Ativan is less harmful than Xanax in the liver.  

Patient is in a contemplation state of his alcohol addiction and ready to take 

actions, the patient is very motivated to be discharged to a rehabilitation 

program and to achieve sobriety.  Extensive support and psychoeducation 

provided.  He does not meet criteria for involuntary psychiatric admission at 

this moment.  I will follow-up in the medical floor.





Assessment & Plan


Estimated LOS:  days





Problem Qualifiers





(1) Depression:  








Chandu Turner MD Jan 17, 2018 13:56

## 2018-01-18 VITALS
TEMPERATURE: 98.9 F | DIASTOLIC BLOOD PRESSURE: 50 MMHG | OXYGEN SATURATION: 95 % | RESPIRATION RATE: 16 BRPM | HEART RATE: 93 BPM | SYSTOLIC BLOOD PRESSURE: 102 MMHG

## 2018-01-18 VITALS
DIASTOLIC BLOOD PRESSURE: 54 MMHG | RESPIRATION RATE: 19 BRPM | HEART RATE: 95 BPM | TEMPERATURE: 98.2 F | SYSTOLIC BLOOD PRESSURE: 116 MMHG | OXYGEN SATURATION: 91 %

## 2018-01-18 VITALS
DIASTOLIC BLOOD PRESSURE: 60 MMHG | OXYGEN SATURATION: 96 % | HEART RATE: 94 BPM | TEMPERATURE: 98.1 F | RESPIRATION RATE: 16 BRPM | SYSTOLIC BLOOD PRESSURE: 104 MMHG

## 2018-01-18 VITALS
DIASTOLIC BLOOD PRESSURE: 56 MMHG | OXYGEN SATURATION: 96 % | RESPIRATION RATE: 19 BRPM | HEART RATE: 94 BPM | TEMPERATURE: 97.4 F | SYSTOLIC BLOOD PRESSURE: 120 MMHG

## 2018-01-18 VITALS
TEMPERATURE: 98.2 F | OXYGEN SATURATION: 94 % | RESPIRATION RATE: 18 BRPM | SYSTOLIC BLOOD PRESSURE: 117 MMHG | HEART RATE: 90 BPM | DIASTOLIC BLOOD PRESSURE: 58 MMHG

## 2018-01-18 VITALS — OXYGEN SATURATION: 93 %

## 2018-01-18 VITALS
OXYGEN SATURATION: 94 % | DIASTOLIC BLOOD PRESSURE: 59 MMHG | TEMPERATURE: 98.2 F | HEART RATE: 98 BPM | SYSTOLIC BLOOD PRESSURE: 116 MMHG | RESPIRATION RATE: 19 BRPM

## 2018-01-18 VITALS — OXYGEN SATURATION: 92 %

## 2018-01-18 LAB
ALBUMIN SERPL-MCNC: 1.9 GM/DL (ref 3.4–5)
ALP SERPL-CCNC: 121 U/L (ref 45–117)
ALT SERPL-CCNC: 60 U/L (ref 12–78)
AST SERPL-CCNC: 111 U/L (ref 15–37)
BASOPHILS NFR BLD AUTO: 1 % (ref 0–2)
BILIRUB SERPL-MCNC: 20.2 MG/DL (ref 0.2–1)
BUN SERPL-MCNC: 14 MG/DL (ref 7–18)
CALCIUM SERPL-MCNC: 8.3 MG/DL (ref 8.5–10.1)
CHLORIDE SERPL-SCNC: 106 MEQ/L (ref 98–107)
CREAT SERPL-MCNC: 1.26 MG/DL (ref 0.6–1.3)
ERYTHROCYTE [DISTWIDTH] IN BLOOD BY AUTOMATED COUNT: 17 % (ref 11.6–17.2)
GFR SERPLBLD BASED ON 1.73 SQ M-ARVRAT: 69 ML/MIN (ref 89–?)
GLUCOSE SERPL-MCNC: 76 MG/DL (ref 74–106)
HCO3 BLD-SCNC: 18.9 MEQ/L (ref 21–32)
HCT VFR BLD CALC: 23.6 % (ref 39–51)
HGB BLD-MCNC: 8.4 GM/DL (ref 13–17)
INR PPP: 2.3 RATIO
LYMPHOCYTES: 3 % (ref 9–44)
MCH RBC QN AUTO: 37.2 PG (ref 27–34)
MCHC RBC AUTO-ENTMCNC: 35.7 % (ref 32–36)
MCV RBC AUTO: 104.1 FL (ref 80–100)
MONOCYTES: 5 % (ref 0–8)
NEUTS BAND # BLD MANUAL: 6.7 TH/MM3 (ref 1.8–7.7)
NEUTS BAND NFR BLD: 14 % (ref 0–6)
NEUTS SEG NFR BLD MANUAL: 75 % (ref 16–70)
PLATELET # BLD: 89 TH/MM3 (ref 150–450)
PMV BLD AUTO: 9.7 FL (ref 7–11)
PROT SERPL-MCNC: 5.2 GM/DL (ref 6.4–8.2)
PROTHROMBIN TIME: 23.4 SEC (ref 9.8–11.6)
RBC # BLD AUTO: 2.27 MIL/MM3 (ref 4.5–5.9)
SODIUM SERPL-SCNC: 136 MEQ/L (ref 136–145)
TARGETS BLD QL SMEAR: (no result)
WBC # BLD AUTO: 7.5 TH/MM3 (ref 4–11)

## 2018-01-18 RX ADMIN — IPRATROPIUM BROMIDE AND ALBUTEROL SULFATE SCH AMPULE: .5; 3 SOLUTION RESPIRATORY (INHALATION) at 05:37

## 2018-01-18 RX ADMIN — RIFAXIMIN SCH MG: 550 TABLET ORAL at 09:44

## 2018-01-18 RX ADMIN — RIFAXIMIN SCH MG: 550 TABLET ORAL at 20:01

## 2018-01-18 RX ADMIN — IPRATROPIUM BROMIDE AND ALBUTEROL SULFATE SCH AMPULE: .5; 3 SOLUTION RESPIRATORY (INHALATION) at 16:04

## 2018-01-18 RX ADMIN — AZITHROMYCIN SCH MG: 250 TABLET, FILM COATED ORAL at 09:44

## 2018-01-18 RX ADMIN — Medication SCH TAB: at 12:44

## 2018-01-18 RX ADMIN — IPRATROPIUM BROMIDE AND ALBUTEROL SULFATE SCH AMPULE: .5; 3 SOLUTION RESPIRATORY (INHALATION) at 12:03

## 2018-01-18 RX ADMIN — Medication SCH TAB: at 09:47

## 2018-01-18 RX ADMIN — SPIRONOLACTONE SCH MG: 25 TABLET, FILM COATED ORAL at 09:45

## 2018-01-18 RX ADMIN — FAMOTIDINE SCH MG: 20 TABLET, FILM COATED ORAL at 09:45

## 2018-01-18 RX ADMIN — POTASSIUM CHLORIDE SCH MEQ: 20 TABLET, EXTENDED RELEASE ORAL at 09:45

## 2018-01-18 RX ADMIN — CHLORHEXIDINE GLUCONATE SCH PACK: 500 CLOTH TOPICAL at 04:00

## 2018-01-18 RX ADMIN — IPRATROPIUM BROMIDE AND ALBUTEROL SULFATE SCH AMPULE: .5; 3 SOLUTION RESPIRATORY (INHALATION) at 08:00

## 2018-01-18 RX ADMIN — CEFTRIAXONE SODIUM SCH MLS/HR: 2 INJECTION, POWDER, FOR SOLUTION INTRAMUSCULAR; INTRAVENOUS at 19:18

## 2018-01-18 RX ADMIN — Medication SCH TAB: at 18:42

## 2018-01-18 RX ADMIN — ACYCLOVIR SCH TAB: 800 TABLET ORAL at 09:45

## 2018-01-18 RX ADMIN — Medication PRN ML: at 09:47

## 2018-01-18 RX ADMIN — IPRATROPIUM BROMIDE AND ALBUTEROL SULFATE SCH AMPULE: .5; 3 SOLUTION RESPIRATORY (INHALATION) at 20:00

## 2018-01-18 RX ADMIN — IPRATROPIUM BROMIDE AND ALBUTEROL SULFATE SCH AMPULE: .5; 3 SOLUTION RESPIRATORY (INHALATION) at 00:57

## 2018-01-18 NOTE — HHI.GIFU
Subjective


Remarks


Sitting up in the bed


Still appears mildly drowsy but answers questions appropriately


Abdomen still with moderate bloating


Still having some nausea but tolerating shakes


 (Ann Pressley)





Objective


Vitals I&O





Vital Signs








  Date Time  Temp Pulse Resp B/P (MAP) Pulse Ox O2 Delivery O2 Flow Rate FiO2


 


1/18/18 04:00      Room Air  


 


1/18/18 04:00 98.2 90 18 117/58 (77) 94   


 


1/18/18 00:57     92   


 


1/18/18 00:24      Room Air  


 


1/18/18 00:00 98.9 93 16 102/50 (67) 95   


 


1/17/18 20:00 98.4 97 16 114/51 (72) 95   


 


1/17/18 20:00      Room Air  


 


1/17/18 16:04 98.4 100 18 125/60 (81) 91   


 


1/17/18 15:49     93   21


 


1/17/18 12:00 99.8 97 17 127/57 (80) 93   


 


1/17/18 10:44     98   














I/O      


 


 1/17/18 1/17/18 1/17/18 1/18/18 1/18/18 1/18/18





 07:00 15:00 23:00 07:00 15:00 23:00


 


Intake Total 1330 ml  1000 ml 240 ml  


 


Balance 1330 ml  1000 ml 240 ml  


 


      


 


Intake Oral 480 ml   240 ml  


 


IV Total 850 ml  1000 ml   


 


# Voids 4   2  


 


# Bowel Movements 2   1  








Laboratory





Laboratory Tests








Test


  1/18/18


05:35


 


White Blood Count 7.5 


 


Red Blood Count 2.27 


 


Hemoglobin 8.4 


 


Hematocrit 23.6 


 


Mean Corpuscular Volume 104.1 


 


Mean Corpuscular Hemoglobin 37.2 


 


Mean Corpuscular Hemoglobin


Concent 35.7 


 


 


Red Cell Distribution Width 17.0 


 


Platelet Count 89 


 


Mean Platelet Volume 9.7 


 


CBC Comment AUTO DIFF 


 


Differential Total Cells


Counted 100 


 


 


Neutrophils % (Manual) 75 


 


Band Neutrophils % 14 


 


Lymphocytes % 3 


 


Monocytes % 5 


 


Eosinophils % 2 


 


Basophils % 1 


 


Neutrophils # (Manual) 6.7 


 


Differential Comment


  FINAL DIFF


MANUAL


 


Platelet Estimate LOW 


 


Platelet Morphology Comment NORMAL 


 


Target Cells 1+ 


 


Prothrombin Time 23.4 


 


Prothromb Time International


Ratio 2.3 


 


 


Blood Urea Nitrogen 14 


 


Creatinine 1.26 


 


Random Glucose 76 


 


Total Protein 5.2 


 


Albumin 1.9 


 


Calcium Level 8.3 


 


Alkaline Phosphatase 121 


 


Aspartate Amino Transf


(AST/SGOT) 111 


 


 


Alanine Aminotransferase


(ALT/SGPT) 60 


 


 


Total Bilirubin 20.2 


 


Sodium Level 136 


 


Potassium Level 3.9 


 


Chloride Level 106 


 


Carbon Dioxide Level 18.9 


 


Anion Gap 11 


 


Estimat Glomerular Filtration


Rate 69 


 














 Date/Time


Source Procedure


Growth Status


 


 


 1/14/18 02:01


Blood Peripheral Aerobic Blood Culture - Preliminary


NO GROWTH IN 3 DAYS Resulted


 


 1/14/18 02:01


Blood Peripheral Anaerobic Blood Culture - Preliminary


NO GROWTH IN 3 DAYS Resulted


 


 1/15/18 19:40


Stool Stool Stool Occult Blood (MADELIN) - Final


HEMOCCULT NEGATIVE Complete


 


 1/15/18 17:29


Urine Clean Catch Urine Culture - Final


NO GROWTH IN 48 HOURS. Complete








Imaging





Last Impressions








Chest X-Ray 1/15/18 0000 Signed





Impressions: 





 Service Date/Time:  Monday, January 15, 2018 09:57 - CONCLUSION:  No acute 





 disease.  No significant change has occurred.       Dm Carroll MD 


 


Abdomen/Pelvis CT 1/13/18 1617 Signed





Impressions: 





 Service Date/Time:  Saturday, January 13, 2018 19:24 - CONCLUSION:  Massive 





 hepatomegaly. Worsening splenomegaly and portosystemic collaterals consistent 





 with worsening portal hypertension. Only trace ascites.     Jose Irvin MD 








Physical Exam


HEENT: Normocephalic; atraumatic; icterus continues


CHEST:  Even/unlabored at rest, productive cough audible wheezing when awake 

and talking


CARDIAC:  RRR


ABDOMEN: Moderate bloating continues, taut, complains of no abdominal pain, 

bowel sounds active, occasional nausea persist 


EXTREMITIES: No clubbing, cyanosis, or edema.


SKIN:  Normal; no rash; (+) jaundice


CNS: Mild drowsiness but arouses fairly easy to verbal stimuli, speech slow and 

understandable; alert and oriented times 2


 (Ann Pressley)





Assessment and Plan


Plan


Assessment: And recent history


- Alcoholic hepatitis- Labs consistent- current labs include hemoglobin 9.9, 

LFTs 165/72, alkaline phosphatase 143, INR 2.6, bilirubin 24.7


  DF-78 Pt currently on Prednisone PO. 


  Pt has been on low dose Prednisone and Xifaxan and recently ran out of both 

medications.  Reports


  drinking three days in a row two weeks ago and has been drinking beer 

intermittently.  Initially stated,  Would like to refrain from having any GI 

procedures


  unless absolutely necessary.  He is requesting to eat, regular diet ordered 

per attending.  Per sister


  pt already wanting to leave, has left AMA in the past.  Discussed again on 01/ 17/18 with mother present in the room the advantages for endoscopy for 

treatment as well as preventative measures. 


  CT abdomen  and pelvis W IV contrast (1/13) noted --> Massive hepatomegaly. 

Worsening splenomegaly


  and portosystemic collateral consistent with worsening portal hypertension.  

Only trace ascites. 


- Anemia probable secondary to chronic disease, no obvious bleeding stable at 

8.4


  Of note, pt reports he can not take Lactulose because it caused severe 

colitis and dehydration leading 


  to CARLEE in the past 


- States he is motivated for no more alcohol, liver transplant has been 

discussed with him and he is planning on taking the steps he needs to have this 

done.


Discussed continued follow-up in the GI office when he is stable for discharge











Plan:


- Increase activity and room


- Continue Xifaxan and prednisone


- No EGD for now patient refuses but states that if he starts having symptoms 

he we'll agree.  Would prefer on an outpatient basis 


- 2 gm sodium diet recommended the patient eats fast food from his family


- Consider beta blocker, BP systolic usually between 110 and 120 does drop to 

99 as his low. 


- Supportives care


- Notify GI of active bleeding


GI will follow and see as needed but no further procedures for now.  Monitor 

labs





- Further recommendations to follow


Pt has been seen and examined by myself and Dr. Parker and this note is 

written on his behalf 


 (Ann Pressley)


Physician Comments


Patient was seen and examined


Agree with above


Continue with current supportive care


Monitor labs


Labs seem to be steady at this point


Patient can probably discharge home with home health monitoring his health and 

drawing his blood


Emesis his needs to be made on no alcohol and good nutrition


Patient can follow-up with GI post discharge


We will sign off


 (Zac Parker MD)











Ann Pressley Jan 18, 2018 09:38


Zac Parker MD Jan 18, 2018 22:40

## 2018-01-18 NOTE — HHI.FPPN
Subjective


Remarks


No acute events overnight. VS unremarkable. Pulse steady at around 90s and BP 

has improved. This morning he reports that he is feeling better. Cough 

continues but does endorse improvement. Continues to have abdominal pain but 

understands that this pain will not be completely resolved during this 

hospitalization. Pt is willing to go to rehab but states that he does not think 

that the places in [facility] are any good and is thinking about just going 

home and continuing his current outpatient regimens for continued abstinence.


 (Telma Covarrubias MD, R3)





Objective


Vitals





Vital Signs








  Date Time  Temp Pulse Resp B/P (MAP) Pulse Ox O2 Delivery O2 Flow Rate FiO2


 


1/18/18 08:04 97.4 94 19 120/56 (77) 96   


 


1/18/18 04:00      Room Air  


 


1/18/18 04:00 98.2 90 18 117/58 (77) 94   


 


1/18/18 00:57     92   


 


1/18/18 00:24      Room Air  


 


1/18/18 00:00 98.9 93 16 102/50 (67) 95   


 


1/17/18 20:00 98.4 97 16 114/51 (72) 95   


 


1/17/18 20:00      Room Air  


 


1/17/18 16:04 98.4 100 18 125/60 (81) 91   


 


1/17/18 15:49     93   21


 


1/17/18 12:00 99.8 97 17 127/57 (80) 93   


 


1/17/18 10:44     98   














I/O      


 


 1/17/18 1/17/18 1/17/18 1/18/18 1/18/18 1/18/18





 07:00 15:00 23:00 07:00 15:00 23:00


 


Intake Total 1330 ml  1000 ml 240 ml  


 


Balance 1330 ml  1000 ml 240 ml  


 


      


 


Intake Oral 480 ml   240 ml  


 


IV Total 850 ml  1000 ml   


 


# Voids 4   2  


 


# Bowel Movements 2   1  








 (Telma Covarrubias MD, R3)


Result Diagram:  


1/18/18 0535                                                                   

             1/18/18 0535





Objective Remarks


GEN: Well-developed, well-nourished patient. No acute distress. Sitting in 

chair comfortably. Obviously jaundiced including eyes, generalized skin.


CV: Increased rate but with regular rhythm.  Grade 2/6 ZEYAD present.


LUNGS: No accessory muscle use. Clear to auscultation bilaterally. No wheezes 

or crackles. 


GI: Distended.


NEURO/PSYCH: Awake, alert. Appropriate insight and judgment. Normal speech


 (Telma Covarrubias MD, R3)





A/P


Assessment and Plan


27-year-old male with history of alcoholism, liver cirrhosis, hepatorenal 

syndrome.  Admitted for liver cirrhosis/hepatorenal syndrome, sepsis.


Discharge Planning


Tomorrow, placement to either home vs substance rehab. May not be covered 

through insurance.





wdw Dr. Lam





 (Telma Covarrubias MD, R3)


Attending Attestation


Patient seen and examined.  Case reviewed and discussed with the resident team.

  Agree with plan of care as discussed with me and documented in the resident 

note.


he unfortunately has terrible liver disease but is stable clinically and based 

on his labs


 (Jany Lam MD)


Problem List:  


(1) Cirrhosis of liver


ICD Codes:  K74.60 - Unspecified cirrhosis of liver


Status:  Chronic


Plan:   History of severe liver cirrhosis associated with jaundice. Meld score 

of 36.6.  Reports most recent alcohol abuse relapse was about 2 weeks ago. No 

signs of withdrawal  CT abdomen significant for massive hepatomegaly and 

worsening splenomegaly and portal systemic collaterals consistent with 

worsening portal hypertension.  Only trace ascites.  Reported baseline INR is 

around 1.3 and hemoglobin baseline of around 10.


-Transfuse 1 unit FFP due to 1 episode of nosebleed in the setting of elevated 

INR


* closely monitor for acute bleeds


* INR stable but remains around 2


-elevated ammonia, down trending


-Hx of chronic prednisone use since 3/2017 with most recent use of 5mg daily. 

Increased temporarily for stress response, will drop back to 5mg daily


* continue until seen by GI doctor


GI consulted: appreciate recommendations


* Follows with Dr. Benavides


* Continue Xifaxan and Prednisone


* Consider beta blocker if blood pressure normalizes


* Pt declines EGD





Medications:


* Vitamin K 10mg x3 days (1/15-1/17)


* Rocephin 2g daily for possible SBP (1/13-


* Xifaxan 550 mg BID


* oxycodone for pain


* prednisone 5mg daily


* Spironolactone 25mg daily





(2) Hepatorenal syndrome


ICD Codes:  K76.7 - Hepatorenal syndrome


Status:  Chronic


Plan:  HX of hepatorenal syndrome. CT abdomen showed hepatomegaly and  

worsening portal hypertension. Baseline Cr around 1. Admission Cr 1.82


-Cr much improved.


-see more detailed plan under liver cirrhosis





(3) Sepsis


ICD Codes:  A41.9 - Sepsis, unspecified organism


Status:  Resolved


Plan:  Met sepsis criteria at admission with fever and tachycardia. However 

sister reports pt's baseline HR is around 


-Suspect symptoms may related to cirrhosis. Clinically patient is stable.


-Associated with CARLEE but now resolved


-Blood cultures negative x2 days


-urine culture negative


-UA and CXR (including repeat) unremarkable.


-continue hydration and abx as described above.





(4) CAP (community acquired pneumonia)


ICD Codes:  J18.9 - Pneumonia, unspecified organism


Status:  Acute


Plan:  Patient complaining of sore throat  and cough that developed prior to 

admission. Pt with hoarse voice on admission. Hx of strep throat. Already on 

abx that would provide coverage of strep. However, PE is now significant for 

Left lower lobe crackles and wheeze that may have become more prominent as 

hydration improved. Will cover for atypicals especially since patient continues 

to recurrent fever.


-Azithromycin 500mg (1/16- plan for 5 days


-cepacol lozenges


-Repeat CXR unremarkable.


-albuterol +nebulizer ordered


-incentive spirometry





(5) Heart murmur


ICD Codes:  R01.1 - Cardiac murmur, unspecified


Plan:  Murmur louder on admission, has improved. Likely related to dehydration 

and anemia.


-monitor and consider echo if there is a clinical change





(6) Macrocytic anemia


ICD Codes:  D53.9 - Nutritional anemia, unspecified


Plan:  Likely related to alcohol use.


-B12 normal 


-Folate minimally low at 3.0


-multivitamin ordered





(7) Alcohol dependence


ICD Codes:  F10.20 - Alcohol dependence, uncomplicated


Status:  Chronic


Plan:  Pt reports relapse x2 due to issue with xanax. No signs of withdrawal at 

this time.


-Recommended continued AA meeting attendance


-Plan to [facility] after discharge. Will attempt to send to drug/

alcohol rehab at discharge


-Psych consulted: appreciate recommendations


* would benefit of restart Prozac 20 mg for his depression


* Will start Ativan 1 mg twice a day for anxiety, since Ativan is less harmful 

than Xanax in the liver. 


* Patient is in a contemplation state of his alcohol addiction and ready to 

take actions, the patient is very motivated to be discharged to a 

rehabilitation program and to achieve sobriety.  


* Extensive support and psychoeducation provided.





(8) Depression


ICD Codes:  F32.9 - Major depressive disorder, single episode, unspecified


Plan:  Hx of depression. Previously on Paxil but not good with taking it every 

day


-Fluoxetine 20mg started. this is a good choice as he will not withdrawal from 

it even if he stops suddenly





(9) FEN


Plan:  Fluids: good PO intake. 


Nutrition: Regular, 2g salt restriction


Electrolytes: Hyponatremia hypokalemia resolved.  Continue to monitor and 

replete as needed. KCL 20meq daily


DVT prophylaxis: SCDs, INR currently >2


GI prophylaxis: Zantac 150 mg daily


 (Telma Covarrubias MD, R3)





Problem Qualifiers





(1) Cirrhosis of liver:  


Qualified Codes:  K70.31 - Alcoholic cirrhosis of liver with ascites


(2) CAP (community acquired pneumonia):  


Qualified Codes:  J18.1 - Lobar pneumonia, unspecified organism


(3) Depression:  








Telma Covarrubias MD, R3 Jan 18, 2018 10:57


Jany Lam MD Jan 22, 2018 08:55

## 2018-01-18 NOTE — HHI.PYPN
Subjective


Remarks


The patient was seen today for psychiatric reevaluation.  Chart was reviewed.  

Case was discussed with nurse in charge.  Psychiatric evaluation patient is calm

, cooperative and pleasant.  Patient reports being in a good mood and in good 

spirits.  Patient says that he is happy most probably secondary discharged 

today or tomorrow back home.  Patient says that his plan is to change his job 

and to engage in some kind of outpatient rehabilitation program.  Patient says 

that he is also motivated to engage in outpatient psychiatric care and continue 

his psychotropics to avoid depression and relapse in alcohol.  He denies 

suicidal and homicidal ideation, he denies visual and auditory hallucinations.  

He is compliant with his medications, no significant side effects.  He is 

oriented 3, no attention deficit, no fluctuation of consciousness present.





Review of Systems


Except as stated in HPI:  all other systems reviewed are Neg





Mental Status Examination


Appearance:  Appropriate


Consciousness:  Alert


Orientation:  x4


Motor Activity:  Normal gait


Speech:  Unremarkable


Language:  Adequate


Fund of Knowledge:  Adequate


Attention and Concentration:  Adequate


Memory:  Unremarkable


Mood:  Appropriate


Affect:  Appropriate


Thought Process & Associations:  Intact


Thought Content:  Appropriate


Hallucination Type:  None


Delusion Type:  None


Suicidal Ideation:  No


Suicidal Plan:  No


Suicidal Intention:  No


Homicidal Ideation:  No


Homicidal Plan:  No


Homicidal Intention:  No


Insight:  Fair


Judgment:  Impulsive





Results


Labs











Test


  1/18/18


05:35


 


White Blood Count 7.5 TH/MM3 


 


Red Blood Count 2.27 MIL/MM3 


 


Hemoglobin 8.4 GM/DL 


 


Hematocrit 23.6 % 


 


Mean Corpuscular Volume 104.1 FL 


 


Mean Corpuscular Hemoglobin 37.2 PG 


 


Mean Corpuscular Hemoglobin


Concent 35.7 % 


 


 


Red Cell Distribution Width 17.0 % 


 


Platelet Count 89 TH/MM3 


 


Mean Platelet Volume 9.7 FL 


 


CBC Comment AUTO DIFF 


 


Differential Total Cells


Counted 100 


 


 


Neutrophils % (Manual) 75 % 


 


Band Neutrophils % 14 % 


 


Lymphocytes % 3 % 


 


Monocytes % 5 % 


 


Eosinophils % 2 % 


 


Basophils % 1 % 


 


Neutrophils # (Manual) 6.7 TH/MM3 


 


Differential Comment


  FINAL DIFF


MANUAL


 


Platelet Estimate LOW 


 


Platelet Morphology Comment NORMAL 


 


Target Cells 1+ 


 


Prothrombin Time 23.4 SEC 


 


Prothromb Time International


Ratio 2.3 RATIO 


 


 


Blood Urea Nitrogen 14 MG/DL 


 


Creatinine 1.26 MG/DL 


 


Random Glucose 76 MG/DL 


 


Total Protein 5.2 GM/DL 


 


Albumin 1.9 GM/DL 


 


Calcium Level 8.3 MG/DL 


 


Alkaline Phosphatase 121 U/L 


 


Aspartate Amino Transf


(AST/SGOT) 111 U/L 


 


 


Alanine Aminotransferase


(ALT/SGPT) 60 U/L 


 


 


Total Bilirubin 20.2 MG/DL 


 


Sodium Level 136 MEQ/L 


 


Potassium Level 3.9 MEQ/L 


 


Chloride Level 106 MEQ/L 


 


Carbon Dioxide Level 18.9 MEQ/L 


 


Anion Gap 11 MEQ/L 


 


Estimat Glomerular Filtration


Rate 69 ML/MIN 


 














 Date/Time


Source Procedure


Growth Status


 


 


 1/14/18 02:01


Blood Peripheral Aerobic Blood Culture - Preliminary


NO GROWTH IN 4 DAYS Resulted


 


 1/14/18 02:01


Blood Peripheral Anaerobic Blood Culture - Preliminary


NO GROWTH IN 4 DAYS Resulted


 


 1/15/18 19:40


Stool Stool Stool Occult Blood ([facility]) - Final


HEMOCCULT NEGATIVE Complete


 


 1/15/18 17:29


Urine Clean Catch Urine Culture - Final


NO GROWTH IN 48 HOURS. Complete








Vitals/IOs





Vital Signs








  Date Time  Temp Pulse Resp B/P (MAP) Pulse Ox O2 Delivery O2 Flow Rate FiO2


 


1/18/18 12:04     91   


 


1/18/18 08:04 97.4 94 19 120/56 (77)    


 


1/18/18 04:00      Room Air  


 


1/17/18 15:49        21


 


1/14/18 11:36       3.00 














Intake and Output   


 


 1/18/18 1/18/18 1/19/18





 08:00 16:00 00:00


 


Intake Total 240 ml  


 


Balance 240 ml  











Assessment & Plan


Problem List:  


(1) Depression


ICD Codes:  F32.9 - Major depressive disorder, single episode, unspecified


Assessment & Plan:  At the moment of this evaluation the patient does not 

present any evidence of subjective or objective symptomatology of depression, 

anxiety, dulce or psychosis.  Patient denies suicidal and homicidal ideation, 

he denies visual and auditory hallucinations.  Patient is compliant with 

medications,and medical side effects.  Patient is in a contemplation state of 

his alcohol addiction, motivated to take actions.  Brief supportive 

psychotherapy provided.  Psychiatry signing off.





Assessment & Plan


Estimated LOS:  days


Justification for Cont. Inpt.


No indication for psychiatric admission at this moment.





Problem Qualifiers





(1) Depression:  








Chandu Turner MD Jan 18, 2018 12:57

## 2018-01-19 VITALS
RESPIRATION RATE: 22 BRPM | SYSTOLIC BLOOD PRESSURE: 111 MMHG | HEART RATE: 105 BPM | DIASTOLIC BLOOD PRESSURE: 59 MMHG | OXYGEN SATURATION: 94 % | TEMPERATURE: 98.5 F

## 2018-01-19 VITALS
HEART RATE: 104 BPM | OXYGEN SATURATION: 94 % | TEMPERATURE: 98.3 F | SYSTOLIC BLOOD PRESSURE: 120 MMHG | DIASTOLIC BLOOD PRESSURE: 63 MMHG | RESPIRATION RATE: 22 BRPM

## 2018-01-19 VITALS
HEART RATE: 98 BPM | RESPIRATION RATE: 17 BRPM | SYSTOLIC BLOOD PRESSURE: 131 MMHG | OXYGEN SATURATION: 96 % | TEMPERATURE: 98.2 F | DIASTOLIC BLOOD PRESSURE: 63 MMHG

## 2018-01-19 VITALS — OXYGEN SATURATION: 97 %

## 2018-01-19 VITALS
TEMPERATURE: 98.1 F | OXYGEN SATURATION: 97 % | SYSTOLIC BLOOD PRESSURE: 111 MMHG | HEART RATE: 102 BPM | RESPIRATION RATE: 14 BRPM | DIASTOLIC BLOOD PRESSURE: 61 MMHG

## 2018-01-19 VITALS
SYSTOLIC BLOOD PRESSURE: 111 MMHG | HEART RATE: 102 BPM | TEMPERATURE: 98.1 F | OXYGEN SATURATION: 97 % | RESPIRATION RATE: 14 BRPM | DIASTOLIC BLOOD PRESSURE: 61 MMHG

## 2018-01-19 VITALS
DIASTOLIC BLOOD PRESSURE: 62 MMHG | HEART RATE: 95 BPM | SYSTOLIC BLOOD PRESSURE: 116 MMHG | OXYGEN SATURATION: 96 % | TEMPERATURE: 98.3 F | RESPIRATION RATE: 16 BRPM

## 2018-01-19 VITALS
RESPIRATION RATE: 16 BRPM | OXYGEN SATURATION: 95 % | SYSTOLIC BLOOD PRESSURE: 115 MMHG | HEART RATE: 98 BPM | TEMPERATURE: 98.2 F | DIASTOLIC BLOOD PRESSURE: 63 MMHG

## 2018-01-19 VITALS
RESPIRATION RATE: 18 BRPM | HEART RATE: 90 BPM | OXYGEN SATURATION: 95 % | SYSTOLIC BLOOD PRESSURE: 118 MMHG | TEMPERATURE: 97.9 F | DIASTOLIC BLOOD PRESSURE: 60 MMHG

## 2018-01-19 VITALS
TEMPERATURE: 98.2 F | OXYGEN SATURATION: 97 % | HEART RATE: 96 BPM | SYSTOLIC BLOOD PRESSURE: 117 MMHG | DIASTOLIC BLOOD PRESSURE: 62 MMHG | RESPIRATION RATE: 16 BRPM

## 2018-01-19 VITALS — OXYGEN SATURATION: 92 %

## 2018-01-19 LAB
BUN SERPL-MCNC: 14 MG/DL (ref 7–18)
CALCIUM SERPL-MCNC: 8.8 MG/DL (ref 8.5–10.1)
CHLORIDE SERPL-SCNC: 105 MEQ/L (ref 98–107)
CREAT SERPL-MCNC: 1.25 MG/DL (ref 0.6–1.3)
GFR SERPLBLD BASED ON 1.73 SQ M-ARVRAT: 69 ML/MIN (ref 89–?)
GLUCOSE SERPL-MCNC: 68 MG/DL (ref 74–106)
HCO3 BLD-SCNC: 21.3 MEQ/L (ref 21–32)
INR PPP: 1.9 RATIO
PROTHROMBIN TIME: 19.4 SEC (ref 9.8–11.6)
SODIUM SERPL-SCNC: 136 MEQ/L (ref 136–145)

## 2018-01-19 RX ADMIN — Medication SCH TAB: at 13:00

## 2018-01-19 RX ADMIN — Medication SCH TAB: at 09:16

## 2018-01-19 RX ADMIN — POTASSIUM CHLORIDE SCH MEQ: 20 TABLET, EXTENDED RELEASE ORAL at 09:17

## 2018-01-19 RX ADMIN — ACYCLOVIR SCH TAB: 800 TABLET ORAL at 09:18

## 2018-01-19 RX ADMIN — FAMOTIDINE SCH MG: 20 TABLET, FILM COATED ORAL at 09:17

## 2018-01-19 RX ADMIN — AZITHROMYCIN SCH MG: 250 TABLET, FILM COATED ORAL at 09:18

## 2018-01-19 RX ADMIN — RIFAXIMIN SCH MG: 550 TABLET ORAL at 21:15

## 2018-01-19 RX ADMIN — IPRATROPIUM BROMIDE AND ALBUTEROL SULFATE SCH AMPULE: .5; 3 SOLUTION RESPIRATORY (INHALATION) at 03:34

## 2018-01-19 RX ADMIN — RIFAXIMIN SCH MG: 550 TABLET ORAL at 09:17

## 2018-01-19 RX ADMIN — IPRATROPIUM BROMIDE AND ALBUTEROL SULFATE SCH AMPULE: .5; 3 SOLUTION RESPIRATORY (INHALATION) at 00:24

## 2018-01-19 RX ADMIN — CHLORHEXIDINE GLUCONATE SCH PACK: 500 CLOTH TOPICAL at 01:04

## 2018-01-19 RX ADMIN — IPRATROPIUM BROMIDE AND ALBUTEROL SULFATE SCH AMPULE: .5; 3 SOLUTION RESPIRATORY (INHALATION) at 08:00

## 2018-01-19 RX ADMIN — Medication SCH TAB: at 17:06

## 2018-01-19 RX ADMIN — SPIRONOLACTONE SCH MG: 25 TABLET, FILM COATED ORAL at 09:00

## 2018-01-19 NOTE — RADRPT
EXAM DATE/TIME:  01/19/2018 14:44 

 

[facility] COMPARISON:     

No previous studies available for comparison.

 

 

INDICATIONS :     

Confusion.

                     

 

RADIATION DOSE:     

37.70 CTDIvol (mGy) 

 

 

 

MEDICAL HISTORY :     

Gastroesophageal reflux disease.  Liver failure,cirrhosis, renal failure, severe jaundice.

 

SURGICAL HISTORY :       

Skull fx

 

ENCOUNTER:      

Initial

 

ACUITY:      

1 day

 

PAIN SCALE:      

0/10

 

LOCATION:        

cranial 

 

TECHNIQUE:     

Multiple contiguous axial images were obtained of the head.  Using automated exposure control and adj
ustment of the mA and/or kV according to patient size, radiation dose was kept as low as reasonably a
chievable to obtain optimal diagnostic quality images.   DICOM format image data is available electro
nically for review and comparison.  

 

FINDINGS:     

 

CEREBRUM:     

The ventricles are normal for age.  No evidence of midline shift, mass lesion, hemorrhage or acute in
farction.  No extra-axial fluid collections are seen.

 

POSTERIOR FOSSA:     

The cerebellum and brainstem are intact.  The 4th ventricle is midline.  The cerebellopontine angle i
s unremarkable.

 

EXTRACRANIAL:     

The visualized portion of the orbits is intact.

 

SKULL:     

The calvaria is intact.  No evidence of skull fracture.

 

CONCLUSION:     

Normal examination.  

 

 

 

 Dm Carroll MD on January 19, 2018 at 14:53           

Board Certified Radiologist.

 This report was verified electronically.

## 2018-01-19 NOTE — HHI.DCPOC
Discharge Care Plan


Diagnosis:  


(1) Cirrhosis of liver


(2) Hepatorenal syndrome


(3) Anxiety


(4) CAP (community acquired pneumonia)


(5) Depression


Goals to Promote Your Health


* To prevent worsening of your condition and complications


* To maintain your health at the optimal level


Directions to Meet Your Goals


*** Take your medications as prescribed


*** Follow your dietary instruction


*** Follow activity as directed








*** Keep your appointments as scheduled


*** Take your immunizations and boosters as scheduled


*** If your symptoms worsen call your PCP, if no PCP go to Urgent Care Center 

or Emergency Room***


*** Smoking is Dangerous to Your Health. Avoid second hand smoke***


***Call the 24-hour hour crisis hotline for domestic abuse at 1-938.959.2305***











Telma Covarrubias MD, R3 Jan 19, 2018 06:52

## 2018-01-19 NOTE — HHI.FPPN
Addendum to progress note


ADDENDUM


Reason for addendum:  Additonal documentation


Additional information


Received page from nurse around 1300 stating family was concerned about his 

discharge as he had 2 episodes of nosebleeds since my visit this morning.  They 

also reported that he is more confused than normal.  During my visit, patient 

endorses 2 episodes of nosebleeds since my visit this morning.  He otherwise 

feels well.  He does not really endorse being confused and is oriented to person

, place, time.  Thoroughly explained to family about expected course of disease 

process and the chronicity of his symptoms.  However due to the elevated INR, 

this does justify a unit of FFP.  In regards to his confusion, suspect it is 

related to his liver cirrhosis and chronically elevated ammonia.  Despite 

counseling and low suspicion of intracranial bleed as there is no focal 

neurological change, we'll proceed with head CT based on discussion with 

patient and family.





sdw Dr. Lam


 (Telma Covarrubias MD, R3)


Reason for addendum:  Additonal documentation


Additional information


Saw pt in the afternoon. He has a strong family who are knowledgeable in 

medicine and report that he is more "loopy" today. his thinking is less clear 

than his norm and he is more sleepy. There was a concern about the narcotics 

sedating him as well as the nose bleeding. agree with him staying for further 

improvement and evaluation.


Patient seen and examined.  Case reviewed and discussed with the resident team.

  Agree with plan of care as discussed with me and documented in the resident 

note.


 (Jany Lam MD)











Telma Covarrubias MD, R3 Jan 19, 2018 13:33


Jany Lam MD Jan 22, 2018 08:59

## 2018-01-19 NOTE — HHI.FPPN
Subjective


Remarks


No acute events overnight.  Vital signs unremarkable.  Resting comfortably in 

bed.  Denies chest pain, SOB.  He does report feeling much better and more 

improved since being in the hospital.  Thus comfortable being discharged today 

and understands that he must have close follow-up with GI and his PCP for 

continued care.  Patient otherwise has no acute concerns.  Patient is also 

aware that he must follow up closely with his AA meetings to remain alcohol 

free.





Objective


Vitals





Vital Signs








  Date Time  Temp Pulse Resp B/P (MAP) Pulse Ox O2 Delivery O2 Flow Rate FiO2


 


1/19/18 08:51     92   


 


1/19/18 06:05      Room Air  


 


1/19/18 04:00 98.3 95 16 116/62 (80) 96   


 


1/19/18 00:00      Room Air  


 


1/19/18 00:00 97.9 90 18 118/60 (79) 95   


 


1/18/18 20:00 98.1 94 16 104/60 (75) 96   


 


1/18/18 20:00      Room Air  


 


1/18/18 16:05     93   


 


1/18/18 16:04 98.2 98 19 116/59 (78) 94   


 


1/18/18 12:04 98.2 95 19 116/54 (74) 95   


 


1/18/18 12:04     91   














I/O      


 


 1/18/18 1/18/18 1/18/18 1/19/18 1/19/18 1/19/18





 07:00 15:00 23:00 07:00 15:00 23:00


 


Intake Total 240 ml  580 ml 240 ml  


 


Balance 240 ml  580 ml 240 ml  


 


      


 


Intake Oral 240 ml  480 ml 240 ml  


 


IV Total   100 ml   


 


# Voids 2  4 2  


 


# Bowel Movements 1  2 0  








Result Diagram:  


1/18/18 0535                                                                   

             1/19/18 0647





Objective Remarks


GEN: Well-developed, well-nourished patient. No acute distress.  Sitting up in 

bed comfortably.  Obviously jaundiced including eyes, generalized skin.


CV: Increased rate but with regular rhythm.  Grade 2/6 ZEYAD present.


LUNGS: No accessory muscle use. Very mild crackles in the left lower base but 

otherwise good air movement bilaterally.


GI: Distended.


NEURO/PSYCH: Awake, alert. Appropriate insight and judgment. Normal speech





A/P


Assessment and Plan


27-year-old male with history of alcoholism, liver cirrhosis, hepatorenal 

syndrome.  Admitted for liver cirrhosis/hepatorenal syndrome, sepsis.


Discharge Planning


Today





dw Dr. Lam


Problem List:  


(1) Cirrhosis of liver


ICD Codes:  K74.60 - Unspecified cirrhosis of liver


Status:  Chronic


Plan:   History of severe liver cirrhosis associated with jaundice. Meld score 

of 36.6.  Reports most recent alcohol abuse relapse was about 2 weeks ago. No 

signs of withdrawal  CT abdomen significant for massive hepatomegaly and 

worsening splenomegaly and portal systemic collaterals consistent with 

worsening portal hypertension.  Only trace ascites.  Reported baseline INR is 

around 1.3 and hemoglobin baseline of around 10.


-Transfuse 1 unit FFP due to 1 episode of nosebleed in the setting of elevated 

INR


* closely monitor for acute bleeds


-Hx of chronic prednisone use since 3/2017 with most recent use of 5mg daily. 

Increased temporarily for stress response, now back to 5mg daily. Will provide 

taper at discharge


* continue until seen by GI doctor


GI consulted: appreciate recommendations


* Follows with Dr. Benavides


* Continue Xifaxan and Prednisone


* Consider beta blocker if blood pressure normalizes


* Pt declines EGD





Medications:


* Vitamin K 10mg x3 days (1/15-1/17)


* Rocephin 2g daily for possible SBP (1/13-1/19)


* Xifaxan 550 mg BID


* oxycodone for pain


* prednisone 5mg daily


* Spironolactone 25mg daily





(2) Hepatorenal syndrome


ICD Codes:  K76.7 - Hepatorenal syndrome


Status:  Chronic


Plan:  HX of hepatorenal syndrome. CT abdomen showed hepatomegaly and  

worsening portal hypertension. Baseline Cr around 1. Admission Cr 1.82


-Cr much improved.


-see more detailed plan under liver cirrhosis





(3) Sepsis


ICD Codes:  A41.9 - Sepsis, unspecified organism


Status:  Resolved


Plan:  Met sepsis criteria at admission with fever and tachycardia. However 

sister reports pt's baseline HR is around 


-Clinically patient is stable.


-Associated with CARLEE but now resolved


-Blood cultures NGTD


-urine culture negative


-UA and CXR (including repeat) unremarkable.


-continue hydration and abx as described above.





(4) CAP (community acquired pneumonia)


ICD Codes:  J18.9 - Pneumonia, unspecified organism


Status:  Acute


Plan:  Patient complaining of sore throat  and cough that developed prior to 

admission. Pt with hoarse voice on admission. Already on abx that would provide 

coverage of strep. However, PE  was now significant for Left lower lobe 

crackles and wheeze that may have become more prominent as hydration improved 

but has now improved. Will also cover for atypical. 


-Azithromycin 500mg (1/16- plan for 5 days


-cepacol lozenges


-Repeat CXR unremarkable.


-albuterol +nebulizer ordered


-incentive spirometry





(5) Heart murmur


ICD Codes:  R01.1 - Cardiac murmur, unspecified


Plan:  Murmur louder on admission, has improved. Likely related to dehydration 

and anemia.


-monitor and consider echo if there is a clinical change





(6) Macrocytic anemia


ICD Codes:  D53.9 - Nutritional anemia, unspecified


Plan:  Likely related to alcohol use.


-B12 normal 


-Folate minimally low at 3.0


-multivitamin ordered





(7) Alcohol dependence


ICD Codes:  F10.20 - Alcohol dependence, uncomplicated


Status:  Chronic


Plan:  Pt reports relapse x2 due to issue with xanax. No signs of withdrawal at 

this time.


-Recommended continued AA meeting attendance


-[facility] discharge. Will attempt to send to drug/

alcohol rehab at discharge


-Psych consulted: appreciate recommendations


* would benefit of restart Prozac 20 mg for his depression


* Will start Ativan 1 mg twice a day for anxiety, since Ativan is less harmful 

than Xanax in the liver. 


* Patient is in a contemplation state of his alcohol addiction and ready to 

take actions, the patient is very motivated to be discharged to a 

rehabilitation program and to achieve sobriety.  


* Extensive support and psychoeducation provided.





(8) Depression


ICD Codes:  F32.9 - Major depressive disorder, single episode, unspecified


Plan:  Hx of depression. Previously on Paxil but not good with taking it every 

day


-Fluoxetine 20mg started. this is a good choice as he will not withdrawal from 

it even if he stops suddenly





(9) FEN


Plan:  Fluids: good PO intake. 


Nutrition: Regular, 2g salt restriction


Electrolytes: Hyponatremia hypokalemia resolved.  Continue to monitor and 

replete as needed. KCL 20meq daily


DVT prophylaxis: SCDs


GI prophylaxis: Zantac 150 mg daily








Problem Qualifiers





(1) Cirrhosis of liver:  


Qualified Codes:  K70.31 - Alcoholic cirrhosis of liver with ascites


(2) CAP (community acquired pneumonia):  


Qualified Codes:  J18.1 - Lobar pneumonia, unspecified organism


(3) Depression:  








Telma Covarrubias MD, R3 Jan 19, 2018 10:21

## 2018-01-20 VITALS
SYSTOLIC BLOOD PRESSURE: 124 MMHG | TEMPERATURE: 98 F | OXYGEN SATURATION: 95 % | DIASTOLIC BLOOD PRESSURE: 59 MMHG | HEART RATE: 99 BPM | RESPIRATION RATE: 17 BRPM

## 2018-01-20 VITALS
RESPIRATION RATE: 17 BRPM | DIASTOLIC BLOOD PRESSURE: 55 MMHG | HEART RATE: 92 BPM | OXYGEN SATURATION: 95 % | SYSTOLIC BLOOD PRESSURE: 106 MMHG | TEMPERATURE: 98.1 F

## 2018-01-20 VITALS
TEMPERATURE: 98 F | DIASTOLIC BLOOD PRESSURE: 67 MMHG | OXYGEN SATURATION: 94 % | HEART RATE: 96 BPM | SYSTOLIC BLOOD PRESSURE: 119 MMHG | RESPIRATION RATE: 21 BRPM

## 2018-01-20 VITALS
HEART RATE: 100 BPM | OXYGEN SATURATION: 95 % | TEMPERATURE: 98.3 F | RESPIRATION RATE: 21 BRPM | SYSTOLIC BLOOD PRESSURE: 125 MMHG | DIASTOLIC BLOOD PRESSURE: 63 MMHG

## 2018-01-20 LAB
INR PPP: 2 RATIO
PROTHROMBIN TIME: 19.8 SEC (ref 9.8–11.6)

## 2018-01-20 RX ADMIN — Medication SCH TAB: at 09:27

## 2018-01-20 RX ADMIN — ACYCLOVIR SCH TAB: 800 TABLET ORAL at 09:28

## 2018-01-20 RX ADMIN — POTASSIUM CHLORIDE SCH MEQ: 20 TABLET, EXTENDED RELEASE ORAL at 09:28

## 2018-01-20 RX ADMIN — RIFAXIMIN SCH MG: 550 TABLET ORAL at 09:27

## 2018-01-20 RX ADMIN — FAMOTIDINE SCH MG: 20 TABLET, FILM COATED ORAL at 09:28

## 2018-01-20 RX ADMIN — AZITHROMYCIN SCH MG: 250 TABLET, FILM COATED ORAL at 09:30

## 2018-01-20 RX ADMIN — SPIRONOLACTONE SCH MG: 25 TABLET, FILM COATED ORAL at 09:27

## 2018-01-20 NOTE — HHI.FPPN
Subjective


Remarks


Patient seen and examined this morning. No acute events overnight per report. 

Vital signs remained stable with mild tachycardia. Patient reports he is 

feeling well and ready to be discharged home today. He states that he is having 

"cabin fever" and cannot stand being in the hospital another day. He continues 

to complain of mild abdominal and extremity pain. He reports that the pain is 

controlled with his pain medication. He reports that throughout the 

hospitalization his mentation has improved and is "thinking clearly now." He 

states that he will call Dr. Marley, gastroenterology, on Monday for follow-up 

appointment and will follow up with his PCP next week. He states he understands 

that he will have to make some lifestyle changes to assist with his 

recuperation and is willing to make these changes at this time. Otherwise he 

has no complaints and denies any new fevers, chills, shortness of breath, chest 

pain, NVD, or tenderness.


 (Kevan Nolan MD R2)





Objective


Vitals





Vital Signs








  Date Time  Temp Pulse Resp B/P (MAP) Pulse Ox O2 Delivery O2 Flow Rate FiO2


 


1/20/18 08:03 98.0 96 21 119/67 (84) 94   


 


1/20/18 04:00 98.1 92 17 106/55 (72) 95   


 


1/20/18 04:00      Room Air  


 


1/20/18 00:00 98.0 99 17 124/59 (80) 95   


 


1/20/18 00:00      Room Air  


 


1/19/18 20:00 98.2 98 17 131/63 (85) 96   


 


1/19/18 20:00      Room Air  


 


1/19/18 19:09 98.2 96 16 117/62 97   


 


1/19/18 16:49     97   21


 


1/19/18 16:41 98.1 102  111/61 97   


 


1/19/18 16:41 98.1 102 14 111/61 97   


 


1/19/18 16:24 98.2 98 16 115/63 95   


 


1/19/18 16:02 98.1 102 14 111/61 (78) 97   


 


1/19/18 12:02 98.3 104 22 120/63 (82) 94   














I/O      


 


 1/19/18 1/19/18 1/19/18 1/20/18 1/20/18 1/20/18





 07:00 15:00 23:00 07:00 15:00 23:00


 


Intake Total 240 ml  616 ml 1000 ml  


 


Balance 240 ml  616 ml 1000 ml  


 


      


 


Intake Oral 240 ml  240 ml 650 ml  


 


FFP   366 ml 350 ml  


 


Blood Product IV Normal Saline Flush   10 ml   


 


# Voids 2  3 5  


 


# Bowel Movements 0  2 1  








 (Kevan oNlan MD R2)


Result Diagram:  


1/18/18 0535                                                                   

             1/19/18 0647





Objective Remarks


GENERAL: Well-nourished, well-developed male lying in bed in no acute distress. 


SKIN: Warm and dry. No rash. Jaundiced throughout body.


HEENT: Atraumatic, normocephalic with extraocular motions intact. Scleral 

icterus positive. No rhinorrhea. No visible lymphadenopathy or jugulovenous 

distension appreciated. 


CARDIOVASCULAR: Regular rate and rhythm without 2/6 ZEYAD. 2+ pulses in all 4 

extremities.


RESPIRATORY: Clear to auscultation bilaterally with no crackles, wheezes, or 

rhonchi. No increased work of breathing.


GASTROINTESTINAL: Abdomen soft with positive bowel sounds. Abdomen mildly 

distended and tender to palpation in all 4 quadrants. No masses appreciated.


MUSCULOSKELETAL: No cyanosis or edema. No calf tenderness. 


NEURO/PSYCH: Afocal. Awake, alert, and oriented x3. Normal speech and judgement.


 (Kevan Nolan MD R2)





A/P


Assessment and Plan


27-year-old male with history of alcoholism, liver cirrhosis, hepatorenal 

syndrome.  Admitted for liver cirrhosis/hepatorenal syndrome, sepsis.


Discharge Planning


Today





dw Dr. Lam





 (Kevan Nolan MD R2)


Attending Attestation


Patient seen and examined.  Case reviewed and discussed with the resident team.

  Agree with plan of care as discussed with me and documented in the resident 

note.


saw him with Dr Nolan and he was clear and articulate. he could explain his 

past history, current plans and medicines and had no hesitation or confusion. 

agree with decreasing benzos and narcotics in end stage liver pts but he is a 

bit of a special case as he is so sick that if he is more stable with the meds 

than without, the risk benefit assessment seems to be in favor of his doing 

better on these meds 


 (Jany Lam MD)


Problem List:  


(1) Cirrhosis of liver


ICD Codes:  K74.60 - Unspecified cirrhosis of liver


Status:  Chronic


Plan:   History of severe liver cirrhosis associated with jaundice. Meld score 

of 36.6.  Reports most recent alcohol abuse relapse was about 2 weeks ago. No 

signs of withdrawal  CT abdomen significant for massive hepatomegaly and 

worsening splenomegaly and portal systemic collaterals consistent with 

worsening portal hypertension.  Only trace ascites.  Reported baseline INR is 

around 1.3 and hemoglobin baseline of around 10.


-Transfuse 2 unit FFP due to multiple episodes of nosebleed in the setting of 

elevated INR


* closely monitor for acute bleeds


* Head CT 1/19: Negative


-Hx of chronic prednisone use since 3/2017 with most recent use of 5mg daily. 

Increased temporarily for stress response, now back to 5mg daily. Will provide 

taper at discharge


* continue until seen by GI doctor


GI consulted: appreciate recommendations


* Follows with Dr. Benavides


* Continue Xifaxan and Prednisone


* Consider beta blocker if blood pressure normalizes


* Pt declines EGD





Medications:


* Vitamin K 10mg x3 days (1/15-1/17)


* Rocephin 2g daily for possible SBP (1/13-1/19)


* Xifaxan 550 mg BID


* oxycodone for pain


* prednisone 1mg daily


* Spironolactone 25mg daily





(2) Hepatorenal syndrome


ICD Codes:  K76.7 - Hepatorenal syndrome


Status:  Chronic


Plan:  HX of hepatorenal syndrome. CT abdomen showed hepatomegaly and  

worsening portal hypertension. Baseline Cr around 1. Admission Cr 1.82


-Cr much improved.


-see more detailed plan under liver cirrhosis





(3) Sepsis


ICD Codes:  A41.9 - Sepsis, unspecified organism


Status:  Resolved


Plan:  Met sepsis criteria at admission with fever and tachycardia. However 

sister reports pt's baseline HR is around 


-Clinically patient is stable.


-Associated with CARLEE but now resolved


-Blood cultures NGTD


-urine culture negative


-UA and CXR (including repeat) unremarkable.


-continue hydration and abx as described above.





(4) CAP (community acquired pneumonia)


ICD Codes:  J18.9 - Pneumonia, unspecified organism


Status:  Acute


Plan:  Patient complaining of sore throat  and cough that developed prior to 

admission. Pt with hoarse voice on admission. Already on abx that would provide 

coverage of strep. However, PE  was now significant for Left lower lobe 

crackles and wheeze that may have become more prominent as hydration improved 

but has now improved. Will also cover for atypical. 


-Azithromycin 500mg (1/16-1/20) for 5 days


-cepacol lozenges


-Repeat CXR unremarkable.


-albuterol +nebulizer ordered


-incentive spirometry





(5) Heart murmur


ICD Codes:  R01.1 - Cardiac murmur, unspecified


Plan:  Murmur louder on admission, has improved. Likely related to dehydration 

and anemia.


-monitor and consider echo if there is a clinical change





(6) Macrocytic anemia


ICD Codes:  D53.9 - Nutritional anemia, unspecified


Plan:  Likely related to alcohol use.


-B12 normal 


-Folate minimally low at 3.0


-multivitamin ordered





(7) Alcohol dependence


ICD Codes:  F10.20 - Alcohol dependence, uncomplicated


Status:  Chronic


Plan:  Pt reports relapse x2 due to issue with xanax. No signs of withdrawal at 

this time.


-Recommended continued AA meeting attendance


-Plan to go [facility] after discharge. Will attempt to send to drug/

alcohol rehab at discharge


-Psych consulted: appreciate recommendations


* would benefit of restart Prozac 20 mg for his depression


* Ativan discontinued due to likely contributing to altered mental status


* Patient is in a contemplation state of his alcohol addiction and ready to 

take actions, the patient is very motivated to be discharged to a 

rehabilitation program and to achieve sobriety.  


* Extensive support and psychoeducation provided.





(8) Depression


ICD Codes:  F32.9 - Major depressive disorder, single episode, unspecified


Plan:  Hx of depression. Previously on Paxil but not good with taking it every 

day


-Fluoxetine 20mg started. this is a good choice as he will not withdrawal from 

it even if he stops suddenly





(9) FEN


Plan:  Fluids: good PO intake. 


Nutrition: Regular, 2g salt restriction


Electrolytes: Hyponatremia hypokalemia resolved.  Continue to monitor and 

replete as needed. KCL 20meq daily


DVT prophylaxis: SCDs


GI prophylaxis: Zantac 150 mg daily


 (Kevan Nolan MD R2)





Problem Qualifiers





(1) Cirrhosis of liver:  


Qualified Codes:  K70.31 - Alcoholic cirrhosis of liver with ascites


(2) CAP (community acquired pneumonia):  


Qualified Codes:  J18.1 - Lobar pneumonia, unspecified organism


(3) Depression:  








Kevan Nolan MD R2 Jan 20, 2018 09:46


Jany Lam MD Jan 22, 2018 09:04

## 2018-01-22 NOTE — HHI.DS
Discharge Summary


Admission Date


Jan 13, 2018 at 19:50


Discharge Date:  Jan 20, 2018


Admitting Diagnosis





Hepatorenal syndrome; abdominal pain; h/o alcohol use





(1) Cirrhosis of liver


Plan:   History of severe liver cirrhosis associated with jaundice. Meld score 

of 36.6.  Reports most recent alcohol abuse relapse was about 2 weeks ago. No 

signs of withdrawal  CT abdomen significant for massive hepatomegaly and 

worsening splenomegaly and portal systemic collaterals consistent with 

worsening portal hypertension.  Only trace ascites.  Reported baseline INR is 

around 1.3 and hemoglobin baseline of around 10.


-Transfuse 2 unit FFP due to multiple episodes of nosebleed in the setting of 

elevated INR


* closely monitor for acute bleeds


* Head CT 1/19: Negative


-Hx of chronic prednisone use since 3/2017 with most recent use of 5mg daily. 

Increased temporarily for stress response, now back to 5mg daily. Will provide 

taper at discharge


* continue until seen by GI doctor


GI consulted: appreciate recommendations


* Follows with Dr. Benavides


* Continue Xifaxan and Prednisone


* Consider beta blocker if blood pressure normalizes


* Pt declines EGD





Medications:


* Vitamin K 10mg x3 days (1/15-1/17)


* Rocephin 2g daily for possible SBP (1/13-1/19)


* Xifaxan 550 mg BID


* oxycodone for pain


* prednisone 1mg daily


* Spironolactone 25mg daily


ICD Codes:  K74.60 - Unspecified cirrhosis of liver


Status:  Chronic


(2) Hepatorenal syndrome


Plan:  HX of hepatorenal syndrome. CT abdomen showed hepatomegaly and  

worsening portal hypertension. Baseline Cr around 1. Admission Cr 1.82


-Cr much improved.


-see more detailed plan under liver cirrhosis


ICD Codes:  K76.7 - Hepatorenal syndrome


Status:  Chronic


(3) Sepsis


Plan:  Met sepsis criteria at admission with fever and tachycardia. However 

sister reports pt's baseline HR is around 


-Clinically patient is stable.


-Associated with CARLEE but now resolved


-Blood cultures NGTD


-urine culture negative


-UA and CXR (including repeat) unremarkable.


-continue hydration and abx as described above.


ICD Codes:  A41.9 - Sepsis, unspecified organism


Status:  Resolved


(4) CAP (community acquired pneumonia)


Plan:  Patient complaining of sore throat  and cough that developed prior to 

admission. Pt with hoarse voice on admission. Already on abx that would provide 

coverage of strep. However, PE  was now significant for Left lower lobe 

crackles and wheeze that may have become more prominent as hydration improved 

but has now improved. Will also cover for atypical. 


-Azithromycin 500mg (1/16-1/20) for 5 days


-cepacol lozenges


-Repeat CXR unremarkable.


-albuterol +nebulizer ordered


-incentive spirometry


ICD Codes:  J18.9 - Pneumonia, unspecified organism


Status:  Acute


(5) Heart murmur


Plan:  Murmur louder on admission, has improved. Likely related to dehydration 

and anemia.


-monitor and consider echo if there is a clinical change


ICD Codes:  R01.1 - Cardiac murmur, unspecified


(6) Macrocytic anemia


Plan:  Likely related to alcohol use.


-B12 normal 


-Folate minimally low at 3.0


-multivitamin ordered


ICD Codes:  D53.9 - Nutritional anemia, unspecified


(7) Alcohol dependence


Plan:  Pt reports relapse x2 due to issue with xanax. No signs of withdrawal at 

this time.


-Recommended continued AA meeting attendance


-Plan to go to [facility] after discharge. Will attempt to send to drug/

alcohol rehab at discharge


-Psych consulted: appreciate recommendations


* would benefit of restart Prozac 20 mg for his depression


* Ativan discontinued due to likely contributing to altered mental status


* Patient is in a contemplation state of his alcohol addiction and ready to 

take actions, the patient is very motivated to be discharged to a 

rehabilitation program and to achieve sobriety.  


* Extensive support and psychoeducation provided.


ICD Codes:  F10.20 - Alcohol dependence, uncomplicated


Status:  Chronic


(8) Depression


Plan:  Hx of depression. Previously on Paxil but not good with taking it every 

day


-Fluoxetine 20mg started. this is a good choice as he will not withdrawal from 

it even if he stops suddenly


ICD Codes:  F32.9 - Major depressive disorder, single episode, unspecified


(9) FEN


Plan:  Fluids: good PO intake. 


Nutrition: Regular, 2g salt restriction


Electrolytes: Hyponatremia hypokalemia resolved.  Continue to monitor and 

replete as needed. KCL 20meq daily


DVT prophylaxis: SCDs


GI prophylaxis: Zantac 150 mg daily





Consultants


GI


Psychiatry


Brief History


Patient is a 28 y/o M w/hx of cirrhosis complicated by varices and hepatorenal 

syndrome presenting w/ jaundice. Sister is at bedside.





Patient was admitted several times to the hospital earlier this year (since 

March) for alcoholic hepatitis and complications related to cirrhosis.Hx of 

leaving AMA. Was seeing Dr. Marley from GI and Dr. Alan from Nephrology until 

care was transitioned fully to PCP as patient's renal function and liver 

function labs improved. Varices were documented per CT at the time. Was treated 

w/prednisone and Xifaxan since then. Was documented to have started prednisone 

taper in August after completing treatment for alcoholic hepatitis. However, 

has still been taking prednisone 5 mg per instructions of his GI doctor after 

concerns of withdrawal after stopping medication. Patient's compliance w/

medications has been questionable. Was seen in september for ETOH relapse 

associated with confusion; however, symptoms resolved after a visit to the ED. 

Had another alcoholic relapse at the end of December, when he went on a 3 day 

binge drink of alcohol. Labs showed AST/ALT ratio of 2, bilirubin >29, and INR 

of 1.8; MELD score >29 put him at estimated mortality of 20% in 3 months. 


Was started on spironolactone 25 mg BID and furosemide 40 mg daily in addition 

to pentoxifylline treatment for acute hepatitis per PCP on 1/11/18.





States that he was taking all the medications that was prescribed by FM doctor 

since last visit. Since then, however, he has not been able to keep food down, 

and that he has been vomiting his food. Denies hematemesis or melena.  + 

coughing yellow sputum, feeling hot, and fatigued. Worsened lower abdominal 

pain. Did not go to work yesterday because of "how yellow he looks." Denies 

pruritis, chest pain, leg swelling, and rash. Denies recent drinking.


CBC/BMP:  


1/18/18 0535                                                                   

             1/19/18 0647





Significant Findings





Laboratory Tests








Test


  1/19/18


06:47 1/20/18


07:35


 


Prothrombin Time


  19.4 SEC


(9.8-11.6) 19.8 SEC


(9.8-11.6)


 


Random Glucose


  68 MG/DL


() 


 


 


Estimat Glomerular Filtration


Rate 69 ML/MIN


(>89) 


 


 


Ammonia


  


  37 MCMOL/L


(11-32)








Imaging





Last Impressions








Head CT 1/19/18 0000 Signed





Impressions: 





 Service Date/Time:  Friday, January 19, 2018 14:44 - CONCLUSION:  Normal 





 examination.       Dm Carroll MD 


 


Chest X-Ray 1/15/18 0000 Signed





Impressions: 





 Service Date/Time:  Monday, January 15, 2018 09:57 - CONCLUSION:  No acute 





 disease.  No significant change has occurred.       Dm Carroll MD 


 


Abdomen/Pelvis CT 1/13/18 1617 Signed





Impressions: 





 Service Date/Time:  Saturday, January 13, 2018 19:24 - CONCLUSION:  Massive 





 hepatomegaly. Worsening splenomegaly and portosystemic collaterals consistent 





 with worsening portal hypertension. Only trace ascites.     Jose Irvin MD 








PE at Discharge


GENERAL: Well-nourished, well-developed male lying in bed in no acute distress. 


SKIN: Warm and dry. No rash. Jaundiced throughout body.


HEENT: Atraumatic, normocephalic with extraocular motions intact. Scleral 

icterus positive. No rhinorrhea. No visible lymphadenopathy or jugulovenous 

distension appreciated. 


CARDIOVASCULAR: Regular rate and rhythm without 2/6 ZEYAD. 2+ pulses in all 4 

extremities.


RESPIRATORY: Clear to auscultation bilaterally with no crackles, wheezes, or 

rhonchi. No increased work of breathing.


GASTROINTESTINAL: Abdomen soft with positive bowel sounds. Abdomen mildly 

distended and tender to palpation in all 4 quadrants. No masses appreciated.


MUSCULOSKELETAL: No cyanosis or edema. No calf tenderness. 


NEURO/PSYCH: Afocal. Awake, alert, and oriented x3. Normal speech and judgement.


Hospital Course


27-year-old male with history of alcoholism, liver cirrhosis, hepatorenal 

syndrome.  Admitted for liver cirrhosis/hepatorenal syndrome, sepsis/CAP. On 

admission, patient did have a elevated INR and ammonia. Patient also presented 

with cough. CXR was unremarkable but patient did develop left lower lobe 

crackles so he was treated for CAP with Rocephin and Azithromycin. The Rocephin 

was initially started due to concern for SBP but abdominal CT only saw trace 

ascites and patient's abdominal pain was at his baseline. In regards to his 

liver cirrhosis, patient received FFP x2 and Vit K due to nose bleeds and 

elevated INR. Renal function did improve from a high of 1.82 on admission. GI 

was consulted to assist with care and offered EGD but patient declined. Also 

recommended beta blocker but patient's BP remained low during hospitalization 

and was not started at this time. Depression was treated with Fluoxetine with 

Ativan to help with anxiety. Pain was controlled with oxycodone as it would be 

processed better without Tylenol due to his liver cirrhosis. Patient was 

discharged in stable condition with strict recommendations to avoid alcohol and 

to have very close follow up with Dr. Benavides, especially in regards to his 

chronic prednisone use.


Pt Condition on Discharge:  Stable


Discharge Disposition:  Discharge Home


Discharge Instructions


DIET: Follow Instructions for:  As Tolerated, No Restrictions, Low Sodium Diet


Additional Diet Instructions:  


Max of 2g of sodium daily


Activities you can perform:  Regular-No Restrictions


Follow up Referrals:  


Appointment for Follow Up @ [facility]


Gastroenterology - 2-3 Days with Michi Benavides M.d.


PCP Follow-up - 1 Week


PCP Follow-up - 1 Week @ [facility]


PCP Follow-up @ DR CASH





New Medications:  


Prednisone (Prednisone) 1 Mg Tab


1 MG PO DAILY, #135 TAB 0 Refills


Take 5tabs for 7 days. Then 4tabs for 10days. Then 3tabs for 10days,


 then 2tabs for 10days, then 1tab for 10days


Fluoxetine (Fluoxetine) 20 Mg Capsule


20 MG PO DAILY, #30 TAB





Multivitamin with Folic Acid (Thera Tablet) 400 Mcg Tablet


1 TAB PO DAILY, #30 TAB





Oxycodone (Oxycodone) 5 Mg Tab


5 MG PO Q8H PRN for PAIN 3-10, #30 TAB





Spironolactone (Aldactone) 25 Mg Tab


25 MG PO DAILY, #30 TAB





 


Continued Medications:  


Ranitidine (Zantac) 150 Mg Tab


150 MG PO DAILY for Reduce Stomach Acid, #90 TAB 1 Refill





Rifaximin (Xifaxan) 550 Mg Tab


550 MG PO BID for Immunosuppression, #60 TAB (This prescription has been renewed

)





 


Discontinued Medications:  


Alprazolam (Alprazolam) 1 Mg Tab


1 MG PO DAILY PRN for ANXIETY, #60 TAB 1 Refill





Furosemide (Furosemide) 40 Mg Tab


40 MG PO DAILY, #30 TAB 0 Refills





Pentoxifylline ER (Pentoxifylline ER) 400 Mg Tab


400 MG PO TID for Inflammation, #84 TAB 0 Refills


For 28 days of treatment for severe alcoholic hepatitis


Spironolactone (Spironolactone) 100 Mg Tab


25 MG PO BID, #30 TAB 0 Refills

















Telma Covarrubias MD, R3 Jan 22, 2018 07:02

## 2018-01-24 NOTE — HHI.FF
Face to Face Verification


Diagnosis:  


(1) Hepatorenal syndrome


(2) Cirrhosis of liver


(3) CAP (community acquired pneumonia)


(4) Jaundice


(5) Depression


Home Health Nursing








Order: Medical education





 Signs/symptoms of disease process





 Medication education-adverse effect

















I have seen patient Scar Mathur on 1/24/18. My clinical findings 

support the need for the requested home health care services because:








 Limited ability to care for self














I certify that my clinical findings support that this patient is homebound 

because:








 Need for psychosocial assistance

















Telma Covarrubias MD, R3 Jan 24, 2018 11:58

## 2022-04-29 NOTE — PD
HPI


Chief Complaint:  Fever


Time Seen by Provider:  17:59


Travel History


International Travel<30 days:  No


Contact w/Intl Traveler<30days:  No


Traveled to known affect area:  No





History of Present Illness


HPI


The patient is a 27-year-old  male who presents to the emergency 

department for fever, headache, sore throat, and myalgias.  The patient has a 

history of cirrhosis secondary to heavy alcohol use, states he quit drinking 

several months ago, but relapsed 3 days ago.  The patient states he has a 

history of cirrhosis and acute renal failure, however, his kidney function has 

returned to baseline and his nephrologist, Dr. Alan, signed off secondary to 

improving renal function.  The patient states he is followed by his primary 

physician, Dr. Erazo, his oncologist, Dr. Young, and his gastroenterologist, Dr. Marley.  The patient states her last 3 days he has had increasing confusion, low

-grade fevers according to the sister, and numbness and tingling in the hands 

and feet.  The patient states he had several beers 3 days ago but then started 

drinking once again.  He does complain of a mild headache associated with his 

fevers but denies any diplopia, blurry vision, or neck pain.  He does complain 

of a sore throat for last 2 days.  He denies any new cough, shortness of breath

, or chest pain.  He does complain of nausea, vomiting, but denies any outright 

abdominal pain.  He has a history of loose stools, but denies any acute change 

in his stool pattern or new diarrhea.  He does complain of some myalgias with 

his paresthesias of all 4 extremities.  The sister states there has been a 

slight change in mental status over the last several days and he does have a 

history of an elevated ammonia level in the past.





PFSH


Past Medical History


Asthma:  No


Autoimmune Disease:  No


Blood Disorders:  No


Anxiety:  Yes


Depression:  No


Cancer:  No


Cardiovascular Problems:  No


Cirrhosis:  Yes (Liver Cirrhosis  /FAILAURE )


COPD:  No


Cystic Fibrosis:  No


Diabetes:  No


Diminished Hearing:  No


Endocrine:  No


Gastrointestinal Disorders:  Yes (liver failure)


GERD:  Yes


Genitourinary:  No


Hiatal Hernia:  No


Immune Disorder:  No


Musculoskeletal:  No


Neurologic:  No


Psychiatric:  Yes


Reproductive:  No


Respiratory:  No


Immunizations Current:  No


Sickle Cell Disease:  No


Sleep Apnea:  No


Thyroid Disease:  No


Ulcer:  No





Past Surgical History


Abdominal Surgery:  No


AICD:  No


Arteriovenous Shunt:  No


Cardiac Surgery:  No


Ear Surgery:  No


Endocrine Surgery:  No


Eye Surgery:  No


Genitourinary Surgery:  No


Gynecologic Surgery:  No


Insulin Pump:  No


Joint Replacement:  No


Oral Surgery:  No


Pacemaker:  No


Thoracic Surgery:  No


Other Surgery:  No





Social History


Alcohol Use:  Yes (6-7 alcoholic beverages daily, none since prior admission  3/

11)


Tobacco Use:  Yes (stop smoking since 3/11/2017)


Substance Use:  No





Allergies-Medications


(Allergen,Severity, Reaction):  


Coded Allergies:  


     No Known Allergies (Verified , 9/15/17)


Reported Meds & Prescriptions





Reported Meds & Active Scripts


Active


Alprazolam 1 Mg Tab 1 Mg PO BID PRN


Paroxetine (Paroxetine HCl) 20 Mg Tab 20 Mg PO DAILY


Xifaxan (Rifaximin) 550 Mg Tab 550 Mg PO BID


Reported


Cymbalta DR (Duloxetine HCl) 20 Mg Capdr 20 Mg PO DAILY


Protonix (Pantoprazole Sodium) 40 Mg Tab 40 Mg PO DAILY


Prednisone 10 Mg Tab 20 Mg PO DAILY








Review of Systems


Except as stated in HPI:  all other systems reviewed are Neg


General / Constitutional:  Positive: Fever


Eyes:  No: Blurred Vision, Visual changes


HENT:  Positive: Headaches, Sore Throat


Cardiovascular:  No: Chest Pain or Discomfort


Respiratory:  No: Shortness of Breath


Gastrointestinal:  Positive: Nausea, Vomiting, No: Diarrhea, Abdominal Pain, 

Changes in Bowel Habits


Genitourinary:  No: Dysuria


Musculoskeletal:  Positive: Myalgias


Neurologic:  Positive: Paresthesia, Sensory Disturbance





Physical Exam


Narrative


GENERAL: Awake, alert, pleasant 27-year-old male who appears his stated age and 

is in no acute respiratory distress.


SKIN: Focused skin assessment warm/dry.


HEAD: Atraumatic. Normocephalic. 


EYES: Pupils equal and round.  Mild icterus bilaterally.


ENT: No nasal bleeding or discharge.  Slightly dry mucous membranes.


NECK: Trachea midline. No JVD.  No meningeal signs.


CARDIOVASCULAR: Regular, tachycardic with a heart rate of 120.


RESPIRATORY: No accessory muscle use. Clear to auscultation. Breath sounds 

equal bilaterally. 


GASTROINTESTINAL: Abdomen soft, enlarged liver border.


MUSCULOSKELETAL: No obvious deformities. No clubbing.  No cyanosis.  No edema. 


NEUROLOGICAL: Awake and alert. No obvious cranial nerve deficits.  Motor 

grossly within normal limits. Normal speech.  No asterixis noted.


Back: No CVA tenderness.


PSYCHIATRIC: Appropriate mood and affect; insight and judgment normal.





Data


Data


Last Documented VS





Vital Signs








  Date Time  Temp Pulse Resp B/P (MAP) Pulse Ox O2 Delivery O2 Flow Rate FiO2


 


9/15/17 20:45        


 


9/15/17 20:11  96 18  97 Room Air  


 


9/15/17 16:58 99.5       








Orders





 Orders


Electrocardiogram (9/15/17 18:13)


Ammonia (9/15/17 18:13)


Complete Blood Count With Diff (9/15/17 18:13)


Comprehensive Metabolic Panel (9/15/17 18:13)


Creatine Kinase (Cpk) (9/15/17 18:13)


Prothrombin Time / Inr (Pt) (9/15/17 18:13)


Act Partial Throm Time (Ptt) (9/15/17 18:13)


Lactic Acid Sepsis Protocol (9/15/17 18:13)


Blood Culture (9/15/17 18:13)


Chest, Single Ap (9/15/17 18:13)


Blood Glucose (9/15/17 18:13)


Ecg Monitoring (9/15/17 18:13)


Iv Access Insert/Monitor (9/15/17 18:13)


Oximetry (9/15/17 18:13)


Sodium Chloride 0.9% Flush (Ns Flush) (9/15/17 18:15)


Sodium Chlor 0.9% 1000 Ml Inj (Ns 1000 M (9/15/17 18:13)


Group A Rapid Strep Screen (9/15/17 18:22)


Influenzae A/B Antigen (9/15/17 18:22)


Ondansetron Inj (Zofran Inj) (9/15/17 19:15)


Strep Culture (Group A) (9/15/17 18:35)


Lactulose Liq (Lactulose Liq) (9/15/17 20:00)


Potassium Chloride (Kcl) (9/15/17 20:00)





Labs





Laboratory Tests








Test


  9/15/17


18:35 9/15/17


18:38


 


Lactic Acid Level 2.0 mmol/L  


 


Ammonia 59 MCMOL/L  


 


White Blood Count  10.4 TH/MM3 


 


Red Blood Count  3.79 MIL/MM3 


 


Hemoglobin  12.9 GM/DL 


 


Hematocrit  37.0 % 


 


Mean Corpuscular Volume  97.7 FL 


 


Mean Corpuscular Hemoglobin  33.9 PG 


 


Mean Corpuscular Hemoglobin


Concent 


  34.7 % 


 


 


Red Cell Distribution Width  14.3 % 


 


Platelet Count  131 TH/MM3 


 


Mean Platelet Volume  8.4 FL 


 


Neutrophils (%) (Auto)  82.8 % 


 


Lymphocytes (%) (Auto)  8.6 % 


 


Monocytes (%) (Auto)  7.3 % 


 


Eosinophils (%) (Auto)  0.1 % 


 


Basophils (%) (Auto)  1.2 % 


 


Neutrophils # (Auto)  8.6 TH/MM3 


 


Lymphocytes # (Auto)  0.9 TH/MM3 


 


Monocytes # (Auto)  0.8 TH/MM3 


 


Eosinophils # (Auto)  0.0 TH/MM3 


 


Basophils # (Auto)  0.1 TH/MM3 


 


CBC Comment  DIFF FINAL 


 


Differential Comment   


 


Prothrombin Time  12.1 SEC 


 


Prothromb Time International


Ratio 


  1.1 RATIO 


 


 


Activated Partial


Thromboplast Time 


  28.1 SEC 


 


 


Blood Urea Nitrogen  7 MG/DL 


 


Creatinine  1.18 MG/DL 


 


Random Glucose  119 MG/DL 


 


Total Protein  7.6 GM/DL 


 


Albumin  4.2 GM/DL 


 


Calcium Level  9.5 MG/DL 


 


Alkaline Phosphatase  283 U/L 


 


Aspartate Amino Transf


(AST/SGOT) 


  282 U/L 


 


 


Alanine Aminotransferase


(ALT/SGPT) 


  101 U/L 


 


 


Total Bilirubin  2.5 MG/DL 


 


Sodium Level  143 MEQ/L 


 


Potassium Level  2.9 MEQ/L 


 


Chloride Level  105 MEQ/L 


 


Carbon Dioxide Level  25.9 MEQ/L 


 


Anion Gap  12 MEQ/L 


 


Estimat Glomerular Filtration


Rate 


  74 ML/MIN 


 


 


Total Creatine Kinase  65 U/L 











MDM


Medical Decision Making


Medical Screen Exam Complete:  Yes


Emergency Medical Condition:  Yes


Medical Record Reviewed:  Yes


Interpretation(s)


EKG reveals normal sinus rhythm with a rate of 90.  Moderate intraventricular 

conduction delay with QRS of 114 ms.


Differential Diagnosis


Differential diagnosis includes influenza, mononucleosis, strep pharyngitis, 

pneumonia, spontaneous bacterial peritonitis, viral syndrome, elevated ammonia 

level, dehydration, encephalitis, meningitis.


Narrative Course


IV was established, labs are drawn and sent, and the patient was placed on 

cardiac telemetry monitoring and continuous pulse oximetry monitoring.  EKG was 

ordered and interpreted.  Strep screen and influenza screen were sent to lab.  

Chest x-ray was obtained.  Blood culture and lactic acid were sent to lab.  

Ammonia level was sent to lab.  The patient was administered 1 L of IV fluids.  

The patient was signed out to the oncoming physician at 7 PM the laboratory 

evaluation, influenza screens, strep screen, and chest x-ray pending.


Condition:  Stable











Marcelo Naidu MD Sep 15, 2017 18:24
Physical Exam


Narrative


Patient signed out to me by Dr. Naidu.  Please see his documentation for 

complete details.  


Briefly, patient is a 27 year old male with history of alcoholic cirrhosis, who 

comes in complaining of confusion. His sister is a nurse here at [employer] and 

both she and his girlfriend noticed some increased confusion.  He says he feels 

cloudy in his head. He does admit he recently relapsed and had a few drinks.  

He also felt some tingling in his extremities. He denies any pain.  He denies 

cough or SOB.  He denies cough or cold symptoms. 





Exam shows slight tenderness to his RUQ. He says this is chronic and no worse. 

He is alert and oriented.





Data


Data


Last Documented VS





Vital Signs








  Date Time  Temp Pulse Resp B/P (MAP) Pulse Ox O2 Delivery O2 Flow Rate FiO2


 


9/15/17 20:11  96 18 149/89 (109) 97 Room Air  


 


9/15/17 16:58 99.5       








Orders





 Orders


Electrocardiogram (9/15/17 18:13)


Ammonia (9/15/17 18:13)


Complete Blood Count With Diff (9/15/17 18:13)


Comprehensive Metabolic Panel (9/15/17 18:13)


Creatine Kinase (Cpk) (9/15/17 18:13)


Prothrombin Time / Inr (Pt) (9/15/17 18:13)


Act Partial Throm Time (Ptt) (9/15/17 18:13)


Urinalysis - C+S If Indicated (9/15/17 18:13)


Lactic Acid Sepsis Protocol (9/15/17 18:13)


Blood Culture (9/15/17 18:13)


Chest, Single Ap (9/15/17 18:13)


Blood Glucose (9/15/17 18:13)


Ecg Monitoring (9/15/17 18:13)


Iv Access Insert/Monitor (9/15/17 18:13)


Oximetry (9/15/17 18:13)


Sodium Chloride 0.9% Flush (Ns Flush) (9/15/17 18:15)


Sodium Chlor 0.9% 1000 Ml Inj (Ns 1000 M (9/15/17 18:13)


Drug Screen, Random Urine (9/15/17 18:13)


Group A Rapid Strep Screen (9/15/17 18:22)


Influenzae A/B Antigen (9/15/17 18:22)


Ondansetron Inj (Zofran Inj) (9/15/17 19:15)


Strep Culture (Group A) (9/15/17 18:35)


Lactulose Liq (Lactulose Liq) (9/15/17 20:00)


Potassium Chloride (Kcl) (9/15/17 20:00)





Labs





Laboratory Tests








Test


  9/15/17


18:35 9/15/17


18:38


 


Lactic Acid Level 2.0 mmol/L  


 


Ammonia 59 MCMOL/L  


 


White Blood Count  10.4 TH/MM3 


 


Red Blood Count  3.79 MIL/MM3 


 


Hemoglobin  12.9 GM/DL 


 


Hematocrit  37.0 % 


 


Mean Corpuscular Volume  97.7 FL 


 


Mean Corpuscular Hemoglobin  33.9 PG 


 


Mean Corpuscular Hemoglobin


Concent 


  34.7 % 


 


 


Red Cell Distribution Width  14.3 % 


 


Platelet Count  131 TH/MM3 


 


Mean Platelet Volume  8.4 FL 


 


Neutrophils (%) (Auto)  82.8 % 


 


Lymphocytes (%) (Auto)  8.6 % 


 


Monocytes (%) (Auto)  7.3 % 


 


Eosinophils (%) (Auto)  0.1 % 


 


Basophils (%) (Auto)  1.2 % 


 


Neutrophils # (Auto)  8.6 TH/MM3 


 


Lymphocytes # (Auto)  0.9 TH/MM3 


 


Monocytes # (Auto)  0.8 TH/MM3 


 


Eosinophils # (Auto)  0.0 TH/MM3 


 


Basophils # (Auto)  0.1 TH/MM3 


 


CBC Comment  DIFF FINAL 


 


Differential Comment   


 


Prothrombin Time  12.1 SEC 


 


Prothromb Time International


Ratio 


  1.1 RATIO 


 


 


Activated Partial


Thromboplast Time 


  28.1 SEC 


 


 


Blood Urea Nitrogen  7 MG/DL 


 


Creatinine  1.18 MG/DL 


 


Random Glucose  119 MG/DL 


 


Total Protein  7.6 GM/DL 


 


Albumin  4.2 GM/DL 


 


Calcium Level  9.5 MG/DL 


 


Alkaline Phosphatase  283 U/L 


 


Aspartate Amino Transf


(AST/SGOT) 


  282 U/L 


 


 


Alanine Aminotransferase


(ALT/SGPT) 


  101 U/L 


 


 


Total Bilirubin  2.5 MG/DL 


 


Sodium Level  143 MEQ/L 


 


Potassium Level  2.9 MEQ/L 


 


Chloride Level  105 MEQ/L 


 


Carbon Dioxide Level  25.9 MEQ/L 


 


Anion Gap  12 MEQ/L 


 


Estimat Glomerular Filtration


Rate 


  74 ML/MIN 


 


 


Total Creatine Kinase  65 U/L 











MDM


Supervised Visit with KOURTNEY:  No


Narrative Course


Patient reports feeling better. His ammonia level is 59, but he says that 

lactulose gives him colitis.  Per sister, his ammonia level has never returned 

to normal.  His AST/ALT are elevated, which is slightly worse than 2 weeks ago, 

but he is following closely with his GI doctor for this.  His INR is within 

normal limits.  He has a normal WBC count and Hgb.  





Potassium was low, 2.9.  This was replaced.  





He has not eaten in the past 2 days because he has been working.  





He says he is feeling better and would like to go home.  His sister and 

girlfriend are comfortable with this plan as well.  He will follow up with his 

doctors.  He will return to the ED as needed for any worsening symptoms.


Diagnosis





 Primary Impression:  


 Cirrhosis of liver


 Qualified Codes:  K70.30 - Alcoholic cirrhosis of liver without ascites


 Additional Impression:  


 Confusion


Patient Instructions:  Cirrhosis (ED), General Instructions, Hepatic 

Encephalopathy (DC)





***Additional Instruction:  


Follow up with your doctors.  Avoid alcohol use.  Eat a balanced diet and 

hydrate yourself.  Return at any time for any worsening symptoms.


Disposition:  01 DISCHARGE HOME


Condition:  Stable











Shruti Valdez MD Sep 15, 2017 20:32
weight-bearing as tolerated